# Patient Record
Sex: FEMALE | Race: WHITE | Employment: OTHER | URBAN - METROPOLITAN AREA
[De-identification: names, ages, dates, MRNs, and addresses within clinical notes are randomized per-mention and may not be internally consistent; named-entity substitution may affect disease eponyms.]

---

## 2017-01-13 ENCOUNTER — TELEPHONE (OUTPATIENT)
Dept: INTERNAL MEDICINE CLINIC | Facility: CLINIC | Age: 67
End: 2017-01-13

## 2017-01-13 DIAGNOSIS — M48.07 LUMBOSACRAL STENOSIS: Primary | ICD-10-CM

## 2017-01-13 NOTE — TELEPHONE ENCOUNTER
FW: REFERRAL  Received:  Today       Genevieve Ring MD  [facility] Clinical Staff       Phone Number: [phone]                     ok            Previous Messages       ----- Message -----   Sudha Smith From: Farshad Werner LPN      Sent: 7/31/2124   5:06 PM

## 2017-01-16 ENCOUNTER — OFFICE VISIT (OUTPATIENT)
Dept: PHYSICAL THERAPY | Age: 67
End: 2017-01-16
Attending: ORTHOPAEDIC SURGERY
Payer: MEDICARE

## 2017-01-16 DIAGNOSIS — Z98.1 S/P LUMBAR FUSION: Primary | ICD-10-CM

## 2017-01-16 DIAGNOSIS — M48.07 SPINAL STENOSIS OF LUMBOSACRAL REGION: ICD-10-CM

## 2017-01-16 PROCEDURE — 97162 PT EVAL MOD COMPLEX 30 MIN: CPT

## 2017-01-16 PROCEDURE — 97110 THERAPEUTIC EXERCISES: CPT

## 2017-01-16 NOTE — PROGRESS NOTES
SPINE EVALUATION:   Referring Physician: Dr. Jagdeep Jaeger  Diagnosis: Spinal stenosis of lumbosacral region (M48.07)     Date of Service: 1/16/2017     PATIENT SUMMARY   Fran Law is a 77year old y/o female who presents to therapy today with comp (taking meds), thyroidectomy 2008, surgery for shatterd patella mid-1990s. ASSESSMENT  Pt presents to therapy with c/o dull LBP with shooting L leg pain, intermittent throughout day. Pain started following fall over dog leash.  She is moderately irritab 4+/5   Knee extension: R 5/5; L 4+/5   PF: R 5/5; L 5/5  DF: R 5/5; L 5/5     Flexibility: Slightly limited hip flexors (bilateral)    Special tests:   Repeated Motions Testing: Standing left side glides reduced LBP and L leg pain  Lumbar Quadrant Testing: care.    Thank you for your referral. Please co-sign.  If you have any questions, please contact me at Dept: [phone]    Sincerely,  Electronically signed by therapist: Carlie Samuel PT, DPT    Physician's certification required: Yes    Certification Fr

## 2017-01-19 ENCOUNTER — OFFICE VISIT (OUTPATIENT)
Dept: PHYSICAL THERAPY | Age: 67
End: 2017-01-19
Attending: ORTHOPAEDIC SURGERY
Payer: MEDICARE

## 2017-01-19 PROCEDURE — 97112 NEUROMUSCULAR REEDUCATION: CPT

## 2017-01-19 PROCEDURE — 97140 MANUAL THERAPY 1/> REGIONS: CPT

## 2017-01-19 PROCEDURE — 97110 THERAPEUTIC EXERCISES: CPT

## 2017-01-19 NOTE — PROGRESS NOTES
Dx: Spinal stenosis of lumbosacral region (M48.07)   Authorized # of Visits: 8 Next MD Visit: After PT   Fall Risk: Slight Precautions: Fusion L4-5, MS     Subjective: Pt states she is feeling good today.  She still has occasional sharp shooting pain into L bars, EO         Single leg stance, EO, UE support x2, 0k92ark each         Prone thoracic transverse mobilization, grade II, x5 min         STM lumbar paraspinals x5min           HEP:  · Prone Press Up on Elbows; hold 1-2 minutes, twice daily  · Left Benetta Sina

## 2017-01-23 ENCOUNTER — OFFICE VISIT (OUTPATIENT)
Dept: PHYSICAL THERAPY | Age: 67
End: 2017-01-23
Attending: ORTHOPAEDIC SURGERY
Payer: MEDICARE

## 2017-01-23 PROCEDURE — 97110 THERAPEUTIC EXERCISES: CPT

## 2017-01-23 PROCEDURE — 97140 MANUAL THERAPY 1/> REGIONS: CPT

## 2017-01-23 PROCEDURE — 97112 NEUROMUSCULAR REEDUCATION: CPT

## 2017-01-23 NOTE — PROGRESS NOTES
Dx: Spinal stenosis of lumbosacral region (M48.07)   Authorized # of Visits: 8 Next MD Visit: After PT   Fall Risk: Slight Precautions: Fusion L4-5, MS     Subjective: \"Not good today. I feel tight in my left buttock and it connects to my left leg. \" Pain Tx#: 5 Date: Tx#: 6 Date: Tx#: 7 Date:    Tx#: 8   Upright bike, L1, x6min Walking in hallway x5min        Standing left side glides 2x10 ----------------        Prone press up 2x10 Standing lumbar extension 3x2x10        Flexion in standing with R fo

## 2017-01-25 RX ORDER — CELECOXIB 200 MG/1
CAPSULE ORAL
Qty: 90 CAPSULE | Refills: 1 | Status: ON HOLD | OUTPATIENT
Start: 2017-01-25 | End: 2017-04-18

## 2017-01-25 RX ORDER — TRIAMTERENE AND HYDROCHLOROTHIAZIDE 37.5; 25 MG/1; MG/1
1 TABLET ORAL
Qty: 90 TABLET | Refills: 0 | Status: ON HOLD | OUTPATIENT
Start: 2017-01-25 | End: 2017-04-24

## 2017-01-26 ENCOUNTER — OFFICE VISIT (OUTPATIENT)
Dept: PHYSICAL THERAPY | Age: 67
End: 2017-01-26
Attending: ORTHOPAEDIC SURGERY
Payer: MEDICARE

## 2017-01-26 PROCEDURE — 97110 THERAPEUTIC EXERCISES: CPT

## 2017-01-26 PROCEDURE — 97140 MANUAL THERAPY 1/> REGIONS: CPT

## 2017-01-26 NOTE — PROGRESS NOTES
Dx: Spinal stenosis of lumbosacral region (M48.07)   Authorized # of Visits: 8 Next MD Visit: After PT   Fall Risk: Slight Precautions: Fusion L4-5, MS     Subjective: \"The mornings are really bad. Lasts for a few hours after waking.  About 3/10 currently, 1/26/2017  Tx#: 4 Date: Tx#: 5 Date: Tx#: 6 Date: Tx#: 7 Date:    Tx#: 8   Upright bike, L1, x6min Walking in hallway x5min ---------------       Standing left side glides 2x10 ---------------- ----------------       Prone press up 2x10 Standing lumba progression, importance of increasing dosing of HEP throughout day. TherEx to improve mobility. NMR to improve motor control and balance. Manual therapy to decrease pain and improve mobility. Charges:  TherEx x3 (40 min), Manual x1 (x15 min)       Total

## 2017-01-30 ENCOUNTER — APPOINTMENT (OUTPATIENT)
Dept: PHYSICAL THERAPY | Age: 67
End: 2017-01-30
Attending: ORTHOPAEDIC SURGERY
Payer: MEDICARE

## 2017-01-31 ENCOUNTER — APPOINTMENT (OUTPATIENT)
Dept: PHYSICAL THERAPY | Age: 67
End: 2017-01-31
Attending: ORTHOPAEDIC SURGERY
Payer: MEDICARE

## 2017-02-02 ENCOUNTER — APPOINTMENT (OUTPATIENT)
Dept: PHYSICAL THERAPY | Age: 67
End: 2017-02-02
Attending: ORTHOPAEDIC SURGERY
Payer: MEDICARE

## 2017-02-03 ENCOUNTER — APPOINTMENT (OUTPATIENT)
Dept: PHYSICAL THERAPY | Age: 67
End: 2017-02-03
Attending: ORTHOPAEDIC SURGERY
Payer: MEDICARE

## 2017-02-06 ENCOUNTER — APPOINTMENT (OUTPATIENT)
Dept: PHYSICAL THERAPY | Age: 67
End: 2017-02-06
Attending: ORTHOPAEDIC SURGERY
Payer: MEDICARE

## 2017-02-08 ENCOUNTER — LAB ENCOUNTER (OUTPATIENT)
Dept: LAB | Age: 67
End: 2017-02-08
Attending: ORTHOPAEDIC SURGERY
Payer: MEDICARE

## 2017-02-08 DIAGNOSIS — M96.1 POST-LAMINECTOMY SYNDROME: ICD-10-CM

## 2017-02-08 LAB
ALBUMIN SERPL-MCNC: 4 G/DL (ref 3.5–4.8)
ALP LIVER SERPL-CCNC: 57 U/L (ref 55–142)
ALT SERPL-CCNC: 31 U/L (ref 14–54)
AST SERPL-CCNC: 20 U/L (ref 15–41)
BASOPHILS # BLD AUTO: 0.05 X10(3) UL (ref 0–0.1)
BASOPHILS NFR BLD AUTO: 0.8 %
BILIRUB SERPL-MCNC: 0.5 MG/DL (ref 0.1–2)
BILIRUB UR QL STRIP.AUTO: NEGATIVE
BUN BLD-MCNC: 23 MG/DL (ref 8–20)
CALCIUM BLD-MCNC: 9.5 MG/DL (ref 8.3–10.3)
CHLORIDE: 103 MMOL/L (ref 101–111)
CO2: 29 MMOL/L (ref 22–32)
COLOR UR AUTO: YELLOW
CREAT BLD-MCNC: 0.78 MG/DL (ref 0.55–1.02)
EOSINOPHIL # BLD AUTO: 0.32 X10(3) UL (ref 0–0.3)
EOSINOPHIL NFR BLD AUTO: 4.9 %
ERYTHROCYTE [DISTWIDTH] IN BLOOD BY AUTOMATED COUNT: 13.2 % (ref 11.5–16)
GLUCOSE BLD-MCNC: 92 MG/DL (ref 70–99)
GLUCOSE UR STRIP.AUTO-MCNC: NEGATIVE MG/DL
HCT VFR BLD AUTO: 41.9 % (ref 34–50)
HGB BLD-MCNC: 13.9 G/DL (ref 12–16)
IMMATURE GRANULOCYTE COUNT: 0.05 X10(3) UL (ref 0–1)
IMMATURE GRANULOCYTE RATIO %: 0.8 %
INR BLD: 0.93 (ref 0.89–1.12)
KETONES UR STRIP.AUTO-MCNC: NEGATIVE MG/DL
LYMPHOCYTES # BLD AUTO: 1.67 X10(3) UL (ref 0.9–4)
LYMPHOCYTES NFR BLD AUTO: 25.7 %
M PROTEIN MFR SERPL ELPH: 7.6 G/DL (ref 6.1–8.3)
MCH RBC QN AUTO: 31.1 PG (ref 27–33.2)
MCHC RBC AUTO-ENTMCNC: 33.2 G/DL (ref 31–37)
MCV RBC AUTO: 93.7 FL (ref 81–100)
MONOCYTES # BLD AUTO: 0.59 X10(3) UL (ref 0.1–0.6)
MONOCYTES NFR BLD AUTO: 9.1 %
NEUTROPHIL ABS PRELIM: 3.82 X10 (3) UL (ref 1.3–6.7)
NEUTROPHILS # BLD AUTO: 3.82 X10(3) UL (ref 1.3–6.7)
NEUTROPHILS NFR BLD AUTO: 58.7 %
NITRITE UR QL STRIP.AUTO: NEGATIVE
PH UR STRIP.AUTO: 7 [PH] (ref 4.5–8)
PLATELET # BLD AUTO: 265 10(3)UL (ref 150–450)
POTASSIUM SERPL-SCNC: 3.8 MMOL/L (ref 3.6–5.1)
PROT UR STRIP.AUTO-MCNC: NEGATIVE MG/DL
PSA SERPL DL<=0.01 NG/ML-MCNC: 12.7 SECONDS (ref 12.3–14.8)
RBC # BLD AUTO: 4.47 X10(6)UL (ref 3.8–5.1)
RBC UR QL AUTO: NEGATIVE
RED CELL DISTRIBUTION WIDTH-SD: 45.1 FL (ref 35.1–46.3)
SODIUM SERPL-SCNC: 140 MMOL/L (ref 136–144)
SP GR UR STRIP.AUTO: 1.01 (ref 1–1.03)
UROBILINOGEN UR STRIP.AUTO-MCNC: <2 MG/DL
WBC # BLD AUTO: 6.5 X10(3) UL (ref 4–13)

## 2017-02-08 PROCEDURE — 85025 COMPLETE CBC W/AUTO DIFF WBC: CPT

## 2017-02-08 PROCEDURE — 85610 PROTHROMBIN TIME: CPT

## 2017-02-08 PROCEDURE — 36415 COLL VENOUS BLD VENIPUNCTURE: CPT

## 2017-02-08 PROCEDURE — 80053 COMPREHEN METABOLIC PANEL: CPT

## 2017-02-08 PROCEDURE — 80061 LIPID PANEL: CPT

## 2017-02-08 PROCEDURE — 81001 URINALYSIS AUTO W/SCOPE: CPT

## 2017-02-08 PROCEDURE — 87081 CULTURE SCREEN ONLY: CPT

## 2017-02-09 ENCOUNTER — APPOINTMENT (OUTPATIENT)
Dept: PHYSICAL THERAPY | Age: 67
End: 2017-02-09
Attending: ORTHOPAEDIC SURGERY
Payer: MEDICARE

## 2017-02-09 ENCOUNTER — OFFICE VISIT (OUTPATIENT)
Dept: INTERNAL MEDICINE CLINIC | Facility: CLINIC | Age: 67
End: 2017-02-09

## 2017-02-09 VITALS
RESPIRATION RATE: 16 BRPM | DIASTOLIC BLOOD PRESSURE: 78 MMHG | BODY MASS INDEX: 31.9 KG/M2 | HEIGHT: 65 IN | HEART RATE: 71 BPM | TEMPERATURE: 99 F | OXYGEN SATURATION: 98 % | WEIGHT: 191.5 LBS | SYSTOLIC BLOOD PRESSURE: 120 MMHG

## 2017-02-09 DIAGNOSIS — Z01.818 PRE-OP EVALUATION: Primary | ICD-10-CM

## 2017-02-09 DIAGNOSIS — Z23 NEED FOR PNEUMOCOCCAL VACCINATION: ICD-10-CM

## 2017-02-09 DIAGNOSIS — L65.9 HAIR LOSS: ICD-10-CM

## 2017-02-09 PROCEDURE — G0009 ADMIN PNEUMOCOCCAL VACCINE: HCPCS | Performed by: FAMILY MEDICINE

## 2017-02-09 PROCEDURE — 90670 PCV13 VACCINE IM: CPT | Performed by: FAMILY MEDICINE

## 2017-02-09 PROCEDURE — 93000 ELECTROCARDIOGRAM COMPLETE: CPT | Performed by: FAMILY MEDICINE

## 2017-02-09 PROCEDURE — 99214 OFFICE O/P EST MOD 30 MIN: CPT | Performed by: FAMILY MEDICINE

## 2017-02-09 NOTE — PROGRESS NOTES
Shlomo Meneses is a 77year old female who presents for a pre-operative physical exam. Patient is to have surgery to assess L4-L5 fusion and to fuse L3-L4, to be done by Dr. Yuli Rust at [facility] on 3/7/17.       HPI:   Pt complains of low back p total) by mouth daily. Disp: 180 tablet Rfl: 1   Multiple Vitamin (DAILY VALUE MULTIVITAMIN) Oral Tab Take 1 tablet by mouth daily. Disp:  Rfl:    Cholecalciferol (VITAMIN D) 2000 UNITS Oral Cap Take 1 capsule by mouth daily.  Disp:  Rfl:    omeprazole 20 M Comment: glass of wine a few times per week. 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 Exercise: three times per week.   Diet: watches fats closely, watches sugar closely and watches calories closely     REVIEW OF SYSTEMS:   GENE 98[birth year]  -COMP METABOLIC PANEL (14)   Collection Time: 02/08/17  7:24 AM   Result Value Ref Range   Glucose 92 70-99 mg/dL   BUN 23 (H) 8-20 mg/dL   Creatinine 0.78 0.55-1.02 mg/dL   GFR 79 >=60   Calcium, Total 9.5 8.3-10.3 mg/dL   Alkaline Phosphatase 5 uL   Neutrophil Absolute 3.82 1.30-6.70 x10(3) uL   Lymphocyte Absolute 1.67 0.90-4.00 x10(3) uL   Monocyte Absolute 0.59 0.10-0.60 x10(3) uL   Eosinophil Absolute 0.32 (H) 0.00-0.30 x10(3) uL   Basophil Absolute 0.05 0.00-0.10 x10(3) uL   Immature Granulo

## 2017-02-10 ENCOUNTER — APPOINTMENT (OUTPATIENT)
Dept: PHYSICAL THERAPY | Age: 67
End: 2017-02-10
Attending: ORTHOPAEDIC SURGERY
Payer: MEDICARE

## 2017-02-16 RX ORDER — BACLOFEN 20 MG/1
TABLET ORAL
Qty: 90 TABLET | Refills: 0 | Status: SHIPPED | OUTPATIENT
Start: 2017-02-16 | End: 2017-03-17

## 2017-02-20 ENCOUNTER — APPOINTMENT (OUTPATIENT)
Dept: LAB | Age: 67
End: 2017-02-20
Attending: DERMATOLOGY
Payer: MEDICARE

## 2017-02-20 DIAGNOSIS — D48.5 NEOPLASM OF UNCERTAIN BEHAVIOR OF SKIN: ICD-10-CM

## 2017-02-20 PROCEDURE — 88305 TISSUE EXAM BY PATHOLOGIST: CPT

## 2017-02-24 ENCOUNTER — TELEPHONE (OUTPATIENT)
Dept: INTERNAL MEDICINE CLINIC | Facility: CLINIC | Age: 67
End: 2017-02-24

## 2017-02-24 RX ORDER — ESCITALOPRAM OXALATE 20 MG/1
TABLET ORAL
Qty: 90 TABLET | Refills: 0 | Status: ON HOLD | OUTPATIENT
Start: 2017-02-24 | End: 2017-04-18

## 2017-02-24 RX ORDER — CEPHALEXIN 500 MG/1
500 CAPSULE ORAL 2 TIMES DAILY
Qty: 6 CAPSULE | Refills: 0 | Status: SHIPPED | OUTPATIENT
Start: 2017-02-24 | End: 2017-03-15 | Stop reason: ALTCHOICE

## 2017-03-02 ENCOUNTER — TELEPHONE (OUTPATIENT)
Dept: INTERNAL MEDICINE CLINIC | Facility: CLINIC | Age: 67
End: 2017-03-02

## 2017-03-02 DIAGNOSIS — D22.9 ATYPICAL MOLE: Primary | ICD-10-CM

## 2017-03-06 ENCOUNTER — APPOINTMENT (OUTPATIENT)
Dept: LAB | Age: 67
End: 2017-03-06
Attending: DERMATOLOGY
Payer: MEDICARE

## 2017-03-06 DIAGNOSIS — D48.5 NEOPLASM OF UNCERTAIN BEHAVIOR OF SKIN: ICD-10-CM

## 2017-03-06 PROCEDURE — 88305 TISSUE EXAM BY PATHOLOGIST: CPT

## 2017-03-09 ENCOUNTER — LAB ENCOUNTER (OUTPATIENT)
Dept: LAB | Age: 67
End: 2017-03-09
Attending: Other
Payer: MEDICARE

## 2017-03-09 DIAGNOSIS — L65.9 HAIR LOSS: ICD-10-CM

## 2017-03-09 DIAGNOSIS — G35 MULTIPLE SCLEROSIS (HCC): ICD-10-CM

## 2017-03-09 LAB
ALBUMIN SERPL-MCNC: 3.9 G/DL (ref 3.5–4.8)
ALP LIVER SERPL-CCNC: 59 U/L (ref 55–142)
ALT SERPL-CCNC: 27 U/L (ref 14–54)
AST SERPL-CCNC: 18 U/L (ref 15–41)
BASOPHILS # BLD AUTO: 0.05 X10(3) UL (ref 0–0.1)
BASOPHILS NFR BLD AUTO: 0.7 %
BILIRUB SERPL-MCNC: 0.4 MG/DL (ref 0.1–2)
BUN BLD-MCNC: 19 MG/DL (ref 8–20)
CALCIUM BLD-MCNC: 9.7 MG/DL (ref 8.3–10.3)
CHLORIDE: 102 MMOL/L (ref 101–111)
CO2: 29 MMOL/L (ref 22–32)
CREAT BLD-MCNC: 0.7 MG/DL (ref 0.55–1.02)
EOSINOPHIL # BLD AUTO: 0.21 X10(3) UL (ref 0–0.3)
EOSINOPHIL NFR BLD AUTO: 2.9 %
ERYTHROCYTE [DISTWIDTH] IN BLOOD BY AUTOMATED COUNT: 13.2 % (ref 11.5–16)
FREE T4: 0.8 NG/DL (ref 0.9–1.8)
GLUCOSE BLD-MCNC: 102 MG/DL (ref 70–99)
HCT VFR BLD AUTO: 41.9 % (ref 34–50)
HGB BLD-MCNC: 14.1 G/DL (ref 12–16)
IMMATURE GRANULOCYTE COUNT: 0.02 X10(3) UL (ref 0–1)
IMMATURE GRANULOCYTE RATIO %: 0.3 %
LYMPHOCYTES # BLD AUTO: 1.63 X10(3) UL (ref 0.9–4)
LYMPHOCYTES NFR BLD AUTO: 22.7 %
M PROTEIN MFR SERPL ELPH: 7.5 G/DL (ref 6.1–8.3)
MCH RBC QN AUTO: 30.9 PG (ref 27–33.2)
MCHC RBC AUTO-ENTMCNC: 33.7 G/DL (ref 31–37)
MCV RBC AUTO: 91.7 FL (ref 81–100)
MONOCYTES # BLD AUTO: 0.77 X10(3) UL (ref 0.1–0.6)
MONOCYTES NFR BLD AUTO: 10.7 %
NEUTROPHIL ABS PRELIM: 4.5 X10 (3) UL (ref 1.3–6.7)
NEUTROPHILS # BLD AUTO: 4.5 X10(3) UL (ref 1.3–6.7)
NEUTROPHILS NFR BLD AUTO: 62.7 %
PLATELET # BLD AUTO: 273 10(3)UL (ref 150–450)
POTASSIUM SERPL-SCNC: 4.1 MMOL/L (ref 3.6–5.1)
RBC # BLD AUTO: 4.57 X10(6)UL (ref 3.8–5.1)
RED CELL DISTRIBUTION WIDTH-SD: 44.6 FL (ref 35.1–46.3)
SODIUM SERPL-SCNC: 139 MMOL/L (ref 136–144)
TSI SER-ACNC: 1.77 MIU/ML (ref 0.35–5.5)
WBC # BLD AUTO: 7.2 X10(3) UL (ref 4–13)

## 2017-03-09 PROCEDURE — 80053 COMPREHEN METABOLIC PANEL: CPT

## 2017-03-09 PROCEDURE — 84443 ASSAY THYROID STIM HORMONE: CPT

## 2017-03-09 PROCEDURE — 36415 COLL VENOUS BLD VENIPUNCTURE: CPT

## 2017-03-09 PROCEDURE — 85025 COMPLETE CBC W/AUTO DIFF WBC: CPT

## 2017-03-09 PROCEDURE — 84439 ASSAY OF FREE THYROXINE: CPT

## 2017-03-13 RX ORDER — AMLODIPINE BESYLATE 5 MG/1
10 TABLET ORAL DAILY
Qty: 180 TABLET | Refills: 0 | Status: SHIPPED | OUTPATIENT
Start: 2017-03-13 | End: 2017-06-05

## 2017-03-16 ENCOUNTER — TELEPHONE (OUTPATIENT)
Dept: NEUROLOGY | Facility: CLINIC | Age: 67
End: 2017-03-16

## 2017-03-16 DIAGNOSIS — G35 MULTIPLE SCLEROSIS (HCC): Primary | ICD-10-CM

## 2017-03-16 RX ORDER — DIMETHYL FUMARATE 240 MG/1
240 CAPSULE ORAL 2 TIMES DAILY
Qty: 180 CAPSULE | Refills: 3 | Status: SHIPPED | OUTPATIENT
Start: 2017-03-16 | End: 2018-04-09

## 2017-03-16 NOTE — TELEPHONE ENCOUNTER
Contacted patient to clarify request.  This appears to be a new specialty pharmacy. She verified that she can no longer fill her Tecfidera at [facility], and needs new Rx sent to [id number]. Last Rx written on 12/9/16 with #180/3.   She confirms that she

## 2017-03-16 NOTE — TELEPHONE ENCOUNTER
Spoke with patient and relayed that Tecfidera was e-scribed, and there were no further recommendations for blood work. She verbalized understanding.

## 2017-03-17 RX ORDER — BACLOFEN 20 MG/1
TABLET ORAL
Qty: 90 TABLET | Refills: 0 | Status: SHIPPED | OUTPATIENT
Start: 2017-03-17 | End: 2017-06-01

## 2017-04-06 ENCOUNTER — ANESTHESIA EVENT (OUTPATIENT)
Dept: SURGERY | Facility: HOSPITAL | Age: 67
End: 2017-04-06

## 2017-04-10 RX ORDER — BACLOFEN 20 MG/1
TABLET ORAL
Qty: 90 TABLET | Refills: 0 | Status: ON HOLD | OUTPATIENT
Start: 2017-04-10 | End: 2017-04-18

## 2017-04-11 ENCOUNTER — HOSPITAL ENCOUNTER (OUTPATIENT)
Dept: PHYSICAL THERAPY | Facility: HOSPITAL | Age: 67
Discharge: HOME | End: 2017-04-11
Attending: ORTHOPAEDIC SURGERY
Payer: MEDICARE

## 2017-04-11 ENCOUNTER — LABORATORY ENCOUNTER (OUTPATIENT)
Dept: LAB | Facility: HOSPITAL | Age: 67
End: 2017-04-11
Attending: ORTHOPAEDIC SURGERY
Payer: MEDICARE

## 2017-04-11 DIAGNOSIS — M48.061 LUMBAR SPINAL STENOSIS: ICD-10-CM

## 2017-04-11 DIAGNOSIS — I10 ESSENTIAL HYPERTENSION: ICD-10-CM

## 2017-04-11 PROCEDURE — 85025 COMPLETE CBC W/AUTO DIFF WBC: CPT

## 2017-04-11 PROCEDURE — 36415 COLL VENOUS BLD VENIPUNCTURE: CPT

## 2017-04-11 PROCEDURE — 80053 COMPREHEN METABOLIC PANEL: CPT

## 2017-04-11 PROCEDURE — 85610 PROTHROMBIN TIME: CPT

## 2017-04-11 PROCEDURE — 81001 URINALYSIS AUTO W/SCOPE: CPT

## 2017-04-11 PROCEDURE — 87081 CULTURE SCREEN ONLY: CPT

## 2017-04-18 ENCOUNTER — SURGERY (OUTPATIENT)
Age: 67
End: 2017-04-18

## 2017-04-18 ENCOUNTER — ANESTHESIA (OUTPATIENT)
Dept: SURGERY | Facility: HOSPITAL | Age: 67
End: 2017-04-18

## 2017-04-18 ENCOUNTER — APPOINTMENT (OUTPATIENT)
Dept: GENERAL RADIOLOGY | Facility: HOSPITAL | Age: 67
DRG: 460 | End: 2017-04-18
Attending: ORTHOPAEDIC SURGERY
Payer: MEDICARE

## 2017-04-18 ENCOUNTER — HOSPITAL ENCOUNTER (INPATIENT)
Facility: HOSPITAL | Age: 67
LOS: 6 days | Discharge: NURSING, REHAB OR LONG-TERM CARE | DRG: 460 | End: 2017-04-24
Attending: ORTHOPAEDIC SURGERY | Admitting: ORTHOPAEDIC SURGERY
Payer: MEDICARE

## 2017-04-18 DIAGNOSIS — I10 ESSENTIAL HYPERTENSION: Primary | ICD-10-CM

## 2017-04-18 DIAGNOSIS — M48.061 LUMBAR SPINAL STENOSIS: ICD-10-CM

## 2017-04-18 PROCEDURE — 0SG00A1 FUSION OF LUMBAR VERTEBRAL JOINT WITH INTERBODY FUSION DEVICE, POSTERIOR APPROACH, POSTERIOR COLUMN, OPEN APPROACH: ICD-10-PCS | Performed by: ORTHOPAEDIC SURGERY

## 2017-04-18 PROCEDURE — 01NB0ZZ RELEASE LUMBAR NERVE, OPEN APPROACH: ICD-10-PCS | Performed by: ORTHOPAEDIC SURGERY

## 2017-04-18 PROCEDURE — 99223 1ST HOSP IP/OBS HIGH 75: CPT | Performed by: INTERNAL MEDICINE

## 2017-04-18 PROCEDURE — 0QP004Z REMOVAL OF INTERNAL FIXATION DEVICE FROM LUMBAR VERTEBRA, OPEN APPROACH: ICD-10-PCS | Performed by: ORTHOPAEDIC SURGERY

## 2017-04-18 PROCEDURE — 76001 XR FLUOROSCOPE EXAM >1 HR EXTENSIVE (CPT=76001): CPT

## 2017-04-18 PROCEDURE — 0QJY0ZZ INSPECTION OF LOWER BONE, OPEN APPROACH: ICD-10-PCS | Performed by: ORTHOPAEDIC SURGERY

## 2017-04-18 PROCEDURE — 0SB20ZZ EXCISION OF LUMBAR VERTEBRAL DISC, OPEN APPROACH: ICD-10-PCS | Performed by: ORTHOPAEDIC SURGERY

## 2017-04-18 PROCEDURE — 4A11X4G MONITORING OF PERIPHERAL NERVOUS ELECTRICAL ACTIVITY, INTRAOPERATIVE, EXTERNAL APPROACH: ICD-10-PCS | Performed by: ORTHOPAEDIC SURGERY

## 2017-04-18 PROCEDURE — 0SG0071 FUSION OF LUMBAR VERTEBRAL JOINT WITH AUTOLOGOUS TISSUE SUBSTITUTE, POSTERIOR APPROACH, POSTERIOR COLUMN, OPEN APPROACH: ICD-10-PCS | Performed by: ORTHOPAEDIC SURGERY

## 2017-04-18 DEVICE — DURAGENXS MATRIX DURAL REGENERATION MATRIX IS AN ABSORBABLE IMPLANT FOR REPAIR OF DURAL DEFECTS. DURAGENXS MATRIX IS AN EASY TO HANDLE, SOFT, WHITE, PLIABLE, NONFRIABLE, POROUS COLLAGEN MATRIX. DURAGENXS MATRIX IS SUPPLIED STERILE, NONPYROGENIC, FOR SINGLE USE IN DOUBLE PEEL PACKAGES IN A VARIETY OF SIZES.
Type: IMPLANTABLE DEVICE | Site: BACK | Status: FUNCTIONAL
Brand: DURAGEN XS™ DURAL REGENERATION MATRIX

## 2017-04-18 DEVICE — IMPLANTABLE DEVICE: Type: IMPLANTABLE DEVICE | Site: BACK | Status: FUNCTIONAL

## 2017-04-18 DEVICE — SCREW SET SPNE VRST GRY: Type: IMPLANTABLE DEVICE | Site: BACK | Status: FUNCTIONAL

## 2017-04-18 DEVICE — GRAFT BN DMNR FBR 30ML: Type: IMPLANTABLE DEVICE | Site: BACK | Status: FUNCTIONAL

## 2017-04-18 DEVICE — BONE GRAFT KIT 7510200 INFUSE SMALL
Type: IMPLANTABLE DEVICE | Site: BACK | Status: FUNCTIONAL
Brand: INFUSE® BONE GRAFT

## 2017-04-18 DEVICE — FLOSEAL SEALENT STERILE 10ML: Type: IMPLANTABLE DEVICE | Site: BACK | Status: FUNCTIONAL

## 2017-04-18 RX ORDER — DIPHENHYDRAMINE HCL 25 MG
25 CAPSULE ORAL EVERY 4 HOURS PRN
Status: DISCONTINUED | OUTPATIENT
Start: 2017-04-18 | End: 2017-04-24

## 2017-04-18 RX ORDER — BACLOFEN 20 MG/1
20 TABLET ORAL 3 TIMES DAILY
Status: ON HOLD | COMMUNITY
End: 2017-04-20

## 2017-04-18 RX ORDER — BACLOFEN 10 MG/1
20 TABLET ORAL 2 TIMES DAILY
Status: DISCONTINUED | OUTPATIENT
Start: 2017-04-18 | End: 2017-04-24

## 2017-04-18 RX ORDER — CELECOXIB 200 MG/1
200 CAPSULE ORAL DAILY
COMMUNITY
End: 2017-07-25

## 2017-04-18 RX ORDER — HYDROCODONE BITARTRATE AND ACETAMINOPHEN 10; 325 MG/1; MG/1
1 TABLET ORAL EVERY 4 HOURS PRN
Qty: 90 TABLET | Refills: 0 | Status: SHIPPED | OUTPATIENT
Start: 2017-04-18 | End: 2017-04-24

## 2017-04-18 RX ORDER — SODIUM CHLORIDE, SODIUM LACTATE, POTASSIUM CHLORIDE, CALCIUM CHLORIDE 600; 310; 30; 20 MG/100ML; MG/100ML; MG/100ML; MG/100ML
INJECTION, SOLUTION INTRAVENOUS CONTINUOUS
Status: DISCONTINUED | OUTPATIENT
Start: 2017-04-18 | End: 2017-04-24

## 2017-04-18 RX ORDER — ESCITALOPRAM OXALATE 20 MG/1
20 TABLET ORAL DAILY
COMMUNITY
End: 2017-08-25

## 2017-04-18 RX ORDER — HYDROCODONE BITARTRATE AND ACETAMINOPHEN 10; 325 MG/1; MG/1
1 TABLET ORAL EVERY 4 HOURS PRN
Status: DISCONTINUED | OUTPATIENT
Start: 2017-04-18 | End: 2017-04-19

## 2017-04-18 RX ORDER — HYDROMORPHONE HYDROCHLORIDE 1 MG/ML
0.4 INJECTION, SOLUTION INTRAMUSCULAR; INTRAVENOUS; SUBCUTANEOUS EVERY 5 MIN PRN
Status: DISCONTINUED | OUTPATIENT
Start: 2017-04-18 | End: 2017-04-18 | Stop reason: HOSPADM

## 2017-04-18 RX ORDER — NALOXONE HYDROCHLORIDE 0.4 MG/ML
80 INJECTION, SOLUTION INTRAMUSCULAR; INTRAVENOUS; SUBCUTANEOUS AS NEEDED
Status: DISCONTINUED | OUTPATIENT
Start: 2017-04-18 | End: 2017-04-18 | Stop reason: HOSPADM

## 2017-04-18 RX ORDER — AMLODIPINE BESYLATE 5 MG/1
10 TABLET ORAL DAILY
Status: DISCONTINUED | OUTPATIENT
Start: 2017-04-19 | End: 2017-04-24

## 2017-04-18 RX ORDER — BISACODYL 10 MG
10 SUPPOSITORY, RECTAL RECTAL
Status: DISCONTINUED | OUTPATIENT
Start: 2017-04-18 | End: 2017-04-24

## 2017-04-18 RX ORDER — MEPERIDINE HYDROCHLORIDE 25 MG/ML
12.5 INJECTION INTRAMUSCULAR; INTRAVENOUS; SUBCUTANEOUS AS NEEDED
Status: DISCONTINUED | OUTPATIENT
Start: 2017-04-18 | End: 2017-04-18 | Stop reason: HOSPADM

## 2017-04-18 RX ORDER — ESCITALOPRAM OXALATE 20 MG/1
20 TABLET ORAL DAILY
Status: DISCONTINUED | OUTPATIENT
Start: 2017-04-18 | End: 2017-04-18

## 2017-04-18 RX ORDER — DOCUSATE SODIUM 100 MG/1
100 CAPSULE, LIQUID FILLED ORAL 2 TIMES DAILY
Status: DISCONTINUED | OUTPATIENT
Start: 2017-04-18 | End: 2017-04-24

## 2017-04-18 RX ORDER — HYDROCODONE BITARTRATE AND ACETAMINOPHEN 10; 325 MG/1; MG/1
2 TABLET ORAL EVERY 4 HOURS PRN
Status: DISCONTINUED | OUTPATIENT
Start: 2017-04-18 | End: 2017-04-19

## 2017-04-18 RX ORDER — ONDANSETRON 2 MG/ML
4 INJECTION INTRAMUSCULAR; INTRAVENOUS EVERY 4 HOURS PRN
Status: DISPENSED | OUTPATIENT
Start: 2017-04-18 | End: 2017-04-19

## 2017-04-18 RX ORDER — CYCLOBENZAPRINE HCL 10 MG
10 TABLET ORAL 3 TIMES DAILY
Qty: 90 TABLET | Refills: 0 | Status: SHIPPED | OUTPATIENT
Start: 2017-04-18 | End: 2017-05-26

## 2017-04-18 RX ORDER — SODIUM CHLORIDE, SODIUM LACTATE, POTASSIUM CHLORIDE, CALCIUM CHLORIDE 600; 310; 30; 20 MG/100ML; MG/100ML; MG/100ML; MG/100ML
INJECTION, SOLUTION INTRAVENOUS CONTINUOUS
Status: DISCONTINUED | OUTPATIENT
Start: 2017-04-18 | End: 2017-04-19

## 2017-04-18 RX ORDER — NALBUPHINE HCL 10 MG/ML
2.5 AMPUL (ML) INJECTION EVERY 4 HOURS PRN
Status: DISCONTINUED | OUTPATIENT
Start: 2017-04-18 | End: 2017-04-24

## 2017-04-18 RX ORDER — DIPHENHYDRAMINE HYDROCHLORIDE 50 MG/ML
25 INJECTION INTRAMUSCULAR; INTRAVENOUS EVERY 4 HOURS PRN
Status: DISCONTINUED | OUTPATIENT
Start: 2017-04-18 | End: 2017-04-24

## 2017-04-18 RX ORDER — ATORVASTATIN CALCIUM 20 MG/1
20 TABLET, FILM COATED ORAL NIGHTLY
Status: DISCONTINUED | OUTPATIENT
Start: 2017-04-18 | End: 2017-04-24

## 2017-04-18 RX ORDER — MIDAZOLAM HYDROCHLORIDE 1 MG/ML
1 INJECTION INTRAMUSCULAR; INTRAVENOUS EVERY 5 MIN PRN
Status: DISCONTINUED | OUTPATIENT
Start: 2017-04-18 | End: 2017-04-18 | Stop reason: HOSPADM

## 2017-04-18 RX ORDER — ONDANSETRON 2 MG/ML
4 INJECTION INTRAMUSCULAR; INTRAVENOUS EVERY 6 HOURS PRN
Status: DISCONTINUED | OUTPATIENT
Start: 2017-04-18 | End: 2017-04-24

## 2017-04-18 RX ORDER — ESTRADIOL 0.1 MG/G
CREAM VAGINAL
COMMUNITY
End: 2017-08-04

## 2017-04-18 RX ORDER — ESCITALOPRAM OXALATE 20 MG/1
20 TABLET ORAL EVERY EVENING
Status: DISCONTINUED | OUTPATIENT
Start: 2017-04-18 | End: 2017-04-18

## 2017-04-18 RX ORDER — DIPHENHYDRAMINE HYDROCHLORIDE 50 MG/ML
12.5 INJECTION INTRAMUSCULAR; INTRAVENOUS AS NEEDED
Status: DISCONTINUED | OUTPATIENT
Start: 2017-04-18 | End: 2017-04-18 | Stop reason: HOSPADM

## 2017-04-18 RX ORDER — BACLOFEN 10 MG/1
20 TABLET ORAL 3 TIMES DAILY
Status: DISCONTINUED | OUTPATIENT
Start: 2017-04-18 | End: 2017-04-18

## 2017-04-18 RX ORDER — ESCITALOPRAM OXALATE 20 MG/1
20 TABLET ORAL NIGHTLY
Status: DISCONTINUED | OUTPATIENT
Start: 2017-04-18 | End: 2017-04-24

## 2017-04-18 RX ORDER — SULFAMETHOXAZOLE AND TRIMETHOPRIM 800; 160 MG/1; MG/1
1 TABLET ORAL 2 TIMES DAILY
Status: DISCONTINUED | OUTPATIENT
Start: 2017-04-18 | End: 2017-04-20

## 2017-04-18 RX ORDER — ONDANSETRON 2 MG/ML
4 INJECTION INTRAMUSCULAR; INTRAVENOUS AS NEEDED
Status: DISCONTINUED | OUTPATIENT
Start: 2017-04-18 | End: 2017-04-18 | Stop reason: HOSPADM

## 2017-04-18 RX ORDER — DIPHENHYDRAMINE HYDROCHLORIDE 50 MG/ML
12.5 INJECTION INTRAMUSCULAR; INTRAVENOUS EVERY 4 HOURS PRN
Status: DISCONTINUED | OUTPATIENT
Start: 2017-04-18 | End: 2017-04-24

## 2017-04-18 RX ORDER — POLYETHYLENE GLYCOL 3350 17 G/17G
17 POWDER, FOR SOLUTION ORAL DAILY PRN
Status: DISCONTINUED | OUTPATIENT
Start: 2017-04-18 | End: 2017-04-24

## 2017-04-18 RX ORDER — HYDROMORPHONE HYDROCHLORIDE 1 MG/ML
0.4 INJECTION, SOLUTION INTRAMUSCULAR; INTRAVENOUS; SUBCUTANEOUS EVERY 30 MIN PRN
Status: DISCONTINUED | OUTPATIENT
Start: 2017-04-18 | End: 2017-04-24

## 2017-04-18 RX ORDER — HYDROMORPHONE HYDROCHLORIDE 1 MG/ML
INJECTION, SOLUTION INTRAMUSCULAR; INTRAVENOUS; SUBCUTANEOUS
Status: COMPLETED
Start: 2017-04-18 | End: 2017-04-18

## 2017-04-18 RX ORDER — LEVOTHYROXINE SODIUM 0.03 MG/1
25 TABLET ORAL
Status: DISCONTINUED | OUTPATIENT
Start: 2017-04-19 | End: 2017-04-24

## 2017-04-18 RX ORDER — CYCLOBENZAPRINE HCL 10 MG
10 TABLET ORAL EVERY 8 HOURS PRN
Status: DISCONTINUED | OUTPATIENT
Start: 2017-04-18 | End: 2017-04-24

## 2017-04-18 RX ORDER — GENTAMICIN SULFATE 40 MG/ML
INJECTION, SOLUTION INTRAMUSCULAR; INTRAVENOUS AS NEEDED
Status: DISCONTINUED | OUTPATIENT
Start: 2017-04-18 | End: 2017-04-18 | Stop reason: HOSPADM

## 2017-04-18 RX ORDER — SENNOSIDES 8.6 MG
17.2 TABLET ORAL NIGHTLY
Status: DISCONTINUED | OUTPATIENT
Start: 2017-04-18 | End: 2017-04-24

## 2017-04-18 RX ORDER — PANTOPRAZOLE SODIUM 20 MG/1
20 TABLET, DELAYED RELEASE ORAL
Status: DISCONTINUED | OUTPATIENT
Start: 2017-04-19 | End: 2017-04-24

## 2017-04-18 RX ORDER — DIMETHYL FUMARATE 240 MG/1
240 CAPSULE ORAL 2 TIMES DAILY
Status: DISCONTINUED | OUTPATIENT
Start: 2017-04-18 | End: 2017-04-24

## 2017-04-18 RX ORDER — METOCLOPRAMIDE HYDROCHLORIDE 5 MG/ML
10 INJECTION INTRAMUSCULAR; INTRAVENOUS EVERY 6 HOURS PRN
Status: DISCONTINUED | OUTPATIENT
Start: 2017-04-18 | End: 2017-04-24

## 2017-04-18 RX ORDER — SODIUM PHOSPHATE, DIBASIC AND SODIUM PHOSPHATE, MONOBASIC 7; 19 G/133ML; G/133ML
1 ENEMA RECTAL ONCE AS NEEDED
Status: ACTIVE | OUTPATIENT
Start: 2017-04-18 | End: 2017-04-18

## 2017-04-18 RX ORDER — LEVOTHYROXINE SODIUM 0.03 MG/1
25 TABLET ORAL
COMMUNITY
End: 2017-08-15

## 2017-04-18 RX ORDER — NALOXONE HYDROCHLORIDE 0.4 MG/ML
0.08 INJECTION, SOLUTION INTRAMUSCULAR; INTRAVENOUS; SUBCUTANEOUS
Status: DISCONTINUED | OUTPATIENT
Start: 2017-04-18 | End: 2017-04-24

## 2017-04-18 NOTE — ANESTHESIA PREPROCEDURE EVALUATION
PRE-OP EVALUATION    Patient Name: Arielle Michael    Pre-op Diagnosis: POSTLAMINECTOMY SYNDROME      Procedure(s):  INSPECTION  OF FUSION LUMBAR 4-5 AND LAMINECTOMY WITH FUSION AND EXTENSION OF LUMBAR 3-4     Surgeon(s) and Role:     * Dalton Zuleta (two) times daily. Disp: 180 capsule Rfl: 3   Multiple Vitamin (DAILY VALUE MULTIVITAMIN) Oral Tab Take 1 tablet by mouth daily. Disp:  Rfl:    Cholecalciferol (VITAMIN D) 2000 UNITS Oral Cap Take 1 capsule by mouth daily.  Disp:  Rfl:    omeprazole 20 MG O 04/11/2017   .0 04/11/2017       Lab Results  Component Value Date    04/11/2017   K 3.4* 04/11/2017   CL 98* 04/11/2017   CO2 30.0 04/11/2017   BUN 28* 04/11/2017   CREATSERUM 0.68 04/11/2017   GLU 86 04/11/2017   CA 10.0 04/11/2017       Lab

## 2017-04-18 NOTE — PROGRESS NOTES
NURSING ADMISSION NOTE      Patient admitted via bed  Oriented to room. Safety precautions initiated. Bed in low position. Call light in reach.

## 2017-04-18 NOTE — ANESTHESIA POSTPROCEDURE EVALUATION
[facility] Patient Status:  Surgery Admit   Age/Gender 79year old female MRN [id number]   [facility] SURGERY Attending Sandy Barrios MD   Hosp Day # 0 PCP Kaitlin Jean MD       Anesthesia Post-op Note    Proced

## 2017-04-18 NOTE — OPERATIVE REPORT
[facility]  Operative Report    Izaiah Alicea Patient Status:  Surgery Admit    3/21/1950 MRN [id number]   [facility] SURGERY Attending Yessica Leger MD   Hosp Day # 0 PCP Alexandrea Evans MD         PREOPERATIVE DIAGNOSIS:   Spo procedure, DVT prophylaxis, antibiotic prophylaxis, consent, availability, sterility of implant, x-rays. All operating personnel concurred and were introduced to each other. Then she was prepped and draped in the usual fashion.  We obtained AP and lateral f exiting nerve root. We exposed the disc as well. We moved the lamina and decompressed the spinal canal. Then we incised the disc, used a series of chen up to the appropriate size. Confirmed its position with the AP and lateral fluoroscopy.  We used an an

## 2017-04-18 NOTE — PROGRESS NOTES
Staff attempted to get the patient up however very unsteady and complaints of dizziness.  Changed of dressing done 70% saturated

## 2017-04-18 NOTE — H&P
[facility]    Spine Surgery H&P  Dr. Wei Mansfield Patient Status:  Surgery Admit    3/21/1950 MRN [id number]   Location [address] [place] [address] Attending Yessica Leger MD   Hosp Day # 0 PCP Alexandrea Evans accommodate. Pupils are approximately 3mm and react to 2mm with reaction to light. Oropharynx is clear. Lungs: Clear to auscultation bilaterally. Cardiac: Regular rate and rhythm. No murmur.   Abdomen:  Soft, non-distended, non-tender, with no rebound o

## 2017-04-18 NOTE — PROGRESS NOTES
[facility] Pharmacy Note:  Pain Consult    Erika Moss is a 79year old female started on Dilaudid PCA. Pharmacy was consulted to review medication profile and to discontinue previously ordered narcotics and sedatives.     Medication profile was reviewed

## 2017-04-19 PROCEDURE — 99232 SBSQ HOSP IP/OBS MODERATE 35: CPT | Performed by: STUDENT IN AN ORGANIZED HEALTH CARE EDUCATION/TRAINING PROGRAM

## 2017-04-19 RX ORDER — POTASSIUM CHLORIDE 20 MEQ/1
40 TABLET, EXTENDED RELEASE ORAL EVERY 4 HOURS
Status: COMPLETED | OUTPATIENT
Start: 2017-04-19 | End: 2017-04-20

## 2017-04-19 RX ORDER — POTASSIUM CHLORIDE 20 MEQ/1
40 TABLET, EXTENDED RELEASE ORAL EVERY 4 HOURS
Status: DISCONTINUED | OUTPATIENT
Start: 2017-04-19 | End: 2017-04-19

## 2017-04-19 RX ORDER — OXYCODONE HYDROCHLORIDE AND ACETAMINOPHEN 5; 325 MG/1; MG/1
1 TABLET ORAL EVERY 4 HOURS PRN
Status: DISCONTINUED | OUTPATIENT
Start: 2017-04-19 | End: 2017-04-24

## 2017-04-19 RX ORDER — OXYCODONE HYDROCHLORIDE AND ACETAMINOPHEN 5; 325 MG/1; MG/1
2 TABLET ORAL EVERY 4 HOURS PRN
Status: DISCONTINUED | OUTPATIENT
Start: 2017-04-19 | End: 2017-04-24

## 2017-04-19 NOTE — OCCUPATIONAL THERAPY NOTE
OCCUPATIONAL THERAPY EVALUATION - INPATIENT     Room Number: 380/380-A  Evaluation Date: 4/19/2017  Type of Evaluation: Initial  Presenting Problem: Lumbar surgery- refer to Medical History section    Physician Order: IP Consult to Occupational Therapy  Re Comment w/ removal of both ovaries d/t DBT and fibroid    THYROIDECTOMY      Comment partial thyroidectomy Lt    LAMINECTOMY,LUMBAR  4/1/2010    Comment L4 L5 fusion    COLONOSCOPY  1/2003    REMOVAL OF OVARIAN CYST(S)      Comment x2    FOOT/TOES SURGE does the patient currently need…  -   Putting on and taking off regular lower body clothing?: A Lot  -   Bathing (including washing, rinsing, drying)?: A Lot  -   Toileting, which includes using toilet, bedpan or urinal? : A Lot  -   Putting on and taking Pt has history of MS.  In this OT evaluation patient presents with the following impairments: Drowsiness,  Decreased B LE strength, decreased trunk mobility, decreased standing balance, decreased standing endurance, and impaired knowledge about proper body 3 out of 3 spine precautions independently.

## 2017-04-19 NOTE — PROGRESS NOTES
[facility]  Progress Note     Maurisio Foil Patient Status:  Inpatient    3/21/1950 MRN [id number]   [facility] 3SW-A Attending Mariel Munson MD   Hosp Day # 1 PCP Sonja Jenkins MD     Chief Complaint: Medical Mgmt     S: P 20 mg Oral BID   • escitalopram  20 mg Oral Nightly       ASSESSMENT / PLAN:     1. Spondylolisthesis s/p L4/L5 lumbar fusion   2. Hypertension  1. Continue amlodipine  2. Continue to hold HCTZ reassess BP in AM  3. Depression- continue lexapro  4.  GERD-

## 2017-04-19 NOTE — PROGRESS NOTES
[facility]  Progress Note    Elijah Fay Patient Status:  Inpatient    3/21/1950 MRN [id number]   [facility] 3SW-A Attending Jolene Granger MD   Hosp Day # 1 PCP Leyla Lozada MD     Subjective:  Elijah Fay is a(n) 79 clear.      Neck: No tenderness to palpitation. Full range of motion to flexion and extension, lateral rotation and lateral flexion of cervical spine. No JVD. Supple. Lungs: Clear to auscultation bilaterally. Cardiac: Regular rate and rhythm.  No murmu

## 2017-04-19 NOTE — PLAN OF CARE
PT REFUSED HOLLOWAY OUT ,PT STATES\" IM NOT STRONG ENOUGH TO GET OOB  TODAY. PT REFUSED  LS SPINE XRY UNTIL RENETTA. PT MADE AWARE CONSEQUENCES OF INCREASE UTI. SEE NEW ORDER

## 2017-04-20 PROCEDURE — 99232 SBSQ HOSP IP/OBS MODERATE 35: CPT | Performed by: STUDENT IN AN ORGANIZED HEALTH CARE EDUCATION/TRAINING PROGRAM

## 2017-04-20 RX ORDER — OXYCODONE HYDROCHLORIDE AND ACETAMINOPHEN 5; 325 MG/1; MG/1
1 TABLET ORAL EVERY 4 HOURS PRN
Qty: 90 TABLET | Refills: 1 | Status: SHIPPED | OUTPATIENT
Start: 2017-04-20 | End: 2017-04-30

## 2017-04-20 NOTE — CM/SW NOTE
04/20/17 1000   CM/SW Referral Data   Referral Source Other  (PT/OT)   Reason for Referral Discharge planning   Informant Patient;Edward Staff   Pertinent Medical Hx   Primary Care Physician Name MD Garo   Patient Info   Patient's Mental Status Aleidalia

## 2017-04-20 NOTE — PROGRESS NOTES
Reminded patient and  about discharge/education class today. Reviewed prevention of constipation side effect  from narcotics. Teachback done again on ankle pumps and incentive spriometry use. Solicited and answered any questions pt had about care.  Pt

## 2017-04-20 NOTE — PHYSICAL THERAPY NOTE
PHYSICAL THERAPY TREATMENT NOTE - INPATIENT    Room Number: 380/380-A     Session: 1   Number of Visits to Meet Established Goals: 4    Presenting Problem: s/p lumbar fusion    Problem List  Principal Problem:    Lumbar spinal stenosis  Active Problems: back  Management Techniques: Activity promotion; Body mechanics;Breathing techniques;Relaxation;Repositioning    BALANCE                                                                                                                     Static Sitting: Poor Patient End of Session: Up in chair;Needs met;Call light within reach;RN aware of session/findings; All patient questions and concerns addressed;SCDs in place; Ice applied         ASSESSMENT      Patient is a 79year old female admitted 4/18/2017 for

## 2017-04-20 NOTE — OCCUPATIONAL THERAPY NOTE
OCCUPATIONAL THERAPY TREATMENT NOTE - INPATIENT     Room Number: 380/380-A  Session: 1   Number of Visits to Meet Established Goals: 5    Presenting Problem: Lumbar surgery- refer to Medical History section    History related to current admission: Pt was a LAMINECTOMY,LUMBAR  4/1/2010    Comment L4 L5 fusion    COLONOSCOPY  1/2003    REMOVAL OF OVARIAN CYST(S)      Comment x2    FOOT/TOES SURGERY PROC UNLISTED  1/2005    Comment hemmertoes operation Rt toe    HYSTERECTOMY         SUBJECTIVE  \"I'm doing bett back to bedside commode via RW, mod assist plus close SBA x1, increased time, and safety cueing > transfer to commode via mod assist to control descent and safety cues for hand placement > toileting including pericare and clothing management in standing vi Potential : Good  Frequency (Obs): Daily    ADL Goals  Patient will perform lower body dressing:  with min assist, with adaptive equipment PRN and while maintaining back safety precautions --> in progress  Patient will perform toileting: with min assist --

## 2017-04-20 NOTE — PLAN OF CARE
DR BROOKS PA INFORMED PT NOT VOID YET. BLADDER SCAN 300CC. PT KEEP REFUSING FOR STRAIGHT CATH. PT STATES\" GIVE ME SOMETIMES TO CONCENTRATE\" PA ADVICE IF UNABLE TO VOID BY 8 PM TO CONSULT [facility] UROLOGIST. WILL INFORMED PT.

## 2017-04-20 NOTE — CM/SW NOTE
Informed by PT that ANTOINE still recommended. CRISELDA spoke with pt. She would like to know what her insurance coverage would be for ANTOINE- she is interested in [address] and agree with referral to check benefits. Referral sent via [facility] [address].

## 2017-04-20 NOTE — PROGRESS NOTES
[facility]  Progress Note    Ned Corado Patient Status:  Inpatient    3/21/1950 MRN [id number]   [facility] 3SW-A Attending Gurinder Vital MD   Hosp Day # 2 PCP Cookie Aguilar MD     Subjective:  Ned Corado is a(n) 79 Pupils are approximately 3mm and react to 2mm with reaction to light. Oropharynx is clear. Neck: No tenderness to palpitation. Full range of motion to flexion and extension, lateral rotation and lateral flexion of cervical spine. No JVD. Supple.

## 2017-04-20 NOTE — CM/SW NOTE
Informed by Lee Kuhn with [facility] [place] [place] [phone] that pt has no copay for days 1-10. She did inform pt of this. ANTOINE will obtain insurance auth. CRISELDA spoke with pt and she confirms above. Insurance auth pending  SW following.

## 2017-04-20 NOTE — PROGRESS NOTES
[facility]  Progress Note     Trudy Jamaal Patient Status:  Inpatient    3/21/1950 MRN [id number]   [facility] 3SW-A Attending Annamarie May MD   Hosp Day # 2 PCP Lizz Loyd MD     Chief Complaint: Medical Mgmt     S: P breakfast   • Sulfamethoxazole-TMP DS  1 tablet Oral BID   • baclofen  20 mg Oral BID   • escitalopram  20 mg Oral Nightly       ASSESSMENT / PLAN:     1. Spondylolisthesis s/p L4/L5 lumbar fusion   2. Hypertension  1. Continue amlodipine  2.  Continue to h

## 2017-04-20 NOTE — PLAN OF CARE
UNABLE TO DO LS XRY D/T PT UNABLE TO STAND ALONE. NEED 2 ASSIST TO STAND UP. DR BROOKS PA AWARE. PT REQ TO TALK TO SW RE: Paz Ahumada. SW INFORMED

## 2017-04-21 ENCOUNTER — APPOINTMENT (OUTPATIENT)
Dept: GENERAL RADIOLOGY | Facility: HOSPITAL | Age: 67
DRG: 460 | End: 2017-04-21
Attending: PHYSICIAN ASSISTANT
Payer: MEDICARE

## 2017-04-21 ENCOUNTER — APPOINTMENT (OUTPATIENT)
Dept: ULTRASOUND IMAGING | Facility: HOSPITAL | Age: 67
DRG: 460 | End: 2017-04-21
Attending: STUDENT IN AN ORGANIZED HEALTH CARE EDUCATION/TRAINING PROGRAM
Payer: MEDICARE

## 2017-04-21 PROCEDURE — 72100 X-RAY EXAM L-S SPINE 2/3 VWS: CPT

## 2017-04-21 PROCEDURE — 93970 EXTREMITY STUDY: CPT

## 2017-04-21 PROCEDURE — 99233 SBSQ HOSP IP/OBS HIGH 50: CPT | Performed by: STUDENT IN AN ORGANIZED HEALTH CARE EDUCATION/TRAINING PROGRAM

## 2017-04-21 NOTE — CM/SW NOTE
Informed by Ninfa [place] with [facility] that PMR consult completed- indicating acute rehab. Message from [facility] cm that acute rehab has been approved. [facility] is the in network option for acute rehab. RN updated- no d/c today but likely tomorrow.     Pt/ mona

## 2017-04-21 NOTE — PROGRESS NOTES
Reminded patient about discharge/education class today. Patient declines to attend and states her  does not want to attend as they are familiar with the anticipated discharged instructions.  Reviewed prevention of constipation side effect  from narco

## 2017-04-21 NOTE — CM/SW NOTE
Informed by RN that Yesica Vo ordering a PMR consult for rehab at [facility]. She notes that he does not feel that pt will receive needed rehab at [facility]. \Hospital [facility] a [facility] and Outlook.\"". Referral sent via [facility]. Jonas Escobar, nurse liaison with [facility], aware of order.   Updated Lesly Linares with [facility] re: P

## 2017-04-21 NOTE — PLAN OF CARE
Assumed care for this patient at 0730: patient alert and oriented. Nauseated this am. Prn zofran given. Pod#3 fusion lumbar 4-5 and laminectomy. Pain controlled with po pain meds. Patient with bilateral leg weakness and pain.  Venous ultrasound negative for

## 2017-04-21 NOTE — PHYSICAL THERAPY NOTE
PHYSICAL THERAPY TREATMENT NOTE - INPATIENT    Room Number: 380/380-A     Session: 2  Number of Visits to Meet Established Goals: 4    Presenting Problem: s/p lumbar fusion    Problem List  Principal Problem:    Lumbar spinal stenosis  Active Problems: ASSESSMENT   Ratin  Location: low back  Management Techniques: Activity promotion; Body mechanics;Breathing techniques;Relaxation;Repositioning    BALANCE knee instability. Pt returned to bedside chair with mod A. Updated pt on role of PT, POC, DC planning/recs, positioning.,activity. Patient End of Session: In bed;Needs met;Call light within reach;RN aware of session/findings; All patient questions a

## 2017-04-21 NOTE — OCCUPATIONAL THERAPY NOTE
OCCUPATIONAL THERAPY TREATMENT NOTE - INPATIENT     Room Number: 380/380-A  Session: 2   Number of Visits to Meet Established Goals: 5    Presenting Problem: Lumbar surgery- refer to Medical History section    History related to current admission: Pt was a Comment partial thyroidectomy Lt    LAMINECTOMY,LUMBAR  4/1/2010    Comment L4 L5 fusion    COLONOSCOPY  1/2003    REMOVAL OF OVARIAN CYST(S)      Comment x2    FOOT/TOES SURGERY PROC UNLISTED  1/2005    Comment hemmertoes operation Rt toe    HYSTERECTOMY Needs met;Call light within reach; In bed;RN aware of session/findings; All patient questions and concerns addressed;SCDs in place    ASSESSMENT   Pt is progressing towards OT goals.  Pt presents with deficits in endurance, LE strength, balance, safety, activ

## 2017-04-21 NOTE — PROGRESS NOTES
[facility]  Progress Note     Fran Generous Patient Status:  Inpatient    3/21/1950 MRN [id number]   [facility] 3SW-A Attending Los Sneed MD   Hosp Day # 3 PCP Michael Daniel MD     Chief Complaint: Medical Mgmt     S: P breakfast   • baclofen  20 mg Oral BID   • escitalopram  20 mg Oral Nightly       ASSESSMENT / PLAN:     1. Spondylolisthesis s/p L4/L5 lumbar fusion   2. Hypertension  1. Continue amlodipine  2. Continue to hold HCTZ , may resume on DC  3.  Depression- con

## 2017-04-21 NOTE — CONSULTS
PHYSICAL MEDICINE AND REHABILITATION CONSULTATION     CC: Impaired mobility and ADL dysfunction secondary to lumbar spondylolisthesis s/p L4-5 fusion    HPI: This is a 79year old female with history of MS with stress incontinence, LE spasticity, GERD, HTN 40 mEq 40 mEq Oral Q4H   [DISCONTINUED] lactated ringers infusion  Intravenous Continuous   [COMPLETED] CeFAZolin Sodium (ANCEF/KEFZOL) IVPB premix 2 g 2 g Intravenous Once   [DISCONTINUED] lactated ringers infusion  Intravenous Continuous   [DISCONTINUED] (ANCEF/KEFZOL) IVPB premix 2 g 2 g Intravenous Q8H   ondansetron HCl (ZOFRAN) injection 4 mg 4 mg Intravenous Q6H PRN   Naloxone HCl (NARCAN) 0.4 MG/ML injection 0.08 mg 0.08 mg Intravenous Q5 Min PRN   DiphenhydrAMINE HCl (BENADRYL) injection 12.5 mg 12. 5 sclerosis ([facility] [address] 2000     chronic side effects include fatique and \"brain fog\" and decreased vision of left eye, left sided weakness due to MS   • PONV (postoperative nausea and vomiting)      severe - projectile vomiting for the entire night    • Back pr for bed mobility, mod (A) for functional transfers, setup for upper body ADL, and max (A) for lower body ADL.      Precautions: fall, spine  FULL CODE    OBJECTIVE:    /64 mmHg  Pulse 74  Temp(Src) 98.4 °F (36.9 °C) (Oral)  Resp 18  Ht 5' 5\" (1.651 m Depression  9.  GERD    IMPAIRMENTS: ADLs, functional mobility, balance, spasticity, strength, endurance, self care, transfers, hygiene    RECOMMENDATIONS: ACUTE INPATIENT REHAB    Based on patient's current functional status with medical needs including pa

## 2017-04-21 NOTE — PROGRESS NOTES
[facility]  Progress Note    Ramirezbrianna Kylee Patient Status:  Inpatient    3/21/1950 MRN [id number]   [facility] 3SW-A Attending Janice Arreola MD   Hosp Day # 3 PCP Mick Guzman MD     Subjective:  Daniel Pichardo is a(n) 79 Supple. Lungs: Clear to auscultation bilaterally. Cardiac: Regular rate and rhythm. No murmur. Abdomen:  Soft, non-distended, non-tender, with no rebound or guarding. No peritoneal signs. No ascites. Liver is within normal limits.   Spleen is not palp

## 2017-04-22 ENCOUNTER — APPOINTMENT (OUTPATIENT)
Dept: GENERAL RADIOLOGY | Facility: HOSPITAL | Age: 67
DRG: 460 | End: 2017-04-22
Attending: Other
Payer: MEDICARE

## 2017-04-22 PROCEDURE — 71010 XR CHEST AP PORTABLE  (CPT=71010): CPT

## 2017-04-22 PROCEDURE — 99233 SBSQ HOSP IP/OBS HIGH 50: CPT | Performed by: STUDENT IN AN ORGANIZED HEALTH CARE EDUCATION/TRAINING PROGRAM

## 2017-04-22 PROCEDURE — 99223 1ST HOSP IP/OBS HIGH 75: CPT | Performed by: OTHER

## 2017-04-22 NOTE — PHYSICAL THERAPY NOTE
PHYSICAL THERAPY TREATMENT NOTE - INPATIENT    Room Number: 380/380-A     Session: 3  Number of Visits to Meet Established Goals: 4    Presenting Problem: s/p lumbar fusion    Problem List  Principal Problem:    Lumbar spinal stenosis  Active Problems: PAIN ASSESSMENT   Ratin  Location: L LE  Management Techniques: Activity promotion; Body mechanics;Breathing techniques;Relaxation;Repositioning    BALANCE to fear of ambulating without 2-person assist.  Pt req'd extra time for ambulation due to slow marilyn. Pt c/o LE weakness and demonstrated mild LE buckling again during last 10' of ambulation.   Pt demonstrated improved sequencing and use of RW during gai level: moderate assistance       Goal #2  Patient is able to demonstrate transfers Sit to/from Stand at assistance level: moderate assistance       Goal #3  Patient is able to ambulate 50 feet with assist device: walker - rolling at assistance level: minim

## 2017-04-22 NOTE — OCCUPATIONAL THERAPY NOTE
OCCUPATIONAL THERAPY TREATMENT NOTE - INPATIENT     Room Number: 380/380-A  Session: 3  Number of Visits to Meet Established Goals: 5    Presenting Problem: Lumbar surgery- refer to Medical History section    History related to current admission: Pt was ad Comment partial thyroidectomy Lt    LAMINECTOMY,LUMBAR  4/1/2010    Comment L4 L5 fusion    COLONOSCOPY  1/2003    REMOVAL OF OVARIAN CYST(S)      Comment x2    FOOT/TOES SURGERY PROC UNLISTED  1/2005    Comment hemmertoes operation Rt toe    HYSTERECTOMY rw use in home environment and decreasing fall risk in home environment.   Patient End of Session: Up in chair;Needs met;Call light within reach;RN aware of session/findings; All patient questions and concerns addressed    ASSESSMENT   Patient is a 79 year precautions independently.  --> MET 4/20; continued reinforcement required to incorporate into ADLs

## 2017-04-22 NOTE — PROGRESS NOTES
[facility]  Progress Note     Parvin Lorenzo Patient Status:  Inpatient    3/21/1950 MRN [id number]   [facility] [place] 3SW-A Attending Consuelo Selby MD   Hosp Day # 4 PCP Ron Jett MD     Chief Complaint: Medical Mgmt     S: P Oral BID   • escitalopram  20 mg Oral Nightly       ASSESSMENT / PLAN:     1. Spondylolisthesis s/p L4/L5 lumbar fusion   2. Hypertension  1. Continue amlodipine  2. Continue to hold HCTZ , may resume on DC  3. Depression- continue lexapro  4.  GERD- contin

## 2017-04-22 NOTE — PLAN OF CARE
Pt has not voided since ford D/Matthew at 0630, bladder scan shows 126mL after sitting on BSC for 10 minutes without ability to void. Will continue to monitor. Pt tried to get up and void again, only drops, no significant void.     Neurology paged, has not

## 2017-04-22 NOTE — PLAN OF CARE
DISCHARGE PLANNING    • Discharge to home or other facility with appropriate resources Adequate for Discharge        GENITOURINARY - ADULT    • Absence of urinary retention Adequate for Discharge        Impaired Activities of Daily Living    • Achieve high

## 2017-04-23 ENCOUNTER — APPOINTMENT (OUTPATIENT)
Dept: MRI IMAGING | Facility: HOSPITAL | Age: 67
DRG: 460 | End: 2017-04-23
Attending: Other
Payer: MEDICARE

## 2017-04-23 PROCEDURE — 72156 MRI NECK SPINE W/O & W/DYE: CPT

## 2017-04-23 PROCEDURE — 99233 SBSQ HOSP IP/OBS HIGH 50: CPT | Performed by: STUDENT IN AN ORGANIZED HEALTH CARE EDUCATION/TRAINING PROGRAM

## 2017-04-23 PROCEDURE — 72157 MRI CHEST SPINE W/O & W/DYE: CPT

## 2017-04-23 PROCEDURE — 99233 SBSQ HOSP IP/OBS HIGH 50: CPT | Performed by: OTHER

## 2017-04-23 NOTE — PLAN OF CARE
DISCHARGE PLANNING    • Discharge to home or other facility with appropriate resources Progressing        GENITOURINARY - ADULT    • Absence of urinary retention Progressing        Impaired Activities of Daily Living    • Achieve highest/safest level of in

## 2017-04-23 NOTE — PHYSICAL THERAPY NOTE
PHYSICAL THERAPY RE-EVALUATION - INPATIENT    Room Number: 380/380-A     Session: 4  Number of Visits to Meet Established Goals: 4    Presenting Problem: s/p lumbar fusion    Problem List  Principal Problem:    Lumbar spinal stenosis  Active Problems: hump.\"    Patient’s self-stated goal is return home. OBJECTIVE  Precautions: Spine; Other (Comment) (pt has co-morbidity of MS)    WEIGHT BEARING RESTRICTION  Weight Bearing Restriction: None                PAIN ASSESSMENT   Rating: 3  Location: low [facility] care and manage clothing independently. Pt sit->stand from toilet to RW with SBA. Ambulated 3ft to sink using RW and washed hands with SBA. Pt then ambulated 200' in hallway using RW with CGA.   Pt demonstrated proper UE placement with sit<->stand t with assist device: walker - rolling at assistance level: minimum assistance    MET 04/23/2017; New Goal 200ft with SBA   Goal #4  Patient to be able to independently verbalize 3/3 spinal precautions MET 04/23/2017   Goal #5  Patient to be I with HEP MET 0

## 2017-04-23 NOTE — OCCUPATIONAL THERAPY NOTE
OCCUPATIONAL THERAPY TREATMENT NOTE - INPATIENT     Room Number: 380/380-A  Session: 4  Number of Visits to Meet Established Goals: 5    Presenting Problem: Lumbar surgery- refer to Medical History section    History related to current admission: Pt was ad ABDOM HYSTERECTOMY      Comment w/ removal of both ovaries d/t DBT and fibroid    THYROIDECTOMY      Comment partial thyroidectomy Lt    LAMINECTOMY,LUMBAR  4/1/2010    Comment L4 L5 fusion    COLONOSCOPY  1/2003    REMOVAL OF OVARIAN CYST(S)      Comment End of Session: Up in chair;Needs met;Call light within reach;RN aware of session/findings; All patient questions and concerns addressed; Family present    ASSESSMENT   Patient is a 79year old male admitted 4/18/17 for spinal surgery.  In this OT treatment s

## 2017-04-23 NOTE — CONSULTS
[facility]    Report of Consultation    Hakan Lan Patient Status:  Inpatient    3/21/1950 MRN [id number]   [facility] 3SW-A Attending Obie Dubin, MD   [facility] Day # 4 PCP Keyanna Grigsby MD     Date of Admission:  2017 Past Surgical History    BACK SURGERY      Comment cervical surgery/ACDF x 3 with Dr. Diana Miranda          Comment x2    TOTAL ABDOM HYSTERECTOMY      Comment w/ removal of both ovaries d/t DBT and fibroid    THYROIDECTOMY      Comment partial th cap/tab 25 mg, 25 mg, Oral, Q4H PRN **OR** DiphenhydrAMINE HCl (BENADRYL) injection 25 mg, 25 mg, Intravenous, Q4H PRN  •  lactated ringers infusion, , Intravenous, Continuous  •  ondansetron HCl (ZOFRAN) injection 4 mg, 4 mg, Intravenous, Q6H PRN  •  Nalo Sensation to light touch is intact bilaterally. Motor: RUE 5/5, LUE distally 4/5, LE's- HF very limited effort as she is not able/allowed to due to recent lumbar fusion, KE/KF 5-/5, DF/PF 5/5. Finger-to-nose coordination is intact.   Gait difficulty stand allowing me to participate in the evaluation of your patient,    Esteban Monzon, DO  Neurology and Neuromuscular medicine  [facility]   pager [phone]

## 2017-04-23 NOTE — PHYSICAL THERAPY NOTE
Attempted to see patient for PT treatment this AM, however, patient was not in room (at MRI per RN report). Will make another attempt later today as schedule permits.

## 2017-04-23 NOTE — PROGRESS NOTES
[facility]  Progress Note     Maurisio Brown Patient Status:  Inpatient    3/21/1950 MRN [id number]   [facility] 3SW-A Attending Mariel Munson MD   Hosp Day # 5 PCP Sonja Jenkins MD     Chief Complaint: Medical Mgmt     S: P 20 mg Oral BID   • escitalopram  20 mg Oral Nightly       ASSESSMENT / PLAN:     1. Spondylolisthesis s/p L4/L5 lumbar fusion   2. Hypertension  1. Continue amlodipine  2. Continue to hold HCTZ , may resume on DC  3. Depression- continue lexapro  4.  GERD-

## 2017-04-23 NOTE — PROGRESS NOTES
[facility]    Progress Note    Murali Segovia Patient Status:  Inpatient    3/21/1950 MRN [id number]   [facility] 3SW-A Attending Srikanth Cueva MD   Hosp Day # 5 PCP Alonzo Tovar MD     Subjective:  Murali Segovia is a(n) IVPB 1g/100ml in 0.9% NaCl minibag/add-van 1 g Intravenous Q8H   MethylPREDNISolone Sodium Succ (Solu-MEDROL) 500 mg in sodium chloride 0.9 % 100 mL IVPB 500 mg Intravenous Q12H   oxyCODONE-acetaminophen (PERCOCET) 5-325 MG per tab 1 tablet 1 tablet Oral Q C2 suggestive of plaque  MRI brain ~2013- periventricular T2 flair lesions    Here yesterday : MRI c and t spine reviewed independently the images- no compression arthritic lesions, and old plaques seen, no enhancement      Assessment:  Patient Active Prob

## 2017-04-23 NOTE — PROGRESS NOTES
[facility]  Progress Note    Megan Buchanan Patient Status:  Inpatient    3/21/1950 MRN [id number]   [facility] 3SW-A Attending Javier Chong MD   Hosp Day # 4 PCP Cam Lyon MD     Subjective:  Megan Buchanan is a(n) 79 flexion and extension, lateral rotation and lateral flexion of cervical spine. No JVD. Supple. Lungs: Clear to auscultation bilaterally. Cardiac: Regular rate and rhythm. No murmur.   Abdomen:  Soft, non-distended, non-tender, with no rebound or guardin

## 2017-04-24 ENCOUNTER — TELEPHONE (OUTPATIENT)
Dept: INTERNAL MEDICINE CLINIC | Facility: CLINIC | Age: 67
End: 2017-04-24

## 2017-04-24 VITALS
WEIGHT: 194 LBS | HEART RATE: 81 BPM | OXYGEN SATURATION: 95 % | DIASTOLIC BLOOD PRESSURE: 70 MMHG | SYSTOLIC BLOOD PRESSURE: 131 MMHG | BODY MASS INDEX: 32.32 KG/M2 | RESPIRATION RATE: 18 BRPM | HEIGHT: 65 IN | TEMPERATURE: 98 F

## 2017-04-24 PROCEDURE — 99232 SBSQ HOSP IP/OBS MODERATE 35: CPT | Performed by: STUDENT IN AN ORGANIZED HEALTH CARE EDUCATION/TRAINING PROGRAM

## 2017-04-24 RX ORDER — TRIAMTERENE AND HYDROCHLOROTHIAZIDE 37.5; 25 MG/1; MG/1
1 TABLET ORAL
Qty: 90 TABLET | Refills: 0 | Status: SHIPPED | OUTPATIENT
Start: 2017-04-24 | End: 2017-06-05

## 2017-04-24 RX ORDER — OXYCODONE HYDROCHLORIDE AND ACETAMINOPHEN 5; 325 MG/1; MG/1
2 TABLET ORAL EVERY 4 HOURS PRN
Qty: 10 TABLET | Refills: 0 | Status: SHIPPED | OUTPATIENT
Start: 2017-04-24 | End: 2017-04-24

## 2017-04-24 RX ORDER — OXYCODONE HYDROCHLORIDE AND ACETAMINOPHEN 5; 325 MG/1; MG/1
1 TABLET ORAL EVERY 4 HOURS PRN
Qty: 10 TABLET | Refills: 0 | Status: SHIPPED | OUTPATIENT
Start: 2017-04-24 | End: 2017-04-24

## 2017-04-24 NOTE — CM/SW NOTE
Discussed pt during daily rounds. Per RN Gen pt is cleared by MDs for discharge today. Spoke with Anthony Vanegas from [facility] and she will get a bed assisgnment for pt some time today. Met with pt. She agrees with plan and would like [address] [place] [address] transport.    She

## 2017-04-24 NOTE — PROGRESS NOTES
Chart reviewed.     80 yo s/p David approach bilateral, removal of cami pedicle screw construct L4/5, inspection of fusion L4/5, revision decompression L3/4, interbody fusion with 2 cages L3/4, posterior segmental fixation from L3-L4-L5, posterolatera

## 2017-04-24 NOTE — CM/SW NOTE
04/24/17 1600   Discharge disposition   Discharged to: [address] [place] [address]   Name of Facillity/Home Care/Hospice Shana Ates   Discharge transportation [facility]

## 2017-04-24 NOTE — CM/SW NOTE
Call from [employer] with [facility]. They are running 20-30 minutes behind. Informed RN Gen who will inform pt.

## 2017-04-24 NOTE — PLAN OF CARE
DISCHARGE PLANNING    • Discharge to home or other facility with appropriate resources Progressing        PAIN - ADULT    • Verbalizes/displays adequate comfort level or patient's stated pain goal Progressing        Verbalized has adequate pain control to

## 2017-04-24 NOTE — CM/SW NOTE
Per Carrillo Lonodn with [facility], pt will go to room 3314 at facility. Requests 3pm discharge time. Contacted Jazmin Hernandez with [facility] and they can provide 3pm medivan. Updated pt and RN Gen. RN to call report to [phone].

## 2017-04-24 NOTE — PHYSICAL THERAPY NOTE
PHYSICAL THERAPY TREATMENT NOTE - INPATIENT    Room Number: 380/380-A     Session: 5   Number of Visits to Meet Established Goals: 4    Presenting Problem: s/p lumbar fusion    Problem List  Principal Problem:    Lumbar spinal stenosis  Active Problems: for transport to rehab. Patient’s self-stated goal is to return home after rehab. OBJECTIVE  Precautions: Spine; Other (Comment) (pt has co-morbidity of MS)    WEIGHT BEARING RESTRICTION  Weight Bearing Restriction: None                PAIN ASSESSMEN supine to sit with supervision, while maintaining spinal precautions with log roll technique. Patient transferred sit to stand with supervision. Patient ambulated 150 ft to rehab gym with rolling walker and CGA.  Patient ascended and descended 8 stairs with Goal #2   Patient is able to demonstrate transfers Sit to/from Stand at assistance level: moderate assistance     MET 04/23/2017; New Goal: SBA-Goal met 4/24/17   Goal #3   Patient is able to ambulate 50 feet with assist device: walker - rolling at [employer]

## 2017-04-24 NOTE — PROGRESS NOTES
[facility]  Progress Note     Trudy Hinton Patient Status:  Inpatient    3/21/1950 MRN [id number]   [facility] 3SW-A Attending Annamarie May MD   Hosp Day # 6 PCP Lizz Loyd MD     Chief Complaint: Medical Mgmt     S: P 1. Spondylolisthesis s/p L4/L5 lumbar fusion   2. Hypertension  1. Continue amlodipine, HCTZ on d/c  3. Depression- continue lexapro  4. GERD- continue PPI  5. HLD- continue statin  6. Hypothyroid - continue levothyroxine   7. MS  8.  Anterior distal le

## 2017-05-02 NOTE — DISCHARGE SUMMARY
Discharge Summary  Patient ID:  Lyudmila Leon  [id number]  79year old  3/21/1950    Admit date: 4/18/2017    Discharge date and time: 4/24/17  Attending Physician: No att. providers found     Reason for admission: post op/ MS  Discharge Diagnoses: POST (two) times daily. , Normal, Disp-180 capsule, R-3, FAISAL    AmLODIPine Besylate 5 MG Oral Tab  Take 2 tablets (10 mg total) by mouth daily. , Normal, Disp-180 tablet, R-0    Atorvastatin Calcium 20 MG Oral Tab  Take 1 tablet (20 mg total) by mouth nightly.  Pt

## 2017-05-04 ENCOUNTER — TELEPHONE (OUTPATIENT)
Dept: INTERNAL MEDICINE CLINIC | Facility: CLINIC | Age: 67
End: 2017-05-04

## 2017-05-04 DIAGNOSIS — M23.52 CHRONIC INSTABILITY OF LEFT KNEE: Primary | ICD-10-CM

## 2017-05-04 DIAGNOSIS — G35 MULTIPLE SCLEROSIS (HCC): ICD-10-CM

## 2017-05-04 NOTE — TELEPHONE ENCOUNTER
referral for DME  Received:  Today       Dawit [facility]       Cc: [facility] Referral Pool                     Received call this morning from Anais. Feliberto Mcbride faxed over a request to your office.   I am going to give you

## 2017-05-05 ENCOUNTER — TELEPHONE (OUTPATIENT)
Dept: NEUROLOGY | Facility: CLINIC | Age: 67
End: 2017-05-05

## 2017-05-05 NOTE — TELEPHONE ENCOUNTER
S: Patient experiencing left leg weakness, with no numbness, s/p lumbar fusion L4/5. B: Last seen by Dr. Gayle Tapia for MS on 12/09/16; stable on Tecfidera. Had lumbar fusion on 04/18/17.      A: According to progress notes on 04/18/17 at 1430, \"Dr. Ernesto Bhat

## 2017-05-05 NOTE — TELEPHONE ENCOUNTER
Left detailed message (acceptable per HIPPA) regarding doctor recommendations. Informed the patient to call the office back to schedule an appointment and ask to speak to a nurse for further clarification.

## 2017-05-08 ENCOUNTER — TELEPHONE (OUTPATIENT)
Dept: INTERNAL MEDICINE CLINIC | Facility: CLINIC | Age: 67
End: 2017-05-08

## 2017-05-10 RX ORDER — BACLOFEN 20 MG/1
TABLET ORAL
Qty: 90 TABLET | Refills: 0 | Status: SHIPPED | OUTPATIENT
Start: 2017-05-10 | End: 2018-02-27

## 2017-05-17 ENCOUNTER — OFFICE VISIT (OUTPATIENT)
Dept: INTERNAL MEDICINE CLINIC | Facility: CLINIC | Age: 67
End: 2017-05-17

## 2017-05-17 VITALS
RESPIRATION RATE: 16 BRPM | HEIGHT: 65 IN | SYSTOLIC BLOOD PRESSURE: 118 MMHG | HEART RATE: 74 BPM | DIASTOLIC BLOOD PRESSURE: 84 MMHG | TEMPERATURE: 98 F

## 2017-05-17 DIAGNOSIS — M48.061 LUMBAR SPINAL STENOSIS: Primary | ICD-10-CM

## 2017-05-17 DIAGNOSIS — Z12.31 VISIT FOR SCREENING MAMMOGRAM: ICD-10-CM

## 2017-05-17 DIAGNOSIS — E03.8 OTHER SPECIFIED HYPOTHYROIDISM: ICD-10-CM

## 2017-05-17 DIAGNOSIS — M25.362 KNEE INSTABILITY, LEFT: ICD-10-CM

## 2017-05-17 DIAGNOSIS — I10 ESSENTIAL HYPERTENSION: ICD-10-CM

## 2017-05-17 PROCEDURE — 99214 OFFICE O/P EST MOD 30 MIN: CPT | Performed by: FAMILY MEDICINE

## 2017-05-17 NOTE — PROGRESS NOTES
HPI:    Patient ID: Jaspal Cruz is a 79year old female. HPI  Jaspal Cruz is a 79year old female.   HPI:   Here for f/u from lumbar sx a month ago  Did have relapse of MS after the sx   Seeing Dr Sree Velazquez now for it   Had 3 steroid drips afterw MULTIVITAMIN) Oral Tab Take 1 tablet by mouth daily. Disp:  Rfl:    Cholecalciferol (VITAMIN D) 2000 UNITS Oral Cap Take 1 capsule by mouth daily.  Disp:  Rfl:    omeprazole 20 MG Oral Capsule Delayed Release Take 1 capsule (20 mg total) by mouth every morn (M48.06) Lumbar spinal stenosis  (primary encounter diagnosis)  Plan: s/p sx  Seeing Dr Odalys Selby    ([address]) Essential hypertension  Plan: stable       (E03.8) Other specified hypothyroidism  Plan: recent labs ok       (Z12.31) Visit for screening mammogr Vitamin (DAILY VALUE MULTIVITAMIN) Oral Tab Take 1 tablet by mouth daily. Disp:  Rfl:    Cholecalciferol (VITAMIN D) 2000 UNITS Oral Cap Take 1 capsule by mouth daily.  Disp:  Rfl:    omeprazole 20 MG Oral Capsule Delayed Release Take 1 capsule (20 mg total

## 2017-05-30 ENCOUNTER — OFFICE VISIT (OUTPATIENT)
Dept: PHYSICAL THERAPY | Age: 67
End: 2017-05-30
Attending: ORTHOPAEDIC SURGERY
Payer: MEDICARE

## 2017-05-30 DIAGNOSIS — Z98.1 S/P LUMBAR FUSION: ICD-10-CM

## 2017-05-30 DIAGNOSIS — R52 PAIN: Primary | ICD-10-CM

## 2017-05-30 PROCEDURE — 97110 THERAPEUTIC EXERCISES: CPT

## 2017-05-30 PROCEDURE — 97163 PT EVAL HIGH COMPLEX 45 MIN: CPT

## 2017-05-30 NOTE — PROGRESS NOTES
SPINE EVALUATION:   Referring Physician: Dr. Vince Flores  Diagnosis: Difficulty walking (R 26.2)  S/P LUMBAR LAMINECTOMY POST LAT INTERBODY FUS 2 LEVEL on 4/18/2017 Date of Service: 5/30/2017     PATIENT SUMMARY   Gema Guevara is a 79year old y/o fem unstable with L leg due to weakness. Gus Lan describes prior level of function able to do all activities prior to surgery despite pain, no leg weakness, able to ambulate with a cane.  Pt goals include would like to do anything prior, be able to walk withou given.  Charges: PT Eval High Complexity based on FOTO, comorbidities and time it took to complete evaluation Thera Ex 1      Total Timed Treatment: 45 min     Total Treatment Time: 45 min   PLAN OF CARE:    Goals:    Patient will be independent with HEP i

## 2017-05-31 ENCOUNTER — HOSPITAL ENCOUNTER (OUTPATIENT)
Dept: MAMMOGRAPHY | Age: 67
Discharge: HOME | End: 2017-05-31
Attending: FAMILY MEDICINE
Payer: MEDICARE

## 2017-05-31 DIAGNOSIS — Z12.31 VISIT FOR SCREENING MAMMOGRAM: ICD-10-CM

## 2017-05-31 PROCEDURE — 77067 SCR MAMMO BI INCL CAD: CPT | Performed by: FAMILY MEDICINE

## 2017-06-01 ENCOUNTER — OFFICE VISIT (OUTPATIENT)
Dept: PHYSICAL THERAPY | Age: 67
End: 2017-06-01
Attending: ORTHOPAEDIC SURGERY
Payer: MEDICARE

## 2017-06-01 PROCEDURE — 97110 THERAPEUTIC EXERCISES: CPT

## 2017-06-01 PROCEDURE — 97112 NEUROMUSCULAR REEDUCATION: CPT

## 2017-06-01 RX ORDER — TRIAMTERENE AND HYDROCHLOROTHIAZIDE 37.5; 25 MG/1; MG/1
1 TABLET ORAL
Qty: 90 TABLET | Refills: 1 | OUTPATIENT
Start: 2017-06-01

## 2017-06-01 RX ORDER — ATORVASTATIN CALCIUM 20 MG/1
20 TABLET, FILM COATED ORAL NIGHTLY
Qty: 30 TABLET | Refills: 5 | Status: SHIPPED | OUTPATIENT
Start: 2017-06-01 | End: 2017-10-26

## 2017-06-01 RX ORDER — BACLOFEN 20 MG/1
TABLET ORAL
Qty: 90 TABLET | Refills: 0 | Status: SHIPPED | OUTPATIENT
Start: 2017-06-01 | End: 2017-08-04

## 2017-06-01 NOTE — PROGRESS NOTES
Dx: Difficulty walking (R 26.2)  S/P LUMBAR LAMINECTOMY POST LAT INTERBODY FUS 2 LEVEL on 4/18/2017       Authorized # of Visits:   Will see for 12 visits         Next MD visit: none scheduled  Fall Risk: standard         Precautions: n/a             [place] 2 (25 min) Neuro re-ed 1 (15 min)       Total Timed Treatment: 40 min  Total Treatment Time: 50 min

## 2017-06-02 ENCOUNTER — HOSPITAL ENCOUNTER (OUTPATIENT)
Dept: MRI IMAGING | Age: 67
Discharge: HOME | End: 2017-06-02
Attending: ORTHOPAEDIC SURGERY
Payer: MEDICARE

## 2017-06-02 DIAGNOSIS — Z98.1 S/P LUMBAR FUSION: ICD-10-CM

## 2017-06-02 DIAGNOSIS — R52 PAIN: ICD-10-CM

## 2017-06-02 PROCEDURE — 72158 MRI LUMBAR SPINE W/O & W/DYE: CPT | Performed by: ORTHOPAEDIC SURGERY

## 2017-06-02 PROCEDURE — A9575 INJ GADOTERATE MEGLUMI 0.1ML: HCPCS | Performed by: ORTHOPAEDIC SURGERY

## 2017-06-05 ENCOUNTER — OFFICE VISIT (OUTPATIENT)
Dept: PHYSICAL THERAPY | Age: 67
End: 2017-06-05
Attending: ORTHOPAEDIC SURGERY
Payer: MEDICARE

## 2017-06-05 PROCEDURE — 97112 NEUROMUSCULAR REEDUCATION: CPT

## 2017-06-05 PROCEDURE — 97110 THERAPEUTIC EXERCISES: CPT

## 2017-06-05 NOTE — PROGRESS NOTES
Dx: Difficulty walking (R 26.2)  S/P LUMBAR LAMINECTOMY POST LAT INTERBODY FUS 2 LEVEL on 4/18/2017       Authorized # of Visits:   Will see for 12 visits         Next MD visit: none scheduled  Fall Risk: standard         Precautions: n/a             [employer] in parallel bars 2X DLS on airex 10 sec X 10 reps        Backward walk parallel bars 2X DLS on airex with head turn R and L 5X        Standing hip flex, abd, extension 10X each R and L purple tband Standing hip flex, abd, extension 10X 2 each R and L black

## 2017-06-06 RX ORDER — AMLODIPINE BESYLATE 5 MG/1
10 TABLET ORAL DAILY
Qty: 180 TABLET | Refills: 0 | Status: SHIPPED | OUTPATIENT
Start: 2017-06-06 | End: 2017-08-29

## 2017-06-06 RX ORDER — TRIAMTERENE AND HYDROCHLOROTHIAZIDE 37.5; 25 MG/1; MG/1
1 TABLET ORAL
Qty: 90 TABLET | Refills: 0 | Status: SHIPPED | OUTPATIENT
Start: 2017-06-06 | End: 2018-01-31

## 2017-06-07 ENCOUNTER — APPOINTMENT (OUTPATIENT)
Dept: PHYSICAL THERAPY | Age: 67
End: 2017-06-07
Attending: ORTHOPAEDIC SURGERY
Payer: MEDICARE

## 2017-06-09 ENCOUNTER — HOSPITAL ENCOUNTER (OUTPATIENT)
Dept: CT IMAGING | Facility: HOSPITAL | Age: 67
Discharge: HOME | End: 2017-06-09
Attending: ORTHOPAEDIC SURGERY
Payer: MEDICARE

## 2017-06-09 DIAGNOSIS — Z98.1 S/P LUMBAR FUSION: ICD-10-CM

## 2017-06-09 PROCEDURE — 72131 CT LUMBAR SPINE W/O DYE: CPT | Performed by: ORTHOPAEDIC SURGERY

## 2017-06-15 ENCOUNTER — OFFICE VISIT (OUTPATIENT)
Dept: PHYSICAL THERAPY | Age: 67
End: 2017-06-15
Attending: ORTHOPAEDIC SURGERY
Payer: MEDICARE

## 2017-06-15 PROCEDURE — 97112 NEUROMUSCULAR REEDUCATION: CPT

## 2017-06-15 PROCEDURE — 97110 THERAPEUTIC EXERCISES: CPT

## 2017-06-15 NOTE — PROGRESS NOTES
Dx: Difficulty walking (R 26.2)  S/P LUMBAR LAMINECTOMY POST LAT INTERBODY FUS 2 LEVEL on 4/18/2017       Authorized # of Visits:   Will see for 12 visits         Next MD visit: none scheduled  Fall Risk: standard         Precautions: n/a             [place] no support       B heel raises 20X Sit to stand 10X 2 Hip stand flex, abd, ext black tband 20X       Lateral walk in parallel bars 2X DLS on airex 10 sec X 10 reps March airex 30X       Backward walk parallel bars 2X DLS on airex with head turn R and L 5X

## 2017-06-16 ENCOUNTER — OFFICE VISIT (OUTPATIENT)
Dept: PHYSICAL THERAPY | Age: 67
End: 2017-06-16
Attending: ORTHOPAEDIC SURGERY
Payer: MEDICARE

## 2017-06-16 PROCEDURE — 97116 GAIT TRAINING THERAPY: CPT

## 2017-06-16 PROCEDURE — 97112 NEUROMUSCULAR REEDUCATION: CPT

## 2017-06-16 NOTE — PROGRESS NOTES
Dx: Difficulty walking (R 26.2)  S/P LUMBAR LAMINECTOMY POST LAT INTERBODY FUS 2 LEVEL on 4/18/2017       Authorized # of Visits:   Will see for 12 visits         Next MD visit: none scheduled  Fall Risk: standard         Precautions: n/a             [place] DLS on airex 10 sec X 10 reps March airex 30X March airex 30X      Backward walk parallel bars 2X DLS on airex with head turn R and L 5X Step up and over on airex 20X LAQ 20X 1#  Sitting hip flexion no weight 10X      Standing hip flex, abd, extension 10X

## 2017-06-20 ENCOUNTER — OFFICE VISIT (OUTPATIENT)
Dept: PHYSICAL THERAPY | Age: 67
End: 2017-06-20
Attending: ORTHOPAEDIC SURGERY
Payer: MEDICARE

## 2017-06-20 PROCEDURE — 97112 NEUROMUSCULAR REEDUCATION: CPT

## 2017-06-20 PROCEDURE — 97116 GAIT TRAINING THERAPY: CPT

## 2017-06-20 PROCEDURE — 97110 THERAPEUTIC EXERCISES: CPT

## 2017-06-20 NOTE — PROGRESS NOTES
Dx: Difficulty walking (R 26.2)  S/P LUMBAR LAMINECTOMY POST LAT INTERBODY FUS 2 LEVEL on 4/18/2017       Authorized # of Visits:   Will see for 12 visits         Next MD visit: none scheduled  Fall Risk: standard         Precautions: n/a             Mary Fong training with a quad cane 120 X 2 with CGA  25 min Step ups 4\" 20x L     Lateral walk in parallel bars 2X DLS on airex 10 sec X 10 reps March airex 30X March airex 30X Lateral steps 5X parallel bars     Backward walk parallel bars 2X DLS on airex with hea

## 2017-06-22 ENCOUNTER — OFFICE VISIT (OUTPATIENT)
Dept: PHYSICAL THERAPY | Age: 67
End: 2017-06-22
Attending: ORTHOPAEDIC SURGERY
Payer: MEDICARE

## 2017-06-22 PROCEDURE — 97110 THERAPEUTIC EXERCISES: CPT

## 2017-06-22 PROCEDURE — 97112 NEUROMUSCULAR REEDUCATION: CPT

## 2017-06-22 PROCEDURE — 97116 GAIT TRAINING THERAPY: CPT

## 2017-06-22 NOTE — PROGRESS NOTES
Dx: Difficulty walking (R 26.2)  S/P LUMBAR LAMINECTOMY POST LAT INTERBODY FUS 2 LEVEL on 4/18/2017       Authorized # of Visits:   Will see for 12 visits         Next MD visit: none scheduled  Fall Risk: standard         Precautions: n/a             Kenzie Platt precor knee flex 20X 15#    March in place 20X Lateral walk 5X Stand 2 min no support Stand 3 min no support Step ups 6\" 20X R Shuttle DL squats L 5 30 reps  SL R L4  SL L L3    B heel raises 20X Sit to stand 10X 2 Hip stand flex, abd, ext black tband 20X

## 2017-06-26 RX ORDER — BACLOFEN 20 MG/1
TABLET ORAL
Qty: 90 TABLET | Refills: 0 | Status: SHIPPED | OUTPATIENT
Start: 2017-06-26 | End: 2017-08-04

## 2017-06-27 ENCOUNTER — OFFICE VISIT (OUTPATIENT)
Dept: PHYSICAL THERAPY | Age: 67
End: 2017-06-27
Attending: ORTHOPAEDIC SURGERY
Payer: MEDICARE

## 2017-06-27 PROCEDURE — 97110 THERAPEUTIC EXERCISES: CPT

## 2017-06-27 PROCEDURE — 97112 NEUROMUSCULAR REEDUCATION: CPT

## 2017-06-27 PROCEDURE — 97116 GAIT TRAINING THERAPY: CPT

## 2017-06-27 NOTE — PROGRESS NOTES
Dx: Difficulty walking (R 26.2)  S/P LUMBAR LAMINECTOMY POST LAT INTERBODY FUS 2 LEVEL on 4/18/2017       Authorized # of Visits:   Will see for 12 visits         Next MD visit: none scheduled  Fall Risk: standard         Precautions: n/a             Elnor Specking 5 30 reps  SL R L4  SL L L3 Shuttle DL squats L 5 30 reps  SL R L4  SL L L3   B heel raises 20X Sit to stand 10X 2 Hip stand flex, abd, ext black tband 20X Gait training with a quad cane 120 X 2 with CGA  25 min Step ups 4\" 20x L March airex 30X Standing

## 2017-06-29 ENCOUNTER — APPOINTMENT (OUTPATIENT)
Dept: PHYSICAL THERAPY | Age: 67
End: 2017-06-29
Attending: ORTHOPAEDIC SURGERY
Payer: MEDICARE

## 2017-07-06 ENCOUNTER — OFFICE VISIT (OUTPATIENT)
Dept: PHYSICAL THERAPY | Age: 67
End: 2017-07-06
Attending: ORTHOPAEDIC SURGERY
Payer: MEDICARE

## 2017-07-06 PROCEDURE — 97110 THERAPEUTIC EXERCISES: CPT

## 2017-07-06 PROCEDURE — 97112 NEUROMUSCULAR REEDUCATION: CPT

## 2017-07-06 PROCEDURE — 97116 GAIT TRAINING THERAPY: CPT

## 2017-07-06 NOTE — PROGRESS NOTES
Dx: Difficulty walking (R 26.2)  S/P LUMBAR LAMINECTOMY POST LAT INTERBODY FUS 2 LEVEL on 4/18/2017       Authorized # of Visits:   Will see for 12 visits         Next MD visit: none scheduled  Fall Risk: standard         Precautions: n/a             [place] knee flex 20X 20#   March in place 20X Lateral walk 5X Stand 2 min no support Stand 3 min no support Step ups 6\" 20X R Shuttle DL squats L 5 30 reps  SL R L4  SL L L3 Shuttle DL squats L 5 30 reps  SL R L4  SL L L3 Standing hip flex/ext/  abd 30X black tb Treatment: 40 min  Total Treatment Time: 50 min

## 2017-07-11 ENCOUNTER — OFFICE VISIT (OUTPATIENT)
Dept: PHYSICAL THERAPY | Age: 67
End: 2017-07-11
Attending: ORTHOPAEDIC SURGERY
Payer: MEDICARE

## 2017-07-11 PROCEDURE — 97112 NEUROMUSCULAR REEDUCATION: CPT

## 2017-07-11 PROCEDURE — 97116 GAIT TRAINING THERAPY: CPT

## 2017-07-11 PROCEDURE — 97110 THERAPEUTIC EXERCISES: CPT

## 2017-07-11 NOTE — PROGRESS NOTES
Dx: Difficulty walking (R 26.2)  S/P LUMBAR LAMINECTOMY POST LAT INTERBODY FUS 2 LEVEL on 4/18/2017       Authorized # of Visits:   Will see for 12 visits         Next MD visit: none scheduled  Fall Risk: standard         Precautions: n/a             [place] knee flex 20X 20#   March in place 20X Lateral walk 5X Stand 2 min no support Stand 3 min no support Step ups 6\" 20X R Shuttle DL squats L 5 30 reps  SL R L4  SL L L3 Shuttle DL squats L 5 30 reps  SL R L4  SL L L3 Standing hip flex/ext/  abd 30X black tb occasional UE support   Skilled Services: balance exercises to further improve ambulation without AD and distance , strengthening exercises to improve gait and transfers, continue gait training no AD     Charges: Gait training 1 (15 min) Neuro re-ed 1 (15

## 2017-07-13 ENCOUNTER — APPOINTMENT (OUTPATIENT)
Dept: PHYSICAL THERAPY | Age: 67
End: 2017-07-13
Attending: ORTHOPAEDIC SURGERY
Payer: MEDICARE

## 2017-07-17 DIAGNOSIS — G35 MULTIPLE SCLEROSIS (HCC): Primary | ICD-10-CM

## 2017-07-17 RX ORDER — OMEPRAZOLE 20 MG/1
20 CAPSULE, DELAYED RELEASE ORAL EVERY MORNING
Qty: 90 CAPSULE | Refills: 1 | Status: SHIPPED | OUTPATIENT
Start: 2017-07-17 | End: 2018-02-19

## 2017-07-18 ENCOUNTER — TELEPHONE (OUTPATIENT)
Dept: NEUROLOGY | Facility: CLINIC | Age: 67
End: 2017-07-18

## 2017-07-18 ENCOUNTER — OFFICE VISIT (OUTPATIENT)
Dept: PHYSICAL THERAPY | Age: 67
End: 2017-07-18
Attending: ORTHOPAEDIC SURGERY
Payer: MEDICARE

## 2017-07-18 DIAGNOSIS — G35 MULTIPLE SCLEROSIS (HCC): Primary | ICD-10-CM

## 2017-07-18 PROCEDURE — 97116 GAIT TRAINING THERAPY: CPT

## 2017-07-18 PROCEDURE — 97110 THERAPEUTIC EXERCISES: CPT

## 2017-07-18 PROCEDURE — 97112 NEUROMUSCULAR REEDUCATION: CPT

## 2017-07-18 NOTE — TELEPHONE ENCOUNTER
Pt last seen on 12/9/17.   Per LOV notes:    Impression/Plan:  (G35) Multiple sclerosis ([facility] [address]  (primary encounter diagnosis)  Plan: CBC WITH DIFFERENTIAL WITH PLATELET, COMP         METABOLIC PANEL (14), TECFIDERA 240 MG Oral         Capsule Delayed Release

## 2017-07-18 NOTE — PROGRESS NOTES
Dx: Difficulty walking (R 26.2)  S/P LUMBAR LAMINECTOMY POST LAT INTERBODY FUS 2 LEVEL on 4/18/2017       Authorized # of Visits:   Will see for 12 visits         Next MD visit: none scheduled  Fall Risk: standard         Precautions: n/a             Storm Grady Standing hip flex/ext/  abd 30X black tband  Lateral walks black tband 5X parallel bars  Shuttle DL squats L 5 30 reps  SL R L4 20X  SL L L3 20X Rocking forward/back rocker board 10X one UE to no support    DLS on airex 1 min X 2 March airex 30X DLS on air

## 2017-07-24 RX ORDER — ESTRADIOL 0.1 MG/G
CREAM VAGINAL
Qty: 42.5 G | Refills: 0 | Status: SHIPPED | OUTPATIENT
Start: 2017-07-24 | End: 2017-10-17

## 2017-07-25 ENCOUNTER — OFFICE VISIT (OUTPATIENT)
Dept: PHYSICAL THERAPY | Age: 67
End: 2017-07-25
Attending: ORTHOPAEDIC SURGERY
Payer: MEDICARE

## 2017-07-25 PROCEDURE — 97110 THERAPEUTIC EXERCISES: CPT

## 2017-07-25 PROCEDURE — 97112 NEUROMUSCULAR REEDUCATION: CPT

## 2017-07-25 RX ORDER — CELECOXIB 200 MG/1
200 CAPSULE ORAL DAILY
Qty: 90 CAPSULE | Refills: 0 | Status: SHIPPED | OUTPATIENT
Start: 2017-07-25 | End: 2017-11-16

## 2017-07-25 NOTE — PROGRESS NOTES
Dx: Difficulty walking (R 26.2)  S/P LUMBAR LAMINECTOMY POST LAT INTERBODY FUS 2 LEVEL on 4/18/2017       Authorized # of Visits:   Will see for 12 visits         Next MD visit: none scheduled  Fall Risk: standard         Precautions: n/a           Physical 10/12 Date: 7/18/2017  Tx#: 11/12 Date: 7/25/2017  Tx#: 12/12   Nu step 10' L2 Nu step 10' L2 Nu step 10' L2 Nu step 10' L2 Nu step 10' L2 Nu step 10' L2   precor knee flex 20X 15# precor knee flex 20X 15# precor knee flex 20X 20# precor knee flex 20X 20# Total Timed Treatment: 40 min  Total Treatment Time: 50 min

## 2017-07-26 ENCOUNTER — LAB ENCOUNTER (OUTPATIENT)
Dept: LAB | Age: 67
End: 2017-07-26
Attending: Other
Payer: MEDICARE

## 2017-07-26 DIAGNOSIS — G35 MULTIPLE SCLEROSIS (HCC): ICD-10-CM

## 2017-07-26 LAB
ALBUMIN SERPL-MCNC: 3.9 G/DL (ref 3.5–4.8)
ALP LIVER SERPL-CCNC: 61 U/L (ref 55–142)
ALT SERPL-CCNC: 26 U/L (ref 14–54)
AST SERPL-CCNC: 14 U/L (ref 15–41)
BASOPHILS # BLD AUTO: 0.05 X10(3) UL (ref 0–0.1)
BASOPHILS NFR BLD AUTO: 0.9 %
BILIRUB SERPL-MCNC: 0.5 MG/DL (ref 0.1–2)
BUN BLD-MCNC: 16 MG/DL (ref 8–20)
CALCIUM BLD-MCNC: 9 MG/DL (ref 8.3–10.3)
CHLORIDE: 105 MMOL/L (ref 101–111)
CO2: 31 MMOL/L (ref 22–32)
CREAT BLD-MCNC: 0.69 MG/DL (ref 0.55–1.02)
EOSINOPHIL # BLD AUTO: 0.24 X10(3) UL (ref 0–0.3)
EOSINOPHIL NFR BLD AUTO: 4.1 %
ERYTHROCYTE [DISTWIDTH] IN BLOOD BY AUTOMATED COUNT: 13.3 % (ref 11.5–16)
GLUCOSE BLD-MCNC: 94 MG/DL (ref 70–99)
HCT VFR BLD AUTO: 40.6 % (ref 34–50)
HGB BLD-MCNC: 13.3 G/DL (ref 12–16)
IMMATURE GRANULOCYTE COUNT: 0.01 X10(3) UL (ref 0–1)
IMMATURE GRANULOCYTE RATIO %: 0.2 %
LYMPHOCYTES # BLD AUTO: 1.51 X10(3) UL (ref 0.9–4)
LYMPHOCYTES NFR BLD AUTO: 25.8 %
M PROTEIN MFR SERPL ELPH: 7.3 G/DL (ref 6.1–8.3)
MCH RBC QN AUTO: 29.4 PG (ref 27–33.2)
MCHC RBC AUTO-ENTMCNC: 32.8 G/DL (ref 31–37)
MCV RBC AUTO: 89.6 FL (ref 81–100)
MONOCYTES # BLD AUTO: 0.62 X10(3) UL (ref 0.1–0.6)
MONOCYTES NFR BLD AUTO: 10.6 %
NEUTROPHIL ABS PRELIM: 3.42 X10 (3) UL (ref 1.3–6.7)
NEUTROPHILS # BLD AUTO: 3.42 X10(3) UL (ref 1.3–6.7)
NEUTROPHILS NFR BLD AUTO: 58.4 %
PLATELET # BLD AUTO: 258 10(3)UL (ref 150–450)
POTASSIUM SERPL-SCNC: 4 MMOL/L (ref 3.6–5.1)
RBC # BLD AUTO: 4.53 X10(6)UL (ref 3.8–5.1)
RED CELL DISTRIBUTION WIDTH-SD: 43.9 FL (ref 35.1–46.3)
SODIUM SERPL-SCNC: 142 MMOL/L (ref 136–144)
WBC # BLD AUTO: 5.9 X10(3) UL (ref 4–13)

## 2017-07-26 PROCEDURE — 85025 COMPLETE CBC W/AUTO DIFF WBC: CPT

## 2017-07-26 PROCEDURE — 36415 COLL VENOUS BLD VENIPUNCTURE: CPT

## 2017-07-26 PROCEDURE — 80053 COMPREHEN METABOLIC PANEL: CPT

## 2017-08-04 ENCOUNTER — OFFICE VISIT (OUTPATIENT)
Dept: NEUROLOGY | Facility: CLINIC | Age: 67
End: 2017-08-04

## 2017-08-04 VITALS
HEART RATE: 60 BPM | DIASTOLIC BLOOD PRESSURE: 84 MMHG | BODY MASS INDEX: 30 KG/M2 | WEIGHT: 181 LBS | RESPIRATION RATE: 16 BRPM | SYSTOLIC BLOOD PRESSURE: 138 MMHG

## 2017-08-04 DIAGNOSIS — F41.9 ANXIETY: ICD-10-CM

## 2017-08-04 DIAGNOSIS — G35 MULTIPLE SCLEROSIS EXACERBATION (HCC): ICD-10-CM

## 2017-08-04 DIAGNOSIS — M48.061 LUMBAR SPINAL STENOSIS: ICD-10-CM

## 2017-08-04 DIAGNOSIS — G35 MULTIPLE SCLEROSIS OF CORD (HCC): Primary | ICD-10-CM

## 2017-08-04 PROCEDURE — 99214 OFFICE O/P EST MOD 30 MIN: CPT | Performed by: OTHER

## 2017-08-04 RX ORDER — ALPRAZOLAM 0.25 MG/1
0.25 TABLET ORAL 2 TIMES DAILY PRN
Qty: 30 TABLET | Refills: 0 | Status: SHIPPED | OUTPATIENT
Start: 2017-08-04 | End: 2017-11-16

## 2017-08-04 NOTE — PROGRESS NOTES
Pt here for MS follow up. Pt had back surgery recently, and had an MS relapse right after the surgery. Was given IV steroids in hospital.  Has been good since then. Pt likes her Tecfidera. Blood tests done prior to visit are WNL.

## 2017-08-04 NOTE — PATIENT INSTRUCTIONS
Refill policies:    • Allow 2-3 business days for refills; controlled substances may take longer.   • Contact your pharmacy at least 5 days prior to running out of medication and have them send an electronic request or submit request through the [facility] have a procedure or additional testing performed. [facility]) will contact your insurance carrier to obtain pre-certification or prior authorization.     Unfortunately, [employer] has seen an increase in denial of payment even though the p

## 2017-08-04 NOTE — PROGRESS NOTES
[facility] [address]   Neurology; follow up  CLINIC VISIT  2017    Jono Lopez Patient Status:  No patient class for patient encounter    3/21/1950 MRN [id number]   Location [address] [place] [address] Clay Ibarra MD     [facility] morning   • Back problem    • Basal cell carcinoma of ear 4/2008    excision of BCC lesion Left ear   • Depression    • Disorder of thyroid    • High blood pressure    • High cholesterol    • Hyperlipidemia    • Multiple sclerosis ([facility] [address] 2000    chronic dean Vaginal Cream PLACE 2 GRAMS VAGINALLY DAILY Disp: 42.5 g Rfl: 0   omeprazole 20 MG Oral Capsule Delayed Release Take 1 capsule (20 mg total) by mouth every morning.  Disp: 90 capsule Rfl: 1   Triamterene-HCTZ 37.5-25 MG Oral Tab Take 1 tablet by mouth once EXAMINATION:  VITAL SIGNS: /84 (BP Location: Right arm, Patient Position: Sitting, Cuff Size: adult)   Pulse 60   Resp 16   Wt 181 lb   BMI 30.12 kg/m²    Body mass index is 30.12 kg/m².   General:  Patient is a 79year old female in no acute distress This Visit     Lumbar spinal stenosis    Multiple sclerosis of cord (HCC) - Primary    Multiple sclerosis exacerbation (HCC)    Relevant Orders    CBC WITH DIFFERENTIAL WITH PLATELET    COMP METABOLIC PANEL (14)    Anxiety      Other Visit Diagnoses    Non

## 2017-08-07 NOTE — TELEPHONE ENCOUNTER
[employer] Referral Order placed.        Per Dr. Paul Dalton at [address] [place] [address] on 08/04/17:  New anxiety, situation induced, xanax #30 was given, PRN

## 2017-08-15 RX ORDER — LEVOTHYROXINE SODIUM 0.03 MG/1
25 TABLET ORAL
Qty: 30 TABLET | Refills: 4 | Status: SHIPPED | OUTPATIENT
Start: 2017-08-15 | End: 2018-01-22

## 2017-08-15 NOTE — TELEPHONE ENCOUNTER
Received denial for Alprazolam 0.25 tabs from [address] [place] [address], Medicare Part D.      States covered drugs include: Clonazepam, Clorazepate, Lorazepam     Theses drugs require PA and may be covered for patients who have previously tried and [facility]

## 2017-08-21 ENCOUNTER — LAB ENCOUNTER (OUTPATIENT)
Dept: LAB | Age: 67
End: 2017-08-21
Attending: DERMATOLOGY
Payer: MEDICARE

## 2017-08-21 ENCOUNTER — TELEPHONE (OUTPATIENT)
Dept: INTERNAL MEDICINE CLINIC | Facility: CLINIC | Age: 67
End: 2017-08-21

## 2017-08-21 DIAGNOSIS — D48.5 NEOPLASM OF UNCERTAIN BEHAVIOR OF SKIN: ICD-10-CM

## 2017-08-21 DIAGNOSIS — D22.5 PIGMENTED HAIRY EPIDERMAL NEVUS: Primary | ICD-10-CM

## 2017-08-21 PROCEDURE — 88305 TISSUE EXAM BY PATHOLOGIST: CPT

## 2017-08-21 NOTE — TELEPHONE ENCOUNTER
Referral   Received:  Today   Message Contents   Noreen Christensen CNA  [facility]   Cc: [facility] Referral Pool   Phone Number: [phone]             .Reason for the order/referral:Referral   PCP: Dr. Anitha Laura   Refer to Provider Dr. Santos Mueller

## 2017-08-27 RX ORDER — ESCITALOPRAM OXALATE 20 MG/1
20 TABLET ORAL DAILY
Qty: 30 TABLET | Refills: 0 | Status: SHIPPED | OUTPATIENT
Start: 2017-08-27 | End: 2017-09-21

## 2017-08-29 RX ORDER — AMLODIPINE BESYLATE 5 MG/1
TABLET ORAL
Qty: 180 TABLET | Refills: 0 | Status: SHIPPED | OUTPATIENT
Start: 2017-08-29 | End: 2017-11-25

## 2017-09-25 RX ORDER — ESCITALOPRAM OXALATE 20 MG/1
TABLET ORAL
Qty: 30 TABLET | Refills: 2 | Status: SHIPPED | OUTPATIENT
Start: 2017-09-25 | End: 2017-12-09

## 2017-10-18 RX ORDER — ESTRADIOL 0.1 MG/G
CREAM VAGINAL
Qty: 42.5 G | Refills: 0 | Status: SHIPPED | OUTPATIENT
Start: 2017-10-18 | End: 2017-10-19

## 2017-10-19 ENCOUNTER — TELEPHONE (OUTPATIENT)
Dept: INTERNAL MEDICINE CLINIC | Facility: CLINIC | Age: 67
End: 2017-10-19

## 2017-10-19 RX ORDER — ESTRADIOL 0.1 MG/G
1 CREAM VAGINAL EVERY OTHER DAY
Qty: 42.5 G | Refills: 0 | COMMUNITY
Start: 2017-10-19 | End: 2017-11-16

## 2017-10-19 NOTE — TELEPHONE ENCOUNTER
[facility] pharmacy called regarding a new rx that was send for ESTRACE 0.1 MG/GM Vaginal Cream  Patient is able to get 1 tube for 30 days, pharmacy stated that patient will be short of what was prescribed to her. Is this okay?     Kameron Pandey [id number]

## 2017-10-27 RX ORDER — ATORVASTATIN CALCIUM 20 MG/1
TABLET, FILM COATED ORAL
Qty: 30 TABLET | Refills: 0 | Status: SHIPPED | OUTPATIENT
Start: 2017-10-27 | End: 2017-11-21

## 2017-10-30 ENCOUNTER — TELEPHONE (OUTPATIENT)
Dept: NEUROLOGY | Facility: CLINIC | Age: 67
End: 2017-10-30

## 2017-10-30 NOTE — TELEPHONE ENCOUNTER
Informed patient lab orders placed on 08/04/17. She confirmed she gets her labs done at [facility] and informed her she can get the labs drawn at any [place] [address]. Patient verbalized understanding and has no further questions or concerns at this time.

## 2017-11-02 ENCOUNTER — LAB ENCOUNTER (OUTPATIENT)
Dept: LAB | Age: 67
End: 2017-11-02
Attending: Other
Payer: MEDICARE

## 2017-11-02 DIAGNOSIS — G35 MULTIPLE SCLEROSIS EXACERBATION (HCC): ICD-10-CM

## 2017-11-02 PROCEDURE — 85025 COMPLETE CBC W/AUTO DIFF WBC: CPT

## 2017-11-02 PROCEDURE — 80053 COMPREHEN METABOLIC PANEL: CPT

## 2017-11-02 PROCEDURE — 36415 COLL VENOUS BLD VENIPUNCTURE: CPT

## 2017-11-15 ENCOUNTER — TELEPHONE (OUTPATIENT)
Dept: INTERNAL MEDICINE CLINIC | Facility: CLINIC | Age: 67
End: 2017-11-15

## 2017-11-15 PROBLEM — Z98.1 HISTORY OF LUMBAR FUSION: Status: ACTIVE | Noted: 2017-11-15

## 2017-11-15 PROBLEM — L92.0 GRANULOMA ANNULARE: Status: ACTIVE | Noted: 2017-11-15

## 2017-11-15 NOTE — TELEPHONE ENCOUNTER
[facility] calling in requesting refill for Estradiol (ESTRACE) 0.1 MG/GM Vaginal Cream    To be sent to:  [facility] in [place] (on file)

## 2017-11-16 ENCOUNTER — OFFICE VISIT (OUTPATIENT)
Dept: INTERNAL MEDICINE CLINIC | Facility: CLINIC | Age: 67
End: 2017-11-16

## 2017-11-16 VITALS
BODY MASS INDEX: 30.77 KG/M2 | WEIGHT: 180.25 LBS | OXYGEN SATURATION: 99 % | TEMPERATURE: 99 F | DIASTOLIC BLOOD PRESSURE: 74 MMHG | RESPIRATION RATE: 12 BRPM | HEART RATE: 62 BPM | SYSTOLIC BLOOD PRESSURE: 134 MMHG | HEIGHT: 64 IN

## 2017-11-16 DIAGNOSIS — K21.9 GASTROESOPHAGEAL REFLUX DISEASE WITHOUT ESOPHAGITIS: ICD-10-CM

## 2017-11-16 DIAGNOSIS — Z13.820 SCREENING FOR OSTEOPOROSIS: ICD-10-CM

## 2017-11-16 DIAGNOSIS — Z13.21 SCREENING FOR ENDOCRINE, NUTRITIONAL, METABOLIC AND IMMUNITY DISORDER: ICD-10-CM

## 2017-11-16 DIAGNOSIS — M48.061 SPINAL STENOSIS OF LUMBAR REGION, UNSPECIFIED WHETHER NEUROGENIC CLAUDICATION PRESENT: ICD-10-CM

## 2017-11-16 DIAGNOSIS — R73.01 IMPAIRED FASTING GLUCOSE: ICD-10-CM

## 2017-11-16 DIAGNOSIS — I10 ESSENTIAL HYPERTENSION: ICD-10-CM

## 2017-11-16 DIAGNOSIS — Z13.29 SCREENING FOR ENDOCRINE, NUTRITIONAL, METABOLIC AND IMMUNITY DISORDER: ICD-10-CM

## 2017-11-16 DIAGNOSIS — Z00.00 ENCOUNTER FOR ANNUAL HEALTH EXAMINATION: ICD-10-CM

## 2017-11-16 DIAGNOSIS — E03.9 HYPOTHYROIDISM, UNSPECIFIED TYPE: ICD-10-CM

## 2017-11-16 DIAGNOSIS — IMO0002 OSTEOARTHROSIS INVOLVING LOWER LEG: ICD-10-CM

## 2017-11-16 DIAGNOSIS — Z13.228 SCREENING FOR ENDOCRINE, NUTRITIONAL, METABOLIC AND IMMUNITY DISORDER: ICD-10-CM

## 2017-11-16 DIAGNOSIS — Z98.1 HISTORY OF LUMBAR FUSION: ICD-10-CM

## 2017-11-16 DIAGNOSIS — S46.811A TRAPEZIUS MUSCLE STRAIN, RIGHT, INITIAL ENCOUNTER: ICD-10-CM

## 2017-11-16 DIAGNOSIS — Z13.0 SCREENING FOR ENDOCRINE, NUTRITIONAL, METABOLIC AND IMMUNITY DISORDER: ICD-10-CM

## 2017-11-16 DIAGNOSIS — F32.A DEPRESSION, UNSPECIFIED DEPRESSION TYPE: ICD-10-CM

## 2017-11-16 DIAGNOSIS — N95.1 MENOPAUSAL VAGINAL DRYNESS: ICD-10-CM

## 2017-11-16 DIAGNOSIS — E78.5 HYPERLIPIDEMIA, UNSPECIFIED HYPERLIPIDEMIA TYPE: ICD-10-CM

## 2017-11-16 DIAGNOSIS — G35 MULTIPLE SCLEROSIS (HCC): ICD-10-CM

## 2017-11-16 DIAGNOSIS — F41.9 ANXIETY: ICD-10-CM

## 2017-11-16 DIAGNOSIS — R53.1 INCREASED WEAKNESS WHEN AMBULATING: ICD-10-CM

## 2017-11-16 DIAGNOSIS — L92.0 GRANULOMA ANNULARE: Primary | ICD-10-CM

## 2017-11-16 PROCEDURE — 96160 PT-FOCUSED HLTH RISK ASSMT: CPT | Performed by: FAMILY MEDICINE

## 2017-11-16 PROCEDURE — G0438 PPPS, INITIAL VISIT: HCPCS | Performed by: FAMILY MEDICINE

## 2017-11-16 RX ORDER — ESTRADIOL 0.1 MG/G
1 CREAM VAGINAL EVERY OTHER DAY
Qty: 42.5 G | Refills: 0 | Status: SHIPPED | OUTPATIENT
Start: 2017-11-16 | End: 2019-01-24

## 2017-11-16 RX ORDER — CYCLOBENZAPRINE HCL 10 MG
10 TABLET ORAL 3 TIMES DAILY PRN
Qty: 30 TABLET | Refills: 0 | Status: SHIPPED | OUTPATIENT
Start: 2017-11-16 | End: 2017-11-27

## 2017-11-16 RX ORDER — CELECOXIB 200 MG/1
200 CAPSULE ORAL DAILY
Qty: 90 CAPSULE | Refills: 0 | Status: SHIPPED | OUTPATIENT
Start: 2017-11-16 | End: 2018-02-19

## 2017-11-16 NOTE — PROGRESS NOTES
HPI:   Jono Lopez is a 79year old female who presents for a MA (Medicare Advantage) [facility] (Once per calendar year). Pt also here for a medication check: 1. Patient presents for recheck of her hypertension/hyperlipidemia.  Pt has been taking worsened by certain movements like raising her arm up. Pain is lessened by rest, heat. Pt also needs a refill on her Celebrex. Pt states she has had this happen before and muscle relaxant- flexeril helped. .  7. Pt also needs a refill on her Estrace cream f mg total) by mouth daily. Estradiol (ESTRACE) 0.1 MG/GM Vaginal Cream Place 1 g vaginally every other day. Cyclobenzaprine HCl 10 MG Oral Tab Take 1 tablet (10 mg total) by mouth 3 (three) times daily as needed for Muscle spasms.    ATORVASTATIN 20 MG O smokeless tobacco. She reports that she drinks about 2.4 oz of alcohol per week . She reports that she does not use drugs.      REVIEW OF SYSTEMS:   GENERAL: feels well otherwise  SKIN: denies any unusual skin lesions,+ ganuloma annulare  EYES: denies blurr tenderness/mass/nodules; no carotid bruit or JVD   Back:   Symmetric, no curvature, ROM normal, no CVA tenderness. Right trapezius- area tense, tight and tender. No swelling, no discoloration or deformity. FROM with pain.  equal bilaterally.    Lungs: esophagitis    Depression, unspecified depression type    Spinal stenosis of lumbar region, unspecified whether neurogenic claudication present    Screening for endocrine, nutritional, metabolic and immunity disorder  -     VITAMIN D, 25-HYDROXY; Future  - ASSAY, THYROID STIM HORMONE; Future    5. Increased weakness when ambulating  Managed by Neuro- Dr. Natalie Orr    6. Hyperlipidemia, unspecified hyperlipidemia type  Stable, continue current medication. Follow a low fat diet. - LIPID PANEL; Future    7.  Impair Place 1 g vaginally every other day. Dispense: 42.5 g; Refill: 0    The patient indicates understanding of these issues and agrees to the plan. Pt to f/u in 6 months for a medication check or sooner if needed.     Rosio Little NP, 11/16/2017     General Yes    Do you have a living will?: Yes     Please go to \"Cognitive Assessment\" under Medicare Assessment section in Charting, test patient and document. Then, refresh your progress note to see your input here.   Cognitive Assessment     What day of the care reminders to display for this patient. Update Health Maintenance if applicable    Chlamydia  Annually if high risk No results found for: CHLAMYDIA No flowsheet data found.     Screening Mammogram      Mammogram Annually to 76, then as discussed Mammogr (mg/dL)   Date Value   09/10/2015 22    No flowsheet data found. Drug Serum Conc  Annually No results found for: DIGOXIN, DIG, VALP No flowsheet data found.     Diabetes      HgbA1C  Annually HEMOGLOBIN A1c (% of total Hgb)   Date Value   09/10/2015 6.5

## 2017-11-16 NOTE — PATIENT INSTRUCTIONS
Kylee Reyes's SCREENING SCHEDULE   Tests on this list are recommended by your physician but may not be covered, or covered at this frequency, by your insurer. Please check with your insurance carrier before scheduling to verify coverage.    Massimo Pollack 65-75) IPPE only No results found for this or any previous visit.  Limited to patients who meet one of the following criteria:   • Men who are 73-68 years old and have smoked more than 100 cigarettes in their lifetime   • Anyone with a family history    Col Rohit Banerjee due on 05/31/2018 Please get this Mammogram regularly   Immunizations      Influenza  Covered Annually   Orders placed or performed in visit on 12/04/14  -INFLUENZA VIRUS VACCINE, >=1YEARS OF AGE    Please get every year    Pneumococcal 13 computer and printer. (the forms are also available in [address])  [email or web address]  This link also has information from the [facility] regarding Advance Directives.

## 2017-11-20 ENCOUNTER — APPOINTMENT (OUTPATIENT)
Dept: LAB | Age: 67
End: 2017-11-20
Attending: FAMILY MEDICINE
Payer: MEDICARE

## 2017-11-20 DIAGNOSIS — E78.5 HYPERLIPIDEMIA, UNSPECIFIED HYPERLIPIDEMIA TYPE: ICD-10-CM

## 2017-11-20 DIAGNOSIS — Z13.21 SCREENING FOR ENDOCRINE, NUTRITIONAL, METABOLIC AND IMMUNITY DISORDER: ICD-10-CM

## 2017-11-20 DIAGNOSIS — Z13.29 SCREENING FOR ENDOCRINE, NUTRITIONAL, METABOLIC AND IMMUNITY DISORDER: ICD-10-CM

## 2017-11-20 DIAGNOSIS — R73.01 IMPAIRED FASTING GLUCOSE: ICD-10-CM

## 2017-11-20 DIAGNOSIS — Z13.228 SCREENING FOR ENDOCRINE, NUTRITIONAL, METABOLIC AND IMMUNITY DISORDER: ICD-10-CM

## 2017-11-20 DIAGNOSIS — E03.9 HYPOTHYROIDISM, UNSPECIFIED TYPE: ICD-10-CM

## 2017-11-20 DIAGNOSIS — Z13.0 SCREENING FOR ENDOCRINE, NUTRITIONAL, METABOLIC AND IMMUNITY DISORDER: ICD-10-CM

## 2017-11-20 PROCEDURE — 82306 VITAMIN D 25 HYDROXY: CPT

## 2017-11-20 PROCEDURE — 80061 LIPID PANEL: CPT

## 2017-11-20 PROCEDURE — 82607 VITAMIN B-12: CPT

## 2017-11-20 PROCEDURE — 36415 COLL VENOUS BLD VENIPUNCTURE: CPT

## 2017-11-20 PROCEDURE — 83036 HEMOGLOBIN GLYCOSYLATED A1C: CPT

## 2017-11-20 PROCEDURE — 84443 ASSAY THYROID STIM HORMONE: CPT

## 2017-11-24 RX ORDER — ATORVASTATIN CALCIUM 20 MG/1
TABLET, FILM COATED ORAL
Qty: 30 TABLET | Refills: 0 | Status: SHIPPED | OUTPATIENT
Start: 2017-11-24 | End: 2017-12-19

## 2017-11-27 RX ORDER — AMLODIPINE BESYLATE 5 MG/1
TABLET ORAL
Qty: 180 TABLET | Refills: 1 | Status: SHIPPED | OUTPATIENT
Start: 2017-11-27 | End: 2018-05-14

## 2017-12-07 NOTE — TELEPHONE ENCOUNTER
Pt is calling states that he will be out of his pain pills by tomorrow morning 8 am.  If thought you would be changing what he is taking.  Please call to advise.   i have not seen her so unable to sign

## 2017-12-12 RX ORDER — ESCITALOPRAM OXALATE 20 MG/1
TABLET ORAL
Qty: 30 TABLET | Refills: 0 | Status: SHIPPED | OUTPATIENT
Start: 2017-12-12 | End: 2018-01-05

## 2017-12-21 RX ORDER — ATORVASTATIN CALCIUM 20 MG/1
TABLET, FILM COATED ORAL
Qty: 30 TABLET | Refills: 2 | Status: SHIPPED | OUTPATIENT
Start: 2017-12-21 | End: 2018-03-20

## 2018-01-05 RX ORDER — ESCITALOPRAM OXALATE 20 MG/1
TABLET ORAL
Qty: 30 TABLET | Refills: 0 | Status: SHIPPED | OUTPATIENT
Start: 2018-01-05 | End: 2018-01-31

## 2018-01-16 ENCOUNTER — TELEPHONE (OUTPATIENT)
Dept: INTERNAL MEDICINE CLINIC | Facility: CLINIC | Age: 68
End: 2018-01-16

## 2018-01-16 DIAGNOSIS — D22.9 LINEAR SEBACEOUS NEVUS SEQUENCE: Primary | ICD-10-CM

## 2018-01-16 DIAGNOSIS — G35 MS (MULTIPLE SCLEROSIS) (HCC): ICD-10-CM

## 2018-01-16 NOTE — TELEPHONE ENCOUNTER
Referral   Received:  Today   Message Contents   SUSHILA [facility] Clinical Staff   Cc: [facility] Referral Pool   Phone Number: [phone]             .Reason for the order/referral:Referral   PCP: Dr. Cheryl Garcia   Refer to Provider Dr. Javier Martinez

## 2018-01-19 ENCOUNTER — TELEPHONE (OUTPATIENT)
Dept: NEUROLOGY | Facility: CLINIC | Age: 68
End: 2018-01-19

## 2018-01-19 DIAGNOSIS — G35 MULTIPLE SCLEROSIS (HCC): Primary | ICD-10-CM

## 2018-01-19 NOTE — TELEPHONE ENCOUNTER
Received fax request to initiate PA for Tecfidera. Also received a fax from [facility] that patient has been approved for a sammie through the [employer] - medicare access fund for $6000 from 12/5/2017 - 12/4/18.      Lab orders placed for C

## 2018-01-22 ENCOUNTER — TELEPHONE (OUTPATIENT)
Dept: INTERNAL MEDICINE CLINIC | Facility: CLINIC | Age: 68
End: 2018-01-22

## 2018-01-22 ENCOUNTER — LAB ENCOUNTER (OUTPATIENT)
Dept: LAB | Age: 68
End: 2018-01-22
Attending: Other
Payer: MEDICARE

## 2018-01-22 DIAGNOSIS — G35 MULTIPLE SCLEROSIS (HCC): ICD-10-CM

## 2018-01-22 LAB
ALBUMIN SERPL-MCNC: 4.2 G/DL (ref 3.5–4.8)
ALP LIVER SERPL-CCNC: 62 U/L (ref 55–142)
ALT SERPL-CCNC: 28 U/L (ref 14–54)
AST SERPL-CCNC: 17 U/L (ref 15–41)
BASOPHILS # BLD AUTO: 0.04 X10(3) UL (ref 0–0.1)
BASOPHILS NFR BLD AUTO: 0.6 %
BILIRUB SERPL-MCNC: 0.5 MG/DL (ref 0.1–2)
BUN BLD-MCNC: 21 MG/DL (ref 8–20)
CALCIUM BLD-MCNC: 9.7 MG/DL (ref 8.3–10.3)
CHLORIDE: 102 MMOL/L (ref 101–111)
CO2: 32 MMOL/L (ref 22–32)
CREAT BLD-MCNC: 0.81 MG/DL (ref 0.55–1.02)
EOSINOPHIL # BLD AUTO: 0.29 X10(3) UL (ref 0–0.3)
EOSINOPHIL NFR BLD AUTO: 4.6 %
ERYTHROCYTE [DISTWIDTH] IN BLOOD BY AUTOMATED COUNT: 13.5 % (ref 11.5–16)
GLUCOSE BLD-MCNC: 92 MG/DL (ref 70–99)
HCT VFR BLD AUTO: 43.9 % (ref 34–50)
HGB BLD-MCNC: 14.4 G/DL (ref 12–16)
IMMATURE GRANULOCYTE COUNT: 0.01 X10(3) UL (ref 0–1)
IMMATURE GRANULOCYTE RATIO %: 0.2 %
LYMPHOCYTES # BLD AUTO: 1.53 X10(3) UL (ref 0.9–4)
LYMPHOCYTES NFR BLD AUTO: 24.2 %
M PROTEIN MFR SERPL ELPH: 7.9 G/DL (ref 6.1–8.3)
MCH RBC QN AUTO: 30.3 PG (ref 27–33.2)
MCHC RBC AUTO-ENTMCNC: 32.8 G/DL (ref 31–37)
MCV RBC AUTO: 92.2 FL (ref 81–100)
MONOCYTES # BLD AUTO: 0.76 X10(3) UL (ref 0.1–0.6)
MONOCYTES NFR BLD AUTO: 12 %
NEUTROPHIL ABS PRELIM: 3.69 X10 (3) UL (ref 1.3–6.7)
NEUTROPHILS # BLD AUTO: 3.69 X10(3) UL (ref 1.3–6.7)
NEUTROPHILS NFR BLD AUTO: 58.4 %
PLATELET # BLD AUTO: 277 10(3)UL (ref 150–450)
POTASSIUM SERPL-SCNC: 3.7 MMOL/L (ref 3.6–5.1)
RBC # BLD AUTO: 4.76 X10(6)UL (ref 3.8–5.1)
RED CELL DISTRIBUTION WIDTH-SD: 46.3 FL (ref 35.1–46.3)
SODIUM SERPL-SCNC: 140 MMOL/L (ref 136–144)
WBC # BLD AUTO: 6.3 X10(3) UL (ref 4–13)

## 2018-01-22 PROCEDURE — 80053 COMPREHEN METABOLIC PANEL: CPT

## 2018-01-22 PROCEDURE — 36415 COLL VENOUS BLD VENIPUNCTURE: CPT

## 2018-01-22 PROCEDURE — 85025 COMPLETE CBC W/AUTO DIFF WBC: CPT

## 2018-01-22 RX ORDER — LEVOTHYROXINE SODIUM 0.03 MG/1
25 TABLET ORAL
Qty: 30 TABLET | Refills: 5 | Status: SHIPPED | OUTPATIENT
Start: 2018-01-22 | End: 2018-05-30

## 2018-01-22 NOTE — TELEPHONE ENCOUNTER
Rec'd fax from [address] [place] [address] relaying APPROVAL of Tecfidera 240mg effective from 01/01/2018 through 01/19/2019. Certification Number: [id number]. Last Rx written on 3/16/17. Do not need new Rx at this time.

## 2018-01-22 NOTE — TELEPHONE ENCOUNTER
Patient called because pharmacy said our office \"refused\" to fill her thyroid medication.   Please refill Levothyroxine Sodium 25 MCG Oral Tab for her as she is out of medication    Joel/Enrique in chart

## 2018-01-31 RX ORDER — TRIAMTERENE AND HYDROCHLOROTHIAZIDE 37.5; 25 MG/1; MG/1
TABLET ORAL
Qty: 90 TABLET | Refills: 0 | Status: SHIPPED | OUTPATIENT
Start: 2018-01-31 | End: 2018-04-30

## 2018-02-01 RX ORDER — ESCITALOPRAM OXALATE 20 MG/1
TABLET ORAL
Qty: 30 TABLET | Refills: 5 | Status: SHIPPED | OUTPATIENT
Start: 2018-02-01 | End: 2018-07-21

## 2018-02-09 ENCOUNTER — TELEPHONE (OUTPATIENT)
Dept: NEUROLOGY | Facility: CLINIC | Age: 68
End: 2018-02-09

## 2018-02-09 NOTE — TELEPHONE ENCOUNTER
Form signed. . However, patient did not sign form so unable to forward form on to [employer].  Patient informed and form mailed back to patient per her request.

## 2018-02-19 ENCOUNTER — TELEPHONE (OUTPATIENT)
Dept: INTERNAL MEDICINE CLINIC | Facility: CLINIC | Age: 68
End: 2018-02-19

## 2018-02-19 DIAGNOSIS — IMO0002 OSTEOARTHROSIS INVOLVING LOWER LEG: ICD-10-CM

## 2018-02-19 RX ORDER — CELECOXIB 200 MG/1
200 CAPSULE ORAL DAILY
Qty: 90 CAPSULE | Refills: 0 | Status: SHIPPED | OUTPATIENT
Start: 2018-02-19 | End: 2018-02-19 | Stop reason: CLARIF

## 2018-02-19 RX ORDER — CELECOXIB 200 MG/1
200 CAPSULE ORAL DAILY
Qty: 90 CAPSULE | Refills: 0 | Status: SHIPPED | OUTPATIENT
Start: 2018-02-19 | End: 2018-05-15

## 2018-02-19 RX ORDER — OMEPRAZOLE 20 MG/1
20 CAPSULE, DELAYED RELEASE ORAL EVERY MORNING
Qty: 90 CAPSULE | Refills: 0 | Status: SHIPPED | OUTPATIENT
Start: 2018-02-19 | End: 2018-02-19 | Stop reason: CLARIF

## 2018-02-19 RX ORDER — OMEPRAZOLE 20 MG/1
20 CAPSULE, DELAYED RELEASE ORAL EVERY MORNING
Qty: 90 CAPSULE | Refills: 0 | Status: SHIPPED | OUTPATIENT
Start: 2018-02-19 | End: 2018-02-20

## 2018-02-19 NOTE — TELEPHONE ENCOUNTER
[facility] pharmacy called requesting refill for:  omeprazole 20 MG Oral Capsule Delayed Release  celecoxib 200 MG Oral Cap    To be sent to:  [place] [id number] Cameron Banerjee, [address] [address] AT [address] [facility], [phone], [phone]

## 2018-02-19 NOTE — TELEPHONE ENCOUNTER
Rx sent to incorrect pharmacy.    Per patient, rx cancelled at [facility]  Please send to [address] [facility] [address] the Celecoxib  Omeprazole to be filled by specialist Dr. Neto Lentz

## 2018-02-23 ENCOUNTER — APPOINTMENT (OUTPATIENT)
Dept: LAB | Age: 68
End: 2018-02-23
Attending: DERMATOLOGY
Payer: MEDICARE

## 2018-02-23 PROCEDURE — 88305 TISSUE EXAM BY PATHOLOGIST: CPT

## 2018-02-27 ENCOUNTER — OFFICE VISIT (OUTPATIENT)
Dept: NEUROLOGY | Facility: CLINIC | Age: 68
End: 2018-02-27

## 2018-02-27 VITALS
RESPIRATION RATE: 16 BRPM | DIASTOLIC BLOOD PRESSURE: 78 MMHG | HEART RATE: 80 BPM | WEIGHT: 190 LBS | BODY MASS INDEX: 32 KG/M2 | SYSTOLIC BLOOD PRESSURE: 112 MMHG

## 2018-02-27 DIAGNOSIS — M48.061 SPINAL STENOSIS OF LUMBAR REGION WITHOUT NEUROGENIC CLAUDICATION: ICD-10-CM

## 2018-02-27 DIAGNOSIS — M62.838 MUSCLE SPASMS OF BOTH LOWER EXTREMITIES: ICD-10-CM

## 2018-02-27 DIAGNOSIS — F41.9 ANXIETY: ICD-10-CM

## 2018-02-27 DIAGNOSIS — F32.0 CURRENT MILD EPISODE OF MAJOR DEPRESSIVE DISORDER WITHOUT PRIOR EPISODE (HCC): ICD-10-CM

## 2018-02-27 DIAGNOSIS — G35 MULTIPLE SCLEROSIS (HCC): Primary | ICD-10-CM

## 2018-02-27 DIAGNOSIS — G25.81 RLS (RESTLESS LEGS SYNDROME): ICD-10-CM

## 2018-02-27 PROCEDURE — 99214 OFFICE O/P EST MOD 30 MIN: CPT | Performed by: OTHER

## 2018-02-27 RX ORDER — BACLOFEN 20 MG/1
TABLET ORAL
Qty: 90 TABLET | Refills: 0 | Status: SHIPPED | OUTPATIENT
Start: 2018-02-27 | End: 2018-02-27

## 2018-02-27 RX ORDER — BACLOFEN 20 MG/1
TABLET ORAL
Qty: 120 TABLET | Refills: 11 | Status: SHIPPED | OUTPATIENT
Start: 2018-02-27 | End: 2018-08-23

## 2018-02-27 NOTE — PATIENT INSTRUCTIONS
Refill policies:    • Allow 2-3 business days for refills; controlled substances may take longer.   • Contact your pharmacy at least 5 days prior to running out of medication and have them send an electronic request or submit request through the [facility] recommended that you have a procedure or additional testing performed. [facility]) will contact your insurance carrier to obtain pre-certification or prior authorization.     Unfortunately, [facility] has seen an increase in denial of paym

## 2018-02-27 NOTE — PROGRESS NOTES
[facility]   Neurology; follow up  CLINIC VISIT  2018    Rodrick Craig Patient Status:  No patient class for patient encounter    3/21/1950 MRN [id number]   Location [address] [place] [address] Ochoa Hyman MD     REASON decreased vision of left eye, left sided weakness due to MS   • Muscle weakness     has MS   • OSTEOARTHRITIS    • Osteoarthritis    • OTHER DISEASES     MS dx 1981 Dr. Cloud Speaker   • PONV (postoperative nausea and vomiting)     severe - projectile vomiting 25 MCG Oral Tab Take 1 tablet (25 mcg total) by mouth before breakfast. Disp: 30 tablet Rfl: 5   ATORVASTATIN 20 MG Oral Tab TAKE 1 TABLET(20 MG) BY MOUTH EVERY NIGHT Disp: 30 tablet Rfl: 2   AMLODIPINE BESYLATE 5 MG Oral Tab TAKE 2 TABLETS(10 MG) BY MOUTH acute stress;   HEENT:  Normal conjunctiva, no abnormal secretion,   Neck supple,  No carotid bruit,  thyroid normal  Lungs are clear to auscultation  Heart: normal SR, no murmur  Extremities:  No edema or cyanosis, pulse is normal. Discoloration in her ank ([facility]  (primary encounter diagnosis)    (F32.0) Current mild episode of major depressive disorder without prior episode ([facility]    (F41.9) Anxiety    (M48.061) Spinal stenosis of lumbar region without neurogenic claudication    (G25.81) RLS (restless legs sy

## 2018-03-20 RX ORDER — ATORVASTATIN CALCIUM 20 MG/1
TABLET, FILM COATED ORAL
Qty: 30 TABLET | Refills: 3 | Status: SHIPPED | OUTPATIENT
Start: 2018-03-20 | End: 2018-08-05

## 2018-04-05 ENCOUNTER — OFFICE VISIT (OUTPATIENT)
Dept: INTERNAL MEDICINE CLINIC | Facility: CLINIC | Age: 68
End: 2018-04-05

## 2018-04-05 VITALS
BODY MASS INDEX: 32 KG/M2 | HEART RATE: 61 BPM | RESPIRATION RATE: 16 BRPM | DIASTOLIC BLOOD PRESSURE: 76 MMHG | OXYGEN SATURATION: 98 % | WEIGHT: 191.75 LBS | TEMPERATURE: 98 F | SYSTOLIC BLOOD PRESSURE: 130 MMHG

## 2018-04-05 DIAGNOSIS — H66.92 LEFT ACUTE OTITIS MEDIA: Primary | ICD-10-CM

## 2018-04-05 PROCEDURE — 99213 OFFICE O/P EST LOW 20 MIN: CPT | Performed by: FAMILY MEDICINE

## 2018-04-05 RX ORDER — AMOXICILLIN 875 MG/1
875 TABLET, COATED ORAL 2 TIMES DAILY
Qty: 20 TABLET | Refills: 0 | Status: SHIPPED | OUTPATIENT
Start: 2018-04-05 | End: 2018-08-14 | Stop reason: ALTCHOICE

## 2018-04-05 NOTE — PROGRESS NOTES
CHIEF COMPLAINT:   Patient presents with:  Ear Pain: Left ear pain along with left cheek and left neck pain x 4 days. No fever.        HPI:   Jono Lopez is a non-toxic, well appearing 76year old female with complaints of left ear pain that extends Rfl: 3   Multiple Vitamin (DAILY VALUE MULTIVITAMIN) Oral Tab Take 1 tablet by mouth daily. Disp:  Rfl:    Cholecalciferol (VITAMIN D) 2000 UNITS Oral Cap Take 1 capsule by mouth daily.  Disp:  Rfl:       Past Medical History:   Diagnosis Date   • BACK PAIN bilaterally. External auditory canals healthy. Right TM: pearly, no bulging, no retraction,no effusion; bony landmarks present. Left TM: redden and dull, no bulging, no retraction,+ effusion; bony landmarks not present.   NOSE: nostrils patent, minimal fuad

## 2018-04-09 DIAGNOSIS — G35 MULTIPLE SCLEROSIS (HCC): ICD-10-CM

## 2018-04-10 RX ORDER — DIMETHYL FUMARATE 240 MG/1
CAPSULE ORAL
Qty: 180 CAPSULE | Refills: 1 | Status: SHIPPED | OUTPATIENT
Start: 2018-04-10 | End: 2018-08-23

## 2018-04-10 NOTE — TELEPHONE ENCOUNTER
Medication: Tecfidera 240mg     Date of last refill: 3/16/17  [place]     Last office visit: 2/27/18  Due back to clinic per last office note:  6 months  Date next office visit scheduled:  No appointment     Last OV note

## 2018-05-02 ENCOUNTER — LAB ENCOUNTER (OUTPATIENT)
Dept: LAB | Age: 68
End: 2018-05-02
Attending: DERMATOLOGY
Payer: MEDICARE

## 2018-05-02 DIAGNOSIS — C44.310 BCC (BASAL CELL CARCINOMA), FACE: ICD-10-CM

## 2018-05-02 PROCEDURE — 88305 TISSUE EXAM BY PATHOLOGIST: CPT

## 2018-05-02 RX ORDER — TRIAMTERENE AND HYDROCHLOROTHIAZIDE 37.5; 25 MG/1; MG/1
TABLET ORAL
Qty: 90 TABLET | Refills: 0 | Status: SHIPPED | OUTPATIENT
Start: 2018-05-02 | End: 2018-07-31

## 2018-05-14 RX ORDER — AMLODIPINE BESYLATE 5 MG/1
TABLET ORAL
Qty: 180 TABLET | Refills: 0 | Status: SHIPPED | OUTPATIENT
Start: 2018-05-14 | End: 2018-08-06

## 2018-05-15 DIAGNOSIS — IMO0002 OSTEOARTHROSIS INVOLVING LOWER LEG: ICD-10-CM

## 2018-05-15 RX ORDER — CELECOXIB 200 MG/1
200 CAPSULE ORAL DAILY
Qty: 90 CAPSULE | Refills: 0 | Status: SHIPPED | OUTPATIENT
Start: 2018-05-15 | End: 2018-08-15

## 2018-05-30 RX ORDER — LEVOTHYROXINE SODIUM 0.03 MG/1
TABLET ORAL
Qty: 90 TABLET | Refills: 0 | Status: SHIPPED | OUTPATIENT
Start: 2018-05-30 | End: 2018-09-24

## 2018-06-06 DIAGNOSIS — G35 MULTIPLE SCLEROSIS (HCC): Primary | ICD-10-CM

## 2018-06-07 RX ORDER — BACLOFEN 20 MG/1
TABLET ORAL
Qty: 180 TABLET | Refills: 0 | OUTPATIENT
Start: 2018-06-07

## 2018-06-07 NOTE — TELEPHONE ENCOUNTER
Pt returned call and states that she is taking the Baclofen TID. She just picked up a refill last week, however, and does not need one right now. Will refuse this request.    Pt reminded of need for labs, and she verbalized understanding.   She is current

## 2018-06-07 NOTE — TELEPHONE ENCOUNTER
Refill is for Baclofen TID. Patient increased at last OV to QID. Patient also to have labs done every 3 months. Last CBC and CMP 1/22/18.  LMTCB to clarify how often she is taking the Baclofen, remind her to get labs done and schedule follow up with Dr Serenity Higuera

## 2018-06-25 ENCOUNTER — TELEPHONE (OUTPATIENT)
Dept: INTERNAL MEDICINE CLINIC | Facility: CLINIC | Age: 68
End: 2018-06-25

## 2018-06-25 DIAGNOSIS — Z85.828 HISTORY OF BASAL CELL CARCINOMA: Primary | ICD-10-CM

## 2018-06-25 DIAGNOSIS — Z12.31 ENCOUNTER FOR SCREENING MAMMOGRAM FOR MALIGNANT NEOPLASM OF BREAST: Primary | ICD-10-CM

## 2018-06-25 NOTE — TELEPHONE ENCOUNTER
Referral Request   Received:  Today   Message Contents   Jacqui Faviobelinda [facility] Clinical Staff   Cc: [facility] Referral Pool   Phone Number: [phone]             .Reason for the order/referral: Office Visit   PCP:  Dr Letty Sanches   Refer to Provider

## 2018-06-25 NOTE — TELEPHONE ENCOUNTER
Patient due for yearly routine mammo - last mammo May 2017.  Rcv'd letter that she was due for mammo in May

## 2018-07-05 ENCOUNTER — HOSPITAL ENCOUNTER (OUTPATIENT)
Dept: MAMMOGRAPHY | Age: 68
Discharge: HOME | End: 2018-07-05
Attending: FAMILY MEDICINE
Payer: MEDICARE

## 2018-07-05 DIAGNOSIS — Z12.31 ENCOUNTER FOR SCREENING MAMMOGRAM FOR MALIGNANT NEOPLASM OF BREAST: ICD-10-CM

## 2018-07-05 PROCEDURE — 77067 SCR MAMMO BI INCL CAD: CPT | Performed by: FAMILY MEDICINE

## 2018-07-05 PROCEDURE — 77063 BREAST TOMOSYNTHESIS BI: CPT | Performed by: FAMILY MEDICINE

## 2018-07-21 RX ORDER — ESCITALOPRAM OXALATE 20 MG/1
TABLET ORAL
Qty: 30 TABLET | Refills: 2 | Status: SHIPPED | OUTPATIENT
Start: 2018-07-21 | End: 2018-09-12

## 2018-07-31 NOTE — TELEPHONE ENCOUNTER
Refill needed    TRIAMTERENE-HCTZ 37.5-25 MG Oral Tab    [facility] #[id number]

## 2018-08-01 RX ORDER — TRIAMTERENE AND HYDROCHLOROTHIAZIDE 37.5; 25 MG/1; MG/1
1 TABLET ORAL
Qty: 90 TABLET | Refills: 0 | Status: SHIPPED | OUTPATIENT
Start: 2018-08-01 | End: 2018-10-29

## 2018-08-01 NOTE — TELEPHONE ENCOUNTER
Refill requested: Triamterene HCTZ 37.5-25    Passed protocol      Last refill: 5/2/18 #90 NR    Relevant Labs: CMP 1/22/18   BP Readings from Last 3 Encounters:  04/05/18 : 130/76  02/27/18 : 112/78  11/16/17 : 134/74      Last OV / RTC advised: 4/5/18 SM

## 2018-08-06 RX ORDER — ATORVASTATIN CALCIUM 20 MG/1
TABLET, FILM COATED ORAL
Qty: 90 TABLET | Refills: 0 | Status: SHIPPED | OUTPATIENT
Start: 2018-08-06 | End: 2018-11-02

## 2018-08-07 RX ORDER — AMLODIPINE BESYLATE 5 MG/1
10 TABLET ORAL DAILY
Qty: 180 TABLET | Refills: 0 | Status: SHIPPED | OUTPATIENT
Start: 2018-08-07 | End: 2018-11-06

## 2018-08-07 NOTE — TELEPHONE ENCOUNTER
Refill requested:  Amlodipine 5 mg     Passed protocol      Last refill: 5/14/18 Amlodipine 5 mg  #180 NR     Relevant Labs:  CMP 1/22/18   BP Readings from Last 3 Encounters:  04/05/18 : 130/76  02/27/18 : 112/78  11/16/17 : 134/74      Last OV / RTC advi

## 2018-08-09 ENCOUNTER — LAB ENCOUNTER (OUTPATIENT)
Dept: LAB | Age: 68
End: 2018-08-09
Attending: Other
Payer: MEDICARE

## 2018-08-09 ENCOUNTER — TELEPHONE (OUTPATIENT)
Dept: NEUROLOGY | Facility: CLINIC | Age: 68
End: 2018-08-09

## 2018-08-09 DIAGNOSIS — G35 MULTIPLE SCLEROSIS (HCC): ICD-10-CM

## 2018-08-09 LAB
ALBUMIN SERPL-MCNC: 4 G/DL (ref 3.5–4.8)
ALBUMIN/GLOB SERPL: 1.1 {RATIO} (ref 1–2)
ALP LIVER SERPL-CCNC: 59 U/L (ref 55–142)
ALT SERPL-CCNC: 25 U/L (ref 14–54)
ANION GAP SERPL CALC-SCNC: 7 MMOL/L (ref 0–18)
AST SERPL-CCNC: 15 U/L (ref 15–41)
BASOPHILS # BLD AUTO: 0.05 X10(3) UL (ref 0–0.1)
BASOPHILS NFR BLD AUTO: 0.9 %
BILIRUB SERPL-MCNC: 0.5 MG/DL (ref 0.1–2)
BUN BLD-MCNC: 20 MG/DL (ref 8–20)
BUN/CREAT SERPL: 27 (ref 10–20)
CALCIUM BLD-MCNC: 9 MG/DL (ref 8.3–10.3)
CHLORIDE SERPL-SCNC: 105 MMOL/L (ref 101–111)
CO2 SERPL-SCNC: 28 MMOL/L (ref 22–32)
CREAT BLD-MCNC: 0.74 MG/DL (ref 0.55–1.02)
EOSINOPHIL # BLD AUTO: 0.29 X10(3) UL (ref 0–0.3)
EOSINOPHIL NFR BLD AUTO: 5 %
ERYTHROCYTE [DISTWIDTH] IN BLOOD BY AUTOMATED COUNT: 13.8 % (ref 11.5–16)
GLOBULIN PLAS-MCNC: 3.6 G/DL (ref 2.5–3.7)
GLUCOSE BLD-MCNC: 99 MG/DL (ref 70–99)
HCT VFR BLD AUTO: 42.1 % (ref 34–50)
HGB BLD-MCNC: 13.7 G/DL (ref 12–16)
IMMATURE GRANULOCYTE COUNT: 0.01 X10(3) UL (ref 0–1)
IMMATURE GRANULOCYTE RATIO %: 0.2 %
LYMPHOCYTES # BLD AUTO: 1.45 X10(3) UL (ref 0.9–4)
LYMPHOCYTES NFR BLD AUTO: 25 %
M PROTEIN MFR SERPL ELPH: 7.6 G/DL (ref 6.1–8.3)
MCH RBC QN AUTO: 30.4 PG (ref 27–33.2)
MCHC RBC AUTO-ENTMCNC: 32.5 G/DL (ref 31–37)
MCV RBC AUTO: 93.3 FL (ref 81–100)
MONOCYTES # BLD AUTO: 0.62 X10(3) UL (ref 0.1–1)
MONOCYTES NFR BLD AUTO: 10.7 %
NEUTROPHIL ABS PRELIM: 3.37 X10 (3) UL (ref 1.3–6.7)
NEUTROPHILS # BLD AUTO: 3.37 X10(3) UL (ref 1.3–6.7)
NEUTROPHILS NFR BLD AUTO: 58.2 %
OSMOLALITY SERPL CALC.SUM OF ELEC: 293 MOSM/KG (ref 275–295)
PLATELET # BLD AUTO: 249 10(3)UL (ref 150–450)
POTASSIUM SERPL-SCNC: 4.1 MMOL/L (ref 3.6–5.1)
RBC # BLD AUTO: 4.51 X10(6)UL (ref 3.8–5.1)
RED CELL DISTRIBUTION WIDTH-SD: 47.3 FL (ref 35.1–46.3)
SODIUM SERPL-SCNC: 140 MMOL/L (ref 136–144)
WBC # BLD AUTO: 5.8 X10(3) UL (ref 4–13)

## 2018-08-09 PROCEDURE — 80053 COMPREHEN METABOLIC PANEL: CPT

## 2018-08-09 PROCEDURE — 36415 COLL VENOUS BLD VENIPUNCTURE: CPT

## 2018-08-09 PROCEDURE — 85025 COMPLETE CBC W/AUTO DIFF WBC: CPT

## 2018-08-09 NOTE — TELEPHONE ENCOUNTER
[employer] message sent to patient with the below results.     ----- Message from Aliza Selby MD sent at 8/9/2018  3:23 PM CDT -----  Labs fine

## 2018-08-14 ENCOUNTER — OFFICE VISIT (OUTPATIENT)
Dept: INTERNAL MEDICINE CLINIC | Facility: CLINIC | Age: 68
End: 2018-08-14

## 2018-08-14 VITALS
HEART RATE: 64 BPM | BODY MASS INDEX: 33 KG/M2 | WEIGHT: 196 LBS | DIASTOLIC BLOOD PRESSURE: 78 MMHG | OXYGEN SATURATION: 98 % | TEMPERATURE: 98 F | SYSTOLIC BLOOD PRESSURE: 128 MMHG | RESPIRATION RATE: 12 BRPM

## 2018-08-14 DIAGNOSIS — Z23 NEED FOR STREPTOCOCCUS PNEUMONIAE VACCINATION: ICD-10-CM

## 2018-08-14 DIAGNOSIS — R89.9 ABNORMAL LABORATORY TEST RESULT: Primary | ICD-10-CM

## 2018-08-14 DIAGNOSIS — Z13.820 OSTEOPOROSIS SCREENING: ICD-10-CM

## 2018-08-14 PROCEDURE — 99213 OFFICE O/P EST LOW 20 MIN: CPT | Performed by: NURSE PRACTITIONER

## 2018-08-14 NOTE — PROGRESS NOTES
CHIEF COMPLAINT:     Patient presents with:  Lab Results: question about BUN/Cr      HPI:   Fawn Oneill is a 76year old female here with main concern that she had a recent elevated BUN/CREAT ratio on CMP from 8/9/18.  Pt states Dr. Jae Calvin ordered the 4/2008    excision of BCC lesion Left ear   • Depression    • Disorder of thyroid    • High blood pressure    • High cholesterol    • Hyperlipidemia    • Multiple sclerosis (HCC) 2000    chronic side effects include fatique and \"brain fog\" and decreased indicates understanding of these issues and agrees to the plan. Follow up for routine care and disease management.

## 2018-08-15 DIAGNOSIS — IMO0002 OSTEOARTHROSIS INVOLVING LOWER LEG: ICD-10-CM

## 2018-08-15 RX ORDER — ESCITALOPRAM OXALATE 20 MG/1
TABLET ORAL
Qty: 90 TABLET | Refills: 2 | OUTPATIENT
Start: 2018-08-15

## 2018-08-15 NOTE — TELEPHONE ENCOUNTER
Refill requested: Escitalopram 20 mg       Last refill: 7/21/18 #30 2R - Due around 9/21/18  Declined to pharmacy.

## 2018-08-16 RX ORDER — CELECOXIB 200 MG/1
CAPSULE ORAL
Qty: 90 CAPSULE | Refills: 0 | Status: SHIPPED | OUTPATIENT
Start: 2018-08-16 | End: 2018-11-15

## 2018-08-23 ENCOUNTER — OFFICE VISIT (OUTPATIENT)
Dept: NEUROLOGY | Facility: CLINIC | Age: 68
End: 2018-08-23

## 2018-08-23 VITALS
HEART RATE: 78 BPM | SYSTOLIC BLOOD PRESSURE: 136 MMHG | BODY MASS INDEX: 33.29 KG/M2 | HEIGHT: 64 IN | RESPIRATION RATE: 16 BRPM | WEIGHT: 195 LBS | DIASTOLIC BLOOD PRESSURE: 72 MMHG

## 2018-08-23 DIAGNOSIS — G25.81 RLS (RESTLESS LEGS SYNDROME): Primary | ICD-10-CM

## 2018-08-23 DIAGNOSIS — R53.82 CHRONIC FATIGUE: ICD-10-CM

## 2018-08-23 DIAGNOSIS — G35 MULTIPLE SCLEROSIS (HCC): ICD-10-CM

## 2018-08-23 PROCEDURE — 99214 OFFICE O/P EST MOD 30 MIN: CPT | Performed by: OTHER

## 2018-08-23 RX ORDER — DIMETHYL FUMARATE 240 MG/1
1 CAPSULE ORAL 2 TIMES DAILY
Qty: 180 CAPSULE | Refills: 3 | Status: SHIPPED | OUTPATIENT
Start: 2018-08-23 | End: 2019-02-05

## 2018-08-23 RX ORDER — BACLOFEN 20 MG/1
TABLET ORAL
Qty: 270 TABLET | Refills: 3 | Status: SHIPPED | OUTPATIENT
Start: 2018-08-23 | End: 2019-08-25

## 2018-08-23 NOTE — PROGRESS NOTES
[facility]   Neurology; follow up  CLINIC VISIT  2018    Roland Miller Patient Status:  No patient class for patient encounter    3/21/1950 MRN [id number]   Location [address] [place] [address] Cherylyn Sandhoff, MD     REASON OSTEOARTHRITIS    • Osteoarthritis    • OTHER DISEASES     MS dx 1981 Dr. Uma Khan   • PONV (postoperative nausea and vomiting)     severe - projectile vomiting for the entire night    • Unspecified essential hypertension    • Visual impairment     decreas TAKE 1 TABLET(20 MG) BY MOUTH DAILY Disp: 30 tablet Rfl: 2   LEVOTHYROXINE SODIUM 25 MCG Oral Tab TAKE 1 TABLET(25 MCG) BY MOUTH BEFORE BREAKFAST Disp: 90 tablet Rfl: 0   omeprazole 20 MG Oral Capsule Delayed Release Take one capsule (20 mg total) by mouth Neck supple,  No carotid bruit,  thyroid normal  Lungs are clear to auscultation  Heart: normal SR, no murmur  Extremities:  No edema or cyanosis, pulse is normal. Discoloration in her ankles,  Skin:  No unusual lesions    Neurologic Examination:  Mental Impression/Plan:  (G25.81) RLS (restless legs syndrome)  (primary encounter diagnosis)    (G35) Multiple sclerosis (HCC)  Plan: Dimethyl Fumarate (TECFIDERA) 240 MG Oral         Capsule Delayed Release, CBC WITH DIFFERENTIAL         WITH PLATELET, CO

## 2018-08-23 NOTE — PATIENT INSTRUCTIONS
Refill policies:    • Allow 2-3 business days for refills; controlled substances may take longer.   • Contact your pharmacy at least 5 days prior to running out of medication and have them send an electronic request or submit request through the “request re entire amount billed. Precertification and Prior Authorizations: If your physician has recommended that you have a procedure or additional testing performed.   [facility]) will contact your insurance carrier to obtain pre-certi

## 2018-09-12 RX ORDER — ESCITALOPRAM OXALATE 20 MG/1
TABLET ORAL
Qty: 90 TABLET | Refills: 0 | Status: SHIPPED | OUTPATIENT
Start: 2018-09-12 | End: 2018-12-12

## 2018-09-14 ENCOUNTER — HOSPITAL ENCOUNTER (OUTPATIENT)
Dept: BONE DENSITY | Age: 68
Discharge: HOME | End: 2018-09-14
Attending: NURSE PRACTITIONER
Payer: MEDICARE

## 2018-09-14 DIAGNOSIS — Z13.820 OSTEOPOROSIS SCREENING: ICD-10-CM

## 2018-09-14 PROCEDURE — 77080 DXA BONE DENSITY AXIAL: CPT | Performed by: NURSE PRACTITIONER

## 2018-09-24 RX ORDER — LEVOTHYROXINE SODIUM 0.03 MG/1
25 TABLET ORAL
Qty: 90 TABLET | Refills: 0 | Status: SHIPPED | OUTPATIENT
Start: 2018-09-24 | End: 2018-11-18

## 2018-10-05 ENCOUNTER — TELEPHONE (OUTPATIENT)
Dept: INTERNAL MEDICINE CLINIC | Facility: CLINIC | Age: 68
End: 2018-10-05

## 2018-10-05 NOTE — TELEPHONE ENCOUNTER
[place] 3 [place] cant fly gets sick would like to know if she can get a 10 day of antibiotic

## 2018-10-05 NOTE — TELEPHONE ENCOUNTER
Pt asking if SM can rx an ABX. Leaving for [place] [facility] next Saturday and will be on a plane for 5 hours . ( daughter has breast cancer and this is 3rd time flying there.  Last 2 flights got sick ( URi sx's) Hx of MS Argueta like rx sent to [facility] in chart [employer]

## 2018-10-08 RX ORDER — AZITHROMYCIN 250 MG/1
250 TABLET, FILM COATED ORAL DAILY
Qty: 6 TABLET | Refills: 0 | Status: SHIPPED | OUTPATIENT
Start: 2018-10-08 | End: 2019-01-17 | Stop reason: ALTCHOICE

## 2018-10-31 RX ORDER — TRIAMTERENE AND HYDROCHLOROTHIAZIDE 37.5; 25 MG/1; MG/1
TABLET ORAL
Qty: 90 TABLET | Refills: 0 | Status: SHIPPED | OUTPATIENT
Start: 2018-10-31 | End: 2019-01-24

## 2018-10-31 NOTE — TELEPHONE ENCOUNTER
Medication(s) to Refill:   Requested Prescriptions     Pending Prescriptions Disp Refills   • TRIAMTERENE-HCTZ 37.5-25 MG Oral Tab [Pharmacy Med Name: TRIAMTERENE 37.5MG/ HCTZ 25MG TABS] 90 tablet 0     Sig: TAKE 1 TABLET BY MOUTH EVERY DAY       Last Time

## 2018-11-05 RX ORDER — ATORVASTATIN CALCIUM 20 MG/1
TABLET, FILM COATED ORAL
Qty: 90 TABLET | Refills: 0 | Status: SHIPPED | OUTPATIENT
Start: 2018-11-05 | End: 2019-01-24

## 2018-11-06 RX ORDER — AMLODIPINE BESYLATE 5 MG/1
TABLET ORAL
Qty: 180 TABLET | Refills: 0 | Status: SHIPPED | OUTPATIENT
Start: 2018-11-06 | End: 2019-01-24

## 2018-11-06 NOTE — TELEPHONE ENCOUNTER
Refill requested:   Requested Prescriptions     Pending Prescriptions Disp Refills   • AMLODIPINE BESYLATE 5 MG Oral Tab [Pharmacy Med Name: AMLODIPINE BESYLATE 5MG TABLETS] 180 tablet 0     Sig: TAKE 2 TABLETS(10 MG) BY MOUTH DAILY         Passed protocol

## 2018-11-15 DIAGNOSIS — IMO0002 OSTEOARTHROSIS INVOLVING LOWER LEG: ICD-10-CM

## 2018-11-15 RX ORDER — CELECOXIB 200 MG/1
CAPSULE ORAL
Qty: 90 CAPSULE | Refills: 0 | Status: SHIPPED | OUTPATIENT
Start: 2018-11-15 | End: 2019-01-24

## 2018-11-15 NOTE — TELEPHONE ENCOUNTER
Celecoxib 200 MG- Qty: 90  No refill  LOV: 08/14/18  Last refill: 08/16/18  No upcoming appointments

## 2018-11-19 RX ORDER — LEVOTHYROXINE SODIUM 0.03 MG/1
TABLET ORAL
Qty: 90 TABLET | Refills: 0 | Status: SHIPPED | OUTPATIENT
Start: 2018-11-19 | End: 2019-02-11

## 2018-12-12 RX ORDER — ESCITALOPRAM OXALATE 20 MG/1
TABLET ORAL
Qty: 90 TABLET | Refills: 0 | Status: SHIPPED | OUTPATIENT
Start: 2018-12-12 | End: 2019-01-24

## 2018-12-22 RX ORDER — OMEPRAZOLE 20 MG/1
CAPSULE, DELAYED RELEASE ORAL
Qty: 90 CAPSULE | Refills: 0 | OUTPATIENT
Start: 2018-12-22

## 2018-12-26 ENCOUNTER — TELEPHONE (OUTPATIENT)
Dept: NEUROLOGY | Facility: CLINIC | Age: 68
End: 2018-12-26

## 2018-12-28 ENCOUNTER — LAB ENCOUNTER (OUTPATIENT)
Dept: LAB | Age: 68
End: 2018-12-28
Attending: Other
Payer: MEDICARE

## 2018-12-28 DIAGNOSIS — G35 MULTIPLE SCLEROSIS (HCC): ICD-10-CM

## 2018-12-28 PROCEDURE — 36415 COLL VENOUS BLD VENIPUNCTURE: CPT

## 2018-12-28 PROCEDURE — 85025 COMPLETE CBC W/AUTO DIFF WBC: CPT

## 2018-12-28 PROCEDURE — 80053 COMPREHEN METABOLIC PANEL: CPT

## 2019-01-03 ENCOUNTER — TELEPHONE (OUTPATIENT)
Dept: NEUROLOGY | Facility: CLINIC | Age: 69
End: 2019-01-03

## 2019-01-03 NOTE — TELEPHONE ENCOUNTER
Patient notified via [address]    ----- Message from Peggy Alejandre MD sent at 1/2/2019  4:27 PM CST -----  Labs normal

## 2019-01-23 PROBLEM — M47.812 FACET ARTHRITIS OF CERVICAL REGION: Status: ACTIVE | Noted: 2019-01-23

## 2019-01-23 PROBLEM — N95.1 MENOPAUSAL VAGINAL DRYNESS: Status: ACTIVE | Noted: 2019-01-23

## 2019-01-23 PROBLEM — M47.816 FACET ARTHRITIS OF LUMBAR REGION: Status: ACTIVE | Noted: 2019-01-23

## 2019-01-24 ENCOUNTER — OFFICE VISIT (OUTPATIENT)
Dept: INTERNAL MEDICINE CLINIC | Facility: CLINIC | Age: 69
End: 2019-01-24
Payer: MEDICARE

## 2019-01-24 VITALS
WEIGHT: 195 LBS | DIASTOLIC BLOOD PRESSURE: 76 MMHG | HEIGHT: 64.5 IN | HEART RATE: 78 BPM | SYSTOLIC BLOOD PRESSURE: 132 MMHG | OXYGEN SATURATION: 97 % | BODY MASS INDEX: 32.89 KG/M2 | RESPIRATION RATE: 16 BRPM | TEMPERATURE: 98 F

## 2019-01-24 DIAGNOSIS — F41.9 ANXIETY: ICD-10-CM

## 2019-01-24 DIAGNOSIS — I10 ESSENTIAL HYPERTENSION: Primary | ICD-10-CM

## 2019-01-24 DIAGNOSIS — R73.03 PREDIABETES: Chronic | ICD-10-CM

## 2019-01-24 DIAGNOSIS — R53.1 INCREASED WEAKNESS WHEN AMBULATING: ICD-10-CM

## 2019-01-24 DIAGNOSIS — M48.061 SPINAL STENOSIS OF LUMBAR REGION, UNSPECIFIED WHETHER NEUROGENIC CLAUDICATION PRESENT: ICD-10-CM

## 2019-01-24 DIAGNOSIS — Z11.59 NEED FOR HEPATITIS C SCREENING TEST: ICD-10-CM

## 2019-01-24 DIAGNOSIS — N95.1 MENOPAUSAL VAGINAL DRYNESS: ICD-10-CM

## 2019-01-24 DIAGNOSIS — Z12.31 ENCOUNTER FOR SCREENING MAMMOGRAM FOR MALIGNANT NEOPLASM OF BREAST: ICD-10-CM

## 2019-01-24 DIAGNOSIS — M62.838 MUSCLE SPASMS OF BOTH LOWER EXTREMITIES: ICD-10-CM

## 2019-01-24 DIAGNOSIS — L65.9 HAIR LOSS: ICD-10-CM

## 2019-01-24 DIAGNOSIS — R68.89 COLD FEELING: ICD-10-CM

## 2019-01-24 DIAGNOSIS — M47.812 FACET ARTHRITIS OF CERVICAL REGION: ICD-10-CM

## 2019-01-24 DIAGNOSIS — M47.816 FACET ARTHRITIS OF LUMBAR REGION: ICD-10-CM

## 2019-01-24 DIAGNOSIS — E03.9 HYPOTHYROIDISM, UNSPECIFIED TYPE: ICD-10-CM

## 2019-01-24 DIAGNOSIS — G25.81 RLS (RESTLESS LEGS SYNDROME): ICD-10-CM

## 2019-01-24 DIAGNOSIS — L92.0 GRANULOMA ANNULARE: ICD-10-CM

## 2019-01-24 DIAGNOSIS — E78.5 HYPERLIPIDEMIA, UNSPECIFIED HYPERLIPIDEMIA TYPE: ICD-10-CM

## 2019-01-24 DIAGNOSIS — F33.0 MILD RECURRENT MAJOR DEPRESSION (HCC): Chronic | ICD-10-CM

## 2019-01-24 DIAGNOSIS — Z00.00 ENCOUNTER FOR ANNUAL HEALTH EXAMINATION: ICD-10-CM

## 2019-01-24 DIAGNOSIS — R53.82 CHRONIC FATIGUE: ICD-10-CM

## 2019-01-24 DIAGNOSIS — G35 MULTIPLE SCLEROSIS (HCC): ICD-10-CM

## 2019-01-24 DIAGNOSIS — IMO0002 OSTEOARTHROSIS INVOLVING LOWER LEG: ICD-10-CM

## 2019-01-24 DIAGNOSIS — Z98.1 HISTORY OF LUMBAR FUSION: ICD-10-CM

## 2019-01-24 DIAGNOSIS — K21.9 GASTROESOPHAGEAL REFLUX DISEASE WITHOUT ESOPHAGITIS: ICD-10-CM

## 2019-01-24 PROBLEM — F32.0 CURRENT MILD EPISODE OF MAJOR DEPRESSIVE DISORDER WITHOUT PRIOR EPISODE: Status: ACTIVE | Noted: 2019-01-24

## 2019-01-24 PROBLEM — F32.0 CURRENT MILD EPISODE OF MAJOR DEPRESSIVE DISORDER WITHOUT PRIOR EPISODE (HCC): Status: ACTIVE | Noted: 2019-01-24

## 2019-01-24 PROCEDURE — 99397 PER PM REEVAL EST PAT 65+ YR: CPT | Performed by: FAMILY MEDICINE

## 2019-01-24 PROCEDURE — G0439 PPPS, SUBSEQ VISIT: HCPCS | Performed by: FAMILY MEDICINE

## 2019-01-24 PROCEDURE — 96160 PT-FOCUSED HLTH RISK ASSMT: CPT | Performed by: FAMILY MEDICINE

## 2019-01-24 RX ORDER — CELECOXIB 200 MG/1
CAPSULE ORAL
Qty: 90 CAPSULE | Refills: 0 | Status: SHIPPED | OUTPATIENT
Start: 2019-01-24 | End: 2019-04-02

## 2019-01-24 RX ORDER — OMEPRAZOLE 20 MG/1
CAPSULE, DELAYED RELEASE ORAL
Qty: 90 CAPSULE | Refills: 0 | Status: SHIPPED | OUTPATIENT
Start: 2019-01-24 | End: 2020-02-12

## 2019-01-24 RX ORDER — TRIAMTERENE AND HYDROCHLOROTHIAZIDE 37.5; 25 MG/1; MG/1
1 TABLET ORAL
Qty: 90 TABLET | Refills: 0 | Status: SHIPPED | OUTPATIENT
Start: 2019-01-24 | End: 2019-04-02

## 2019-01-24 RX ORDER — ATORVASTATIN CALCIUM 20 MG/1
TABLET, FILM COATED ORAL
Qty: 90 TABLET | Refills: 0 | Status: SHIPPED | OUTPATIENT
Start: 2019-01-24 | End: 2019-04-02

## 2019-01-24 RX ORDER — ESCITALOPRAM OXALATE 20 MG/1
TABLET ORAL
Qty: 90 TABLET | Refills: 0 | Status: SHIPPED | OUTPATIENT
Start: 2019-01-24 | End: 2019-07-24

## 2019-01-24 RX ORDER — AMLODIPINE BESYLATE 5 MG/1
TABLET ORAL
Qty: 180 TABLET | Refills: 0 | Status: SHIPPED | OUTPATIENT
Start: 2019-01-24 | End: 2019-04-02

## 2019-01-24 RX ORDER — ESTRADIOL 0.1 MG/G
1 CREAM VAGINAL EVERY OTHER DAY
Qty: 42.5 G | Refills: 0 | Status: SHIPPED | OUTPATIENT
Start: 2019-01-24 | End: 2019-06-13 | Stop reason: ALTCHOICE

## 2019-01-24 NOTE — PATIENT INSTRUCTIONS
Lillian Reyes's SCREENING SCHEDULE   Tests on this list are recommended by your physician but may not be covered, or covered at this frequency, by your insurer. Please check with your insurance carrier before scheduling to verify coverage.    Jeremi Parker results found for this or any previous visit.  Limited to patients who meet one of the following criteria:   • Men who are 73-68 years old and have smoked more than 100 cigarettes in their lifetime   • Anyone with a family history    Colorectal Cancer Scree get this Mammogram regularly   Immunizations      Influenza  Covered Annually Orders placed or performed in visit on 12/04/14   • INFLUENZA VIRUS VACCINE, >=1YEARS OF AGE    Please get every year    Pneumococcal 13 (Prevnar)  Covered Once after 65 Orders and printer. (the forms are also available in [address])  [email or web address]  This link also has information from the [facility] regarding Advance Directives.

## 2019-01-24 NOTE — PROGRESS NOTES
HPI:   Hakan Lan is a 76year old female who presents for a MA (Medicare Advantage) [facility] (Once per calendar year). Pt had a history of hypothyroidism and here to recheck. Has been tolerating the medication well. Due for TSH repeat.  Denies hypertension     Esophageal reflux     Osteoarthrosis involving lower leg     Hyperlipidemia     Increased weakness when ambulating     Hypothyroidism     Anxiety     Granuloma annulare     History of lumbar fusion     RLS (restless legs syndrome)     Musc Oral Tab Take 1 tablet by mouth once daily. Estradiol (ESTRACE) 0.1 MG/GM Vaginal Cream Place 1 g vaginally every other day.    LEVOTHYROXINE SODIUM 25 MCG Oral Tab TAKE 1 TABLET(25 MCG) BY MOUTH BEFORE BREAKFAST   baclofen 20 MG Oral Tab TAKE 1 TABLET(20 glasses  HEENT: denies nasal congestion, sinus pain or ST  LUNGS: denies shortness of breath with exertion  CARDIOVASCULAR: denies chest pain on exertion,+HTN  GI: denies abdominal pain,+GERD  : denies dysuria, vaginal discharge or itching, no complaint cyanosis or edema   Pulses: 2+ and symmetric   Skin: Skin color, texture, turgor normal, no rashes or lesions. No bald patches noted some thinning hair noted towards the front of the scalp.    Lymph nodes: Cervical, supraclavicular, and axillary nodes lucy Oral Cap; TAKE 1 CAPSULE(200 MG) BY MOUTH DAILY    Increased weakness when ambulating    Muscle spasms of both lower extremities    Facet arthritis of cervical region (HCC)    Menopausal vaginal dryness  -     Estradiol (ESTRACE) 0.1 MG/GM Vaginal Cream; P type  Pt to continue their medication, increase exercise,  choose better fats,  increase fiber and avoid processed foods.    - LIPID PANEL; Future  - atorvastatin 20 MG Oral Tab; TAKE 1 TABLET(20 MG) BY MOUTH EVERY NIGHT  Dispense: 90 tablet;  Refill: 0 calcium supplement daily. 18. Chronic fatigue  Check labs  - ASSAY, THYROID STIM HORMONE; Future  - VITAMIN B12; Future  - MAGNESIUM; Future    19.  Prediabetes  - stable, Pt to continue to eat a low carb diet, exercise and loose weight  - HEMOGLOBIN A1C (calc))   Date Value   09/10/2015 116     LDL Cholesterol (mg/dL)   Date Value   11/20/2017 82        EKG - w/ Initial Preventative Physical Exam only, or if medically necessary Electrocardiogram date02/09/2017       Colorectal Cancer Screening      Colono Illicit injectable drug abusers     Tetanus Toxoid  Only covered with a cut with metal- TD and TDaP Not covered by Medicare Part B No vaccine history found This may be covered with your prescription benefits, but Medicare does not cover unless Medically

## 2019-01-26 RX ORDER — TRIAMTERENE AND HYDROCHLOROTHIAZIDE 37.5; 25 MG/1; MG/1
TABLET ORAL
Qty: 90 TABLET | Refills: 0 | OUTPATIENT
Start: 2019-01-26

## 2019-01-27 DIAGNOSIS — G35 MULTIPLE SCLEROSIS (HCC): ICD-10-CM

## 2019-01-28 NOTE — TELEPHONE ENCOUNTER
Called the pharmacy and spoke with Riaz Sloan who states that the system did not verify the patient had refills remaining on the prescription. Okay to disreagrd.      Medication: TECFIDERA 240 MG Oral Capsule Delayed Release    Date of last refill: 08/23/18 (#18

## 2019-02-01 RX ORDER — DIMETHYL FUMARATE 240 MG/1
CAPSULE ORAL
Qty: 60 CAPSULE | OUTPATIENT
Start: 2019-02-01

## 2019-02-02 ENCOUNTER — LAB ENCOUNTER (OUTPATIENT)
Dept: LAB | Age: 69
End: 2019-02-02
Attending: FAMILY MEDICINE
Payer: MEDICARE

## 2019-02-02 DIAGNOSIS — I10 ESSENTIAL HYPERTENSION: ICD-10-CM

## 2019-02-02 DIAGNOSIS — L65.9 HAIR LOSS: Primary | ICD-10-CM

## 2019-02-02 DIAGNOSIS — R53.82 CHRONIC FATIGUE: ICD-10-CM

## 2019-02-02 DIAGNOSIS — Z11.59 NEED FOR HEPATITIS C SCREENING TEST: ICD-10-CM

## 2019-02-02 DIAGNOSIS — R68.89 COLD FEELING: ICD-10-CM

## 2019-02-02 DIAGNOSIS — E78.5 HYPERLIPIDEMIA, UNSPECIFIED HYPERLIPIDEMIA TYPE: ICD-10-CM

## 2019-02-02 DIAGNOSIS — R73.03 PREDIABETES: Chronic | ICD-10-CM

## 2019-02-02 LAB
CHOLEST SMN-MCNC: 198 MG/DL (ref ?–200)
EST. AVERAGE GLUCOSE BLD GHB EST-MCNC: 126 MG/DL (ref 68–126)
HAV AB SERPL IA-ACNC: 535 PG/ML (ref 193–986)
HAV IGM SER QL: 2.1 MG/DL (ref 1.8–2.5)
HBA1C MFR BLD HPLC: 6 % (ref ?–5.7)
HCV AB SERPL QL IA: NONREACTIVE
HDLC SERPL-MCNC: 86 MG/DL (ref 40–59)
IRON SATURATION: 20 % (ref 15–50)
IRON: 74 UG/DL (ref 28–170)
LDLC SERPL CALC-MCNC: 98 MG/DL (ref ?–100)
NONHDLC SERPL-MCNC: 112 MG/DL (ref ?–130)
PROLACTIN: 5.9 NG/ML
SED RATE-ML: 12 MM/HR (ref 0–25)
T4 FREE SERPL-MCNC: 0.8 NG/DL (ref 0.9–1.8)
TOTAL IRON BINDING CAPACITY: 373 UG/DL (ref 240–450)
TRANSFERRIN SERPL-MCNC: 250 MG/DL (ref 200–360)
TRIGL SERPL-MCNC: 72 MG/DL (ref 30–149)
TSI SER-ACNC: 1.98 MIU/ML (ref 0.35–5.5)
VIT D+METAB SERPL-MCNC: 34.8 NG/ML (ref 30–100)
VLDLC SERPL CALC-MCNC: 14 MG/DL (ref 0–30)

## 2019-02-02 PROCEDURE — 80061 LIPID PANEL: CPT

## 2019-02-02 PROCEDURE — 84402 ASSAY OF FREE TESTOSTERONE: CPT

## 2019-02-02 PROCEDURE — 84146 ASSAY OF PROLACTIN: CPT

## 2019-02-02 PROCEDURE — 83550 IRON BINDING TEST: CPT

## 2019-02-02 PROCEDURE — 83735 ASSAY OF MAGNESIUM: CPT

## 2019-02-02 PROCEDURE — 86803 HEPATITIS C AB TEST: CPT

## 2019-02-02 PROCEDURE — 84630 ASSAY OF ZINC: CPT

## 2019-02-02 PROCEDURE — 83540 ASSAY OF IRON: CPT

## 2019-02-02 PROCEDURE — 84439 ASSAY OF FREE THYROXINE: CPT

## 2019-02-02 PROCEDURE — 36415 COLL VENOUS BLD VENIPUNCTURE: CPT

## 2019-02-02 PROCEDURE — 82607 VITAMIN B-12: CPT

## 2019-02-02 PROCEDURE — 82306 VITAMIN D 25 HYDROXY: CPT

## 2019-02-02 PROCEDURE — 83036 HEMOGLOBIN GLYCOSYLATED A1C: CPT

## 2019-02-02 PROCEDURE — 84443 ASSAY THYROID STIM HORMONE: CPT

## 2019-02-02 PROCEDURE — 84403 ASSAY OF TOTAL TESTOSTERONE: CPT

## 2019-02-02 PROCEDURE — 85652 RBC SED RATE AUTOMATED: CPT

## 2019-02-02 PROCEDURE — 84207 ASSAY OF VITAMIN B-6: CPT

## 2019-02-04 RX ORDER — AMLODIPINE BESYLATE 5 MG/1
TABLET ORAL
Qty: 180 TABLET | Refills: 0 | OUTPATIENT
Start: 2019-02-04

## 2019-02-05 ENCOUNTER — TELEPHONE (OUTPATIENT)
Dept: NEUROLOGY | Facility: CLINIC | Age: 69
End: 2019-02-05

## 2019-02-05 DIAGNOSIS — G35 MULTIPLE SCLEROSIS (HCC): ICD-10-CM

## 2019-02-05 RX ORDER — DIMETHYL FUMARATE 240 MG/1
1 CAPSULE ORAL 2 TIMES DAILY
Qty: 180 CAPSULE | Refills: 2 | Status: SHIPPED | OUTPATIENT
Start: 2019-02-05 | End: 2019-09-17

## 2019-02-05 RX ORDER — ATORVASTATIN CALCIUM 20 MG/1
TABLET, FILM COATED ORAL
Qty: 90 TABLET | Refills: 0 | OUTPATIENT
Start: 2019-02-05

## 2019-02-05 NOTE — TELEPHONE ENCOUNTER
Received fax from [facility] clinical review that patient has been approved for Tecfidera  mg through 1/31/2019. Per CMM review. No key.

## 2019-02-05 NOTE — TELEPHONE ENCOUNTER
Called the pharmacy and spoke with Stephen Anglin who states that the patient prescription has  and the pharmacy is needing a new one.        Medication: Tecfidera 240 MG Capsule    Date of last refill: 18 (#180/3)  [place]

## 2019-02-06 LAB
SEX HORMONE BINDING GLOBULIN: 52 NMOL/L
TESTOSTERONE -MS, BIOAVAILAB: 7.1 NG/DL
TESTOSTERONE, -MS/MS: 20 NG/DL
TESTOSTERONE, FREE -MS/MS: 2.5 PG/ML
VITAMIN B6: 44.9 NMOL/L

## 2019-02-07 LAB — ZINC: 76 UG/DL

## 2019-02-11 ENCOUNTER — TELEPHONE (OUTPATIENT)
Dept: INTERNAL MEDICINE CLINIC | Facility: CLINIC | Age: 69
End: 2019-02-11

## 2019-02-11 DIAGNOSIS — R79.89 ABNORMAL THYROID BLOOD TEST: Primary | ICD-10-CM

## 2019-02-11 RX ORDER — LEVOTHYROXINE SODIUM 0.03 MG/1
TABLET ORAL
Qty: 90 TABLET | Refills: 1 | Status: SHIPPED | OUTPATIENT
Start: 2019-02-11 | End: 2019-07-08

## 2019-02-11 NOTE — TELEPHONE ENCOUNTER
Hgba1c up a little to 6. Pt to watch her diet, and continue to exercise. TSH wnl and T4 remains low, Pt to f/u with Endo- Dr. Sudha Frey.  Other labs pending

## 2019-02-13 DIAGNOSIS — IMO0002 OSTEOARTHROSIS INVOLVING LOWER LEG: ICD-10-CM

## 2019-02-13 RX ORDER — CELECOXIB 200 MG/1
CAPSULE ORAL
Qty: 90 CAPSULE | Refills: 0 | OUTPATIENT
Start: 2019-02-13

## 2019-02-13 NOTE — TELEPHONE ENCOUNTER
Pt med.  Was refilled on 1-24-19 qty: 90     Celecoxib  Last OV relevant to medication: 1-24-19  Last refill date: 1-24-19  #/refills: 0  When pt was asked to return for OV: 6 months  Upcoming appt/reason: none  Recent labs: 2-2-19: HCV/ Sed Rate/ [employer]

## 2019-03-03 ENCOUNTER — APPOINTMENT (OUTPATIENT)
Dept: GENERAL RADIOLOGY | Age: 69
End: 2019-03-03
Attending: FAMILY MEDICINE
Payer: MEDICARE

## 2019-03-03 ENCOUNTER — HOSPITAL ENCOUNTER (OUTPATIENT)
Age: 69
Discharge: HOME | End: 2019-03-03
Attending: FAMILY MEDICINE
Payer: MEDICARE

## 2019-03-03 VITALS
DIASTOLIC BLOOD PRESSURE: 79 MMHG | RESPIRATION RATE: 16 BRPM | WEIGHT: 195 LBS | TEMPERATURE: 98 F | HEART RATE: 81 BPM | OXYGEN SATURATION: 98 % | BODY MASS INDEX: 33 KG/M2 | SYSTOLIC BLOOD PRESSURE: 149 MMHG

## 2019-03-03 DIAGNOSIS — L03.011 PARONYCHIA OF RIGHT THUMB: Primary | ICD-10-CM

## 2019-03-03 DIAGNOSIS — S60.111A SUBUNGUAL HEMATOMA OF RIGHT THUMB, INITIAL ENCOUNTER: ICD-10-CM

## 2019-03-03 DIAGNOSIS — S62.522A CLOSED DISPLACED FRACTURE OF DISTAL PHALANX OF LEFT THUMB, INITIAL ENCOUNTER: ICD-10-CM

## 2019-03-03 PROCEDURE — 11740 EVACUATION SUBUNGUAL HMTMA: CPT

## 2019-03-03 PROCEDURE — 73140 X-RAY EXAM OF FINGER(S): CPT | Performed by: FAMILY MEDICINE

## 2019-03-03 PROCEDURE — 26750 TREAT FINGER FRACTURE EACH: CPT

## 2019-03-03 PROCEDURE — 99213 OFFICE O/P EST LOW 20 MIN: CPT

## 2019-03-03 PROCEDURE — 99204 OFFICE O/P NEW MOD 45 MIN: CPT

## 2019-03-03 RX ORDER — CEPHALEXIN 500 MG/1
500 CAPSULE ORAL 3 TIMES DAILY
Qty: 21 CAPSULE | Refills: 0 | Status: SHIPPED | OUTPATIENT
Start: 2019-03-03 | End: 2019-03-10

## 2019-03-03 NOTE — ED PROVIDER NOTES
Patient Seen in: THE [facility] In [place]    History   Patient presents with:  Upper Extremity Injury (musculoskeletal): right thumb     Stated Complaint: RIGHT THUMB SMASHED IN CAR DOOR X 3DAYS    17-year-old female comes in to get checked Tobacco Use      Smoking status: Never Smoker      Smokeless tobacco: Never Used    Alcohol use:  Yes      Alcohol/week: 3.0 oz      Types: 5 Glasses of wine per week      Binge frequency: Weekly      Comment: 5x week red wine    Drug use: No      Review There is a nondisplaced fracture of the distal tuft of the distal phalanx of the thumb.  No additional osseous injuries/fractures.  There are mild to moderate osteoarthritic changes of the MCP joint of the thumb.  There also mild   osteoarthritic changes a Current Discharge Medication List    START taking these medications    cephALEXin 500 MG Oral Cap  Take 1 capsule (500 mg total) by mouth 3 (three) times daily for 7 days.   Qty: 21 capsule Refills: 0

## 2019-03-03 NOTE — ED INITIAL ASSESSMENT (HPI)
Patient states that last Wednesday she closed her right thumb in a car door. Patient states that she noticed that the cuticle region was \" lifted\" with a yellow coloring to the base of the nail bed. Patient denies fever. Complains of soreness.  CMS [place]

## 2019-03-11 RX ORDER — ESCITALOPRAM OXALATE 20 MG/1
TABLET ORAL
Qty: 90 TABLET | Refills: 0 | Status: SHIPPED | OUTPATIENT
Start: 2019-03-11 | End: 2019-04-02

## 2019-03-13 ENCOUNTER — HOSPITAL ENCOUNTER (OUTPATIENT)
Age: 69
Discharge: HOME | End: 2019-03-13
Payer: MEDICARE

## 2019-03-13 ENCOUNTER — LAB ENCOUNTER (OUTPATIENT)
Dept: LAB | Age: 69
End: 2019-03-13
Attending: INTERNAL MEDICINE
Payer: MEDICARE

## 2019-03-13 VITALS
TEMPERATURE: 98 F | DIASTOLIC BLOOD PRESSURE: 76 MMHG | RESPIRATION RATE: 16 BRPM | SYSTOLIC BLOOD PRESSURE: 127 MMHG | WEIGHT: 197 LBS | HEIGHT: 65 IN | OXYGEN SATURATION: 96 % | BODY MASS INDEX: 32.82 KG/M2 | HEART RATE: 70 BPM

## 2019-03-13 DIAGNOSIS — S69.91XD: Primary | ICD-10-CM

## 2019-03-13 DIAGNOSIS — E89.0 POSTSURGICAL HYPOTHYROIDISM: ICD-10-CM

## 2019-03-13 LAB — TSI SER-ACNC: 4.17 MIU/ML (ref 0.36–3.74)

## 2019-03-13 PROCEDURE — 84443 ASSAY THYROID STIM HORMONE: CPT

## 2019-03-13 PROCEDURE — 99213 OFFICE O/P EST LOW 20 MIN: CPT

## 2019-03-13 PROCEDURE — 99214 OFFICE O/P EST MOD 30 MIN: CPT

## 2019-03-13 PROCEDURE — 36415 COLL VENOUS BLD VENIPUNCTURE: CPT

## 2019-03-13 PROCEDURE — 84439 ASSAY OF FREE THYROXINE: CPT

## 2019-03-13 RX ORDER — CEFADROXIL 500 MG/1
500 CAPSULE ORAL 2 TIMES DAILY
Qty: 14 CAPSULE | Refills: 0 | Status: SHIPPED | OUTPATIENT
Start: 2019-03-13 | End: 2019-03-20

## 2019-03-13 NOTE — ED INITIAL ASSESSMENT (HPI)
Patient presents with cc of wound check to right thumbnail which she smashed in the car on the 26th. States that she was here to get drained on 3/3/19 but last two days noted yellowish color under nail bed despite finishing antibiotic course. No drainage not

## 2019-03-13 NOTE — ED PROVIDER NOTES
Patient Seen in: [facility]    History   Patient presents with:  Laceration Abrasion (integumentary)    Stated Complaint: wound check (seems to be infected)    PATTY    Raji Spicer is a 69-year-old female who presents today for evaluat UNLISTED  1/2005    hemmertoes operation Rt toe   • HYSTERECTOMY     • LAMINECTOMY,LUMBAR  4/1/2010    L4 L5 fusion   • LUMBAR LAMINECTOMY POST LAT INTERBODY FUS 2 LEVEL N/A 4/18/2017    Performed by Lita Villavicencio MD at [address] [place] [address] over the hematoma, which the patient is afraid is pus formation under the nail. The cuticle is healthy in appearance without paronychia. Neurological: She is alert and oriented to person, place, and time. Skin: Skin is warm and dry.    Nursing note an

## 2019-03-18 LAB — FREE T4 BY EQUIL DIALYSIS-TMS: 1.5 NG/DL

## 2019-03-18 RX ORDER — LEVOTHYROXINE SODIUM 0.03 MG/1
TABLET ORAL
Qty: 90 TABLET | Refills: 0 | OUTPATIENT
Start: 2019-03-18

## 2019-03-18 NOTE — PROGRESS NOTES
Thyroid level slightly decreased. However as discussed at office visit, pt had been taking levothyroxine with food and vitamins. Pt should get labs done 6 weeks after taking medication appropriately - already ordered.   Pt should also schedule follow-up a

## 2019-03-18 NOTE — PROGRESS NOTES
S/w patient regarding lab result , patient verbalized understanding, appt made to see Dr Fermin Acevedo on 7/18/19.

## 2019-03-18 NOTE — CONSULTS
[facility]  Report of Consultation    Londonhollie Maura Patient Status:  Inpatient    3/21/1950 MRN [id number]   [facility] 3SW-A Attending Consuelo Selby MD   Hosp Day # 0 PCP Ron Jett MD     Reason for Consultation:  Medical Bmp ordered. Pt notified and non fasting.  She wanted to know about the HIV test. She said she  was told by another dr to come back in 3 months to have it rechecked even though the one in dec was non reactive. Please advise.    HYSTERECTOMY      Comment w/ removal of both ovaries d/t DBT and fibroid    THYROIDECTOMY      Comment partial thyroidectomy Lt    LAMINECTOMY,LUMBAR  4/1/2010    Comment L4 L5 fusion    COLONOSCOPY  1/2003    REMOVAL OF OVARIAN CYST(S)      Comment x2 ondansetron HCl (ZOFRAN) injection 4 mg, 4 mg, Intravenous, Q6H PRN  •  Naloxone HCl (NARCAN) 0.4 MG/ML injection 0.08 mg, 0.08 mg, Intravenous, Q5 Min PRN  •  DiphenhydrAMINE HCl (BENADRYL) injection 12.5 mg, 12.5 mg, Intravenous, Q4H PRN  •  Nalbuphine surgeon    1. Essential hypertension–continue home medication amlodipine which patient already took today's dose at home before surgery, will restart tomorrow, discussed this with RN. We will hold triamterene hydrochlorothiazide for now with hypokalemia.

## 2019-03-18 NOTE — TELEPHONE ENCOUNTER
Pt called back stated that she no longer uses [facility] in [facility], uses [employer] order. I told her they requested a refill ([facility]) she stated she is good, does not need no refills.      Asked pt if it was okay to take [facility] off chart, she said ye

## 2019-03-18 NOTE — TELEPHONE ENCOUNTER
Lm for pt to return phone call. Pharmacy ([facility] in [place]) requested refill on Levothyroxine. We just filled Rx on 2-2019 through [facility]. To early for refill.     Calling for clarification

## 2019-03-20 NOTE — TELEPHONE ENCOUNTER
Patient called office. Pt stated she does not need the refill as yet, it was an error from the Pharmacy. Patient would like to keep [facility] on file.

## 2019-04-01 ENCOUNTER — APPOINTMENT (OUTPATIENT)
Dept: GENERAL RADIOLOGY | Age: 69
End: 2019-04-01
Attending: PHYSICIAN ASSISTANT
Payer: MEDICARE

## 2019-04-01 ENCOUNTER — HOSPITAL ENCOUNTER (OUTPATIENT)
Age: 69
Discharge: HOME | End: 2019-04-01
Payer: MEDICARE

## 2019-04-01 VITALS
TEMPERATURE: 98 F | DIASTOLIC BLOOD PRESSURE: 68 MMHG | HEART RATE: 60 BPM | RESPIRATION RATE: 18 BRPM | SYSTOLIC BLOOD PRESSURE: 133 MMHG | OXYGEN SATURATION: 99 %
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
## 2019-04-01 DIAGNOSIS — S20.211A RIB CONTUSION, RIGHT, INITIAL ENCOUNTER: ICD-10-CM

## 2019-04-01 DIAGNOSIS — S66.911A MUSCLE STRAIN OF RIGHT WRIST, INITIAL ENCOUNTER: ICD-10-CM

## 2019-04-01 PROCEDURE — 73110 X-RAY EXAM OF WRIST: CPT | Performed by: PHYSICIAN ASSISTANT

## 2019-04-01 PROCEDURE — 71101 X-RAY EXAM UNILAT RIBS/CHEST: CPT | Performed by: PHYSICIAN ASSISTANT

## 2019-04-01 PROCEDURE — 99214 OFFICE O/P EST MOD 30 MIN: CPT

## 2019-04-01 PROCEDURE — 73140 X-RAY EXAM OF FINGER(S): CPT | Performed by: PHYSICIAN ASSISTANT

## 2019-04-01 RX ORDER — ACETAMINOPHEN 500 MG
1000 TABLET ORAL ONCE
Status: COMPLETED | OUTPATIENT
Start: 2019-04-01 | End: 2019-04-01

## 2019-04-01 RX ORDER — ACETAMINOPHEN AND CODEINE PHOSPHATE 300; 30 MG/1; MG/1
1 TABLET ORAL EVERY 6 HOURS PRN
Qty: 10 TABLET | Refills: 0 | Status: SHIPPED | OUTPATIENT
Start: 2019-04-01 | End: 2019-04-08

## 2019-04-01 NOTE — ED PROVIDER NOTES
Patient Seen in: [facility] [address] [facility] [place]    History   Patient presents with:  Fall (musculoskeletal, neurologic)  Trauma (cardiovascular, musculoskeletal)    Stated Complaint: Rib pain right side,gash above right eye,fell yesterday    HPI x2   • COLONOSCOPY  1/2003   • FOOT/TOES SURGERY PROC UNLISTED  1/2005    hemmertoes operation Rt toe   • HYSTERECTOMY     • LAMINECTOMY,LUMBAR  4/1/2010    L4 L5 fusion   • LUMBAR LAMINECTOMY POST LAT INTERBODY FUS 2 LEVEL N/A 4/18/2017    Performed by Koko Reynoso Pulmonary/Chest: Effort normal and breath sounds normal. No tachypnea. No respiratory distress. She has no decreased breath sounds. She exhibits tenderness (Right chest wall). She exhibits no crepitus and no edema.        Genitourinary: No breast tenderness CONCLUSION:    Redemonstration of fracture involving the tuft of the distal phalanx of the thumb. The fracture was present on prior x-ray exam from about 4 weeks ago.   Fracture lines remain demonstrated, there is greater lucency, which could be secondary Result Date: 4/1/2019  PROCEDURE:  XR WRIST COMPLETE (MIN 3 VIEWS), RIGHT (CPT=73110)  TECHNIQUE:  Three views were obtained. COMPARISON:  None. INDICATIONS:  Status post fall onto outstretched right hand/wrist.  Right wrist pain.   Right-sided chest and CONCLUSION:  Unremarkable exam.    Dictated by: Jonathan Oquendo DO on 4/01/2019 at 12:39     Approved by:  Jonathan Oquendo DO                MDM   Clinical impression: Fall from tripping, rib contusion, right wrist strain  Plan: Patient is informed of her x-ray

## 2019-04-01 NOTE — ED INITIAL ASSESSMENT (HPI)
Patient presents with cc of fall yesterday after Sikh when her left leg gave out-she has MS but has not needed cane for months. She landed on her right side/chest/ribcage,right wrist and right eyelid got bruised from her eyeglass frame. No head injury or L

## 2019-04-02 DIAGNOSIS — IMO0002 OSTEOARTHROSIS INVOLVING LOWER LEG: ICD-10-CM

## 2019-04-02 DIAGNOSIS — I10 ESSENTIAL HYPERTENSION: ICD-10-CM

## 2019-04-02 DIAGNOSIS — E78.5 HYPERLIPIDEMIA, UNSPECIFIED HYPERLIPIDEMIA TYPE: ICD-10-CM

## 2019-04-02 RX ORDER — CELECOXIB 200 MG/1
CAPSULE ORAL
Qty: 90 CAPSULE | Refills: 0 | Status: SHIPPED | OUTPATIENT
Start: 2019-04-02 | End: 2019-12-05

## 2019-04-02 RX ORDER — TRIAMTERENE AND HYDROCHLOROTHIAZIDE 37.5; 25 MG/1; MG/1
TABLET ORAL
Qty: 90 TABLET | Refills: 0 | Status: SHIPPED | OUTPATIENT
Start: 2019-04-02 | End: 2019-06-05

## 2019-04-02 RX ORDER — AMLODIPINE BESYLATE 5 MG/1
TABLET ORAL
Qty: 180 TABLET | Refills: 0 | Status: SHIPPED | OUTPATIENT
Start: 2019-04-02 | End: 2019-06-05

## 2019-04-02 RX ORDER — ESCITALOPRAM OXALATE 20 MG/1
TABLET ORAL
Qty: 90 TABLET | Refills: 0 | Status: SHIPPED | OUTPATIENT
Start: 2019-04-02 | End: 2019-09-17

## 2019-04-02 RX ORDER — ATORVASTATIN CALCIUM 20 MG/1
TABLET, FILM COATED ORAL
Qty: 90 TABLET | Refills: 0 | Status: SHIPPED | OUTPATIENT
Start: 2019-04-02 | End: 2019-06-05

## 2019-04-02 NOTE — TELEPHONE ENCOUNTER
Amlodipine approved  Last OV relevant to medication: 1-24-19  Last refill date: 1-24-19  #/refills: 0  When pt was asked to return for OV: 6 month  Upcoming appt/reason: none  Recent labs: 12-28-19: CMP    Atorvastatin approved  Last OV relevant to [employer]

## 2019-05-03 ENCOUNTER — TELEPHONE (OUTPATIENT)
Dept: NEUROLOGY | Facility: CLINIC | Age: 69
End: 2019-05-03

## 2019-05-03 ENCOUNTER — LAB ENCOUNTER (OUTPATIENT)
Dept: LAB | Age: 69
End: 2019-05-03
Attending: INTERNAL MEDICINE
Payer: MEDICARE

## 2019-05-03 DIAGNOSIS — G35 MULTIPLE SCLEROSIS (HCC): Primary | ICD-10-CM

## 2019-05-03 DIAGNOSIS — G35 MULTIPLE SCLEROSIS (HCC): ICD-10-CM

## 2019-05-03 DIAGNOSIS — E89.0 POSTSURGICAL HYPOTHYROIDISM: ICD-10-CM

## 2019-05-03 PROCEDURE — 80053 COMPREHEN METABOLIC PANEL: CPT

## 2019-05-03 PROCEDURE — 36415 COLL VENOUS BLD VENIPUNCTURE: CPT

## 2019-05-03 PROCEDURE — 85025 COMPLETE CBC W/AUTO DIFF WBC: CPT

## 2019-05-03 PROCEDURE — 84443 ASSAY THYROID STIM HORMONE: CPT

## 2019-05-03 NOTE — TELEPHONE ENCOUNTER
Patient called at Lab trying to get Tecfidera labs. Patient would like orders placed now and for three months from now. Orders placed. LOV notes 8/23/2019;     She is stable in MS on tecfidera, for last 4 years, she had one flare up in April 2017 afte

## 2019-05-06 ENCOUNTER — TELEPHONE (OUTPATIENT)
Dept: NEUROLOGY | Facility: CLINIC | Age: 69
End: 2019-05-06

## 2019-05-06 NOTE — TELEPHONE ENCOUNTER
Left message left with lab results as stated below.  (ok per HIPAA consent)      ----- Message from Henrry Frye MD sent at 5/6/2019  9:32 AM CDT -----  Lab normal, slightly increased BUN, needs more fluid intake,

## 2019-06-05 DIAGNOSIS — E78.5 HYPERLIPIDEMIA, UNSPECIFIED HYPERLIPIDEMIA TYPE: ICD-10-CM

## 2019-06-05 DIAGNOSIS — I10 ESSENTIAL HYPERTENSION: ICD-10-CM

## 2019-06-05 DIAGNOSIS — IMO0002 OSTEOARTHROSIS INVOLVING LOWER LEG: ICD-10-CM

## 2019-06-06 ENCOUNTER — TELEPHONE (OUTPATIENT)
Dept: INTERNAL MEDICINE CLINIC | Facility: CLINIC | Age: 69
End: 2019-06-06

## 2019-06-06 DIAGNOSIS — E78.5 HYPERLIPIDEMIA, UNSPECIFIED HYPERLIPIDEMIA TYPE: ICD-10-CM

## 2019-06-06 DIAGNOSIS — I10 ESSENTIAL HYPERTENSION: ICD-10-CM

## 2019-06-06 RX ORDER — CELECOXIB 200 MG/1
CAPSULE ORAL
Qty: 90 CAPSULE | Refills: 0 | OUTPATIENT
Start: 2019-06-06

## 2019-06-06 RX ORDER — AMLODIPINE BESYLATE 5 MG/1
TABLET ORAL
Qty: 20 TABLET | Refills: 0 | Status: SHIPPED | OUTPATIENT
Start: 2019-06-06 | End: 2019-07-24

## 2019-06-06 RX ORDER — ATORVASTATIN CALCIUM 20 MG/1
TABLET, FILM COATED ORAL
Qty: 90 TABLET | Refills: 0 | Status: SHIPPED | OUTPATIENT
Start: 2019-06-06 | End: 2019-06-06

## 2019-06-06 RX ORDER — ESCITALOPRAM OXALATE 20 MG/1
TABLET ORAL
Qty: 90 TABLET | Refills: 0 | OUTPATIENT
Start: 2019-06-06

## 2019-06-06 RX ORDER — TRIAMTERENE AND HYDROCHLOROTHIAZIDE 37.5; 25 MG/1; MG/1
TABLET ORAL
Qty: 90 TABLET | Refills: 0 | Status: SHIPPED | OUTPATIENT
Start: 2019-06-06 | End: 2019-06-06

## 2019-06-06 RX ORDER — TRIAMTERENE AND HYDROCHLOROTHIAZIDE 37.5; 25 MG/1; MG/1
1 TABLET ORAL
Qty: 10 TABLET | Refills: 0 | Status: SHIPPED | OUTPATIENT
Start: 2019-06-06 | End: 2019-07-24

## 2019-06-06 RX ORDER — AMLODIPINE BESYLATE 5 MG/1
TABLET ORAL
Qty: 180 TABLET | Refills: 0 | Status: SHIPPED | OUTPATIENT
Start: 2019-06-06 | End: 2019-06-06

## 2019-06-06 RX ORDER — ATORVASTATIN CALCIUM 20 MG/1
TABLET, FILM COATED ORAL
Qty: 10 TABLET | Refills: 0 | Status: SHIPPED | OUTPATIENT
Start: 2019-06-06 | End: 2019-07-24

## 2019-06-06 NOTE — TELEPHONE ENCOUNTER
Per refill request 90 day sent to mail order but Pt called back and needs 10 days to local pharmacy.  This is ok to do

## 2019-06-06 NOTE — TELEPHONE ENCOUNTER
Patient calling back her voicemail was \"cutoff. \" Informed patient she is due for OV; she is leaving for [place] this weekend, and is asking for 10 day supply until she gets back to schedule for July?   Please callback patient and let her know

## 2019-06-06 NOTE — TELEPHONE ENCOUNTER
Celecoxib last filled 4/2/19 #90     Escitalopram last filled 4/2/19 #90    LOV 1/24/19 - MA Supervisit    RTC - 6 months for med check    No upcoming appt    Labs completed 2/2/19

## 2019-06-13 ENCOUNTER — OFFICE VISIT (OUTPATIENT)
Dept: FAMILY MEDICINE CLINIC | Facility: CLINIC | Age: 69
End: 2019-06-13
Payer: MEDICARE

## 2019-06-13 VITALS
OXYGEN SATURATION: 97 % | SYSTOLIC BLOOD PRESSURE: 128 MMHG | BODY MASS INDEX: 33.05 KG/M2 | RESPIRATION RATE: 16 BRPM | HEIGHT: 64.5 IN | WEIGHT: 196 LBS | TEMPERATURE: 98 F | DIASTOLIC BLOOD PRESSURE: 78 MMHG | HEART RATE: 68 BPM

## 2019-06-13 DIAGNOSIS — J01.00 ACUTE NON-RECURRENT MAXILLARY SINUSITIS: Primary | ICD-10-CM

## 2019-06-13 PROCEDURE — 99213 OFFICE O/P EST LOW 20 MIN: CPT | Performed by: NURSE PRACTITIONER

## 2019-06-13 RX ORDER — AMOXICILLIN AND CLAVULANATE POTASSIUM 875; 125 MG/1; MG/1
1 TABLET, FILM COATED ORAL 2 TIMES DAILY
Qty: 14 TABLET | Refills: 0 | Status: SHIPPED | OUTPATIENT
Start: 2019-06-13 | End: 2019-06-20

## 2019-06-13 NOTE — PROGRESS NOTES
CHIEF COMPLAINT:   Patient presents with:  Cough: cold sxs x 4 days, coughing up green mucus, travel in 2 days      HPI:   Parvin Lorenzo is a 71year old female who presents for upper respiratory symptoms for  4 days.  Patient reports sinus congestion, ESCITALOPRAM 20 MG Oral Tab TAKE 1 TABLET EVERY DAY Disp: 90 tablet Rfl: 0   Cholecalciferol (VITAMIN D) 2000 UNITS Oral Cap Take 1 capsule by mouth daily.  Disp:  Rfl:       Past Medical History:   Diagnosis Date   • Arthritis    • BACK PAIN     in morning LUNGS: denies shortness of breath or wheezing, See HPI  CARDIOVASCULAR: denies chest pain or palpitations   GI: denies N/V/C or abdominal pain  NEURO: Denies headaches    EXAM:   /78   Pulse 68   Temp 98.4 °F (36.9 °C) (Oral)   Resp 16   Ht 64.5\" Risks, benefits, and side effects of medication explained and discussed. Patient Instructions       Sinusitis (Antibiotic Treatment)    The sinuses are air-filled spaces within the bones of the face.  They connect to the inside of the nose. Sinusitis is · Over-the-counter antihistamines may help if allergies contributed to your sinusitis.    · Do not use nasal rinses or irrigation during an acute sinus infection, unless your healthcare provider tells you to.  Rinsing may spread the infection to other areas Your healthcare provider has told you that you have acute bronchitis. Bronchitis is infection or inflammation of the bronchial tubes (airways in the lungs). Normally, air moves easily in and out of the airways.  Bronchitis narrows the airways, making it emmie · Drink plenty of fluids, such as water, juice, or warm soup. Fluids loosen mucus so that you can cough it up. This helps you breathe more easily. Fluids also prevent dehydration. · Make sure you get plenty of rest.  · Do not smoke.  Do not allow anyone el

## 2019-06-13 NOTE — PATIENT INSTRUCTIONS
Sinusitis (Antibiotic Treatment)    The sinuses are air-filled spaces within the bones of the face. They connect to the inside of the nose. Sinusitis is an inflammation of the tissue that lines the sinuses. Sinusitis can occur during a cold.  It can also · Do not use nasal rinses or irrigation during an acute sinus infection, unless your healthcare provider tells you to. Rinsing may spread the infection to other areas in your sinuses.   · Use acetaminophen or ibuprofen to control pain, unless another pain m Your healthcare provider has told you that you have acute bronchitis. Bronchitis is infection or inflammation of the bronchial tubes (airways in the lungs). Normally, air moves easily in and out of the airways.  Bronchitis narrows the airways, making it emmie · Drink plenty of fluids, such as water, juice, or warm soup. Fluids loosen mucus so that you can cough it up. This helps you breathe more easily. Fluids also prevent dehydration. · Make sure you get plenty of rest.  · Do not smoke.  Do not allow anyone el

## 2019-07-09 RX ORDER — LEVOTHYROXINE SODIUM 0.03 MG/1
TABLET ORAL
Qty: 90 TABLET | Refills: 1 | Status: SHIPPED | OUTPATIENT
Start: 2019-07-09 | End: 2019-12-05

## 2019-07-24 ENCOUNTER — OFFICE VISIT (OUTPATIENT)
Dept: INTERNAL MEDICINE CLINIC | Facility: CLINIC | Age: 69
End: 2019-07-24
Payer: MEDICARE

## 2019-07-24 VITALS
HEART RATE: 65 BPM | WEIGHT: 195 LBS | BODY MASS INDEX: 32.89 KG/M2 | RESPIRATION RATE: 12 BRPM | SYSTOLIC BLOOD PRESSURE: 136 MMHG | DIASTOLIC BLOOD PRESSURE: 64 MMHG | OXYGEN SATURATION: 98 % | TEMPERATURE: 98 F | HEIGHT: 64.5 IN

## 2019-07-24 DIAGNOSIS — I10 ESSENTIAL HYPERTENSION: ICD-10-CM

## 2019-07-24 DIAGNOSIS — F33.0 MILD RECURRENT MAJOR DEPRESSION (HCC): Chronic | ICD-10-CM

## 2019-07-24 DIAGNOSIS — E78.5 HYPERLIPIDEMIA, UNSPECIFIED HYPERLIPIDEMIA TYPE: Primary | ICD-10-CM

## 2019-07-24 DIAGNOSIS — F41.9 ANXIETY: ICD-10-CM

## 2019-07-24 PROCEDURE — 99214 OFFICE O/P EST MOD 30 MIN: CPT | Performed by: FAMILY MEDICINE

## 2019-07-24 RX ORDER — ESCITALOPRAM OXALATE 20 MG/1
TABLET ORAL
Qty: 90 TABLET | Refills: 0 | Status: SHIPPED | OUTPATIENT
Start: 2019-07-24 | End: 2019-10-01

## 2019-07-24 RX ORDER — TRIAMTERENE AND HYDROCHLOROTHIAZIDE 37.5; 25 MG/1; MG/1
1 TABLET ORAL
Qty: 90 TABLET | Refills: 1 | Status: SHIPPED | OUTPATIENT
Start: 2019-07-24 | End: 2020-01-13

## 2019-07-24 RX ORDER — ATORVASTATIN CALCIUM 20 MG/1
TABLET, FILM COATED ORAL
Qty: 90 TABLET | Refills: 0 | Status: SHIPPED | OUTPATIENT
Start: 2019-07-24 | End: 2019-10-11

## 2019-07-24 RX ORDER — AMLODIPINE BESYLATE 5 MG/1
TABLET ORAL
Qty: 180 TABLET | Refills: 1 | Status: SHIPPED | OUTPATIENT
Start: 2019-07-24 | End: 2020-01-13

## 2019-07-24 NOTE — PROGRESS NOTES
CHIEF COMPLAINT:     Patient presents with:  Medication Follow-Up      HPI:   Rodrick Craig is a 71year old female   Patient presents for recheck of  Hypertension and hyperlipdemia.  Pt has been taking medications as instructed, no medication side effe tablet Rfl: 1   celecoxib 200 MG Oral Cap TAKE 1 CAPSULE EVERY DAY Disp: 90 capsule Rfl: 0   ESCITALOPRAM 20 MG Oral Tab TAKE 1 TABLET EVERY DAY Disp: 90 tablet Rfl: 0   Dimethyl Fumarate (TECFIDERA) 240 MG Oral Capsule Delayed Release Take 1 tablet by cheyenne frequency: Weekly      Comment: 5x week red wine    Drug use: No       REVIEW OF SYSTEMS:   GENERAL: Denies fever, chills,weight change, decreased appetite  SKIN: Denies rashes, skin wounds or ulcers.   EYES: Denies blurred vision or double vision  HENT: Edgard Thompson hyperlipidemia type  - Pt to continue their medication, increase exercise,  choose better fats,  increase fiber and avoid processed foods. LIPID PANEL; Future  - atorvastatin 20 MG Oral Tab; TAKE 1 TABLET EVERY NIGHT  Dispense: 90 tablet; Refill: 0    3.

## 2019-08-01 ENCOUNTER — HOSPITAL ENCOUNTER (OUTPATIENT)
Dept: MAMMOGRAPHY | Age: 69
Discharge: HOME | End: 2019-08-01
Attending: FAMILY MEDICINE
Payer: MEDICARE

## 2019-08-01 DIAGNOSIS — Z12.31 ENCOUNTER FOR SCREENING MAMMOGRAM FOR MALIGNANT NEOPLASM OF BREAST: ICD-10-CM

## 2019-08-01 PROCEDURE — 77063 BREAST TOMOSYNTHESIS BI: CPT | Performed by: FAMILY MEDICINE

## 2019-08-01 PROCEDURE — 77067 SCR MAMMO BI INCL CAD: CPT | Performed by: FAMILY MEDICINE

## 2019-08-05 ENCOUNTER — TELEPHONE (OUTPATIENT)
Dept: INTERNAL MEDICINE CLINIC | Facility: CLINIC | Age: 69
End: 2019-08-05

## 2019-08-05 DIAGNOSIS — Z85.828 PERSONAL HISTORY OF OTHER MALIGNANT NEOPLASM OF SKIN: Primary | ICD-10-CM

## 2019-08-05 DIAGNOSIS — G35 MULTIPLE SCLEROSIS (HCC): ICD-10-CM

## 2019-08-05 NOTE — TELEPHONE ENCOUNTER
Referral request 2 of 2   Received:  Today   Message Contents   Magdi Beck [facility] Clinical Staff   Cc: [facility]   Phone Number: [phone]             Patient Name: Shlomo Meneses   : 1950   Reason for the order/r

## 2019-08-26 RX ORDER — BACLOFEN 20 MG/1
TABLET ORAL
Qty: 270 TABLET | Refills: 0 | Status: SHIPPED | OUTPATIENT
Start: 2019-08-26 | End: 2019-09-17

## 2019-08-26 NOTE — TELEPHONE ENCOUNTER
Medication: Baclofen 20 mg     Date of last refill: 08/23/18 (#270/3)  [place]     Last office visit: 8/23/2018  Due back to clinic per last office note:  Around 08/23/19  Date next office visit scheduled:  09/17/

## 2019-09-11 ENCOUNTER — LAB ENCOUNTER (OUTPATIENT)
Dept: LAB | Age: 69
End: 2019-09-11
Attending: INTERNAL MEDICINE
Payer: MEDICARE

## 2019-09-11 ENCOUNTER — TELEPHONE (OUTPATIENT)
Dept: NEUROLOGY | Facility: CLINIC | Age: 69
End: 2019-09-11

## 2019-09-11 DIAGNOSIS — G35 MULTIPLE SCLEROSIS (HCC): ICD-10-CM

## 2019-09-11 DIAGNOSIS — E78.5 HYPERLIPIDEMIA, UNSPECIFIED HYPERLIPIDEMIA TYPE: ICD-10-CM

## 2019-09-11 DIAGNOSIS — E89.0 POSTSURGICAL HYPOTHYROIDISM: ICD-10-CM

## 2019-09-11 LAB
ALBUMIN SERPL-MCNC: 3.9 G/DL (ref 3.4–5)
ALBUMIN/GLOB SERPL: 1.2 {RATIO} (ref 1–2)
ALP LIVER SERPL-CCNC: 57 U/L (ref 55–142)
ALT SERPL-CCNC: 24 U/L (ref 13–56)
ANION GAP SERPL CALC-SCNC: 11 MMOL/L (ref 0–18)
AST SERPL-CCNC: 16 U/L (ref 15–37)
BASOPHILS # BLD AUTO: 0.04 X10(3) UL (ref 0–0.2)
BASOPHILS NFR BLD AUTO: 0.6 %
BILIRUB SERPL-MCNC: 0.6 MG/DL (ref 0.1–2)
BUN BLD-MCNC: 22 MG/DL (ref 7–18)
BUN/CREAT SERPL: 31.9 (ref 10–20)
CALCIUM BLD-MCNC: 9.3 MG/DL (ref 8.5–10.1)
CHLORIDE SERPL-SCNC: 105 MMOL/L (ref 98–112)
CHOLEST SMN-MCNC: 211 MG/DL (ref ?–200)
CO2 SERPL-SCNC: 27 MMOL/L (ref 21–32)
CREAT BLD-MCNC: 0.69 MG/DL (ref 0.55–1.02)
DEPRECATED RDW RBC AUTO: 46.2 FL (ref 35.1–46.3)
EOSINOPHIL # BLD AUTO: 0.28 X10(3) UL (ref 0–0.7)
EOSINOPHIL NFR BLD AUTO: 4.3 %
ERYTHROCYTE [DISTWIDTH] IN BLOOD BY AUTOMATED COUNT: 13.9 % (ref 11–15)
GLOBULIN PLAS-MCNC: 3.3 G/DL (ref 2.8–4.4)
GLUCOSE BLD-MCNC: 101 MG/DL (ref 70–99)
HCT VFR BLD AUTO: 42.2 % (ref 35–48)
HDLC SERPL-MCNC: 96 MG/DL (ref 40–59)
HGB BLD-MCNC: 13.8 G/DL (ref 12–16)
IMM GRANULOCYTES # BLD AUTO: 0.01 X10(3) UL (ref 0–1)
IMM GRANULOCYTES NFR BLD: 0.2 %
LDLC SERPL CALC-MCNC: 100 MG/DL (ref ?–100)
LYMPHOCYTES # BLD AUTO: 1.35 X10(3) UL (ref 1–4)
LYMPHOCYTES NFR BLD AUTO: 20.8 %
M PROTEIN MFR SERPL ELPH: 7.2 G/DL (ref 6.4–8.2)
MCH RBC QN AUTO: 29.7 PG (ref 26–34)
MCHC RBC AUTO-ENTMCNC: 32.7 G/DL (ref 31–37)
MCV RBC AUTO: 90.9 FL (ref 80–100)
MONOCYTES # BLD AUTO: 0.65 X10(3) UL (ref 0.1–1)
MONOCYTES NFR BLD AUTO: 10 %
NEUTROPHILS # BLD AUTO: 4.16 X10 (3) UL (ref 1.5–7.7)
NEUTROPHILS # BLD AUTO: 4.16 X10(3) UL (ref 1.5–7.7)
NEUTROPHILS NFR BLD AUTO: 64.1 %
NONHDLC SERPL-MCNC: 115 MG/DL (ref ?–130)
OSMOLALITY SERPL CALC.SUM OF ELEC: 299 MOSM/KG (ref 275–295)
PLATELET # BLD AUTO: 247 10(3)UL (ref 150–450)
POTASSIUM SERPL-SCNC: 3.8 MMOL/L (ref 3.5–5.1)
RBC # BLD AUTO: 4.64 X10(6)UL (ref 3.8–5.3)
SODIUM SERPL-SCNC: 143 MMOL/L (ref 136–145)
TRIGL SERPL-MCNC: 73 MG/DL (ref 30–149)
TSI SER-ACNC: 3.04 MIU/ML (ref 0.36–3.74)
VLDLC SERPL CALC-MCNC: 15 MG/DL (ref 0–30)
WBC # BLD AUTO: 6.5 X10(3) UL (ref 4–11)

## 2019-09-11 PROCEDURE — 80053 COMPREHEN METABOLIC PANEL: CPT

## 2019-09-11 PROCEDURE — 36415 COLL VENOUS BLD VENIPUNCTURE: CPT

## 2019-09-11 PROCEDURE — 80061 LIPID PANEL: CPT

## 2019-09-11 PROCEDURE — 85025 COMPLETE CBC W/AUTO DIFF WBC: CPT

## 2019-09-11 PROCEDURE — 84443 ASSAY THYROID STIM HORMONE: CPT

## 2019-09-11 NOTE — TELEPHONE ENCOUNTER
[place] message sent to patient with the below results.       ----- Message from Dea Sagastume MD sent at 9/11/2019  3:34 PM CDT -----  Lab normal

## 2019-09-17 ENCOUNTER — OFFICE VISIT (OUTPATIENT)
Dept: NEUROLOGY | Facility: CLINIC | Age: 69
End: 2019-09-17
Payer: MEDICARE

## 2019-09-17 VITALS
BODY MASS INDEX: 33 KG/M2 | RESPIRATION RATE: 18 BRPM | WEIGHT: 197 LBS | HEART RATE: 72 BPM | SYSTOLIC BLOOD PRESSURE: 132 MMHG | DIASTOLIC BLOOD PRESSURE: 70 MMHG

## 2019-09-17 DIAGNOSIS — G35 MULTIPLE SCLEROSIS (HCC): ICD-10-CM

## 2019-09-17 DIAGNOSIS — G25.81 RLS (RESTLESS LEGS SYNDROME): Primary | ICD-10-CM

## 2019-09-17 DIAGNOSIS — M62.838 MUSCLE SPASMS OF BOTH LOWER EXTREMITIES: ICD-10-CM

## 2019-09-17 PROCEDURE — 99214 OFFICE O/P EST MOD 30 MIN: CPT | Performed by: OTHER

## 2019-09-17 RX ORDER — BACLOFEN 20 MG/1
20 TABLET ORAL 2 TIMES DAILY
Qty: 180 TABLET | Refills: 3 | Status: SHIPPED | OUTPATIENT
Start: 2019-09-17 | End: 2020-09-17

## 2019-09-17 RX ORDER — DIMETHYL FUMARATE 240 MG/1
1 CAPSULE ORAL 2 TIMES DAILY
Qty: 180 CAPSULE | Refills: 3 | Status: SHIPPED | OUTPATIENT
Start: 2019-09-17 | End: 2020-01-31

## 2019-09-17 NOTE — PROGRESS NOTES
[facility]   Neurology; follow up  CLINIC VISIT  2019    Opal Wright Patient Status:  No patient class for patient encounter    3/21/1950 MRN [id number]   Location ED [facility] PCP Lieutenant Derick MD     REASON side effects include fatique and \"brain fog\" and decreased vision of left eye, left sided weakness due to MS   • Muscle weakness     has MS   • OSTEOARTHRITIS    • Osteoarthritis    • OTHER DISEASES     MS dx 1981 Dr. Jeannie Ramachandran   • DARYA (postoperative stefano tablet Rfl: 0   escitalopram 20 MG Oral Tab TAKE 1 TABLET(20 MG) BY MOUTH DAILY Disp: 90 tablet Rfl: 0   Levothyroxine Sodium 25 MCG Oral Tab TAKE 1 TABLET BEFORE BREAKFAST Disp: 90 tablet Rfl: 1   celecoxib 200 MG Oral Cap TAKE 1 CAPSULE EVERY DAY Disp: 9 Normal developed and well nourished , in no acute stress;   HEENT:  Normal conjunctiva, no abnormal secretion,   Neck supple,  No carotid bruit,  thyroid normal  Lungs are clear to auscultation  Heart: normal SR, no murmur  Extremities:  No edema or cyanosi syndrome) - Primary          Impression/Plan:  (G25.81) RLS (restless legs syndrome)  (primary encounter diagnosis)    (H26.389) Muscle spasms of both lower extremities    (G35) Multiple sclerosis (HCC)  Plan: Dimethyl Fumarate (TECFIDERA) 240 MG Oral

## 2019-09-19 ENCOUNTER — TELEPHONE (OUTPATIENT)
Dept: NEUROLOGY | Facility: CLINIC | Age: 69
End: 2019-09-19

## 2019-10-01 DIAGNOSIS — F41.9 ANXIETY: ICD-10-CM

## 2019-10-01 DIAGNOSIS — F33.0 MILD RECURRENT MAJOR DEPRESSION (HCC): Chronic | ICD-10-CM

## 2019-10-02 RX ORDER — ESCITALOPRAM OXALATE 20 MG/1
TABLET ORAL
Qty: 90 TABLET | Refills: 0 | Status: SHIPPED | OUTPATIENT
Start: 2019-10-02 | End: 2019-12-05

## 2019-10-02 NOTE — TELEPHONE ENCOUNTER
ESCITALOPRAM 20 MG Oral Tab    Last OV relevant to medication: 7/24/2019  Last refill date: 7/24/2019     #/refills: #90 w/ 0 refills   When pt was asked to return for OV: 6 months   Upcoming appt/reason: No future appointments

## 2019-10-11 DIAGNOSIS — E78.5 HYPERLIPIDEMIA, UNSPECIFIED HYPERLIPIDEMIA TYPE: ICD-10-CM

## 2019-10-12 RX ORDER — ATORVASTATIN CALCIUM 20 MG/1
TABLET, FILM COATED ORAL
Qty: 90 TABLET | Refills: 2 | Status: SHIPPED | OUTPATIENT
Start: 2019-10-12 | End: 2020-05-08

## 2019-10-14 ENCOUNTER — TELEPHONE (OUTPATIENT)
Dept: INTERNAL MEDICINE CLINIC | Facility: CLINIC | Age: 69
End: 2019-10-14

## 2019-10-14 DIAGNOSIS — Z12.11 COLON CANCER SCREENING: Primary | ICD-10-CM

## 2019-10-14 NOTE — TELEPHONE ENCOUNTER
Referral   Received:  Today   Message Contents   Deidre Luu [facility] Clinical Staff   Cc: [facility] Referral Pool             .Reason for the order/referral:  Route Colonoscopy   PCP:  Dr. Carrie Hernandez   Refer to Provider :  Dr. Ramirez Walker

## 2019-11-05 PROBLEM — K31.7 POLYP OF STOMACH AND DUODENUM: Status: ACTIVE | Noted: 2019-11-05

## 2019-11-05 PROBLEM — R12 HEARTBURN: Status: ACTIVE | Noted: 2019-11-05

## 2019-11-05 PROBLEM — K44.9 DIAPHRAGMATIC HERNIA WITHOUT OBSTRUCTION OR GANGRENE: Status: ACTIVE | Noted: 2019-11-05

## 2019-11-05 PROBLEM — K21.9 GASTROESOPHAGEAL REFLUX DISEASE WITHOUT ESOPHAGITIS: Status: ACTIVE | Noted: 2019-11-05

## 2019-11-05 PROBLEM — Z86.010 PERSONAL HISTORY OF COLONIC POLYPS: Status: ACTIVE | Noted: 2019-11-05

## 2019-11-05 PROBLEM — D12.0 BENIGN NEOPLASM OF CECUM: Status: ACTIVE | Noted: 2019-11-05

## 2019-11-05 PROBLEM — D12.2 BENIGN NEOPLASM OF ASCENDING COLON: Status: ACTIVE | Noted: 2019-11-05

## 2019-11-05 PROBLEM — K22.70 BARRETT'S ESOPHAGUS WITHOUT DYSPLASIA: Status: ACTIVE | Noted: 2019-11-05

## 2019-11-05 PROBLEM — Z86.0100 PERSONAL HISTORY OF COLONIC POLYPS: Status: ACTIVE | Noted: 2019-11-05

## 2019-11-05 PROBLEM — K29.30 CHRONIC SUPERFICIAL GASTRITIS WITHOUT BLEEDING: Status: ACTIVE | Noted: 2019-11-05

## 2019-11-05 PROCEDURE — 88305 TISSUE EXAM BY PATHOLOGIST: CPT | Performed by: INTERNAL MEDICINE

## 2019-12-05 DIAGNOSIS — IMO0002 OSTEOARTHROSIS INVOLVING LOWER LEG: ICD-10-CM

## 2019-12-05 DIAGNOSIS — F33.0 MILD RECURRENT MAJOR DEPRESSION (HCC): Chronic | ICD-10-CM

## 2019-12-05 DIAGNOSIS — F41.9 ANXIETY: ICD-10-CM

## 2019-12-05 RX ORDER — ESCITALOPRAM OXALATE 20 MG/1
TABLET ORAL
Qty: 90 TABLET | Refills: 0 | Status: SHIPPED | OUTPATIENT
Start: 2019-12-05 | End: 2020-06-25

## 2019-12-05 RX ORDER — LEVOTHYROXINE SODIUM 0.03 MG/1
TABLET ORAL
Qty: 90 TABLET | Refills: 0 | Status: SHIPPED | OUTPATIENT
Start: 2019-12-05 | End: 2020-02-12

## 2019-12-05 RX ORDER — CELECOXIB 200 MG/1
CAPSULE ORAL
Qty: 90 CAPSULE | Refills: 0 | Status: SHIPPED | OUTPATIENT
Start: 2019-12-05 | End: 2020-03-09

## 2019-12-05 NOTE — TELEPHONE ENCOUNTER
Levothyroxine 25 MCG  Last OV relevant to medication: 7-24-19  Last refill date: 7-9-19 #/refills: 1  When pt was asked to return for OV: 6 mo.   Upcoming appt/reason: none  Recent labs: 9-11-19: TSH    Escitalopram 20 Mg  Last OV relevant to medication: 7-

## 2019-12-07 DIAGNOSIS — G35 MULTIPLE SCLEROSIS (HCC): Primary | ICD-10-CM

## 2019-12-07 DIAGNOSIS — M62.838 MUSCLE SPASMS OF BOTH LOWER EXTREMITIES: ICD-10-CM

## 2019-12-09 RX ORDER — BACLOFEN 20 MG/1
TABLET ORAL
Qty: 270 TABLET | Refills: 0 | OUTPATIENT
Start: 2019-12-09

## 2019-12-09 NOTE — TELEPHONE ENCOUNTER
Refill request denied. Patient received Rx Baclofen on 9/17/19- 1 year refills. With [facility] mail order.

## 2019-12-29 ENCOUNTER — APPOINTMENT (OUTPATIENT)
Dept: GENERAL RADIOLOGY | Age: 69
End: 2019-12-29
Attending: PHYSICIAN ASSISTANT
Payer: MEDICARE

## 2019-12-29 ENCOUNTER — HOSPITAL ENCOUNTER (OUTPATIENT)
Age: 69
Discharge: HOME | End: 2019-12-29
Payer: MEDICARE

## 2019-12-29 VITALS
HEART RATE: 85 BPM | DIASTOLIC BLOOD PRESSURE: 70 MMHG | SYSTOLIC BLOOD PRESSURE: 150 MMHG | RESPIRATION RATE: 18 BRPM | OXYGEN SATURATION: 97 % | TEMPERATURE: 98 F

## 2019-12-29 DIAGNOSIS — S82.832A OTHER CLOSED FRACTURE OF DISTAL END OF LEFT FIBULA, INITIAL ENCOUNTER: Primary | ICD-10-CM

## 2019-12-29 PROCEDURE — 99214 OFFICE O/P EST MOD 30 MIN: CPT

## 2019-12-29 PROCEDURE — 73560 X-RAY EXAM OF KNEE 1 OR 2: CPT | Performed by: PHYSICIAN ASSISTANT

## 2019-12-29 PROCEDURE — 73610 X-RAY EXAM OF ANKLE: CPT | Performed by: PHYSICIAN ASSISTANT

## 2019-12-29 PROCEDURE — 73630 X-RAY EXAM OF FOOT: CPT | Performed by: PHYSICIAN ASSISTANT

## 2019-12-29 NOTE — ED INITIAL ASSESSMENT (HPI)
Patient reports slipped fell 12 steps going the stairs on 12/24/2019. Pain to left lower leg from knee down to foot area. With bruising and swelling.

## 2019-12-29 NOTE — ED PROVIDER NOTES
Patient Seen in: [facility] [address] [place] [address],[address]      History   Patient presents with:  Lower Extremity Injury    Stated Complaint: LEFT FOOT SPRAIN X 5 DAYS    HPI    Patient is a very pleasant 51-year-old female.   5 days prior to arrival, patient L4 L5 fusion   • LUMBAR LAMINECTOMY POST LAT INTERBODY FUS 2 LEVEL N/A 4/18/2017    Performed by Sujata Mcintyre MD at [facility] MAIN OR   • REMOVAL OF OVARIAN CYST(S)      x2   • THYROIDECTOMY      partial thyroidectomy Lt   • TONSILLECTOMY  1950's   • TOTAL 12/29/2019  PROCEDURE:  XR ANKLE (MIN 3 VIEWS), LEFT (CPT=73610)  TECHNIQUE:  Three views were obtained. COMPARISON:  None. INDICATIONS:  LEFT FOOT SPRAIN X 5 DAYS  PATIENT STATED HISTORY: (As transcribed by Technologist)  Smitha Alas on 12/24.  Left knee pain a INDICATIONS:  LEFT FOOT SPRAIN X 5 DAYS  PATIENT STATED HISTORY: (As transcribed by Technologist)  Ann [place] on 12/24. Left knee pain anteriorly and left ankle pain since. Left foot has bruising plantar aspect.  History of prior left knee surgery about 25yrs ago

## 2019-12-30 ENCOUNTER — TELEPHONE (OUTPATIENT)
Dept: INTERNAL MEDICINE CLINIC | Facility: CLINIC | Age: 69
End: 2019-12-30

## 2019-12-30 DIAGNOSIS — S82.402A CLOSED FRACTURE OF SHAFT OF LEFT FIBULA, UNSPECIFIED FRACTURE MORPHOLOGY, INITIAL ENCOUNTER: Primary | ICD-10-CM

## 2019-12-30 NOTE — TELEPHONE ENCOUNTER
REFERRAL   Received:  Today   Message Contents   [facility] Clinical Staff   Cc: [facility] Referral Pool   Phone Number: [phone]             .Reason for the order/referral:(L) FOOT & ANKLE   PCP: Rex Mario   Refer to Provider (R

## 2020-01-02 ENCOUNTER — TELEPHONE (OUTPATIENT)
Dept: ORTHOPEDICS CLINIC | Facility: CLINIC | Age: 70
End: 2020-01-02

## 2020-01-02 DIAGNOSIS — M79.662 PAIN OF LEFT LOWER LEG: Primary | ICD-10-CM

## 2020-01-02 NOTE — TELEPHONE ENCOUNTER
CONCLUSION:    Oblique fracture distal shaft of the fibula just above the lateral malleolus with mild displacement, no angulation, no widening of the ankle mortise.   Soft tissue swelling consistent with hematoma lateral and to a lesser extent anterior   an

## 2020-01-02 NOTE — TELEPHONE ENCOUNTER
Patient notified that she needs to come to office to have splint taken off and then will go to x-rays

## 2020-01-02 NOTE — TELEPHONE ENCOUNTER
Please place orders for fracture of left distal shaft of fib.     Future Appointments   Date Time Provider [place]   1/3/2020  4:00 PM Gilmar Alacron MD [facility]   9/9/2020 10:00 AM Inna Yap MD [facility]

## 2020-01-03 ENCOUNTER — OFFICE VISIT (OUTPATIENT)
Dept: ORTHOPEDICS CLINIC | Facility: CLINIC | Age: 70
End: 2020-01-03
Payer: MEDICARE

## 2020-01-03 ENCOUNTER — HOSPITAL ENCOUNTER (OUTPATIENT)
Dept: GENERAL RADIOLOGY | Facility: HOSPITAL | Age: 70
Discharge: HOME | End: 2020-01-03
Attending: ORTHOPAEDIC SURGERY
Payer: MEDICARE

## 2020-01-03 VITALS — HEIGHT: 65 IN | BODY MASS INDEX: 31.99 KG/M2 | OXYGEN SATURATION: 98 % | WEIGHT: 192 LBS | HEART RATE: 72 BPM

## 2020-01-03 DIAGNOSIS — S82.65XA CLOSED NONDISPLACED FRACTURE OF LATERAL MALLEOLUS OF LEFT FIBULA, INITIAL ENCOUNTER: ICD-10-CM

## 2020-01-03 DIAGNOSIS — S82.65XA CLOSED NONDISPLACED FRACTURE OF LATERAL MALLEOLUS OF LEFT FIBULA, INITIAL ENCOUNTER: Primary | ICD-10-CM

## 2020-01-03 PROCEDURE — L4387 NON-PNEUM WALK BOOT PRE OTS: HCPCS | Performed by: ORTHOPAEDIC SURGERY

## 2020-01-03 PROCEDURE — 73610 X-RAY EXAM OF ANKLE: CPT | Performed by: ORTHOPAEDIC SURGERY

## 2020-01-03 PROCEDURE — 99203 OFFICE O/P NEW LOW 30 MIN: CPT | Performed by: ORTHOPAEDIC SURGERY

## 2020-01-03 NOTE — H&P
[facility] New Patient Note    CC: Patient presents with:  Consult: right lower leg injury- slipped on carpet 12/24/19. icing.  seen at urgent care 12/29/19. hx right shattered knee 1997.         HPI: This 71year old female presents today with comp x2   • COLONOSCOPY  1/2003   • COLONOSCOPY     • FOOT/TOES SURGERY PROC UNLISTED  1/2005    hemmertoes operation Rt toe   • HYSTERECTOMY     • LAMINECTOMY,LUMBAR  4/1/2010    L4 L5 fusion   • LUMBAR LAMINECTOMY POST LAT INTERBODY FUS 2 LEVEL N/A 4/18/2017 Brother    • Hypertension Sister    • Hypertension Sister    • Hypertension Brother      Social History    Occupational History      Not on file    Tobacco Use      Smoking status: Never Smoker      Smokeless tobacco: Never Used    Substance and Sexual Act well as treatment recommendations. For minimally displaced stable ankle fracture, can treat nonsurgically with weightbearing as tolerated in a boot. We did provide a tall walking boot today.   I did discuss physical therapy for decreasing ankle swelling a

## 2020-01-08 NOTE — PROGRESS NOTES
LOWER EXTREMITY EVALUATION:   Referring Physician: Dr. Natalia Duenas  Diagnosis: Closed nondisplaced fracture of lateral malleolus of left fibula     Date of Service: 1/8/2020     PATIENT SUMMARY   Josh Garcia is a 71year old female who presents to  last year. She notes she can exercise without any symptoms coming on. 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 Her stress tends to exacerbate her MS symptoms.  She notes undergoing a lumbar fusio necessary to address the above impairments and reach functional goals. Therapy educated the patient to not engage in any LLE WB without her boot until she meets with Dr. Helene Alba on 1/31/20.  The patient was instructed to no longer WB on her LLE when gettin mod      Strength/MMT:   Hip Knee Foot/Ankle   Flexion: R 4/5; L 4/5  Extension: R 4-/5; L 4/5  Abduction: R 4-/5; L 4-/5  ER: R 4/5; L 4/5  IR: R 4+/5; L 4/5 Flexion: R 4+/5; L 4+/5  Extension: R 5/5; L 5/5    DF: R 5/5; L; DNT  PF: R; 5/5  L; DNT  INV: R uneven surfaces such as gravel and grass   · Pt will be independent and compliant with comprehensive HEP to maintain progress achieved in PT       Frequency / Duration: Patient will be seen for 2 x/week or a total of 8 visits over a 90 day period.  Treatmen

## 2020-01-09 ENCOUNTER — OFFICE VISIT (OUTPATIENT)
Dept: PHYSICAL THERAPY | Age: 70
End: 2020-01-09
Attending: ORTHOPAEDIC SURGERY
Payer: MEDICARE

## 2020-01-09 DIAGNOSIS — S82.65XA CLOSED NONDISPLACED FRACTURE OF LATERAL MALLEOLUS OF LEFT FIBULA, INITIAL ENCOUNTER: ICD-10-CM

## 2020-01-09 PROCEDURE — 97110 THERAPEUTIC EXERCISES: CPT

## 2020-01-09 PROCEDURE — 97161 PT EVAL LOW COMPLEX 20 MIN: CPT

## 2020-01-13 ENCOUNTER — APPOINTMENT (OUTPATIENT)
Dept: PHYSICAL THERAPY | Age: 70
End: 2020-01-13
Attending: ORTHOPAEDIC SURGERY
Payer: MEDICARE

## 2020-01-13 DIAGNOSIS — I10 ESSENTIAL HYPERTENSION: ICD-10-CM

## 2020-01-13 RX ORDER — TRIAMTERENE AND HYDROCHLOROTHIAZIDE 37.5; 25 MG/1; MG/1
TABLET ORAL
Qty: 90 TABLET | Refills: 0 | Status: SHIPPED | OUTPATIENT
Start: 2020-01-13 | End: 2020-06-25

## 2020-01-13 RX ORDER — AMLODIPINE BESYLATE 5 MG/1
TABLET ORAL
Qty: 180 TABLET | Refills: 0 | Status: SHIPPED | OUTPATIENT
Start: 2020-01-13 | End: 2020-04-01

## 2020-01-13 NOTE — TELEPHONE ENCOUNTER
Amlodipine 5 mg  Last OV relevant to medication: 7-24-19  Last refill date: 7-24-19 #/refills: 1  When pt was asked to return for OV: 6 mo.   Upcoming appt/reason: none  Recent labs: 9-11-19: CMP    Triamterene-hctz 37.5-25 MG  Last OV relevant to [employer]

## 2020-01-15 ENCOUNTER — APPOINTMENT (OUTPATIENT)
Dept: PHYSICAL THERAPY | Age: 70
End: 2020-01-15
Attending: ORTHOPAEDIC SURGERY
Payer: MEDICARE

## 2020-01-16 ENCOUNTER — TELEPHONE (OUTPATIENT)
Dept: INTERNAL MEDICINE CLINIC | Facility: CLINIC | Age: 70
End: 2020-01-16

## 2020-01-16 DIAGNOSIS — S82.65XD CLOSED NONDISPLACED FRACTURE OF LATERAL MALLEOLUS OF LEFT FIBULA WITH ROUTINE HEALING, SUBSEQUENT ENCOUNTER: Primary | ICD-10-CM

## 2020-01-16 NOTE — TELEPHONE ENCOUNTER
referral request   Received:  Today   Message Contents   Michelle Livan [facility] Clinical Staff   Cc: [facility] Referral Pool   Phone Number: [phone]             .Reason for the order/referral: post surgical physical therapy   PCP: [email or web address] Dr. Thao Tolliver

## 2020-01-20 ENCOUNTER — APPOINTMENT (OUTPATIENT)
Dept: PHYSICAL THERAPY | Age: 70
End: 2020-01-20
Attending: ORTHOPAEDIC SURGERY
Payer: MEDICARE

## 2020-01-22 ENCOUNTER — OFFICE VISIT (OUTPATIENT)
Dept: PHYSICAL THERAPY | Age: 70
End: 2020-01-22
Attending: ORTHOPAEDIC SURGERY
Payer: MEDICARE

## 2020-01-22 PROCEDURE — 97140 MANUAL THERAPY 1/> REGIONS: CPT

## 2020-01-22 PROCEDURE — 97110 THERAPEUTIC EXERCISES: CPT

## 2020-01-22 NOTE — PROGRESS NOTES
Dx:  Closed nondisplaced fracture of lateral malleolus of left fibula with routine healing        Insurance (Authorized # of Visits):  6          Authorizing Physician: Dr. Payton Kay  Next MD visit: none scheduled  Fall Risk: standard         Precautions: n surfaces such as gravel and grass   · Pt will be independent and compliant with comprehensive HEP to maintain progress achieved in PT      Plan: POC will emphasize left ankle AROM exercises, STM/IASTM, and strengthening/flexibility in NWB.   Date: 1/22/2020

## 2020-01-24 ENCOUNTER — APPOINTMENT (OUTPATIENT)
Dept: PHYSICAL THERAPY | Age: 70
End: 2020-01-24
Attending: ORTHOPAEDIC SURGERY
Payer: MEDICARE

## 2020-01-28 ENCOUNTER — OFFICE VISIT (OUTPATIENT)
Dept: PHYSICAL THERAPY | Age: 70
End: 2020-01-28
Attending: ORTHOPAEDIC SURGERY
Payer: MEDICARE

## 2020-01-28 PROCEDURE — 97110 THERAPEUTIC EXERCISES: CPT

## 2020-01-28 PROCEDURE — 97140 MANUAL THERAPY 1/> REGIONS: CPT

## 2020-01-28 NOTE — PROGRESS NOTES
Dx:  Closed nondisplaced fracture of lateral malleolus of left fibula with routine healing        Insurance (Authorized # of Visits):  6          Authorizing Physician: Dr. Cara Kwong  Next MD visit: none scheduled  Fall Risk: standard         Precautions: n comprehensive HEP to maintain progress achieved in PT    Plan: POC will emphasize left ankle AROM exercises, STM/IASTM, and strengthening/flexibility in NWB.   Date: 1/22/2020  TX#: 2/8 Date: 1/28                TX#: 3/8 Date:                 TX#: 4/ Date:

## 2020-01-30 ENCOUNTER — APPOINTMENT (OUTPATIENT)
Dept: PHYSICAL THERAPY | Age: 70
End: 2020-01-30
Attending: ORTHOPAEDIC SURGERY
Payer: MEDICARE

## 2020-01-31 ENCOUNTER — OFFICE VISIT (OUTPATIENT)
Dept: ORTHOPEDICS CLINIC | Facility: CLINIC | Age: 70
End: 2020-01-31
Payer: MEDICARE

## 2020-01-31 ENCOUNTER — HOSPITAL ENCOUNTER (OUTPATIENT)
Dept: GENERAL RADIOLOGY | Facility: HOSPITAL | Age: 70
Discharge: HOME | End: 2020-01-31
Attending: ORTHOPAEDIC SURGERY
Payer: MEDICARE

## 2020-01-31 VITALS
HEART RATE: 68 BPM | RESPIRATION RATE: 16 BRPM | OXYGEN SATURATION: 97 % | BODY MASS INDEX: 31.99 KG/M2 | WEIGHT: 192 LBS | HEIGHT: 65 IN

## 2020-01-31 DIAGNOSIS — S82.65XA CLOSED NONDISPLACED FRACTURE OF LATERAL MALLEOLUS OF LEFT FIBULA, INITIAL ENCOUNTER: ICD-10-CM

## 2020-01-31 DIAGNOSIS — S82.65XD CLOSED NONDISPLACED FRACTURE OF LATERAL MALLEOLUS OF LEFT FIBULA WITH ROUTINE HEALING, SUBSEQUENT ENCOUNTER: Primary | ICD-10-CM

## 2020-01-31 PROCEDURE — 99213 OFFICE O/P EST LOW 20 MIN: CPT | Performed by: ORTHOPAEDIC SURGERY

## 2020-01-31 PROCEDURE — 73610 X-RAY EXAM OF ANKLE: CPT | Performed by: ORTHOPAEDIC SURGERY

## 2020-01-31 NOTE — PROGRESS NOTES
[facility] Progress Note    Subjective: Patient returns approximately 6 weeks following stable left ankle fibula fracture.   She has been full weightbearing in her boot, removing the boot for working on motion with therapy, and reports that there is n

## 2020-02-03 ENCOUNTER — OFFICE VISIT (OUTPATIENT)
Dept: PHYSICAL THERAPY | Age: 70
End: 2020-02-03
Attending: ORTHOPAEDIC SURGERY
Payer: MEDICARE

## 2020-02-03 PROCEDURE — 97110 THERAPEUTIC EXERCISES: CPT

## 2020-02-03 NOTE — PROGRESS NOTES
Dx:  Closed nondisplaced fracture of lateral malleolus of left fibula with routine healing        Insurance (Authorized # of Visits):  6          Authorizing Physician: Dr. Tosha Prater MD visit: N/A  Fall Risk: standard         Precautions: n/a to > or = to 35 degrees for improved efficiency with pivoting during gait  · Pt will demonstrate improved left ankle eversion AROM to > or = to 18 degrees for improved efficiency with pivoting during gait  · Pt will have increased ankle left strength to 4+ isometrics - 4 ways, 5 sec, 5 reps each      Towel crunches, x 4 mins --      Standing marching and side stepping inside parallel bars, x 20 reps each, 1 set w/ UE support w/ boot on --       Prone hip extension reps, x 10, 2 sets       Three way hip kicks

## 2020-02-05 ENCOUNTER — OFFICE VISIT (OUTPATIENT)
Dept: PHYSICAL THERAPY | Age: 70
End: 2020-02-05
Attending: ORTHOPAEDIC SURGERY
Payer: MEDICARE

## 2020-02-05 PROCEDURE — 97110 THERAPEUTIC EXERCISES: CPT

## 2020-02-05 NOTE — PROGRESS NOTES
Dx:  Closed nondisplaced fracture of lateral malleolus of left fibula with routine healing        Insurance (Authorized # of Visits):  6          Authorizing Physician: Dr. Jolyn Osler Next MD visit: N/A  Fall Risk: standard         Precautions: n/a surfaces such as gravel and grass   · Pt will be independent and compliant with comprehensive HEP to maintain progress achieved in PT      Plan: POC will continue left ankle and LLE strengthening NWB or WB while wearing boot - Will begin to wean out of seals sec, 5 reps each     Towel crunches, x 4 mins -- --     Standing marching and side stepping inside parallel bars, x 20 reps each, 1 set w/ UE support w/ boot on -- --      Prone hip extension reps, x 10, 2 sets Prone hip extension reps, x 10, 2 sets w/ 2 l

## 2020-02-06 ENCOUNTER — TELEPHONE (OUTPATIENT)
Dept: INTERNAL MEDICINE CLINIC | Facility: CLINIC | Age: 70
End: 2020-02-06

## 2020-02-06 NOTE — TELEPHONE ENCOUNTER
Patient recevied a letter from her insurance stating amlodopine will not be covered anymore and so she would like to know if we can send another one for her and would like to know which one it would be

## 2020-02-11 ENCOUNTER — OFFICE VISIT (OUTPATIENT)
Dept: PHYSICAL THERAPY | Age: 70
End: 2020-02-11
Attending: ORTHOPAEDIC SURGERY
Payer: MEDICARE

## 2020-02-11 DIAGNOSIS — F41.9 ANXIETY: ICD-10-CM

## 2020-02-11 DIAGNOSIS — IMO0002 OSTEOARTHROSIS INVOLVING LOWER LEG: ICD-10-CM

## 2020-02-11 DIAGNOSIS — F33.0 MILD RECURRENT MAJOR DEPRESSION (HCC): Chronic | ICD-10-CM

## 2020-02-11 PROCEDURE — 97110 THERAPEUTIC EXERCISES: CPT

## 2020-02-11 NOTE — PROGRESS NOTES
Dx:  Closed nondisplaced fracture of lateral malleolus of left fibula with routine healing        Insurance (Authorized # of Visits):  6          Authorizing Physician: Dr. Kervin Mcdonald  Next MD visit: N/A  Fall Risk: standard         Precautions: n/a strength to 4+/5 throughout for improved ankle control with ADLs such as prolonged gait and stair negotiation   · Pt will have improved SLS to > or = to 30s for increased ankle stability with ambulation on uneven surfaces such as gravel and grass   · Pt wi lying abduction SLR, x 10 2 sets, w/ 2 lb weights Supine SLR, x 10, 2 sets, w/ and w/o 4 lb weight    Side lying abduction SLR, x 10 2 sets, w/ 2 lb weights     6\" lateral step ups while wearing boot, x 10, 2 sets 6\" lateral/forward step ups while wearin

## 2020-02-11 NOTE — PHYSICAL THERAPY NOTE
PHYSICAL THERAPY EVALUATION - INPATIENT     Room Number: 380/380-A  Evaluation Date: 4/19/2017  Type of Evaluation: Initial  Physician Order: PT Eval and Treat    Presenting Problem: s/p lumbar fusion  Reason for Therapy: Mobility Dysfunction and Disch removal of both ovaries d/t DBT and fibroid    THYROIDECTOMY      Comment partial thyroidectomy Lt    LAMINECTOMY,LUMBAR  4/1/2010    Comment L4 L5 fusion    COLONOSCOPY  1/2003    REMOVAL OF OVARIAN CYST(S)      Comment x2    FOOT/TOES SURGERY PROC UNLIST Lot   -   Moving from lying on back to sitting on the side of the bed?: A Lot   How much help from another person does the patient currently need. ..   -   Moving to and from a bed to a chair (including a wheelchair)?: Total   -   Need to walk in hospital r decreased endurance, gait dysfunction. These impairments manifest themselves as functional limitations in mobility and functional independence. Pt's AM-PAC score demonstrating 81.38% functional impairment. The patient is below her baseline and would benefit No

## 2020-02-12 ENCOUNTER — OFFICE VISIT (OUTPATIENT)
Dept: PHYSICAL THERAPY | Age: 70
End: 2020-02-12
Attending: ORTHOPAEDIC SURGERY
Payer: MEDICARE

## 2020-02-12 PROCEDURE — 97110 THERAPEUTIC EXERCISES: CPT

## 2020-02-12 PROCEDURE — 97112 NEUROMUSCULAR REEDUCATION: CPT

## 2020-02-12 RX ORDER — LEVOTHYROXINE SODIUM 0.03 MG/1
TABLET ORAL
Qty: 90 TABLET | Refills: 0 | Status: SHIPPED | OUTPATIENT
Start: 2020-02-12 | End: 2020-04-21

## 2020-02-12 NOTE — PROGRESS NOTES
Dx:  Closed nondisplaced fracture of lateral malleolus of left fibula with routine healing        Insurance (Authorized # of Visits):  8          Authorizing Physician: Dr. Naren Recio  Next MD visit: Early April 2020  Fall Risk: standard         Precautions: prolonged gait and stair negotiation   · Pt will have improved SLS to > or = to 30s for increased ankle stability with ambulation on uneven surfaces such as gravel and grass   · Pt will be independent and compliant with comprehensive HEP to maintain progre each Left ankle isometrics - 4 ways, 5 sec, 5 reps each Left ankle isometrics - 4 ways, 5 sec, 5 reps each   -- -- Wobble board forward/back, x 3 mins Wobble board forward/back and side/side, x 6 mins   -- -- Leg Press BLE - 6 bands, x 20 reps    Leg Press

## 2020-02-12 NOTE — TELEPHONE ENCOUNTER
Sent [employer] message stating that she is due for AWV. Celecoxib 200 mg  Last OV relevant to medication: 7-24-19  Last refill date: 12-5-19 #/refills: 0  When pt was asked to return for OV: 6 mo.   Upcoming appt/reason: none  Recent labs: none    Escitalo

## 2020-02-18 RX ORDER — CELECOXIB 200 MG/1
CAPSULE ORAL
Qty: 90 CAPSULE | Refills: 0 | OUTPATIENT
Start: 2020-02-18

## 2020-02-18 RX ORDER — ESCITALOPRAM OXALATE 20 MG/1
TABLET ORAL
Qty: 90 TABLET | Refills: 0 | OUTPATIENT
Start: 2020-02-18

## 2020-02-24 ENCOUNTER — OFFICE VISIT (OUTPATIENT)
Dept: PHYSICAL THERAPY | Age: 70
End: 2020-02-24
Attending: ORTHOPAEDIC SURGERY
Payer: MEDICARE

## 2020-02-24 PROCEDURE — 97110 THERAPEUTIC EXERCISES: CPT

## 2020-02-24 NOTE — PROGRESS NOTES
Discharge Summary    Pt has attended 8 visits in Physical Therapy. Subjective: The patient comes into therapy with no pain. Hugh Torres did not attend therapy last week due to being on vacation with her family.  She is now 9 weeks out from fracturing her l wearing her boot. Clifton Sanchez was able to meet 4/6 functional goals this session and partially met another goal. She is able to negotiate stairs reciprocally with RUE support and has made significant gains with her functional ability.  Clifton Sanchez still shows some de and measures and HEP review - 25 mins   Left ankle alphabet, x 2 reps Left ankle alphabet, x 2 reps Left ankle alphabet, x 2 reps -- Sports Art Bike - 8 mins   Wind shield wiper AROM, x 25 reps, 2 sets Wind shield wiper AROM, x 25 reps, 2 sets Wind shield extension reps, x 10, 2 sets Prone hip extension reps, x 10, 2 sets w/ 2 lb weights Prone hip extension reps, x 10, 2 sets w/ 2 lb weights Prone hip extension reps, x 10, 2 sets w/ 2 lb weights --   Three way hip kicks, x 10, 2 sets Three way hip kicks, x

## 2020-02-25 ENCOUNTER — TELEPHONE (OUTPATIENT)
Dept: NEUROLOGY | Facility: CLINIC | Age: 70
End: 2020-02-25

## 2020-02-25 NOTE — TELEPHONE ENCOUNTER
Left message with Nemesio Moody from [address] to discuss. Patient states \"I might just stop medication, I feel fine\". Requested patient make f/u appointment with provider before stopping medication. Patient reports she has enough medication for the next month.

## 2020-02-27 ENCOUNTER — APPOINTMENT (OUTPATIENT)
Dept: PHYSICAL THERAPY | Age: 70
End: 2020-02-27
Attending: ORTHOPAEDIC SURGERY
Payer: MEDICARE

## 2020-02-27 NOTE — TELEPHONE ENCOUNTER
Spoke with Yissel Richards with [employer] and he states that generally patient's with government insurance do not receive co-pay assistance directly from [employer].  He states that they are usually put in touch with a charitable organization to help with co-pay and sh

## 2020-02-28 NOTE — TELEPHONE ENCOUNTER
Called patient and relayed the information in the note below regarding calling [employer] to be evaluated for free drug programs    Patient states she just got off the phone with [address] and they again have informed her that she does not qualify for their minor

## 2020-02-28 NOTE — TELEPHONE ENCOUNTER
Spoke with Antolin Schafer, Rep for [employer], and he states that [address] attempted call to patient. She needs to call patient assistance to be evaluated for free drug. Rn called above [employer] at Multiple sclerosis support [phone] and spoke to Donovan.   Per KATHRYN

## 2020-03-02 ENCOUNTER — APPOINTMENT (OUTPATIENT)
Dept: PHYSICAL THERAPY | Age: 70
End: 2020-03-02
Attending: ORTHOPAEDIC SURGERY
Payer: MEDICARE

## 2020-03-05 ENCOUNTER — APPOINTMENT (OUTPATIENT)
Dept: PHYSICAL THERAPY | Age: 70
End: 2020-03-05
Attending: ORTHOPAEDIC SURGERY
Payer: MEDICARE

## 2020-03-05 NOTE — TELEPHONE ENCOUNTER
Patient calling on status of Celecoxib 200 mg and Levothyroxine she has made an appointment at the end of month per [facility] request

## 2020-03-09 RX ORDER — CELECOXIB 200 MG/1
CAPSULE ORAL
Qty: 90 CAPSULE | Refills: 0 | Status: SHIPPED | OUTPATIENT
Start: 2020-03-09 | End: 2020-04-30

## 2020-03-30 ENCOUNTER — TELEPHONE (OUTPATIENT)
Dept: ORTHOPEDICS CLINIC | Facility: CLINIC | Age: 70
End: 2020-03-30

## 2020-03-30 NOTE — TELEPHONE ENCOUNTER
Called patient to discuss her ankle - reports it is feeling great. She has been discharged from PT and is walking, exercising, functioning normally without any issue. She is happy with her recovery.  Advised patient to continue activity as tolerated and caroline

## 2020-04-01 DIAGNOSIS — I10 ESSENTIAL HYPERTENSION: ICD-10-CM

## 2020-04-01 RX ORDER — AMLODIPINE BESYLATE 5 MG/1
TABLET ORAL
Qty: 180 TABLET | Refills: 0 | Status: SHIPPED | OUTPATIENT
Start: 2020-04-01 | End: 2020-06-25

## 2020-04-01 NOTE — TELEPHONE ENCOUNTER
CAL[facility]    Per notes below, patient had 2 months of Tecfidera left and was waiting to determine if any funding would become available to help with co-pay assist.    Patient could be scheduled for Telephone visit with Dr. Preethi Ernandez if she needs to change MS medic

## 2020-04-01 NOTE — TELEPHONE ENCOUNTER
Amlodipine 5 mg  Last OV relevant to medication: 7-24-19  Last refill date: 1-13-20 #/refills: 0  When pt was asked to return for OV: 6 mo.   Upcoming appt/reason: 6-1-20  Recent labs: 9-11-19: CMP

## 2020-04-08 ENCOUNTER — TELEPHONE (OUTPATIENT)
Dept: NEUROLOGY | Facility: CLINIC | Age: 70
End: 2020-04-08

## 2020-04-08 NOTE — TELEPHONE ENCOUNTER
I called her back, she is doing well on tecfidera, the reason she wants to stop the pill is she has to pay $ 8000/month fir the medication, she could not afford it.  Are we able to talk to company or insurance to give her drug financial support or to see an

## 2020-04-08 NOTE — TELEPHONE ENCOUNTER
Pt does not qualify for [employer] free drug program. Pt states she was not successful in getting assistance through [employer] one to one. Gave pt PAN foundation number to call and apply for their assistance.  Pt to call back with update,

## 2020-04-08 NOTE — TELEPHONE ENCOUNTER
Pt states she has only been taking Tecfidera one time a day instead of twice a day so that she wouldn't run out of medication. Pt states she is doing great and would like to know if she can just stop taking medication all together.      Will route to provid

## 2020-04-09 NOTE — TELEPHONE ENCOUNTER
Pt would need to be put on waitlist on for PAN so she called [employer] and they dug deeper and said there was a mistake somewhere that she was indeed still being covered by [employer] assistance program and have a script on file still.  No further needs from [employer]

## 2020-04-21 RX ORDER — LEVOTHYROXINE SODIUM 0.03 MG/1
TABLET ORAL
Qty: 90 TABLET | Refills: 0 | Status: SHIPPED | OUTPATIENT
Start: 2020-04-21 | End: 2020-06-18

## 2020-04-29 DIAGNOSIS — IMO0002 OSTEOARTHROSIS INVOLVING LOWER LEG: ICD-10-CM

## 2020-04-30 RX ORDER — CELECOXIB 200 MG/1
CAPSULE ORAL
Qty: 90 CAPSULE | Refills: 0 | Status: SHIPPED | OUTPATIENT
Start: 2020-04-30 | End: 2020-07-09

## 2020-04-30 NOTE — TELEPHONE ENCOUNTER
Celecoxib 200 mg  Last OV relevant to medication: 7-24-19  Last refill date: 3-9-20 #/refills: 0  When pt was asked to return for OV: 6 mo.   Upcoming appt/reason: 6-1-20  Recent labs: none

## 2020-05-08 DIAGNOSIS — E78.5 HYPERLIPIDEMIA, UNSPECIFIED HYPERLIPIDEMIA TYPE: ICD-10-CM

## 2020-05-08 RX ORDER — ATORVASTATIN CALCIUM 20 MG/1
TABLET, FILM COATED ORAL
Qty: 90 TABLET | Refills: 1 | Status: SHIPPED | OUTPATIENT
Start: 2020-05-08 | End: 2020-10-08

## 2020-05-19 ENCOUNTER — TELEPHONE (OUTPATIENT)
Dept: NEUROLOGY | Facility: CLINIC | Age: 70
End: 2020-05-19

## 2020-05-27 DIAGNOSIS — I10 ESSENTIAL HYPERTENSION: ICD-10-CM

## 2020-05-28 RX ORDER — AMLODIPINE BESYLATE 5 MG/1
TABLET ORAL
Qty: 180 TABLET | Refills: 0 | OUTPATIENT
Start: 2020-05-28

## 2020-06-01 NOTE — TELEPHONE ENCOUNTER
Form signed by Dr. Powell Agent. Per letter sent with paperwork from pt, requested it be mailed back to her. Sent original back in envelope provided by pt. Copy sent for scanning.

## 2020-06-18 RX ORDER — LEVOTHYROXINE SODIUM 0.03 MG/1
TABLET ORAL
Qty: 90 TABLET | Refills: 0 | Status: SHIPPED | OUTPATIENT
Start: 2020-06-18 | End: 2020-08-27

## 2020-06-18 NOTE — TELEPHONE ENCOUNTER
Levothyroxine 25 mcg  Last OV relevant to medication:  Last refill date: 4-21-20 #/refills: 0  When pt was asked to return for OV:   Upcoming appt/reason: 6-25-20  Recent labs: 9-11-19: TSH W REFLEX

## 2020-06-25 ENCOUNTER — OFFICE VISIT (OUTPATIENT)
Dept: INTERNAL MEDICINE CLINIC | Facility: CLINIC | Age: 70
End: 2020-06-25
Payer: MEDICARE

## 2020-06-25 VITALS
BODY MASS INDEX: 33.15 KG/M2 | SYSTOLIC BLOOD PRESSURE: 138 MMHG | RESPIRATION RATE: 16 BRPM | HEIGHT: 65 IN | TEMPERATURE: 97 F | DIASTOLIC BLOOD PRESSURE: 70 MMHG | WEIGHT: 199 LBS

## 2020-06-25 DIAGNOSIS — I10 ESSENTIAL HYPERTENSION: ICD-10-CM

## 2020-06-25 DIAGNOSIS — K21.9 GASTROESOPHAGEAL REFLUX DISEASE WITHOUT ESOPHAGITIS: ICD-10-CM

## 2020-06-25 DIAGNOSIS — G25.81 RLS (RESTLESS LEGS SYNDROME): ICD-10-CM

## 2020-06-25 DIAGNOSIS — E78.5 HYPERLIPIDEMIA, UNSPECIFIED HYPERLIPIDEMIA TYPE: ICD-10-CM

## 2020-06-25 DIAGNOSIS — S80.212A ABRASION OF LEFT KNEE, INITIAL ENCOUNTER: ICD-10-CM

## 2020-06-25 DIAGNOSIS — Z23 NEED FOR PNEUMOCOCCAL VACCINATION: ICD-10-CM

## 2020-06-25 DIAGNOSIS — L92.0 GRANULOMA ANNULARE: ICD-10-CM

## 2020-06-25 DIAGNOSIS — F33.0 MILD RECURRENT MAJOR DEPRESSION (HCC): ICD-10-CM

## 2020-06-25 DIAGNOSIS — M62.838 MUSCLE SPASMS OF BOTH LOWER EXTREMITIES: ICD-10-CM

## 2020-06-25 DIAGNOSIS — N95.1 MENOPAUSAL VAGINAL DRYNESS: ICD-10-CM

## 2020-06-25 DIAGNOSIS — M47.816 FACET ARTHRITIS OF LUMBAR REGION: ICD-10-CM

## 2020-06-25 DIAGNOSIS — E03.9 HYPOTHYROIDISM, UNSPECIFIED TYPE: ICD-10-CM

## 2020-06-25 DIAGNOSIS — Z86.010 PERSONAL HISTORY OF COLONIC POLYPS: ICD-10-CM

## 2020-06-25 DIAGNOSIS — Z00.00 LABORATORY EXAMINATION ORDERED AS PART OF A ROUTINE GENERAL MEDICAL EXAMINATION: ICD-10-CM

## 2020-06-25 DIAGNOSIS — IMO0002 OSTEOARTHROSIS INVOLVING LOWER LEG: ICD-10-CM

## 2020-06-25 DIAGNOSIS — F41.9 ANXIETY: ICD-10-CM

## 2020-06-25 DIAGNOSIS — R73.03 PREDIABETES: ICD-10-CM

## 2020-06-25 DIAGNOSIS — M47.812 FACET ARTHRITIS OF CERVICAL REGION: ICD-10-CM

## 2020-06-25 DIAGNOSIS — K22.70 BARRETT'S ESOPHAGUS WITHOUT DYSPLASIA: ICD-10-CM

## 2020-06-25 DIAGNOSIS — Z00.00 ENCOUNTER FOR ANNUAL HEALTH EXAMINATION: ICD-10-CM

## 2020-06-25 DIAGNOSIS — Z98.1 HISTORY OF LUMBAR FUSION: ICD-10-CM

## 2020-06-25 DIAGNOSIS — R53.82 CHRONIC FATIGUE: ICD-10-CM

## 2020-06-25 DIAGNOSIS — R53.1 INCREASED WEAKNESS WHEN AMBULATING: ICD-10-CM

## 2020-06-25 DIAGNOSIS — Z12.31 ENCOUNTER FOR SCREENING MAMMOGRAM FOR BREAST CANCER: Primary | ICD-10-CM

## 2020-06-25 DIAGNOSIS — M48.061 SPINAL STENOSIS OF LUMBAR REGION, UNSPECIFIED WHETHER NEUROGENIC CLAUDICATION PRESENT: ICD-10-CM

## 2020-06-25 DIAGNOSIS — S93.401A SPRAIN OF RIGHT ANKLE, UNSPECIFIED LIGAMENT, INITIAL ENCOUNTER: ICD-10-CM

## 2020-06-25 DIAGNOSIS — G35 MULTIPLE SCLEROSIS (HCC): ICD-10-CM

## 2020-06-25 PROCEDURE — G0439 PPPS, SUBSEQ VISIT: HCPCS | Performed by: FAMILY MEDICINE

## 2020-06-25 PROCEDURE — 99397 PER PM REEVAL EST PAT 65+ YR: CPT | Performed by: FAMILY MEDICINE

## 2020-06-25 PROCEDURE — 90732 PPSV23 VACC 2 YRS+ SUBQ/IM: CPT | Performed by: FAMILY MEDICINE

## 2020-06-25 PROCEDURE — G0009 ADMIN PNEUMOCOCCAL VACCINE: HCPCS | Performed by: FAMILY MEDICINE

## 2020-06-25 PROCEDURE — 96160 PT-FOCUSED HLTH RISK ASSMT: CPT | Performed by: FAMILY MEDICINE

## 2020-06-25 RX ORDER — AMLODIPINE BESYLATE 5 MG/1
10 TABLET ORAL DAILY
Qty: 180 TABLET | Refills: 0 | Status: SHIPPED | OUTPATIENT
Start: 2020-06-25 | End: 2020-08-17

## 2020-06-25 RX ORDER — ESCITALOPRAM OXALATE 20 MG/1
20 TABLET ORAL DAILY
Qty: 90 TABLET | Refills: 0 | Status: SHIPPED | OUTPATIENT
Start: 2020-06-25 | End: 2020-08-17

## 2020-06-25 RX ORDER — CEFADROXIL 500 MG/1
500 CAPSULE ORAL 2 TIMES DAILY
Qty: 20 CAPSULE | Refills: 0 | Status: SHIPPED | OUTPATIENT
Start: 2020-06-25 | End: 2021-01-07 | Stop reason: ALTCHOICE

## 2020-06-25 RX ORDER — TRIAMTERENE AND HYDROCHLOROTHIAZIDE 37.5; 25 MG/1; MG/1
1 TABLET ORAL DAILY
Qty: 90 TABLET | Refills: 0 | Status: SHIPPED | OUTPATIENT
Start: 2020-06-25 | End: 2020-08-17

## 2020-06-25 NOTE — PATIENT INSTRUCTIONS
Mirza Reyes's SCREENING SCHEDULE   Tests on this list are recommended by your physician but may not be covered, or covered at this frequency, by your insurer. Please check with your insurance carrier before scheduling to verify coverage.    Tucker Matos found for this or any previous visit.  Limited to patients who meet one of the following criteria:   • Men who are 73-68 years old and have smoked more than 100 cigarettes in their lifetime   • Anyone with a family history    Colorectal Cancer Screening  Co Mammogram regularly   Immunizations      Influenza  Covered Annually Orders placed or performed in visit on 12/04/14   • INFLUENZA VIRUS VACCINE, >=1YEARS OF AGE    Please get every year    Pneumococcal 13 (Prevnar)  Covered Once after 65 Orders placed or on it's website for anyone to review and print using their home computer and printer. (the forms are also available in [address])  [email or web address]  This link also has information from the [facility] regarding Advance Directives.

## 2020-06-25 NOTE — PROGRESS NOTES
HPI:   Izaiah Alicea is a 79year old female who presents for a MA (Medicare Advantage) [facility] (Once per calendar year). Patient presents for recheck of their hypertension/hyperlipidemia.  Pt has been taking medications as instructed, no medicati the USPSTF and we discussed options, see handouts.     Do you have 3 or more medical conditions?: 1-Yes  Have you fallen in the last 12 months?: 1-Yes  Do you accidently lose urine?: 0-No  Do you have difficulty seeing?: 1-Yes  Do you have any difficulty wa Physical Therapist (Physical Therapy)  Almaz Sherwood PT as Physical Therapist (Physical Therapy)    Patient Active Problem List:     Lumbar spinal stenosis     Multiple sclerosis (HCC)     Essential hypertension     Esophageal reflux     Osteoarthrosis daily.  Triamterene-HCTZ 37.5-25 MG Oral Tab, Take 1 tablet by mouth daily. escitalopram 20 MG Oral Tab, Take 1 tablet (20 mg total) by mouth daily.   LEVOTHYROXINE SODIUM 25 MCG Oral Tab, TAKE 1 TABLET BEFORE BREAKFAST  OMEPRAZOLE 20 MG Oral Capsule Delay HISTORY:   She  reports that she has never smoked. She has never used smokeless tobacco. She reports current alcohol use of about 5.0 standard drinks of alcohol per week. She reports that she does not use drugs.      REVIEW OF SYSTEMS:   GENERAL: feels well symmetrical, trachea midline, no adenopathy;  thyroid: not enlarged, symmetric, no tenderness/mass/nodules; no carotid bruit or JVD   Back:   Symmetric, no curvature, ROM normal, no CVA tenderness   Lungs:   Clear to auscultation bilaterally, respirations 25-HYDROXY; Future  -     ASSAY, THYROID STIM HORMONE; Future    Need for pneumococcal vaccination  -     PNEUMOCOCCAL IMM (PNEUMOVAX)    Encounter for annual health examination    Gastroesophageal reflux disease without esophagitis    Personal history of pneumococcal vaccination    - PNEUMOCOCCAL IMM (PNEUMOVAX)    4. Encounter for annual health examination  - Pt to follow up in one year for her next PX.    5. Gastroesophageal reflux disease without esophagitis  - Managed by Dr. Deja Polk    6.  Personal history tablet; Refill: 0    20. Alas's esophagus without dysplasia  - Managed by Dr. Sheridan Caldera    21. Facet arthritis of lumbar region  - stable, uses Celebrex as needed    22. Granuloma annulare  - Managed by Dr. Taya Daley    23.  Hypothyroidism, unspecified type Cardiovascular Disease Screening     LDL Annually LDL Cholesterol (mg/dL)   Date Value   09/11/2019 100 (H)     LDL-CHOLESTEROL (mg/dL (calc))   Date Value   09/10/2015 116        EKG - w/ Initial Preventative Physical Exam only, or if medically necessar live in the same house as a HepB virus carrier   Homosexual men   Illicit injectable drug abusers     Tetanus Toxoid  Only covered with a cut with metal- TD and TDaP Not covered by Medicare Part B No vaccine history found This may be covered with your pres

## 2020-07-08 DIAGNOSIS — IMO0002 OSTEOARTHROSIS INVOLVING LOWER LEG: ICD-10-CM

## 2020-07-09 RX ORDER — CELECOXIB 200 MG/1
CAPSULE ORAL
Qty: 90 CAPSULE | Refills: 0 | Status: SHIPPED | OUTPATIENT
Start: 2020-07-09 | End: 2020-09-22

## 2020-08-03 ENCOUNTER — HOSPITAL ENCOUNTER (OUTPATIENT)
Dept: MAMMOGRAPHY | Age: 70
Discharge: HOME | End: 2020-08-03
Attending: FAMILY MEDICINE
Payer: MEDICARE

## 2020-08-03 DIAGNOSIS — Z12.31 ENCOUNTER FOR SCREENING MAMMOGRAM FOR BREAST CANCER: ICD-10-CM

## 2020-08-03 PROCEDURE — 77067 SCR MAMMO BI INCL CAD: CPT | Performed by: FAMILY MEDICINE

## 2020-08-03 PROCEDURE — 77063 BREAST TOMOSYNTHESIS BI: CPT | Performed by: FAMILY MEDICINE

## 2020-08-04 ENCOUNTER — TELEPHONE (OUTPATIENT)
Dept: INTERNAL MEDICINE CLINIC | Facility: CLINIC | Age: 70
End: 2020-08-04

## 2020-08-04 NOTE — TELEPHONE ENCOUNTER
Pt stopped by the clinic today with concerns of her BP being up at home. Her reading were: 139/71,132/72,137/70 and 149/76. The only reading that was up was the last one. Lets have the Pt continue to monitor her BP for the next week.  Make sure Pt is taking

## 2020-08-17 DIAGNOSIS — I10 ESSENTIAL HYPERTENSION: ICD-10-CM

## 2020-08-17 DIAGNOSIS — F33.0 MILD RECURRENT MAJOR DEPRESSION (HCC): ICD-10-CM

## 2020-08-17 DIAGNOSIS — F41.9 ANXIETY: ICD-10-CM

## 2020-08-17 RX ORDER — ESCITALOPRAM OXALATE 20 MG/1
TABLET ORAL
Qty: 90 TABLET | Refills: 0 | Status: SHIPPED | OUTPATIENT
Start: 2020-08-17 | End: 2020-11-17

## 2020-08-17 RX ORDER — TRIAMTERENE AND HYDROCHLOROTHIAZIDE 37.5; 25 MG/1; MG/1
TABLET ORAL
Qty: 90 TABLET | Refills: 0 | Status: SHIPPED | OUTPATIENT
Start: 2020-08-17 | End: 2020-11-17

## 2020-08-17 RX ORDER — AMLODIPINE BESYLATE 5 MG/1
TABLET ORAL
Qty: 180 TABLET | Refills: 0 | Status: SHIPPED | OUTPATIENT
Start: 2020-08-17 | End: 2020-11-17

## 2020-08-17 NOTE — TELEPHONE ENCOUNTER
Sent pt my chart message stating pt needs to get lab work done. Provided central scheduling. Amlodipine 5 mg  Last OV relevant to medication: 6-25-20  Last refill date: 6-25-20 #/refills: 0  When pt was asked to return for OV: 6 mo.   Upcoming appt/reas

## 2020-08-20 ENCOUNTER — LAB ENCOUNTER (OUTPATIENT)
Dept: LAB | Age: 70
End: 2020-08-20
Attending: FAMILY MEDICINE
Payer: MEDICARE

## 2020-08-20 DIAGNOSIS — Z00.00 LABORATORY EXAMINATION ORDERED AS PART OF A ROUTINE GENERAL MEDICAL EXAMINATION: ICD-10-CM

## 2020-08-20 LAB
ALBUMIN SERPL-MCNC: 3.9 G/DL (ref 3.4–5)
ALBUMIN/GLOB SERPL: 1.1 {RATIO} (ref 1–2)
ALP LIVER SERPL-CCNC: 59 U/L (ref 55–142)
ALT SERPL-CCNC: 23 U/L (ref 13–56)
ANION GAP SERPL CALC-SCNC: 3 MMOL/L (ref 0–18)
AST SERPL-CCNC: 13 U/L (ref 15–37)
BASOPHILS # BLD AUTO: 0.05 X10(3) UL (ref 0–0.2)
BASOPHILS NFR BLD AUTO: 0.8 %
BILIRUB SERPL-MCNC: 0.5 MG/DL (ref 0.1–2)
BUN BLD-MCNC: 22 MG/DL (ref 7–18)
BUN/CREAT SERPL: 31 (ref 10–20)
CALCIUM BLD-MCNC: 9.4 MG/DL (ref 8.5–10.1)
CHLORIDE SERPL-SCNC: 104 MMOL/L (ref 98–112)
CHOLEST SMN-MCNC: 187 MG/DL (ref ?–200)
CO2 SERPL-SCNC: 32 MMOL/L (ref 21–32)
CREAT BLD-MCNC: 0.71 MG/DL (ref 0.55–1.02)
DEPRECATED RDW RBC AUTO: 48.3 FL (ref 35.1–46.3)
EOSINOPHIL # BLD AUTO: 0.29 X10(3) UL (ref 0–0.7)
EOSINOPHIL NFR BLD AUTO: 4.5 %
ERYTHROCYTE [DISTWIDTH] IN BLOOD BY AUTOMATED COUNT: 13.8 % (ref 11–15)
EST. AVERAGE GLUCOSE BLD GHB EST-MCNC: 120 MG/DL (ref 68–126)
GLOBULIN PLAS-MCNC: 3.6 G/DL (ref 2.8–4.4)
GLUCOSE BLD-MCNC: 113 MG/DL (ref 70–99)
HBA1C MFR BLD HPLC: 5.8 % (ref ?–5.7)
HCT VFR BLD AUTO: 43.6 % (ref 35–48)
HDLC SERPL-MCNC: 90 MG/DL (ref 40–59)
HGB BLD-MCNC: 13.6 G/DL (ref 12–16)
IMM GRANULOCYTES # BLD AUTO: 0.02 X10(3) UL (ref 0–1)
IMM GRANULOCYTES NFR BLD: 0.3 %
LDLC SERPL CALC-MCNC: 87 MG/DL (ref ?–100)
LYMPHOCYTES # BLD AUTO: 1.5 X10(3) UL (ref 1–4)
LYMPHOCYTES NFR BLD AUTO: 23.3 %
M PROTEIN MFR SERPL ELPH: 7.5 G/DL (ref 6.4–8.2)
MCH RBC QN AUTO: 29.4 PG (ref 26–34)
MCHC RBC AUTO-ENTMCNC: 31.2 G/DL (ref 31–37)
MCV RBC AUTO: 94.4 FL (ref 80–100)
MONOCYTES # BLD AUTO: 0.76 X10(3) UL (ref 0.1–1)
MONOCYTES NFR BLD AUTO: 11.8 %
NEUTROPHILS # BLD AUTO: 3.83 X10 (3) UL (ref 1.5–7.7)
NEUTROPHILS # BLD AUTO: 3.83 X10(3) UL (ref 1.5–7.7)
NEUTROPHILS NFR BLD AUTO: 59.3 %
NONHDLC SERPL-MCNC: 97 MG/DL (ref ?–130)
OSMOLALITY SERPL CALC.SUM OF ELEC: 292 MOSM/KG (ref 275–295)
PATIENT FASTING Y/N/NP: YES
PATIENT FASTING Y/N/NP: YES
PLATELET # BLD AUTO: 270 10(3)UL (ref 150–450)
POTASSIUM SERPL-SCNC: 4 MMOL/L (ref 3.5–5.1)
RBC # BLD AUTO: 4.62 X10(6)UL (ref 3.8–5.3)
SODIUM SERPL-SCNC: 139 MMOL/L (ref 136–145)
TRIGL SERPL-MCNC: 49 MG/DL (ref 30–149)
TSI SER-ACNC: 2.79 MIU/ML (ref 0.36–3.74)
VIT D+METAB SERPL-MCNC: 55 NG/ML (ref 30–100)
VLDLC SERPL CALC-MCNC: 10 MG/DL (ref 0–30)
WBC # BLD AUTO: 6.5 X10(3) UL (ref 4–11)

## 2020-08-20 PROCEDURE — 80061 LIPID PANEL: CPT

## 2020-08-20 PROCEDURE — 36415 COLL VENOUS BLD VENIPUNCTURE: CPT

## 2020-08-20 PROCEDURE — 85025 COMPLETE CBC W/AUTO DIFF WBC: CPT

## 2020-08-20 PROCEDURE — 84443 ASSAY THYROID STIM HORMONE: CPT

## 2020-08-20 PROCEDURE — 80053 COMPREHEN METABOLIC PANEL: CPT

## 2020-08-20 PROCEDURE — 82306 VITAMIN D 25 HYDROXY: CPT

## 2020-08-20 PROCEDURE — 83036 HEMOGLOBIN GLYCOSYLATED A1C: CPT

## 2020-08-24 ENCOUNTER — TELEPHONE (OUTPATIENT)
Dept: ORTHOPEDICS CLINIC | Facility: CLINIC | Age: 70
End: 2020-08-24

## 2020-08-24 DIAGNOSIS — M25.571 RIGHT ANKLE PAIN, UNSPECIFIED CHRONICITY: Primary | ICD-10-CM

## 2020-08-24 NOTE — TELEPHONE ENCOUNTER
Patient has an appointment scheduled Wednesday 8/26 at 11:45 am for rolled R ankle. Patient did have imaging done, can you please order?

## 2020-08-24 NOTE — TELEPHONE ENCOUNTER
Contacted patient, let her know that imaging has been ordered and to contact central scheduling to schedule appointment.

## 2020-08-25 ENCOUNTER — HOSPITAL ENCOUNTER (OUTPATIENT)
Dept: GENERAL RADIOLOGY | Age: 70
Discharge: HOME | End: 2020-08-25
Attending: NURSE PRACTITIONER
Payer: MEDICARE

## 2020-08-25 DIAGNOSIS — M25.571 RIGHT ANKLE PAIN, UNSPECIFIED CHRONICITY: ICD-10-CM

## 2020-08-25 PROCEDURE — 73610 X-RAY EXAM OF ANKLE: CPT | Performed by: NURSE PRACTITIONER

## 2020-08-26 ENCOUNTER — OFFICE VISIT (OUTPATIENT)
Dept: ORTHOPEDICS CLINIC | Facility: CLINIC | Age: 70
End: 2020-08-26
Payer: MEDICARE

## 2020-08-26 ENCOUNTER — TELEPHONE (OUTPATIENT)
Dept: PHYSICAL THERAPY | Age: 70
End: 2020-08-26

## 2020-08-26 VITALS — OXYGEN SATURATION: 99 % | BODY MASS INDEX: 32 KG/M2 | HEIGHT: 65 IN | RESPIRATION RATE: 16 BRPM | HEART RATE: 88 BPM

## 2020-08-26 DIAGNOSIS — S93.491A SPRAIN OF ANTERIOR TALOFIBULAR LIGAMENT OF RIGHT ANKLE, INITIAL ENCOUNTER: Primary | ICD-10-CM

## 2020-08-26 DIAGNOSIS — M25.571 RIGHT ANKLE PAIN, UNSPECIFIED CHRONICITY: ICD-10-CM

## 2020-08-26 PROBLEM — K63.5 POLYP OF ASCENDING COLON: Status: ACTIVE | Noted: 2020-02-06

## 2020-08-26 PROCEDURE — 99213 OFFICE O/P EST LOW 20 MIN: CPT | Performed by: NURSE PRACTITIONER

## 2020-08-27 RX ORDER — LEVOTHYROXINE SODIUM 0.03 MG/1
TABLET ORAL
Qty: 90 TABLET | Refills: 0 | Status: SHIPPED | OUTPATIENT
Start: 2020-08-27 | End: 2020-11-17

## 2020-08-27 NOTE — PROGRESS NOTES
LOWER EXTREMITY EVALUATION:   Referring Physician: Dr. Venkat Funes  Diagnosis:  Sprain of anterior talofibular ligament of right ankle, initial encounter     Date of Service: 8/28/2020      PATIENT SUMMARY   Maurisio Brown is a 71year old female who prese and swelling that she states came on during the first week of July 2020 after sustaining a fall at her house. The results of the objective tests and measures show the patient feels no pain with palpation to any area of her right ankle or RLE.  She ambulates degrees L; 37 degrees  EV: R; 15 degrees L; 19 degrees            Accessory motion: The patient presents with mild hypomobility with right metatarsal mobilizations        Flexibility:  Hamstrings: R; WNL L; WNL  Piriformis: R; mild L; mild  Quads: R; signi degrees for improved efficiency with pivoting during gait  · Pt will demonstrate improved right ankle eversion AROM to > or = to 18 degrees for improved efficiency with pivoting during gait  · Pt will have increased ankle right strength to 4+/5 throughout

## 2020-08-27 NOTE — TELEPHONE ENCOUNTER
Levothyroxine 25 mcg   Last OV relevant to medication: 6-25-20  Last refill date: 6-18-20 #/refills: 0  When pt was asked to return for OV: 6 mo.   Upcoming appt/reason: none  Recent labs: 8-20-20: thyroid

## 2020-08-28 ENCOUNTER — OFFICE VISIT (OUTPATIENT)
Dept: PHYSICAL THERAPY | Age: 70
End: 2020-08-28
Attending: NURSE PRACTITIONER
Payer: MEDICARE

## 2020-08-28 DIAGNOSIS — S93.491A SPRAIN OF ANTERIOR TALOFIBULAR LIGAMENT OF RIGHT ANKLE, INITIAL ENCOUNTER: ICD-10-CM

## 2020-08-28 DIAGNOSIS — M25.571 RIGHT ANKLE PAIN, UNSPECIFIED CHRONICITY: ICD-10-CM

## 2020-08-28 PROCEDURE — 97161 PT EVAL LOW COMPLEX 20 MIN: CPT

## 2020-08-28 PROCEDURE — 97110 THERAPEUTIC EXERCISES: CPT

## 2020-09-02 ENCOUNTER — OFFICE VISIT (OUTPATIENT)
Dept: PHYSICAL THERAPY | Age: 70
End: 2020-09-02
Attending: NURSE PRACTITIONER
Payer: MEDICARE

## 2020-09-02 DIAGNOSIS — S93.491A SPRAIN OF ANTERIOR TALOFIBULAR LIGAMENT OF RIGHT ANKLE, INITIAL ENCOUNTER: ICD-10-CM

## 2020-09-02 DIAGNOSIS — M25.571 RIGHT ANKLE PAIN, UNSPECIFIED CHRONICITY: ICD-10-CM

## 2020-09-02 PROCEDURE — 97110 THERAPEUTIC EXERCISES: CPT

## 2020-09-02 PROCEDURE — 97140 MANUAL THERAPY 1/> REGIONS: CPT

## 2020-09-02 PROCEDURE — 97112 NEUROMUSCULAR REEDUCATION: CPT

## 2020-09-02 NOTE — PROGRESS NOTES
Dx: Sprain of anterior talofibular ligament of right ankle, initial encounter          Insurance (Authorized # of Visits):  6           Authorizing Physician: Dr. Tony Barr  Next MD visit: none scheduled  Fall Risk: standard         Precautions: n/a will have improved tandem stance balance to > or = to 30s for increased ankle stability with ambulation on uneven surfaces such as gravel and grass   · Pt will be independent and compliant with comprehensive HEP to maintain progress achieved in PT    Plan:

## 2020-09-04 ENCOUNTER — OFFICE VISIT (OUTPATIENT)
Dept: PHYSICAL THERAPY | Age: 70
End: 2020-09-04
Attending: NURSE PRACTITIONER
Payer: MEDICARE

## 2020-09-04 DIAGNOSIS — S93.491A SPRAIN OF ANTERIOR TALOFIBULAR LIGAMENT OF RIGHT ANKLE, INITIAL ENCOUNTER: ICD-10-CM

## 2020-09-04 DIAGNOSIS — M25.571 RIGHT ANKLE PAIN, UNSPECIFIED CHRONICITY: ICD-10-CM

## 2020-09-04 PROCEDURE — 97140 MANUAL THERAPY 1/> REGIONS: CPT

## 2020-09-04 PROCEDURE — 97112 NEUROMUSCULAR REEDUCATION: CPT

## 2020-09-04 PROCEDURE — 97110 THERAPEUTIC EXERCISES: CPT

## 2020-09-04 NOTE — PROGRESS NOTES
Dx: Sprain of anterior talofibular ligament of right ankle, initial encounter          Insurance (Authorized # of Visits):  6           Authorizing Physician: Dr. Wendy Velazquez  Next MD visit: none scheduled  Fall Risk: standard         Precautions: n/a to 30s for increased ankle stability with ambulation on uneven surfaces such as gravel and grass   · Pt will be independent and compliant with comprehensive HEP to maintain progress achieved in PT    Plan: POC will emphasize STM, global LE strengthening, c

## 2020-09-10 ENCOUNTER — OFFICE VISIT (OUTPATIENT)
Dept: PHYSICAL THERAPY | Age: 70
End: 2020-09-10
Attending: NURSE PRACTITIONER
Payer: MEDICARE

## 2020-09-10 DIAGNOSIS — S93.491A SPRAIN OF ANTERIOR TALOFIBULAR LIGAMENT OF RIGHT ANKLE, INITIAL ENCOUNTER: ICD-10-CM

## 2020-09-10 DIAGNOSIS — M25.571 RIGHT ANKLE PAIN, UNSPECIFIED CHRONICITY: ICD-10-CM

## 2020-09-10 PROCEDURE — 97112 NEUROMUSCULAR REEDUCATION: CPT

## 2020-09-10 PROCEDURE — 97140 MANUAL THERAPY 1/> REGIONS: CPT

## 2020-09-10 PROCEDURE — 97110 THERAPEUTIC EXERCISES: CPT

## 2020-09-10 NOTE — PROGRESS NOTES
Dx: Sprain of anterior talofibular ligament of right ankle, initial encounter          Insurance (Authorized # of Visits):  6           Authorizing Physician: Dr. Isa Frazier  Next MD visit: none scheduled  Fall Risk: standard         Precautions: n/a stance balance to > or = to 30s for increased ankle stability with ambulation on uneven surfaces such as gravel and grass   · Pt will be independent and compliant with comprehensive HEP to maintain progress achieved in PT    Plan: POC will emphasize AVERY g

## 2020-09-15 ENCOUNTER — OFFICE VISIT (OUTPATIENT)
Dept: PHYSICAL THERAPY | Age: 70
End: 2020-09-15
Attending: NURSE PRACTITIONER
Payer: MEDICARE

## 2020-09-15 DIAGNOSIS — S93.491A SPRAIN OF ANTERIOR TALOFIBULAR LIGAMENT OF RIGHT ANKLE, INITIAL ENCOUNTER: ICD-10-CM

## 2020-09-15 DIAGNOSIS — M25.571 RIGHT ANKLE PAIN, UNSPECIFIED CHRONICITY: ICD-10-CM

## 2020-09-15 PROCEDURE — 97110 THERAPEUTIC EXERCISES: CPT

## 2020-09-15 PROCEDURE — 97112 NEUROMUSCULAR REEDUCATION: CPT

## 2020-09-15 PROCEDURE — 97140 MANUAL THERAPY 1/> REGIONS: CPT

## 2020-09-17 PROBLEM — R29.898 WEAKNESS OF RIGHT LEG: Status: ACTIVE | Noted: 2020-09-17

## 2020-09-17 NOTE — PROGRESS NOTES
[facility] [address]   Neurology; follow up  CLINIC VISIT  2020    Rafi Nataliya Patient Status:  No patient class for patient encounter    3/21/1950 MRN [id number]   Location [address] [place] [address] Juan Marcelo MD     REASON Constipation    • Depression    • Disorder of thyroid    • Easy bruising    • Fatigue    • Hemorrhoids After having a baby   • High blood pressure    • High cholesterol    • Hyperlipidemia    • Multiple sclerosis ([facility] [address] 2000    chronic side effects include Dispense Refill   • Dimethyl Fumarate (TECFIDERA) 240 MG Oral Capsule Delayed Release Take 240 mg by mouth 2 (two) times a day. 60 capsule 11   • baclofen 20 MG Oral Tab Take 1 tablet (20 mg total) by mouth 2 (two) times daily.  180 tablet 3   • OMEPRAZOLE bleeding  ENDOCRINE: denies excessive thirst or urination; denies unexpected wt gain or wt loss  ALLERGY/IMM.: denies food or seasonal allergies      PHYSICAL EXAMINATION:  VITAL SIGNS: /72 (BP Location: Right arm, Patient Position: Sitting, Cuff Siz negative;   Coordination: Normal FTN and HTS;   Gait: spastic based, slow, no assistance is needed,   Romberg sign is negative, Tandem walk is abnormal    MRI brain last 4/2017 MS was same, unchanged, she does not want to repeat yearly,       Problem List I

## 2020-09-18 ENCOUNTER — OFFICE VISIT (OUTPATIENT)
Dept: PHYSICAL THERAPY | Age: 70
End: 2020-09-18
Attending: NURSE PRACTITIONER
Payer: MEDICARE

## 2020-09-18 DIAGNOSIS — M25.571 RIGHT ANKLE PAIN, UNSPECIFIED CHRONICITY: ICD-10-CM

## 2020-09-18 DIAGNOSIS — S93.491A SPRAIN OF ANTERIOR TALOFIBULAR LIGAMENT OF RIGHT ANKLE, INITIAL ENCOUNTER: ICD-10-CM

## 2020-09-18 PROCEDURE — 97112 NEUROMUSCULAR REEDUCATION: CPT

## 2020-09-18 PROCEDURE — 97140 MANUAL THERAPY 1/> REGIONS: CPT

## 2020-09-18 PROCEDURE — 97110 THERAPEUTIC EXERCISES: CPT

## 2020-09-18 NOTE — PROGRESS NOTES
Dx: Sprain of anterior talofibular ligament of right ankle, initial encounter          Insurance (Authorized # of Visits):  6           Authorizing Physician: Dr. Tosin Milton  Next MD visit: none scheduled  Fall Risk: standard         Precautions: n/a · Pt will have improved tandem stance balance to > or = to 30s for increased ankle stability with ambulation on uneven surfaces such as gravel and grass   · Pt will be independent and compliant with comprehensive HEP to maintain progress achieved in PT each side    Side lying abduction SLR, x 10 reps, 2 sets    Prone Hip extension reps, x 10 reps, 2 sets     Wobble Board forward/back and side/side - 5 mins Wobble Board forward/back and side/side - 5 mins Wobble Board forward/back and side/side - 5 mins

## 2020-09-19 DIAGNOSIS — IMO0002 OSTEOARTHROSIS INVOLVING LOWER LEG: ICD-10-CM

## 2020-09-22 ENCOUNTER — OFFICE VISIT (OUTPATIENT)
Dept: PHYSICAL THERAPY | Age: 70
End: 2020-09-22
Attending: NURSE PRACTITIONER
Payer: MEDICARE

## 2020-09-22 DIAGNOSIS — S93.491A SPRAIN OF ANTERIOR TALOFIBULAR LIGAMENT OF RIGHT ANKLE, INITIAL ENCOUNTER: ICD-10-CM

## 2020-09-22 DIAGNOSIS — M25.571 RIGHT ANKLE PAIN, UNSPECIFIED CHRONICITY: ICD-10-CM

## 2020-09-22 PROCEDURE — 97110 THERAPEUTIC EXERCISES: CPT

## 2020-09-22 PROCEDURE — 97140 MANUAL THERAPY 1/> REGIONS: CPT

## 2020-09-22 PROCEDURE — 97112 NEUROMUSCULAR REEDUCATION: CPT

## 2020-09-22 RX ORDER — CELECOXIB 200 MG/1
CAPSULE ORAL
Qty: 90 CAPSULE | Refills: 0 | Status: SHIPPED | OUTPATIENT
Start: 2020-09-22 | End: 2020-12-13

## 2020-09-22 NOTE — PROGRESS NOTES
Dx: Sprain of anterior talofibular ligament of right ankle, initial encounter          Insurance (Authorized # of Visits):  6           Authorizing Physician: Dr. Angy Constantino  Next MD visit: none scheduled  Fall Risk: standard         Precautions: n/a will have improved tandem stance balance to > or = to 30s for increased ankle stability with ambulation on uneven surfaces such as gravel and grass   · Pt will be independent and compliant with comprehensive HEP to maintain progress achieved in PT    Plan: Board forward/back and side/side - 5 mins      Wall squats, x 10 reps, 2 sets   STS from chair, x 10 reps, 2 sets, w/ RB STS from chair, x 10 reps, 2 sets, w/ RB STS from chair, x 10 reps, 2 sets, w/ RB STS from chair, x 10 reps, 2 sets, w/ RB   LBW/MW w/

## 2020-09-22 NOTE — TELEPHONE ENCOUNTER
Failed protocol     Last refill:  7/9/2020 Celebrex 200 mg #90 NR    LOV:   6/25/2020 Jacquie Herrera RTC 6 months  24.  Osteoarthrosis involving lower leg  - stable, uses Celebrex as needed  No FOV scheduled

## 2020-09-25 ENCOUNTER — OFFICE VISIT (OUTPATIENT)
Dept: PHYSICAL THERAPY | Age: 70
End: 2020-09-25
Attending: NURSE PRACTITIONER
Payer: MEDICARE

## 2020-09-25 DIAGNOSIS — S93.491A SPRAIN OF ANTERIOR TALOFIBULAR LIGAMENT OF RIGHT ANKLE, INITIAL ENCOUNTER: ICD-10-CM

## 2020-09-25 DIAGNOSIS — M25.571 RIGHT ANKLE PAIN, UNSPECIFIED CHRONICITY: ICD-10-CM

## 2020-09-25 PROCEDURE — 97140 MANUAL THERAPY 1/> REGIONS: CPT

## 2020-09-25 PROCEDURE — 97110 THERAPEUTIC EXERCISES: CPT

## 2020-09-25 NOTE — PROGRESS NOTES
Progress Summary    Pt has attended 8 visits in Physical Therapy. Subjective: The patient comes into therapy feeling good and states her right ankle swelling is 80% reduced since starting physical therapy.  Marisol Kincaid also feels her strength and balance h AROM, knee/ankle strength, and static/dynamic balance since beginning therapy. She shows more control during gait and less unsteady. However, Sinan Salinas still presents with hip weakness impairments that therapy can still help her progress.  The patient can also Semi-Tandem and Tandem balance in parallel bars - 6 mins --   Tandem walk on FP inside parallel bars w/ RUE finger support - 6 laps - w/ and w/o hurdles Tandem walk on FP inside parallel bars w/ RUE finger support - 6 laps - w/ and w/o hurdles Tandem walk Dept: [phone].     Sincerely,  Electronically signed by therapist: Stoney Woodruff PT

## 2020-10-07 ENCOUNTER — APPOINTMENT (OUTPATIENT)
Dept: PHYSICAL THERAPY | Age: 70
End: 2020-10-07
Attending: NURSE PRACTITIONER
Payer: MEDICARE

## 2020-10-07 DIAGNOSIS — E78.5 HYPERLIPIDEMIA, UNSPECIFIED HYPERLIPIDEMIA TYPE: ICD-10-CM

## 2020-10-08 ENCOUNTER — OFFICE VISIT (OUTPATIENT)
Dept: FAMILY MEDICINE CLINIC | Facility: CLINIC | Age: 70
End: 2020-10-08
Payer: MEDICARE

## 2020-10-08 VITALS
HEART RATE: 69 BPM | TEMPERATURE: 98 F | DIASTOLIC BLOOD PRESSURE: 74 MMHG | OXYGEN SATURATION: 96 % | RESPIRATION RATE: 16 BRPM | HEIGHT: 65 IN | BODY MASS INDEX: 32.65 KG/M2 | SYSTOLIC BLOOD PRESSURE: 139 MMHG | WEIGHT: 196 LBS

## 2020-10-08 DIAGNOSIS — L02.91 ABSCESS: Primary | ICD-10-CM

## 2020-10-08 PROCEDURE — 87205 SMEAR GRAM STAIN: CPT | Performed by: NURSE PRACTITIONER

## 2020-10-08 PROCEDURE — 3008F BODY MASS INDEX DOCD: CPT | Performed by: NURSE PRACTITIONER

## 2020-10-08 PROCEDURE — 87070 CULTURE OTHR SPECIMN AEROBIC: CPT | Performed by: NURSE PRACTITIONER

## 2020-10-08 PROCEDURE — 99214 OFFICE O/P EST MOD 30 MIN: CPT | Performed by: NURSE PRACTITIONER

## 2020-10-08 PROCEDURE — 3075F SYST BP GE 130 - 139MM HG: CPT | Performed by: NURSE PRACTITIONER

## 2020-10-08 PROCEDURE — 3078F DIAST BP <80 MM HG: CPT | Performed by: NURSE PRACTITIONER

## 2020-10-08 RX ORDER — ATORVASTATIN CALCIUM 20 MG/1
TABLET, FILM COATED ORAL
Qty: 90 TABLET | Refills: 1 | Status: SHIPPED | OUTPATIENT
Start: 2020-10-08 | End: 2021-10-03

## 2020-10-08 RX ORDER — SULFAMETHOXAZOLE AND TRIMETHOPRIM 800; 160 MG/1; MG/1
1 TABLET ORAL 2 TIMES DAILY
Qty: 20 TABLET | Refills: 0 | Status: SHIPPED | OUTPATIENT
Start: 2020-10-08 | End: 2020-10-18

## 2020-10-08 NOTE — PROGRESS NOTES
CHIEF COMPLAINT:   Patient presents with:  Bump: right upper thigh, redness, whitness, no drainage s/s for 1 month increase in size       HPI:    Josué Mendez is a 79year old female who presents for evaluation of an irritated area on right thigh.   Pe Take 1,000 capsules by mouth daily. • CALCIUM OR Take 1,000 mg by mouth daily. • Multiple Vitamin (DAILY VALUE MULTIVITAMIN) Oral Tab Take 1 tablet by mouth daily. • Cholecalciferol (VITAMIN D) 2000 UNITS Oral Cap Take 1 capsule by mouth daily. Age of Onset   • Cancer Sister         cervical cancer   • Hypertension Mother          2019   • Breast Cancer Daughter 44        BRCA 1 GENE   • Hypertension Brother    • Hypertension Sister    • Hypertension Sister    • Hypertension Brother murmur. LYMPH: No lymphadenopathy. ASSESSMENT AND PLAN:   Opal Wright is a 79year old female who presents for evaluation of a rash.  Findings are consistent with:    ASSESSMENT:  Abscess  (primary encounter diagnosis)     ---Area cleansed with i

## 2020-10-13 ENCOUNTER — TELEPHONE (OUTPATIENT)
Dept: NEUROLOGY | Facility: CLINIC | Age: 70
End: 2020-10-13

## 2020-10-13 NOTE — TELEPHONE ENCOUNTER
Received incoming fax from [employer] that requires provider signature. Placed in provider folder for review and signature.

## 2020-10-14 ENCOUNTER — APPOINTMENT (OUTPATIENT)
Dept: PHYSICAL THERAPY | Age: 70
End: 2020-10-14
Attending: NURSE PRACTITIONER
Payer: MEDICARE

## 2020-10-16 RX ORDER — DIMETHYL FUMARATE 240 MG/1
240 CAPSULE ORAL 2 TIMES DAILY
Qty: 1 CAPSULE | Refills: 0 | COMMUNITY
Start: 2020-10-16 | End: 2021-04-14

## 2020-10-16 NOTE — TELEPHONE ENCOUNTER
[facility].  Refill updated historically as Rx Tecfidera for [employer] Patient Assistance Program.

## 2020-10-21 ENCOUNTER — OFFICE VISIT (OUTPATIENT)
Dept: PHYSICAL THERAPY | Age: 70
End: 2020-10-21
Attending: NURSE PRACTITIONER
Payer: MEDICARE

## 2020-10-21 PROCEDURE — 97112 NEUROMUSCULAR REEDUCATION: CPT

## 2020-10-21 PROCEDURE — 97140 MANUAL THERAPY 1/> REGIONS: CPT

## 2020-10-21 PROCEDURE — 97110 THERAPEUTIC EXERCISES: CPT

## 2020-10-21 NOTE — PROGRESS NOTES
Dx: Sprain of anterior talofibular ligament of right ankle, initial encounter          Insurance (Authorized # of Visits):  15           Authorizing Physician: Dr. Dana Medel    Next MD visit: none scheduled  Fall Risk: standard         Precautions: n/a ankle stability with ambulation on uneven surfaces such as gravel and grass - PARTIALLY MET  · Pt will be independent and compliant with comprehensive HEP to maintain progress achieved in PT - MET      Plan: POC will progress static/dynamic balance, flexib RB -- --   LBW/MWBMW w/ GB inside parallel bars - 20 feet - 4 sets each LBW w/ YB on stair case x 10 stairs, 2 sets, each side -- LBW/MW w/ YB AND 2 lb ankle weights on stair case x 10 stairs, 2 sets, each side   Ice to R ankle - 10 mins Ice to R ankle - 1

## 2020-10-28 ENCOUNTER — OFFICE VISIT (OUTPATIENT)
Dept: PHYSICAL THERAPY | Age: 70
End: 2020-10-28
Attending: NURSE PRACTITIONER
Payer: MEDICARE

## 2020-10-28 PROCEDURE — 97112 NEUROMUSCULAR REEDUCATION: CPT

## 2020-10-28 PROCEDURE — 97110 THERAPEUTIC EXERCISES: CPT

## 2020-10-28 PROCEDURE — 97140 MANUAL THERAPY 1/> REGIONS: CPT

## 2020-10-28 NOTE — PROGRESS NOTES
Dx: Sprain of anterior talofibular ligament of right ankle, initial encounter          Insurance (Authorized # of Visits):  15           Authorizing Physician: Dr. Dana Medel    Next MD visit: none scheduled  Fall Risk: standard         Precautions: n/a PROGRESS  · Pt will have improved tandem stance balance to > or = to 30s for increased ankle stability with ambulation on uneven surfaces such as gravel and grass - PARTIALLY MET  · Pt will be independent and compliant with comprehensive HEP to maintain pr w/ RB -- -- Gait w/ cone taps and CGA-Radha - 4 mins   LBW w/ YB on stair case x 10 stairs, 2 sets, each side -- LBW/MW w/ YB AND 2 lb ankle weights on stair case x 10 stairs, 2 sets, each side LBW/MW w/ 2 lb ankle weights on stair case x 10 stairs, 2 sets,

## 2020-11-04 ENCOUNTER — OFFICE VISIT (OUTPATIENT)
Dept: PHYSICAL THERAPY | Age: 70
End: 2020-11-04
Attending: NURSE PRACTITIONER
Payer: MEDICARE

## 2020-11-04 PROCEDURE — 97140 MANUAL THERAPY 1/> REGIONS: CPT

## 2020-11-04 PROCEDURE — 97112 NEUROMUSCULAR REEDUCATION: CPT

## 2020-11-04 PROCEDURE — 97110 THERAPEUTIC EXERCISES: CPT

## 2020-11-04 NOTE — PROGRESS NOTES
Dx: Sprain of anterior talofibular ligament of right ankle, initial encounter          Insurance (Authorized # of Visits):  15           Authorizing Physician: Dr. Flavio Johnson    Next MD visit: none scheduled  Fall Risk: standard         Precautions: n/a increased ankle stability with ambulation on uneven surfaces such as gravel and grass - PARTIALLY MET  · Pt will be independent and compliant with comprehensive HEP to maintain progress achieved in PT - MET      Plan: POC will progress static/dynamic maximilian LBW/MW w/ 2 lb ankle weights on stair case x 10 stairs, 2 sets, each side LBW/MW w/ 2 lb ankle weights on stair case x 10 stairs, 2 sets, each side   Ice to R ankle - 10 mins -- Standing marching w/ 2 lb ankle weights, x 10 reps, 2 sets Standing marching w

## 2020-11-09 ENCOUNTER — HOSPITAL ENCOUNTER (OUTPATIENT)
Age: 70
Discharge: HOME | End: 2020-11-09
Attending: EMERGENCY MEDICINE
Payer: MEDICARE

## 2020-11-09 VITALS
BODY MASS INDEX: 33.46 KG/M2 | HEART RATE: 98 BPM | HEIGHT: 64 IN | RESPIRATION RATE: 20 BRPM | DIASTOLIC BLOOD PRESSURE: 81 MMHG | WEIGHT: 196 LBS | SYSTOLIC BLOOD PRESSURE: 138 MMHG | OXYGEN SATURATION: 96 % | TEMPERATURE: 98 F

## 2020-11-09 DIAGNOSIS — T14.8XXA WOUND INFECTION: Primary | ICD-10-CM

## 2020-11-09 DIAGNOSIS — L08.9 WOUND INFECTION: Primary | ICD-10-CM

## 2020-11-09 PROCEDURE — 87186 SC STD MICRODIL/AGAR DIL: CPT | Performed by: EMERGENCY MEDICINE

## 2020-11-09 PROCEDURE — 87077 CULTURE AEROBIC IDENTIFY: CPT | Performed by: EMERGENCY MEDICINE

## 2020-11-09 PROCEDURE — 87070 CULTURE OTHR SPECIMN AEROBIC: CPT | Performed by: EMERGENCY MEDICINE

## 2020-11-09 PROCEDURE — 99214 OFFICE O/P EST MOD 30 MIN: CPT

## 2020-11-09 PROCEDURE — 87205 SMEAR GRAM STAIN: CPT | Performed by: EMERGENCY MEDICINE

## 2020-11-09 RX ORDER — CEFADROXIL 500 MG/1
500 CAPSULE ORAL 2 TIMES DAILY
Qty: 20 CAPSULE | Refills: 0 | Status: SHIPPED | OUTPATIENT
Start: 2020-11-09 | End: 2020-11-19

## 2020-11-09 NOTE — ED PROVIDER NOTES
Patient Seen in: [facility]      History   Patient presents with:  Abscess    Stated Complaint: pt has a boil on her hip that is draining at night and red     HPI    61-year-old female comes to the hospital complaint of having difficulty screened and evaluated during this visit. As a treating physician attending to the patient, I determined, within reasonable clinical confidence and prior to discharge, that an emergency medical condition was not or was no longer present.   There was no in

## 2020-11-09 NOTE — ED INITIAL ASSESSMENT (HPI)
Pt presents today with c/o abscess to right hip x 1 month. Pt states that she had it drained a month ago at another [facility] facility and took antibiotics at that time. Pt states that the area still drains at night. Pt denies any fevers.

## 2020-11-11 ENCOUNTER — APPOINTMENT (OUTPATIENT)
Dept: PHYSICAL THERAPY | Age: 70
End: 2020-11-11
Attending: NURSE PRACTITIONER
Payer: MEDICARE

## 2020-11-11 ENCOUNTER — TELEPHONE (OUTPATIENT)
Dept: PHYSICAL THERAPY | Age: 70
End: 2020-11-11

## 2020-11-12 NOTE — ED NOTES
Spoke with patient and told her to finish her antibiotic course as she has good coverage. Her wound is healing with minimal drainage. Will f/u as needed.

## 2020-11-16 DIAGNOSIS — F33.0 MILD RECURRENT MAJOR DEPRESSION (HCC): ICD-10-CM

## 2020-11-16 DIAGNOSIS — F41.9 ANXIETY: ICD-10-CM

## 2020-11-16 DIAGNOSIS — I10 ESSENTIAL HYPERTENSION: ICD-10-CM

## 2020-11-17 RX ORDER — TRIAMTERENE AND HYDROCHLOROTHIAZIDE 37.5; 25 MG/1; MG/1
TABLET ORAL
Qty: 90 TABLET | Refills: 0 | Status: SHIPPED | OUTPATIENT
Start: 2020-11-17 | End: 2021-01-14

## 2020-11-17 RX ORDER — ESCITALOPRAM OXALATE 20 MG/1
20 TABLET ORAL DAILY
Qty: 90 TABLET | Refills: 0 | Status: SHIPPED | OUTPATIENT
Start: 2020-11-17 | End: 2021-01-14

## 2020-11-17 RX ORDER — LEVOTHYROXINE SODIUM 0.03 MG/1
TABLET ORAL
Qty: 90 TABLET | Refills: 0 | Status: SHIPPED | OUTPATIENT
Start: 2020-11-17 | End: 2021-01-14

## 2020-11-17 RX ORDER — AMLODIPINE BESYLATE 5 MG/1
TABLET ORAL
Qty: 180 TABLET | Refills: 0 | Status: SHIPPED | OUTPATIENT
Start: 2020-11-17 | End: 2021-01-14

## 2020-11-17 NOTE — TELEPHONE ENCOUNTER
Levothyroxine 25 mg  Filled 8-7-20  Qty 90  0 refills  LOV: 6-25-20  No upcoming appt. Amlodipine 5 mg  Filled 8-17-20  Qty 180  0 refills   LOV: 6-25-20  No upcoming appt. Triamterene-hctz 37.5-25 mg  Filled 8-17-20  Qty 90  0 refill  LOV: 6-25-20  No upcoming appt. Escitalopram 20 mg  Filled 8-17-20  Qty 90  0 refills  LOV: 6-25-20  No upcoming appt.

## 2020-11-18 ENCOUNTER — APPOINTMENT (OUTPATIENT)
Dept: PHYSICAL THERAPY | Age: 70
End: 2020-11-18
Attending: NURSE PRACTITIONER
Payer: MEDICARE

## 2020-11-18 ENCOUNTER — TELEPHONE (OUTPATIENT)
Dept: PHYSICAL THERAPY | Age: 70
End: 2020-11-18

## 2020-11-30 ENCOUNTER — TELEMEDICINE (OUTPATIENT)
Dept: INTERNAL MEDICINE CLINIC | Facility: CLINIC | Age: 70
End: 2020-11-30
Payer: MEDICARE

## 2020-11-30 DIAGNOSIS — R39.9 URINARY SYMPTOM OR SIGN: Primary | ICD-10-CM

## 2020-11-30 PROCEDURE — 99213 OFFICE O/P EST LOW 20 MIN: CPT | Performed by: FAMILY MEDICINE

## 2020-11-30 RX ORDER — NITROFURANTOIN 25; 75 MG/1; MG/1
100 CAPSULE ORAL 2 TIMES DAILY
Qty: 14 CAPSULE | Refills: 0 | Status: SHIPPED | OUTPATIENT
Start: 2020-11-30 | End: 2021-01-07 | Stop reason: ALTCHOICE

## 2020-11-30 NOTE — PROGRESS NOTES
Virtual Telephone Check-In    Fran Tapia verbally consents to a Virtual/VideoTelephone Check-In visit on 11/30/20. Patient has been referred to the [facility] website at [email or web address] to review the yearly Consent to Treat document.     Patient mg one bid  Instructions given on increasing fluid intake, and rest  The patient indicates understanding of these issues and agrees to the plan. The patient is asked to Call if fever, vomiting, worsening symptoms.         Please note that the following vis

## 2020-12-02 ENCOUNTER — APPOINTMENT (OUTPATIENT)
Dept: PHYSICAL THERAPY | Age: 70
End: 2020-12-02
Attending: NURSE PRACTITIONER
Payer: MEDICARE

## 2020-12-13 DIAGNOSIS — IMO0002 OSTEOARTHROSIS INVOLVING LOWER LEG: ICD-10-CM

## 2020-12-13 RX ORDER — CELECOXIB 200 MG/1
CAPSULE ORAL
Qty: 90 CAPSULE | Refills: 0 | Status: SHIPPED | OUTPATIENT
Start: 2020-12-13 | End: 2021-02-11

## 2021-01-05 ENCOUNTER — TELEPHONE (OUTPATIENT)
Dept: NEUROLOGY | Facility: CLINIC | Age: 71
End: 2021-01-05

## 2021-01-05 DIAGNOSIS — G35 MULTIPLE SCLEROSIS (HCC): Primary | ICD-10-CM

## 2021-01-07 ENCOUNTER — OFFICE VISIT (OUTPATIENT)
Dept: INTERNAL MEDICINE CLINIC | Facility: CLINIC | Age: 71
End: 2021-01-07
Payer: MEDICARE

## 2021-01-07 VITALS
SYSTOLIC BLOOD PRESSURE: 138 MMHG | RESPIRATION RATE: 18 BRPM | DIASTOLIC BLOOD PRESSURE: 70 MMHG | BODY MASS INDEX: 33.8 KG/M2 | TEMPERATURE: 98 F | WEIGHT: 198 LBS | HEART RATE: 88 BPM | HEIGHT: 64 IN

## 2021-01-07 DIAGNOSIS — M54.32 SCIATICA OF LEFT SIDE: Primary | ICD-10-CM

## 2021-01-07 PROCEDURE — 3078F DIAST BP <80 MM HG: CPT | Performed by: FAMILY MEDICINE

## 2021-01-07 PROCEDURE — 3075F SYST BP GE 130 - 139MM HG: CPT | Performed by: FAMILY MEDICINE

## 2021-01-07 PROCEDURE — 3008F BODY MASS INDEX DOCD: CPT | Performed by: FAMILY MEDICINE

## 2021-01-07 PROCEDURE — 99213 OFFICE O/P EST LOW 20 MIN: CPT | Performed by: FAMILY MEDICINE

## 2021-01-07 RX ORDER — PREDNISONE 10 MG/1
TABLET ORAL
Qty: 20 TABLET | Refills: 0 | Status: SHIPPED | OUTPATIENT
Start: 2021-01-07 | End: 2021-04-14

## 2021-01-07 RX ORDER — HYDROCODONE BITARTRATE AND ACETAMINOPHEN 5; 325 MG/1; MG/1
1 TABLET ORAL EVERY 6 HOURS PRN
Qty: 30 TABLET | Refills: 0 | Status: SHIPPED | OUTPATIENT
Start: 2021-01-07 | End: 2021-04-14

## 2021-01-07 NOTE — PROGRESS NOTES
CHIEF COMPLAINT:     Patient presents with:  Back Pain: Siatic pain, L side. Started about a week ago. Shooting burn from lower back down to knee. HPI:   Janes Oconnor is a 79year old female who is here for complaints of lower pain.   Pain is loc Oral Tab Take 1 tablet (20 mg total) by mouth 2 (two) times daily.  180 tablet 3   • OMEPRAZOLE 20 MG Oral Capsule Delayed Release TAKE 1 CAPSULE EVERY DAY 30 MINUTES BEFORE BREAKFAST 90 capsule 3   • Omega-3 Fatty Acids (OMEGA-3 FISH OIL) 1000 MG Oral Cap breath   CARDIOVASCULAR: denies chest pain or palpitations  GI: denies abdominal pain, N/VC/D. Denies heartburn  : no dysuria, urgency or flank pain. MUSCULOSKELETAL: Per HPI. No other joints are affected  NEURO: No numbness or tingling.   No loss of b patient indicates understanding of these issues and agrees to the plan. The patient is asked to return if sx's persist or worsen.

## 2021-01-13 DIAGNOSIS — I10 ESSENTIAL HYPERTENSION: ICD-10-CM

## 2021-01-13 DIAGNOSIS — F41.9 ANXIETY: ICD-10-CM

## 2021-01-13 DIAGNOSIS — F33.0 MILD RECURRENT MAJOR DEPRESSION (HCC): ICD-10-CM

## 2021-01-13 NOTE — TELEPHONE ENCOUNTER
escitalopram 20 mg  Filled 11-17-20  Qty 90  0 refills  No upcoming appt. Amlodipine 5 mg  Filled 11-17-20  Qty 180  0 refills  No upcoming appt. Levothyroxine 25 mcg   Filled 11-17-20  Qty 90  0 refills  No upcoming appt.      Triamterene-hydrochlo

## 2021-01-14 RX ORDER — LEVOTHYROXINE SODIUM 0.03 MG/1
TABLET ORAL
Qty: 90 TABLET | Refills: 0 | Status: SHIPPED | OUTPATIENT
Start: 2021-01-14 | End: 2021-03-24

## 2021-01-14 RX ORDER — AMLODIPINE BESYLATE 5 MG/1
TABLET ORAL
Qty: 180 TABLET | Refills: 0 | Status: SHIPPED | OUTPATIENT
Start: 2021-01-14 | End: 2021-03-24

## 2021-01-14 RX ORDER — ESCITALOPRAM OXALATE 20 MG/1
TABLET ORAL
Qty: 90 TABLET | Refills: 0 | Status: SHIPPED | OUTPATIENT
Start: 2021-01-14 | End: 2021-03-24

## 2021-01-14 RX ORDER — TRIAMTERENE AND HYDROCHLOROTHIAZIDE 37.5; 25 MG/1; MG/1
TABLET ORAL
Qty: 90 TABLET | Refills: 0 | Status: SHIPPED | OUTPATIENT
Start: 2021-01-14 | End: 2021-03-24

## 2021-02-09 ENCOUNTER — TELEPHONE (OUTPATIENT)
Dept: INTERNAL MEDICINE CLINIC | Facility: CLINIC | Age: 71
End: 2021-02-09

## 2021-02-09 NOTE — TELEPHONE ENCOUNTER
Colonoscopy report from Dr Joana Linares MD at [facility] received.       updated     Per report patient to return in 1 year for repeat colonoscopy

## 2021-02-11 DIAGNOSIS — IMO0002 OSTEOARTHROSIS INVOLVING LOWER LEG: ICD-10-CM

## 2021-02-11 RX ORDER — CELECOXIB 200 MG/1
CAPSULE ORAL
Qty: 90 CAPSULE | Refills: 0 | Status: SHIPPED | OUTPATIENT
Start: 2021-02-11 | End: 2021-04-18

## 2021-02-15 PROBLEM — K63.5 POLYP OF COLON: Status: ACTIVE | Noted: 2020-02-06

## 2021-03-10 ENCOUNTER — TELEPHONE (OUTPATIENT)
Dept: CASE MANAGEMENT | Age: 71
End: 2021-03-10

## 2021-03-10 NOTE — TELEPHONE ENCOUNTER
Pt informed is eligible for 2021 Medicare Advantage Supervisit, requested a call back in May to schedule in the Summer.

## 2021-03-12 DIAGNOSIS — Z23 NEED FOR VACCINATION: ICD-10-CM

## 2021-03-18 ENCOUNTER — TELEPHONE (OUTPATIENT)
Dept: INTERNAL MEDICINE CLINIC | Facility: CLINIC | Age: 71
End: 2021-03-18

## 2021-03-22 DIAGNOSIS — F33.0 MILD RECURRENT MAJOR DEPRESSION (HCC): ICD-10-CM

## 2021-03-22 DIAGNOSIS — I10 ESSENTIAL HYPERTENSION: ICD-10-CM

## 2021-03-22 DIAGNOSIS — F41.9 ANXIETY: ICD-10-CM

## 2021-03-24 RX ORDER — TRIAMTERENE AND HYDROCHLOROTHIAZIDE 37.5; 25 MG/1; MG/1
TABLET ORAL
Qty: 90 TABLET | Refills: 0 | Status: SHIPPED | OUTPATIENT
Start: 2021-03-24 | End: 2021-07-01

## 2021-03-24 RX ORDER — ESCITALOPRAM OXALATE 20 MG/1
TABLET ORAL
Qty: 90 TABLET | Refills: 0 | Status: SHIPPED | OUTPATIENT
Start: 2021-03-24 | End: 2021-07-01

## 2021-03-24 RX ORDER — LEVOTHYROXINE SODIUM 0.03 MG/1
TABLET ORAL
Qty: 90 TABLET | Refills: 0 | Status: SHIPPED | OUTPATIENT
Start: 2021-03-24 | End: 2021-07-01

## 2021-03-24 RX ORDER — AMLODIPINE BESYLATE 5 MG/1
TABLET ORAL
Qty: 180 TABLET | Refills: 0 | Status: SHIPPED | OUTPATIENT
Start: 2021-03-24 | End: 2021-07-01

## 2021-03-24 NOTE — TELEPHONE ENCOUNTER
LOV: 1/7/2021 with HUSSAIN Sheppard  RTC: no follow-up on file  Last Relevant Labs: 8/20/2020   Filled: 1/14/2021    #3-month supply with 0 refills    Future Appointments   Date Time Provider Keeley Barajas   5/3/2021  9:00 AM HUSSAIN Milian [facility]

## 2021-04-14 ENCOUNTER — OFFICE VISIT (OUTPATIENT)
Dept: SURGERY | Facility: CLINIC | Age: 71
End: 2021-04-14
Payer: MEDICARE

## 2021-04-14 VITALS
DIASTOLIC BLOOD PRESSURE: 70 MMHG | SYSTOLIC BLOOD PRESSURE: 122 MMHG | HEIGHT: 64 IN | BODY MASS INDEX: 33.8 KG/M2 | WEIGHT: 198 LBS

## 2021-04-14 DIAGNOSIS — Z98.1 S/P LUMBAR FUSION: ICD-10-CM

## 2021-04-14 DIAGNOSIS — G35 MULTIPLE SCLEROSIS (HCC): Primary | ICD-10-CM

## 2021-04-14 DIAGNOSIS — M54.50 ACUTE BILATERAL LOW BACK PAIN WITHOUT SCIATICA: ICD-10-CM

## 2021-04-14 PROCEDURE — 99203 OFFICE O/P NEW LOW 30 MIN: CPT | Performed by: PHYSICIAN ASSISTANT

## 2021-04-14 PROCEDURE — 3074F SYST BP LT 130 MM HG: CPT | Performed by: PHYSICIAN ASSISTANT

## 2021-04-14 PROCEDURE — 3008F BODY MASS INDEX DOCD: CPT | Performed by: PHYSICIAN ASSISTANT

## 2021-04-14 PROCEDURE — 3078F DIAST BP <80 MM HG: CPT | Performed by: PHYSICIAN ASSISTANT

## 2021-04-14 NOTE — PROGRESS NOTES
Neurosurgery Clinic Visit  2021    Erika Moss PCP:  Migdalia Zhang MD    3/21/1950 MRN [id number]       CC:  Back Pain    HPI:    Gt Mendoza is a very pleasant 70year old female with PMH of MS and LS Fusion x 2 who presents with recent exacerbat • Muscle weakness     has MS   • OSTEOARTHRITIS    • Osteoarthritis    • OTHER DISEASES     MS dx 1981 Dr. Fadia Mcgill   • Pain in joints At times/left knee   • PONV (postoperative nausea and vomiting)     severe - projectile vomiting for the entire night SILT, Gait Normal, DTRs equal without reyes's or clonus  Upper extremity strength:      Deltoid Biceps Triceps Wrist Extension  Finger Abduction Finger Extension Thumb Opposition   Right 5 5 5 5 5 5 5 5   Left 5 5 5 5 5 5 5 5   Lower extremity stren

## 2021-04-14 NOTE — PROGRESS NOTES
Bought a new mattress and woke up with horrific pain in back, this was 2 months ago    Did make this appt 1 week ago and feeling a little better  But still wanted to keep the appt    Heat, ice, CBD freezing cream.    Having very little pain, it is worse wh

## 2021-04-16 DIAGNOSIS — IMO0002 OSTEOARTHROSIS INVOLVING LOWER LEG: ICD-10-CM

## 2021-04-18 RX ORDER — CELECOXIB 200 MG/1
CAPSULE ORAL
Qty: 90 CAPSULE | Refills: 0 | Status: SHIPPED | OUTPATIENT
Start: 2021-04-18 | End: 2021-06-24

## 2021-05-03 ENCOUNTER — LAB ENCOUNTER (OUTPATIENT)
Dept: LAB | Age: 71
End: 2021-05-03
Attending: FAMILY MEDICINE
Payer: MEDICARE

## 2021-05-03 ENCOUNTER — OFFICE VISIT (OUTPATIENT)
Dept: INTERNAL MEDICINE CLINIC | Facility: CLINIC | Age: 71
End: 2021-05-03
Payer: MEDICARE

## 2021-05-03 VITALS
OXYGEN SATURATION: 98 % | BODY MASS INDEX: 33.99 KG/M2 | HEART RATE: 89 BPM | SYSTOLIC BLOOD PRESSURE: 132 MMHG | WEIGHT: 194.25 LBS | RESPIRATION RATE: 16 BRPM | HEIGHT: 63.25 IN | TEMPERATURE: 98 F | DIASTOLIC BLOOD PRESSURE: 84 MMHG

## 2021-05-03 DIAGNOSIS — I10 ESSENTIAL HYPERTENSION: ICD-10-CM

## 2021-05-03 DIAGNOSIS — M47.816 FACET ARTHRITIS OF LUMBAR REGION: ICD-10-CM

## 2021-05-03 DIAGNOSIS — R53.1 INCREASED WEAKNESS WHEN AMBULATING: ICD-10-CM

## 2021-05-03 DIAGNOSIS — R29.898 WEAKNESS OF RIGHT LEG: ICD-10-CM

## 2021-05-03 DIAGNOSIS — M25.471 EDEMA OF RIGHT ANKLE: ICD-10-CM

## 2021-05-03 DIAGNOSIS — L81.4 LENTIGO: ICD-10-CM

## 2021-05-03 DIAGNOSIS — Z12.31 ENCOUNTER FOR SCREENING MAMMOGRAM FOR BREAST CANCER: ICD-10-CM

## 2021-05-03 DIAGNOSIS — K22.70 BARRETT'S ESOPHAGUS WITHOUT DYSPLASIA: ICD-10-CM

## 2021-05-03 DIAGNOSIS — K29.30 CHRONIC SUPERFICIAL GASTRITIS WITHOUT BLEEDING: ICD-10-CM

## 2021-05-03 DIAGNOSIS — Z85.828 PERSONAL HISTORY OF MALIGNANT NEOPLASM OF SKIN: ICD-10-CM

## 2021-05-03 DIAGNOSIS — M85.859 OSTEOPENIA OF NECK OF FEMUR, UNSPECIFIED LATERALITY: ICD-10-CM

## 2021-05-03 DIAGNOSIS — K31.7 POLYP OF STOMACH AND DUODENUM: ICD-10-CM

## 2021-05-03 DIAGNOSIS — K21.9 GASTROESOPHAGEAL REFLUX DISEASE WITHOUT ESOPHAGITIS: ICD-10-CM

## 2021-05-03 DIAGNOSIS — M47.812 FACET ARTHRITIS OF CERVICAL REGION: ICD-10-CM

## 2021-05-03 DIAGNOSIS — R53.82 CHRONIC FATIGUE: ICD-10-CM

## 2021-05-03 DIAGNOSIS — K63.5 POLYP OF COLON, UNSPECIFIED PART OF COLON, UNSPECIFIED TYPE: ICD-10-CM

## 2021-05-03 DIAGNOSIS — M62.838 MUSCLE SPASMS OF BOTH LOWER EXTREMITIES: ICD-10-CM

## 2021-05-03 DIAGNOSIS — Z00.00 ENCOUNTER FOR ANNUAL HEALTH EXAMINATION: ICD-10-CM

## 2021-05-03 DIAGNOSIS — L82.1 SEBORRHEIC KERATOSES: ICD-10-CM

## 2021-05-03 DIAGNOSIS — L92.0 GRANULOMA ANNULARE: ICD-10-CM

## 2021-05-03 DIAGNOSIS — R73.03 PREDIABETES: ICD-10-CM

## 2021-05-03 DIAGNOSIS — E03.9 HYPOTHYROIDISM, UNSPECIFIED TYPE: ICD-10-CM

## 2021-05-03 DIAGNOSIS — Z00.00 ENCOUNTER FOR ANNUAL HEALTH EXAMINATION: Primary | ICD-10-CM

## 2021-05-03 DIAGNOSIS — R60.0 BILATERAL LEG EDEMA: ICD-10-CM

## 2021-05-03 DIAGNOSIS — F33.0 MILD RECURRENT MAJOR DEPRESSION (HCC): ICD-10-CM

## 2021-05-03 DIAGNOSIS — G25.81 RLS (RESTLESS LEGS SYNDROME): ICD-10-CM

## 2021-05-03 DIAGNOSIS — E78.5 HYPERLIPIDEMIA, UNSPECIFIED HYPERLIPIDEMIA TYPE: ICD-10-CM

## 2021-05-03 DIAGNOSIS — N95.1 MENOPAUSAL VAGINAL DRYNESS: ICD-10-CM

## 2021-05-03 DIAGNOSIS — D12.2 BENIGN NEOPLASM OF ASCENDING COLON: ICD-10-CM

## 2021-05-03 DIAGNOSIS — F41.9 ANXIETY: ICD-10-CM

## 2021-05-03 DIAGNOSIS — Z98.1 HISTORY OF LUMBAR FUSION: ICD-10-CM

## 2021-05-03 DIAGNOSIS — Z01.89 ENCOUNTER FOR ROUTINE LABORATORY TESTING: ICD-10-CM

## 2021-05-03 DIAGNOSIS — D12.0 BENIGN NEOPLASM OF CECUM: ICD-10-CM

## 2021-05-03 DIAGNOSIS — G35 MULTIPLE SCLEROSIS (HCC): ICD-10-CM

## 2021-05-03 DIAGNOSIS — IMO0002 OSTEOARTHROSIS INVOLVING LOWER LEG: ICD-10-CM

## 2021-05-03 DIAGNOSIS — K44.9 HIATAL HERNIA: ICD-10-CM

## 2021-05-03 PROBLEM — R12 HEARTBURN: Status: RESOLVED | Noted: 2019-11-05 | Resolved: 2021-05-03

## 2021-05-03 PROBLEM — Z86.0100 PERSONAL HISTORY OF COLONIC POLYPS: Status: RESOLVED | Noted: 2019-11-05 | Resolved: 2021-05-03

## 2021-05-03 PROBLEM — Z86.010 PERSONAL HISTORY OF COLONIC POLYPS: Status: RESOLVED | Noted: 2019-11-05 | Resolved: 2021-05-03

## 2021-05-03 PROCEDURE — 83036 HEMOGLOBIN GLYCOSYLATED A1C: CPT

## 2021-05-03 PROCEDURE — 3075F SYST BP GE 130 - 139MM HG: CPT | Performed by: FAMILY MEDICINE

## 2021-05-03 PROCEDURE — 80053 COMPREHEN METABOLIC PANEL: CPT

## 2021-05-03 PROCEDURE — 82306 VITAMIN D 25 HYDROXY: CPT

## 2021-05-03 PROCEDURE — 99397 PER PM REEVAL EST PAT 65+ YR: CPT | Performed by: FAMILY MEDICINE

## 2021-05-03 PROCEDURE — 36415 COLL VENOUS BLD VENIPUNCTURE: CPT

## 2021-05-03 PROCEDURE — 80061 LIPID PANEL: CPT

## 2021-05-03 PROCEDURE — 84443 ASSAY THYROID STIM HORMONE: CPT

## 2021-05-03 PROCEDURE — G0439 PPPS, SUBSEQ VISIT: HCPCS | Performed by: FAMILY MEDICINE

## 2021-05-03 PROCEDURE — 3079F DIAST BP 80-89 MM HG: CPT | Performed by: FAMILY MEDICINE

## 2021-05-03 PROCEDURE — 3008F BODY MASS INDEX DOCD: CPT | Performed by: FAMILY MEDICINE

## 2021-05-03 PROCEDURE — 96160 PT-FOCUSED HLTH RISK ASSMT: CPT | Performed by: FAMILY MEDICINE

## 2021-05-03 PROCEDURE — 85025 COMPLETE CBC W/AUTO DIFF WBC: CPT

## 2021-05-03 NOTE — PROGRESS NOTES
HPI:   Ned Corado is a 70year old female who presents for a MA (Medicare Advantage) [facility] (Once per calendar year). Patient reports improvement with lower back pain that was triggered by a new firm mattress 3 months ago.  She continues to ex status. Vision Problems? : Yes   She has Walking problems based on screening of functional status.    Difficulty walking?: Yes       Depression Screening (PHQ-2/PHQ-9): Over the LAST 2 WEEKS   Little interest or pleasure in doing things: Not at all  [facility] duodenum     Alas's esophagus without dysplasia     Benign neoplasm of cecum     Benign neoplasm of ascending colon     Polyp of colon     Weakness of right leg     Seborrheic keratoses     Lentigo     Personal history of malignant neoplasm of skin    W PAIN, Back problem, Basal cell carcinoma of ear (4/2008), Change in hair (Hair is thinning/age related), Constipation, Depression, Disorder of thyroid, Easy bruising, Fatigue, Heartburn (11/5/2019), Hemorrhoids (After having a baby), High blood pressure, H history  ALL/ASTHMA: denies hx of allergy or asthma    EXAM:   /84   Pulse 89   Temp 98.2 °F (36.8 °C) (Oral)   Resp 16   Ht 5' 3.25\" (1.607 m)   Wt 194 lb 4 oz (88.1 kg)   SpO2 98%   BMI 34.14 kg/m²  Estimated body mass index is 34.14 kg/m² as calc Immunization History   Administered Date(s) Administered   • >=3 YRS TRI  MULTIDOSE VIAL (08032) FLU CLINIC 12/04/2014   • FLU VAC High Dose 65 YRS & Older PRSV Free (93293) 10/20/2017, 09/26/2018, 09/25/2019, 10/06/2020   • Fluvirin, 3 Years & >, Im 1 type    Osteoarthrosis involving lower leg    Edema of right ankle    Encounter for screening mammogram for breast cancer  -     [facility] CLINT 2D+3D SCREENING BILAT (CPT=77067/20139);  Future    Osteopenia of neck of femur, unspecified laterality  -     XR DEXA and anaerobic exercises, choose better fats, increase fiber and avoid processed foods     21.  Essential hypertension  - continue medication  - conservative measures dicussed  - diet and exercise explained and encouraged  - fasting labs due.  - home blood p (restless legs syndrome)  History of lumbar fusion  Increased weakness when ambulating  Multiple sclerosis (hcc)  Lentigo  Polyp of colon, unspecified part of colon, unspecified type  Gastroesophageal reflux disease without esophagitis  Benign neoplasm of Procedure   Diabetes Screening      HbgA1C   Annually Lab Results   Component Value Date    A1C 5.8 (H) 08/20/2020    No flowsheet data found.     Fasting Blood Sugar (FSB)Annually Glucose (mg/dL)   Date Value   08/20/2020 113 (H)     GLUCOSE (mg/dL)   Date get once after your 65th birthday    Hepatitis B for Moderate/High Risk No vaccine history found Medium/high risk factors:   End-stage renal disease   Hemophiliacs who received Factor VIII or IX concentrates   Clients of institutions for the mentally retar

## 2021-05-03 NOTE — PATIENT INSTRUCTIONS
Rosa Reyes's SCREENING SCHEDULE   Tests on this list are recommended by your physician but may not be covered, or covered at this frequency, by your insurer. Please check with your insurance carrier before scheduling to verify coverage.    Ling Morley for this or any previous visit.  Limited to patients who meet one of the following criteria:   • Men who are 73-68 years old and have smoked more than 100 cigarettes in their lifetime   • Anyone with a family history    Colorectal Cancer Screening  Covered Mammogram regularly   Immunizations      Influenza  Covered Annually Orders placed or performed in visit on 12/04/14   • INFLUENZA VIRUS VACCINE, >=1YEARS OF AGE    Please get every year    Pneumococcal 13 (Prevnar)  Covered Once after 65 Orders placed or on it's website for anyone to review and print using their home computer and printer. (the forms are also available in [address])  [email or web address]  This link also has information from the [facility] regarding Advance Directives.   Sh non-screening if indicated for medical reasons Electrocardiogram date Routine EKG is not a screening covered service except at the [place] to Medicare Visit    Abdominal aortic aneurysm screening (once between ages 73-68) IPPE only No results found for [facility] patient. Chlamydia  Annually if high risk No results found for: CHLAMYDIA No flowsheet data found.     Screening Mammogram      Mammogram    Recommend Annually to at least age 76, and as needed after 76 Mammogram due on 08/03/2021 Please get this Mammog website. [email or web address].[email or web address]/public/books/[email or web address].htm  A link to the [facility]. This site has a lot of good information including definitions of the different types of Advance Directives.  It also has the [facility] forms available

## 2021-06-02 ENCOUNTER — HOSPITAL ENCOUNTER (OUTPATIENT)
Dept: BONE DENSITY | Age: 71
Discharge: HOME | End: 2021-06-02
Attending: FAMILY MEDICINE
Payer: MEDICARE

## 2021-06-02 DIAGNOSIS — M85.859 OSTEOPENIA OF NECK OF FEMUR, UNSPECIFIED LATERALITY: ICD-10-CM

## 2021-06-02 PROCEDURE — 77080 DXA BONE DENSITY AXIAL: CPT | Performed by: FAMILY MEDICINE

## 2021-06-10 ENCOUNTER — PATIENT MESSAGE (OUTPATIENT)
Dept: INTERNAL MEDICINE CLINIC | Facility: CLINIC | Age: 71
End: 2021-06-10

## 2021-06-10 DIAGNOSIS — M25.571 RIGHT ANKLE PAIN, UNSPECIFIED CHRONICITY: Primary | ICD-10-CM

## 2021-06-11 NOTE — TELEPHONE ENCOUNTER
From: Josh Garcia  To: HUSSANI Pineda  Sent: 6/10/2021 6:42 PM CDT  Subject: Referral Request    Chandler Pearl,     I'm requesting a referral to see Dr. Natalia Duenas, for a follow up on my right ankle.  The ankle has no pain, I can stand, walk and exercise

## 2021-06-23 DIAGNOSIS — IMO0002 OSTEOARTHROSIS INVOLVING LOWER LEG: ICD-10-CM

## 2021-06-24 RX ORDER — CELECOXIB 200 MG/1
CAPSULE ORAL
Qty: 90 CAPSULE | Refills: 0 | Status: SHIPPED | OUTPATIENT
Start: 2021-06-24 | End: 2021-09-01

## 2021-06-24 NOTE — TELEPHONE ENCOUNTER
Name from pharmacy: CELECOXIB 200 MG Capsule          Will file in chart as: CELECOXIB 200 MG Oral Cap    Sig: TAKE 1 CAPSULE EVERY DAY    Disp:  90 capsule    Refills:  0 (Pharmacy requested: Not specified)    Start: 6/23/2021    Class: Normal    Non-form

## 2021-06-30 DIAGNOSIS — F33.0 MILD RECURRENT MAJOR DEPRESSION (HCC): ICD-10-CM

## 2021-06-30 DIAGNOSIS — F41.9 ANXIETY: ICD-10-CM

## 2021-06-30 DIAGNOSIS — I10 ESSENTIAL HYPERTENSION: ICD-10-CM

## 2021-07-01 RX ORDER — AMLODIPINE BESYLATE 5 MG/1
TABLET ORAL
Qty: 180 TABLET | Refills: 0 | Status: SHIPPED | OUTPATIENT
Start: 2021-07-01 | End: 2021-08-30

## 2021-07-01 RX ORDER — ESCITALOPRAM OXALATE 20 MG/1
20 TABLET ORAL DAILY
Qty: 90 TABLET | Refills: 0 | Status: SHIPPED | OUTPATIENT
Start: 2021-07-01 | End: 2021-08-30

## 2021-07-01 RX ORDER — LEVOTHYROXINE SODIUM 0.03 MG/1
TABLET ORAL
Qty: 90 TABLET | Refills: 0 | Status: SHIPPED | OUTPATIENT
Start: 2021-07-01 | End: 2021-08-30

## 2021-07-01 RX ORDER — TRIAMTERENE AND HYDROCHLOROTHIAZIDE 37.5; 25 MG/1; MG/1
TABLET ORAL
Qty: 90 TABLET | Refills: 0 | Status: SHIPPED | OUTPATIENT
Start: 2021-07-01 | End: 2021-08-30

## 2021-07-01 NOTE — TELEPHONE ENCOUNTER
Amlodipine 5 mg  Filled 3-24-21  Qty 180  0 refills  No upcoming appt. LOV 5-3-21    Triamterene-hydrochlorothiazide 37.5-25 mg  Filled 3-24-21  Qty 90  0 refills  No upcoming appt.   LOV 5-3-21    Levothyroxine 25 mg  Filled 3-24-21  Qty 90  0 refills  No

## 2021-08-05 ENCOUNTER — TELEPHONE (OUTPATIENT)
Dept: ORTHOPEDICS CLINIC | Facility: CLINIC | Age: 71
End: 2021-08-05

## 2021-08-05 DIAGNOSIS — M25.562 LEFT KNEE PAIN, UNSPECIFIED CHRONICITY: Primary | ICD-10-CM

## 2021-08-05 RX ORDER — BACLOFEN 20 MG/1
TABLET ORAL
Qty: 180 TABLET | Refills: 3 | Status: SHIPPED | OUTPATIENT
Start: 2021-08-05

## 2021-08-05 NOTE — TELEPHONE ENCOUNTER
Please enter an Xray RX for a LT KNEE  for this patient’s upcoming appointment, as the patient has not had any imaging SINCE 2019. Patient was instructed to come in 30 minutes before their appointment to get X-rays. Thank you so much!       Future Appoin

## 2021-08-05 NOTE — TELEPHONE ENCOUNTER
Medication: baclofen 20 MG Oral Tab    Date of last refill: 9/17/2020 (#180/3)  [place]    Last office visit: 9/17/2020  Due back to clinic per last office note:  1 year  Date next office visit scheduled:    Future A

## 2021-08-18 ENCOUNTER — HOSPITAL ENCOUNTER (OUTPATIENT)
Dept: MAMMOGRAPHY | Age: 71
Discharge: HOME | End: 2021-08-18
Attending: FAMILY MEDICINE
Payer: MEDICARE

## 2021-08-18 DIAGNOSIS — Z12.31 ENCOUNTER FOR SCREENING MAMMOGRAM FOR BREAST CANCER: ICD-10-CM

## 2021-08-18 PROCEDURE — 77067 SCR MAMMO BI INCL CAD: CPT | Performed by: FAMILY MEDICINE

## 2021-08-18 PROCEDURE — 77063 BREAST TOMOSYNTHESIS BI: CPT | Performed by: FAMILY MEDICINE

## 2021-08-28 DIAGNOSIS — F33.0 MILD RECURRENT MAJOR DEPRESSION (HCC): ICD-10-CM

## 2021-08-28 DIAGNOSIS — I10 ESSENTIAL HYPERTENSION: ICD-10-CM

## 2021-08-28 DIAGNOSIS — F41.9 ANXIETY: ICD-10-CM

## 2021-08-30 RX ORDER — LEVOTHYROXINE SODIUM 0.03 MG/1
TABLET ORAL
Qty: 90 TABLET | Refills: 0 | Status: SHIPPED | OUTPATIENT
Start: 2021-08-30 | End: 2021-11-08

## 2021-08-30 RX ORDER — TRIAMTERENE AND HYDROCHLOROTHIAZIDE 37.5; 25 MG/1; MG/1
TABLET ORAL
Qty: 90 TABLET | Refills: 0 | Status: SHIPPED | OUTPATIENT
Start: 2021-08-30 | End: 2021-11-08

## 2021-08-30 RX ORDER — AMLODIPINE BESYLATE 5 MG/1
TABLET ORAL
Qty: 180 TABLET | Refills: 0 | Status: SHIPPED | OUTPATIENT
Start: 2021-08-30 | End: 2021-11-08

## 2021-08-30 RX ORDER — ESCITALOPRAM OXALATE 20 MG/1
TABLET ORAL
Qty: 90 TABLET | Refills: 0 | Status: SHIPPED | OUTPATIENT
Start: 2021-08-30 | End: 2021-12-16

## 2021-08-30 NOTE — TELEPHONE ENCOUNTER
Name from pharmacy: LEVOTHYROXINE SODIUM 25 [address]         Will file in chart as: LEVOTHYROXINE 25 MCG Oral Tab    Sig: TAKE 1 TABLET BEFORE BREAKFAST    Disp:  90 tablet    Refills:  0 (Pharmacy requested: Not specified)    Start: 8/28/2021    Class: requested: Not specified)    Start: 8/28/2021    Class: Normal    Non-formulary For: Mild recurrent major depression ([facility] Anxiety    Last ordered: 2 months ago by HUSSAIN Mercado Last refill: 7/2/2021    Rx #: [id number]     Last OV:5/3/21  Last labs:5

## 2021-09-01 RX ORDER — CELECOXIB 200 MG/1
CAPSULE ORAL
Qty: 90 CAPSULE | Refills: 0 | Status: SHIPPED | OUTPATIENT
Start: 2021-09-01 | End: 2021-12-16

## 2021-09-15 ENCOUNTER — HOSPITAL ENCOUNTER (OUTPATIENT)
Dept: GENERAL RADIOLOGY | Age: 71
Discharge: HOME | End: 2021-09-15
Attending: ORTHOPAEDIC SURGERY
Payer: MEDICARE

## 2021-09-15 ENCOUNTER — OFFICE VISIT (OUTPATIENT)
Dept: ORTHOPEDICS CLINIC | Facility: CLINIC | Age: 71
End: 2021-09-15
Payer: MEDICARE

## 2021-09-15 VITALS — BODY MASS INDEX: 33.22 KG/M2 | HEIGHT: 64.5 IN | OXYGEN SATURATION: 99 % | HEART RATE: 78 BPM | WEIGHT: 197 LBS

## 2021-09-15 DIAGNOSIS — M25.562 LEFT KNEE PAIN, UNSPECIFIED CHRONICITY: ICD-10-CM

## 2021-09-15 DIAGNOSIS — Z01.89 ENCOUNTER FOR LOWER EXTREMITY COMPARISON IMAGING STUDY: ICD-10-CM

## 2021-09-15 DIAGNOSIS — M17.12 PRIMARY OSTEOARTHRITIS OF LEFT KNEE: Primary | ICD-10-CM

## 2021-09-15 PROCEDURE — 3008F BODY MASS INDEX DOCD: CPT | Performed by: ORTHOPAEDIC SURGERY

## 2021-09-15 PROCEDURE — 73564 X-RAY EXAM KNEE 4 OR MORE: CPT | Performed by: ORTHOPAEDIC SURGERY

## 2021-09-15 PROCEDURE — 99213 OFFICE O/P EST LOW 20 MIN: CPT | Performed by: ORTHOPAEDIC SURGERY

## 2021-09-15 PROCEDURE — 20610 DRAIN/INJ JOINT/BURSA W/O US: CPT | Performed by: ORTHOPAEDIC SURGERY

## 2021-09-15 PROCEDURE — 73562 X-RAY EXAM OF KNEE 3: CPT | Performed by: ORTHOPAEDIC SURGERY

## 2021-09-15 RX ORDER — TRIAMCINOLONE ACETONIDE 40 MG/ML
40 INJECTION, SUSPENSION INTRA-ARTICULAR; INTRAMUSCULAR ONCE
Status: COMPLETED | OUTPATIENT
Start: 2021-09-15 | End: 2021-09-15

## 2021-09-15 RX ADMIN — TRIAMCINOLONE ACETONIDE 40 MG: 40 INJECTION, SUSPENSION INTRA-ARTICULAR; INTRAMUSCULAR at 16:13:00

## 2021-09-15 NOTE — PROGRESS NOTES
[facility] Progress Note    Subjective: Patient previously seen for left ankle injury, now here today with left knee pain. Patient states this is been going on for a few weeks.  She has had it in the past before, it occurs intermittently, and notes th every 3 months as needed. She expressed understanding and agreement with this discussion and plan.     Saurav Flores MD, [address] Orthopedic Surgery  Phone [phone]  Fax [phone]

## 2021-10-03 DIAGNOSIS — E78.5 HYPERLIPIDEMIA, UNSPECIFIED HYPERLIPIDEMIA TYPE: ICD-10-CM

## 2021-10-04 RX ORDER — ATORVASTATIN CALCIUM 20 MG/1
20 TABLET, FILM COATED ORAL NIGHTLY
Qty: 90 TABLET | Refills: 0 | Status: SHIPPED | OUTPATIENT
Start: 2021-10-04 | End: 2021-12-27

## 2021-10-04 NOTE — TELEPHONE ENCOUNTER
atorvastatin 20 MG Oral Tab          Sig: Take 1 tablet (20 mg total) by mouth nightly.     Disp:  90 tablet    Refills:  1    Start: 10/3/2021    Class: Normal    Non-formulary For: Hyperlipidemia, unspecified hyperlipidemia type    Last ordered: 12 months

## 2021-11-08 DIAGNOSIS — F33.0 MILD RECURRENT MAJOR DEPRESSION (HCC): ICD-10-CM

## 2021-11-08 DIAGNOSIS — F41.9 ANXIETY: ICD-10-CM

## 2021-11-08 DIAGNOSIS — I10 ESSENTIAL HYPERTENSION: ICD-10-CM

## 2021-11-08 RX ORDER — TRIAMTERENE AND HYDROCHLOROTHIAZIDE 37.5; 25 MG/1; MG/1
1 TABLET ORAL DAILY
Qty: 90 TABLET | Refills: 0 | Status: SHIPPED | OUTPATIENT
Start: 2021-11-08 | End: 2022-01-22

## 2021-11-08 RX ORDER — ESCITALOPRAM OXALATE 20 MG/1
TABLET ORAL
Qty: 90 TABLET | Refills: 0 | OUTPATIENT
Start: 2021-11-08

## 2021-11-08 RX ORDER — CELECOXIB 200 MG/1
CAPSULE ORAL
Qty: 90 CAPSULE | Refills: 0 | OUTPATIENT
Start: 2021-11-08

## 2021-11-08 RX ORDER — AMLODIPINE BESYLATE 5 MG/1
10 TABLET ORAL DAILY
Qty: 180 TABLET | Refills: 0 | Status: SHIPPED | OUTPATIENT
Start: 2021-11-08 | End: 2022-01-22

## 2021-11-08 RX ORDER — LEVOTHYROXINE SODIUM 0.03 MG/1
TABLET ORAL
Qty: 90 TABLET | Refills: 0 | Status: SHIPPED | OUTPATIENT
Start: 2021-11-08 | End: 2022-01-22

## 2021-12-06 ENCOUNTER — TELEPHONE (OUTPATIENT)
Dept: INTERNAL MEDICINE CLINIC | Facility: CLINIC | Age: 71
End: 2021-12-06

## 2021-12-15 DIAGNOSIS — F33.0 MILD RECURRENT MAJOR DEPRESSION (HCC): ICD-10-CM

## 2021-12-15 DIAGNOSIS — F41.9 ANXIETY: ICD-10-CM

## 2021-12-16 RX ORDER — CELECOXIB 200 MG/1
CAPSULE ORAL
Qty: 90 CAPSULE | Refills: 0 | Status: SHIPPED | OUTPATIENT
Start: 2021-12-16

## 2021-12-16 RX ORDER — ESCITALOPRAM OXALATE 20 MG/1
TABLET ORAL
Qty: 90 TABLET | Refills: 0 | Status: SHIPPED | OUTPATIENT
Start: 2021-12-16

## 2021-12-16 NOTE — TELEPHONE ENCOUNTER
escitalopram 20 mg  Filled 8-30-21  Qty 90  0 refills  Upcoming appt. 12-27-21  LOV 5-3-21    Celecoxib 200 mg  Filled 9-1-21  Qty 90  0 refills  Upcoming appt.  12-27-21  LOV 5-3-21

## 2021-12-22 ENCOUNTER — LAB ENCOUNTER (OUTPATIENT)
Dept: LAB | Age: 71
End: 2021-12-22
Attending: FAMILY MEDICINE
Payer: MEDICARE

## 2021-12-22 DIAGNOSIS — R73.03 PREDIABETES: ICD-10-CM

## 2021-12-22 PROCEDURE — 83036 HEMOGLOBIN GLYCOSYLATED A1C: CPT

## 2021-12-22 PROCEDURE — 36415 COLL VENOUS BLD VENIPUNCTURE: CPT

## 2021-12-27 ENCOUNTER — OFFICE VISIT (OUTPATIENT)
Dept: INTERNAL MEDICINE CLINIC | Facility: CLINIC | Age: 71
End: 2021-12-27
Payer: MEDICARE

## 2021-12-27 VITALS
SYSTOLIC BLOOD PRESSURE: 120 MMHG | RESPIRATION RATE: 16 BRPM | TEMPERATURE: 98 F | DIASTOLIC BLOOD PRESSURE: 60 MMHG | WEIGHT: 198 LBS | OXYGEN SATURATION: 95 % | BODY MASS INDEX: 33.8 KG/M2 | HEART RATE: 74 BPM | HEIGHT: 64 IN

## 2021-12-27 DIAGNOSIS — R73.01 IMPAIRED FASTING GLUCOSE: ICD-10-CM

## 2021-12-27 DIAGNOSIS — E78.5 HYPERLIPIDEMIA, UNSPECIFIED HYPERLIPIDEMIA TYPE: ICD-10-CM

## 2021-12-27 DIAGNOSIS — E03.9 HYPOTHYROIDISM, UNSPECIFIED TYPE: ICD-10-CM

## 2021-12-27 DIAGNOSIS — I10 ESSENTIAL HYPERTENSION: Primary | ICD-10-CM

## 2021-12-27 DIAGNOSIS — F41.9 ANXIETY: ICD-10-CM

## 2021-12-27 PROCEDURE — 3074F SYST BP LT 130 MM HG: CPT | Performed by: FAMILY MEDICINE

## 2021-12-27 PROCEDURE — 3008F BODY MASS INDEX DOCD: CPT | Performed by: FAMILY MEDICINE

## 2021-12-27 PROCEDURE — 3078F DIAST BP <80 MM HG: CPT | Performed by: FAMILY MEDICINE

## 2021-12-27 PROCEDURE — 99214 OFFICE O/P EST MOD 30 MIN: CPT | Performed by: FAMILY MEDICINE

## 2021-12-27 RX ORDER — ATORVASTATIN CALCIUM 20 MG/1
20 TABLET, FILM COATED ORAL NIGHTLY
Qty: 90 TABLET | Refills: 0 | Status: SHIPPED | OUTPATIENT
Start: 2021-12-27

## 2021-12-27 NOTE — PROGRESS NOTES
CHIEF COMPLAINT:     Patient presents with: Follow - Up: Medications      HPI:   Janes Oconnor is a 70year old female   Patient presents for recheck of  Hypertension and hyperlipdemia.  Pt has been taking medications as instructed, no medication side TWICE DAILY 180 tablet 3   • Omega-3 Fatty Acids (OMEGA-3 FISH OIL) 1000 MG Oral Cap Take 1,000 capsules by mouth daily. • CALCIUM OR Take 1,000 mg by mouth daily. • Multiple Vitamin (DAILY VALUE MULTIVITAMIN) Oral Tab Take 1 tablet by mouth daily. facial weakness, congestion, rhinorrhea, sore throat or ear pain.     CHEST: Denies chest pain, or palpitations  LUNGS: Denies shortness of breath, cough, or wheezing  GI: Denies abdominal pain, N/V/C/D.   MUSCULOSKELETAL: Denies any arthralgia,myalgias or Tab; Take 1 tablet (20 mg total) by mouth nightly. Dispense: 90 tablet; Refill: 0    3. Essential hypertension  Conservative measures dicussed. Continue medication. Diet and exercise explained and encouraged. Home blood pressure monitoring.  Pt should me

## 2022-01-12 NOTE — PLAN OF CARE
PT STILL REFUSED STRAIGHT CATH. PT INFORMED BY 8PM NO VOID UROLOGIST WILL BE CONSULTED,IF NOT AGREE FOR STRAIGHT CATH. PT STATES\" I WILL TRY AGAIN AT 8PM. WILL ENDORSED NEXT PCT AND RN. normal (ped)...

## 2022-01-21 DIAGNOSIS — I10 ESSENTIAL HYPERTENSION: ICD-10-CM

## 2022-01-22 RX ORDER — AMLODIPINE BESYLATE 5 MG/1
TABLET ORAL
Qty: 180 TABLET | Refills: 0 | Status: SHIPPED | OUTPATIENT
Start: 2022-01-22

## 2022-01-22 RX ORDER — LEVOTHYROXINE SODIUM 0.03 MG/1
TABLET ORAL
Qty: 90 TABLET | Refills: 0 | Status: SHIPPED | OUTPATIENT
Start: 2022-01-22

## 2022-01-22 RX ORDER — TRIAMTERENE AND HYDROCHLOROTHIAZIDE 37.5; 25 MG/1; MG/1
TABLET ORAL
Qty: 90 TABLET | Refills: 0 | Status: SHIPPED | OUTPATIENT
Start: 2022-01-22

## 2022-01-22 NOTE — TELEPHONE ENCOUNTER
Name from pharmacy: LEVOTHYROXINE SODIUM 25 [address]          Will file in chart as: LEVOTHYROXINE 25 MCG Oral Tab    Sig: TAKE 1 TABLET BEFORE BREAKFAST    Disp:  90 tablet    Refills:  0 (Pharmacy requested: Not specified)    Start: 1/21/2022    Class: 11/8/2021 for 90 days     RTC: 6 months     FOV: No FOV

## 2022-02-17 RX ORDER — CELECOXIB 200 MG/1
CAPSULE ORAL
Qty: 90 CAPSULE | Refills: 0 | OUTPATIENT
Start: 2022-02-17

## 2022-02-17 RX ORDER — ESCITALOPRAM OXALATE 20 MG/1
TABLET ORAL
Qty: 90 TABLET | Refills: 0 | OUTPATIENT
Start: 2022-02-17

## 2022-03-01 RX ORDER — ATORVASTATIN CALCIUM 20 MG/1
TABLET, FILM COATED ORAL
Qty: 90 TABLET | Refills: 0 | Status: SHIPPED | OUTPATIENT
Start: 2022-03-01

## 2022-03-20 ENCOUNTER — PATIENT MESSAGE (OUTPATIENT)
Dept: ORTHOPEDICS CLINIC | Facility: CLINIC | Age: 72
End: 2022-03-20

## 2022-03-21 NOTE — TELEPHONE ENCOUNTER
Verified voicemail reached with verbal consent signed. Attempted to call Fouzia Alvarado regarding appt scheduling question. Reached voicemail, left voicemail and provided a detail message with my call back contact information.

## 2022-03-21 NOTE — TELEPHONE ENCOUNTER
From: Ceci Hearn  To: Marcia Solis MD  Sent: 3/20/2022 7:29 PM CDT  Subject: appointment for knee injection    Dr. Tong Garibay, when I came to see you in October for a knee injection, you told me I do not need to schedule an appointment should I need another injection in the future. I called your office last week to schedule an appointment for a Baker's cyst. I mentioned that I will come into the office for an injection in my knee. The  advised me that I need an appointment for the injection. I told her what you said about coming in without an appointment. She insisted I need an appointment. The appointment was scheduled for April 20th. I am going to [facility] for [place]. I can not wait that long for a knee injection. The pain is severe and causing me to limp. Dr. Tong Garibay, can I please come into your office for an injection before April 20th?   Thanking you in advance,  Mi Kelsey  03/21/1950  [phone]

## 2022-03-21 NOTE — TELEPHONE ENCOUNTER
Appt set and date/time/location confirmed with patient. Pt verbalized understanding. No further questions at this time.

## 2022-04-01 RX ORDER — LEVOTHYROXINE SODIUM 0.03 MG/1
TABLET ORAL
Qty: 90 TABLET | Refills: 0 | Status: SHIPPED | OUTPATIENT
Start: 2022-04-01

## 2022-04-01 RX ORDER — AMLODIPINE BESYLATE 5 MG/1
10 TABLET ORAL DAILY
Qty: 180 TABLET | Refills: 0 | Status: SHIPPED | OUTPATIENT
Start: 2022-04-01

## 2022-04-01 RX ORDER — TRIAMTERENE AND HYDROCHLOROTHIAZIDE 37.5; 25 MG/1; MG/1
1 TABLET ORAL DAILY
Qty: 90 TABLET | Refills: 0 | Status: SHIPPED | OUTPATIENT
Start: 2022-04-01

## 2022-04-01 NOTE — TELEPHONE ENCOUNTER
Amlodipine 5 mg  Filled 1-22-22  Qty 180  0 refill  Upcoming appt. 4-6-22  LOV 12-27-21    Triamterene-hydrochlorothiazide 37.5-25 mg  Filled 1-22-22  Qty 90  0 refills  Upcoming appt. 4-6-22  LOV 12-27-21    Levothyroxine 25 mcg  Filled 1-22-22  Qty 90  0 refills  Upcoming appt. 4-6-22  LOV 12-27-21  Labs: 5-3-21: TSH

## 2022-04-04 ENCOUNTER — OFFICE VISIT (OUTPATIENT)
Dept: ORTHOPEDICS CLINIC | Facility: CLINIC | Age: 72
End: 2022-04-04
Payer: MEDICARE

## 2022-04-04 VITALS — HEIGHT: 64 IN | BODY MASS INDEX: 31.58 KG/M2 | WEIGHT: 185 LBS

## 2022-04-04 DIAGNOSIS — M71.22 SYNOVIAL CYST OF LEFT POPLITEAL SPACE: ICD-10-CM

## 2022-04-04 DIAGNOSIS — M17.12 PRIMARY OSTEOARTHRITIS OF LEFT KNEE: Primary | ICD-10-CM

## 2022-04-04 PROCEDURE — 3008F BODY MASS INDEX DOCD: CPT | Performed by: ORTHOPAEDIC SURGERY

## 2022-04-04 PROCEDURE — 99213 OFFICE O/P EST LOW 20 MIN: CPT | Performed by: ORTHOPAEDIC SURGERY

## 2022-04-04 PROCEDURE — 20610 DRAIN/INJ JOINT/BURSA W/O US: CPT | Performed by: ORTHOPAEDIC SURGERY

## 2022-04-04 RX ORDER — TRIAMCINOLONE ACETONIDE 40 MG/ML
40 INJECTION, SUSPENSION INTRA-ARTICULAR; INTRAMUSCULAR ONCE
Status: COMPLETED | OUTPATIENT
Start: 2022-04-04 | End: 2022-04-04

## 2022-04-04 RX ADMIN — TRIAMCINOLONE ACETONIDE 40 MG: 40 INJECTION, SUSPENSION INTRA-ARTICULAR; INTRAMUSCULAR at 14:28:00

## 2022-04-04 NOTE — PROCEDURES
After informed consent, the patient's left knee was marked, locally anesthetized with skin refrigerant, prepped with topical antiseptic, and injected with a mixture of 1mL 40mg/mL Kenalog, 2mL 1% lidocaine and 2mL 0.5% marcaine through the inferolateral portal.  A band-aid was applied. The patient tolerated the procedure well.     Dipti Gregory MD, [address] [place] Orthopedic Surgery  Phone [phone]  Fax [phone]

## 2022-04-04 NOTE — PROGRESS NOTES
[facility] Progress Note    Subjective: Very pleasant 70-year-old female, history of severe valgus left knee osteoarthritis as well as history of patella fracture treated with surgery decades ago, presents for repeat evaluation of her left knee as well as new complaint of swelling behind the knee and diagnosis of Baker's cyst.  Patient reports that the steroid injection performed 7 months ago helped for about 3 to 4 months. Symptoms are now back. She has pain around the joint line laterally. Additionally now she has noticed swelling in the back of the knee as well as pain and tightness. There is no radiation of pain. No redness, fever or chills. Objective: Patient is awake alert no distress. Left knee has healed midline incision. She has near full extension of the knee. She does have valgus alignment. She is tender to palpation about lateral joint line, nontender over the posterior knee joint however there is a palpable soft tissue mass. Assessment/Plan: 70-year-old female with symptomatic left knee valgus osteoarthritis, also with popliteal cyst in the posterior knee. I discussed the anatomy, etiology, pathophysiology and natural history of a popliteal cyst.  I discussed that it is typically related to pathology in the knee, in this case osteoarthritis. We discussed that it can wax and wane in size, and that typically management is directed at the underlying pathology, in this case osteoarthritis. We did revisit the discussion of treatment options for arthritis. Patient has had good relief from steroid injection and would like to repeat this again today. She would like to set up an appointment for 3 months to repeat injection, but may cancel it if she is doing well. We did briefly discussed knee replacement as well, expectations regarding perioperative management and postoperative timeframe for recovery. She expressed understanding and agreement with this discussion and plan.     Clarence Webb Fernando Greene MD, [address] [place] [address] Orthopedic Surgery  Phone [phone]  Fax [phone]

## 2022-04-20 ENCOUNTER — OFFICE VISIT (OUTPATIENT)
Dept: INTERNAL MEDICINE CLINIC | Facility: CLINIC | Age: 72
End: 2022-04-20
Payer: MEDICARE

## 2022-04-20 VITALS
TEMPERATURE: 98 F | HEART RATE: 76 BPM | RESPIRATION RATE: 16 BRPM | WEIGHT: 196 LBS | BODY MASS INDEX: 33.46 KG/M2 | HEIGHT: 64 IN | SYSTOLIC BLOOD PRESSURE: 134 MMHG | DIASTOLIC BLOOD PRESSURE: 64 MMHG

## 2022-04-20 DIAGNOSIS — L82.1 SEBORRHEIC KERATOSES: ICD-10-CM

## 2022-04-20 DIAGNOSIS — G89.29 CHRONIC PAIN OF LEFT KNEE: ICD-10-CM

## 2022-04-20 DIAGNOSIS — F33.0 MILD RECURRENT MAJOR DEPRESSION (HCC): ICD-10-CM

## 2022-04-20 DIAGNOSIS — K21.9 GASTROESOPHAGEAL REFLUX DISEASE WITHOUT ESOPHAGITIS: ICD-10-CM

## 2022-04-20 DIAGNOSIS — K22.70 BARRETT'S ESOPHAGUS WITHOUT DYSPLASIA: ICD-10-CM

## 2022-04-20 DIAGNOSIS — I10 ESSENTIAL HYPERTENSION: ICD-10-CM

## 2022-04-20 DIAGNOSIS — Z98.1 HISTORY OF LUMBAR FUSION: ICD-10-CM

## 2022-04-20 DIAGNOSIS — M25.562 CHRONIC PAIN OF LEFT KNEE: ICD-10-CM

## 2022-04-20 DIAGNOSIS — R73.03 PREDIABETES: ICD-10-CM

## 2022-04-20 DIAGNOSIS — G25.81 RLS (RESTLESS LEGS SYNDROME): ICD-10-CM

## 2022-04-20 DIAGNOSIS — G35 MULTIPLE SCLEROSIS (HCC): ICD-10-CM

## 2022-04-20 DIAGNOSIS — R29.898 WEAKNESS OF RIGHT LEG: ICD-10-CM

## 2022-04-20 DIAGNOSIS — K31.7 POLYP OF STOMACH AND DUODENUM: ICD-10-CM

## 2022-04-20 DIAGNOSIS — M47.816 FACET ARTHRITIS OF LUMBAR REGION: ICD-10-CM

## 2022-04-20 DIAGNOSIS — F41.9 ANXIETY: ICD-10-CM

## 2022-04-20 DIAGNOSIS — R53.82 CHRONIC FATIGUE: ICD-10-CM

## 2022-04-20 DIAGNOSIS — K29.30 CHRONIC SUPERFICIAL GASTRITIS WITHOUT BLEEDING: ICD-10-CM

## 2022-04-20 DIAGNOSIS — Z00.00 ENCOUNTER FOR ANNUAL HEALTH EXAMINATION: ICD-10-CM

## 2022-04-20 DIAGNOSIS — N95.1 MENOPAUSAL VAGINAL DRYNESS: ICD-10-CM

## 2022-04-20 DIAGNOSIS — M46.92 UNSPECIFIED INFLAMMATORY SPONDYLOPATHY, CERVICAL REGION (HCC): ICD-10-CM

## 2022-04-20 DIAGNOSIS — L92.0 GRANULOMA ANNULARE: ICD-10-CM

## 2022-04-20 DIAGNOSIS — E03.9 HYPOTHYROIDISM, UNSPECIFIED TYPE: ICD-10-CM

## 2022-04-20 DIAGNOSIS — M47.812 FACET ARTHRITIS OF CERVICAL REGION: ICD-10-CM

## 2022-04-20 DIAGNOSIS — Z85.828 PERSONAL HISTORY OF MALIGNANT NEOPLASM OF SKIN: ICD-10-CM

## 2022-04-20 DIAGNOSIS — Z12.31 ENCOUNTER FOR SCREENING MAMMOGRAM FOR BREAST CANCER: Primary | ICD-10-CM

## 2022-04-20 DIAGNOSIS — M48.061 SPINAL STENOSIS OF LUMBAR REGION, UNSPECIFIED WHETHER NEUROGENIC CLAUDICATION PRESENT: ICD-10-CM

## 2022-04-20 DIAGNOSIS — M62.838 MUSCLE SPASMS OF BOTH LOWER EXTREMITIES: ICD-10-CM

## 2022-04-20 DIAGNOSIS — K44.9 HIATAL HERNIA: ICD-10-CM

## 2022-04-20 DIAGNOSIS — L81.4 LENTIGO: ICD-10-CM

## 2022-04-20 DIAGNOSIS — E78.5 HYPERLIPIDEMIA, UNSPECIFIED HYPERLIPIDEMIA TYPE: ICD-10-CM

## 2022-04-20 DIAGNOSIS — K63.5 POLYP OF COLON, UNSPECIFIED PART OF COLON, UNSPECIFIED TYPE: ICD-10-CM

## 2022-04-20 DIAGNOSIS — D12.0 BENIGN NEOPLASM OF CECUM: ICD-10-CM

## 2022-04-20 DIAGNOSIS — D12.2 BENIGN NEOPLASM OF ASCENDING COLON: ICD-10-CM

## 2022-04-20 PROCEDURE — 99397 PER PM REEVAL EST PAT 65+ YR: CPT | Performed by: FAMILY MEDICINE

## 2022-04-20 PROCEDURE — G0439 PPPS, SUBSEQ VISIT: HCPCS | Performed by: FAMILY MEDICINE

## 2022-04-20 PROCEDURE — 3078F DIAST BP <80 MM HG: CPT | Performed by: FAMILY MEDICINE

## 2022-04-20 PROCEDURE — 3008F BODY MASS INDEX DOCD: CPT | Performed by: FAMILY MEDICINE

## 2022-04-20 PROCEDURE — 1123F ACP DISCUSS/DSCN MKR DOCD: CPT | Performed by: FAMILY MEDICINE

## 2022-04-20 PROCEDURE — 96160 PT-FOCUSED HLTH RISK ASSMT: CPT | Performed by: FAMILY MEDICINE

## 2022-04-20 PROCEDURE — 3075F SYST BP GE 130 - 139MM HG: CPT | Performed by: FAMILY MEDICINE

## 2022-04-20 RX ORDER — ESCITALOPRAM OXALATE 20 MG/1
20 TABLET ORAL DAILY
Qty: 90 TABLET | Refills: 0 | Status: SHIPPED | OUTPATIENT
Start: 2022-04-20

## 2022-04-20 RX ORDER — CELECOXIB 200 MG/1
200 CAPSULE ORAL DAILY
Qty: 90 CAPSULE | Refills: 0 | Status: SHIPPED | OUTPATIENT
Start: 2022-04-20

## 2022-05-11 RX ORDER — ATORVASTATIN CALCIUM 20 MG/1
TABLET, FILM COATED ORAL
Qty: 90 TABLET | Refills: 0 | Status: SHIPPED | OUTPATIENT
Start: 2022-05-11

## 2022-05-11 NOTE — TELEPHONE ENCOUNTER
[facility] sent for pt to complete labs. Atorvastatin 20 mg  Cholesterol Medication Protocol Failed 05/10/2022 06:55 PM   Protocol Details  ALT < 80    ALT resulted within past year    Lipid panel within past 12 months   Filled 3-1-22  Qty 90  0 refills  No upcoming appt.    LOV 4-20-22

## 2022-05-13 ENCOUNTER — LAB ENCOUNTER (OUTPATIENT)
Dept: LAB | Age: 72
End: 2022-05-13
Attending: FAMILY MEDICINE
Payer: MEDICARE

## 2022-05-13 DIAGNOSIS — I10 ESSENTIAL HYPERTENSION: ICD-10-CM

## 2022-05-13 DIAGNOSIS — R73.01 IMPAIRED FASTING GLUCOSE: ICD-10-CM

## 2022-05-13 DIAGNOSIS — K21.9 GASTROESOPHAGEAL REFLUX DISEASE WITHOUT ESOPHAGITIS: ICD-10-CM

## 2022-05-13 DIAGNOSIS — K29.30 CHRONIC SUPERFICIAL GASTRITIS WITHOUT BLEEDING: ICD-10-CM

## 2022-05-13 DIAGNOSIS — R73.03 PREDIABETES: ICD-10-CM

## 2022-05-13 DIAGNOSIS — E78.5 HYPERLIPIDEMIA, UNSPECIFIED HYPERLIPIDEMIA TYPE: ICD-10-CM

## 2022-05-13 DIAGNOSIS — E03.9 HYPOTHYROIDISM, UNSPECIFIED TYPE: ICD-10-CM

## 2022-05-13 DIAGNOSIS — R53.82 CHRONIC FATIGUE: ICD-10-CM

## 2022-05-13 LAB
ALBUMIN SERPL-MCNC: 3.8 G/DL (ref 3.4–5)
ALBUMIN/GLOB SERPL: 1 {RATIO} (ref 1–2)
ALP LIVER SERPL-CCNC: 56 U/L
ALT SERPL-CCNC: 27 U/L
ANION GAP SERPL CALC-SCNC: 6 MMOL/L (ref 0–18)
AST SERPL-CCNC: 16 U/L (ref 15–37)
BASOPHILS # BLD AUTO: 0.06 X10(3) UL (ref 0–0.2)
BASOPHILS NFR BLD AUTO: 0.8 %
BILIRUB SERPL-MCNC: 0.5 MG/DL (ref 0.1–2)
BUN BLD-MCNC: 18 MG/DL (ref 7–18)
CALCIUM BLD-MCNC: 9.9 MG/DL (ref 8.5–10.1)
CHLORIDE SERPL-SCNC: 105 MMOL/L (ref 98–112)
CHOLEST SERPL-MCNC: 206 MG/DL (ref ?–200)
CO2 SERPL-SCNC: 29 MMOL/L (ref 21–32)
CREAT BLD-MCNC: 0.72 MG/DL
EOSINOPHIL # BLD AUTO: 0.31 X10(3) UL (ref 0–0.7)
EOSINOPHIL NFR BLD AUTO: 4 %
ERYTHROCYTE [DISTWIDTH] IN BLOOD BY AUTOMATED COUNT: 14 %
EST. AVERAGE GLUCOSE BLD GHB EST-MCNC: 123 MG/DL (ref 68–126)
FASTING PATIENT LIPID ANSWER: YES
FASTING STATUS PATIENT QL REPORTED: YES
GLOBULIN PLAS-MCNC: 3.7 G/DL (ref 2.8–4.4)
GLUCOSE BLD-MCNC: 104 MG/DL (ref 70–99)
HBA1C MFR BLD: 5.9 % (ref ?–5.7)
HCT VFR BLD AUTO: 43.6 %
HDLC SERPL-MCNC: 93 MG/DL (ref 40–59)
HGB BLD-MCNC: 14.4 G/DL
IMM GRANULOCYTES # BLD AUTO: 0.02 X10(3) UL (ref 0–1)
IMM GRANULOCYTES NFR BLD: 0.3 %
LDLC SERPL CALC-MCNC: 101 MG/DL (ref ?–100)
LYMPHOCYTES # BLD AUTO: 1.5 X10(3) UL (ref 1–4)
LYMPHOCYTES NFR BLD AUTO: 19.2 %
MCH RBC QN AUTO: 30.7 PG (ref 26–34)
MCHC RBC AUTO-ENTMCNC: 33 G/DL (ref 31–37)
MCV RBC AUTO: 93 FL
MONOCYTES # BLD AUTO: 0.55 X10(3) UL (ref 0.1–1)
MONOCYTES NFR BLD AUTO: 7 %
NEUTROPHILS # BLD AUTO: 5.37 X10 (3) UL (ref 1.5–7.7)
NEUTROPHILS # BLD AUTO: 5.37 X10(3) UL (ref 1.5–7.7)
NEUTROPHILS NFR BLD AUTO: 68.7 %
NONHDLC SERPL-MCNC: 113 MG/DL (ref ?–130)
OSMOLALITY SERPL CALC.SUM OF ELEC: 292 MOSM/KG (ref 275–295)
PLATELET # BLD AUTO: 267 10(3)UL (ref 150–450)
POTASSIUM SERPL-SCNC: 4 MMOL/L (ref 3.5–5.1)
PROT SERPL-MCNC: 7.5 G/DL (ref 6.4–8.2)
RBC # BLD AUTO: 4.69 X10(6)UL
SODIUM SERPL-SCNC: 140 MMOL/L (ref 136–145)
TRIGL SERPL-MCNC: 69 MG/DL (ref 30–149)
TSI SER-ACNC: 2.77 MIU/ML (ref 0.36–3.74)
VIT D+METAB SERPL-MCNC: 33.1 NG/ML (ref 30–100)
VLDLC SERPL CALC-MCNC: 11 MG/DL (ref 0–30)
WBC # BLD AUTO: 7.8 X10(3) UL (ref 4–11)

## 2022-05-13 PROCEDURE — 80053 COMPREHEN METABOLIC PANEL: CPT

## 2022-05-13 PROCEDURE — 83036 HEMOGLOBIN GLYCOSYLATED A1C: CPT

## 2022-05-13 PROCEDURE — 84443 ASSAY THYROID STIM HORMONE: CPT

## 2022-05-13 PROCEDURE — 85025 COMPLETE CBC W/AUTO DIFF WBC: CPT

## 2022-05-13 PROCEDURE — 80061 LIPID PANEL: CPT

## 2022-05-13 PROCEDURE — 36415 COLL VENOUS BLD VENIPUNCTURE: CPT

## 2022-05-13 PROCEDURE — 82306 VITAMIN D 25 HYDROXY: CPT

## 2022-05-26 NOTE — TELEPHONE ENCOUNTER
Refused Medication: BACLOFEN 20 MG Oral Tab     Patient has not been seen in over 1 year. Sent patient message via [address]

## 2022-05-27 RX ORDER — BACLOFEN 20 MG/1
TABLET ORAL
Qty: 180 TABLET | Refills: 3 | OUTPATIENT
Start: 2022-05-27

## 2022-06-07 DIAGNOSIS — I10 ESSENTIAL HYPERTENSION: ICD-10-CM

## 2022-06-08 RX ORDER — AMLODIPINE BESYLATE 5 MG/1
TABLET ORAL
Qty: 180 TABLET | Refills: 0 | Status: SHIPPED | OUTPATIENT
Start: 2022-06-08

## 2022-06-08 RX ORDER — TRIAMTERENE AND HYDROCHLOROTHIAZIDE 37.5; 25 MG/1; MG/1
TABLET ORAL
Qty: 90 TABLET | Refills: 0 | Status: SHIPPED | OUTPATIENT
Start: 2022-06-08

## 2022-06-08 RX ORDER — LEVOTHYROXINE SODIUM 0.03 MG/1
TABLET ORAL
Qty: 90 TABLET | Refills: 0 | Status: SHIPPED | OUTPATIENT
Start: 2022-06-08

## 2022-06-08 NOTE — TELEPHONE ENCOUNTER
Amlodipine 5 mg  Filled 4-1-22  Qty 180  0 refills  No upcoming appt. LOV 4-20-22    Levothyroxine 25 mg  Filled 4-1-22  Qty 90  0 refills  No upcoming appt. LOV 4-20-22    Triamterene-hydrochlorothiazide 37.5-25 mg  Filled 4-1-22  Qty 90  0 refills  No upcoming appt.    LOV 4-20-22

## 2022-06-28 ENCOUNTER — PATIENT MESSAGE (OUTPATIENT)
Dept: INTERNAL MEDICINE CLINIC | Facility: CLINIC | Age: 72
End: 2022-06-28

## 2022-06-28 DIAGNOSIS — M25.562 CHRONIC PAIN OF LEFT KNEE: Primary | ICD-10-CM

## 2022-06-28 DIAGNOSIS — G89.29 CHRONIC PAIN OF LEFT KNEE: Primary | ICD-10-CM

## 2022-06-28 NOTE — TELEPHONE ENCOUNTER
From: Alexandrea Yi  To: HUSSAIN Vázquez  Sent: 6/28/2022 3:56 PM CDT  Subject: Referral    Ronni James, can I please have a referral to see Dipti Gregory. I have an appointment scheduled on July 11th, to have an injection in my knee before I go to [facility].   Thank Juan Carlos Viera

## 2022-07-11 ENCOUNTER — OFFICE VISIT (OUTPATIENT)
Dept: ORTHOPEDICS CLINIC | Facility: CLINIC | Age: 72
End: 2022-07-11
Payer: MEDICARE

## 2022-07-11 VITALS — OXYGEN SATURATION: 98 % | HEART RATE: 72 BPM

## 2022-07-11 DIAGNOSIS — M17.12 PRIMARY OSTEOARTHRITIS OF LEFT KNEE: Primary | ICD-10-CM

## 2022-07-11 RX ORDER — TRIAMCINOLONE ACETONIDE 40 MG/ML
40 INJECTION, SUSPENSION INTRA-ARTICULAR; INTRAMUSCULAR ONCE
Status: COMPLETED | OUTPATIENT
Start: 2022-07-11 | End: 2022-07-11

## 2022-07-11 RX ADMIN — TRIAMCINOLONE ACETONIDE 40 MG: 40 INJECTION, SUSPENSION INTRA-ARTICULAR; INTRAMUSCULAR at 11:00:00

## 2022-07-11 NOTE — PROCEDURES
Patient has been getting excellent relief of her knee pain for 3 months from the injection most recently performed in April, however she may want to consider knee replacement surgery. She is going to follow-up in 3 months, may repeat injection if she is doing very well from this most recent injection, or may consider scheduling knee replacement if effectiveness is diminishing. Did review risks benefits, went over knee replacement on the knee replacement model. After informed consent, the patient's left knee was marked, locally anesthetized with skin refrigerant, prepped with topical antiseptic, and injected with a mixture of 1mL 40mg/mL Kenalog, 2mL 1% lidocaine and 2mL 0.5% marcaine through the inferolateral portal.  A band-aid was applied. The patient tolerated the procedure well.     Frederick Frye MD, [address] Orthopedic Surgery  Phone [phone]  Fax [phone]

## 2022-07-12 ENCOUNTER — OFFICE VISIT (OUTPATIENT)
Dept: INTERNAL MEDICINE CLINIC | Facility: CLINIC | Age: 72
End: 2022-07-12
Payer: MEDICARE

## 2022-07-12 VITALS
TEMPERATURE: 98 F | RESPIRATION RATE: 16 BRPM | HEART RATE: 68 BPM | HEIGHT: 64 IN | BODY MASS INDEX: 33.63 KG/M2 | DIASTOLIC BLOOD PRESSURE: 72 MMHG | WEIGHT: 197 LBS | SYSTOLIC BLOOD PRESSURE: 126 MMHG

## 2022-07-12 DIAGNOSIS — M79.89 SWOLLEN FEET: ICD-10-CM

## 2022-07-12 DIAGNOSIS — R22.41 LOCALIZED SWELLING OF RIGHT LOWER LEG: ICD-10-CM

## 2022-07-12 DIAGNOSIS — R22.42 LOCALIZED SWELLING OF LEFT LOWER LEG: Primary | ICD-10-CM

## 2022-07-12 PROCEDURE — 3074F SYST BP LT 130 MM HG: CPT | Performed by: FAMILY MEDICINE

## 2022-07-12 PROCEDURE — 3078F DIAST BP <80 MM HG: CPT | Performed by: FAMILY MEDICINE

## 2022-07-12 PROCEDURE — 3008F BODY MASS INDEX DOCD: CPT | Performed by: FAMILY MEDICINE

## 2022-07-12 PROCEDURE — 99213 OFFICE O/P EST LOW 20 MIN: CPT | Performed by: FAMILY MEDICINE

## 2022-07-13 DIAGNOSIS — F33.0 MILD RECURRENT MAJOR DEPRESSION (HCC): ICD-10-CM

## 2022-07-13 DIAGNOSIS — G89.29 CHRONIC PAIN OF LEFT KNEE: ICD-10-CM

## 2022-07-13 DIAGNOSIS — M25.562 CHRONIC PAIN OF LEFT KNEE: ICD-10-CM

## 2022-07-13 DIAGNOSIS — M47.812 FACET ARTHRITIS OF CERVICAL REGION: ICD-10-CM

## 2022-07-13 DIAGNOSIS — M48.061 SPINAL STENOSIS OF LUMBAR REGION, UNSPECIFIED WHETHER NEUROGENIC CLAUDICATION PRESENT: ICD-10-CM

## 2022-07-13 DIAGNOSIS — M46.92 UNSPECIFIED INFLAMMATORY SPONDYLOPATHY, CERVICAL REGION (HCC): ICD-10-CM

## 2022-07-13 DIAGNOSIS — M47.816 FACET ARTHRITIS OF LUMBAR REGION: ICD-10-CM

## 2022-07-13 DIAGNOSIS — F41.9 ANXIETY: ICD-10-CM

## 2022-07-13 RX ORDER — CELECOXIB 200 MG/1
200 CAPSULE ORAL DAILY
Qty: 90 CAPSULE | Refills: 0 | Status: SHIPPED | OUTPATIENT
Start: 2022-07-13

## 2022-07-13 RX ORDER — ESCITALOPRAM OXALATE 20 MG/1
20 TABLET ORAL DAILY
Qty: 90 TABLET | Refills: 0 | Status: SHIPPED | OUTPATIENT
Start: 2022-07-13

## 2022-07-13 NOTE — TELEPHONE ENCOUNTER
Escitalopram 20 mg  Filled 4-20-22  Qty 90  0 refills  No upcoming appt. LOV 4-20-22    Celecoxib 200 mg  Filled 4-20-22  Qty 90  0 refills  No upcoming appt.   LOV 4-20-22

## 2022-08-08 ENCOUNTER — TELEPHONE (OUTPATIENT)
Dept: INTERNAL MEDICINE CLINIC | Facility: CLINIC | Age: 72
End: 2022-08-08

## 2022-08-08 ENCOUNTER — HOSPITAL ENCOUNTER (OUTPATIENT)
Dept: ULTRASOUND IMAGING | Age: 72
Discharge: HOME | End: 2022-08-08
Attending: FAMILY MEDICINE
Payer: MEDICARE

## 2022-08-08 DIAGNOSIS — M79.89 SWOLLEN FEET: ICD-10-CM

## 2022-08-08 DIAGNOSIS — R22.42 LOCALIZED SWELLING OF LEFT LOWER LEG: ICD-10-CM

## 2022-08-08 DIAGNOSIS — R22.41 LOCALIZED SWELLING OF RIGHT LOWER LEG: ICD-10-CM

## 2022-08-08 PROCEDURE — 93970 EXTREMITY STUDY: CPT | Performed by: FAMILY MEDICINE

## 2022-08-08 NOTE — TELEPHONE ENCOUNTER
Call received from imaging department, spoke to [facility] who wanted to clarify US order. Discussed with SM that she is seeking venous insufficiency, not venous blood flow which is looking for clots. [facility] thankful for the assistance.

## 2022-08-11 ENCOUNTER — TELEPHONE (OUTPATIENT)
Dept: INTERNAL MEDICINE CLINIC | Facility: CLINIC | Age: 72
End: 2022-08-11

## 2022-08-11 DIAGNOSIS — R09.89 VEIN SYMPTOM: Primary | ICD-10-CM

## 2022-08-11 NOTE — TELEPHONE ENCOUNTER
Referral for Dr. Edith Torrez placed per [facility] written order. See US result note from 8/8/22.

## 2022-08-11 NOTE — TELEPHONE ENCOUNTER
----- Message from HUSSAIN Valentine sent at 8/10/2022  1:16 PM CDT -----  There is reversal of flow suggesting valvular incompetence in the right greater saphenous vein in the distal thigh. There is moderate reflux noted. Dr. Emily Marcelino would like Pt to see Dr. Arabella Mtz for the vein issue. Pt also has a large right popliteal Baker's cyst. If it is bothering her she can see Ortho/Dr. Georges Barlow for this.

## 2022-08-17 ENCOUNTER — OFFICE VISIT (OUTPATIENT)
Facility: LOCATION | Age: 72
End: 2022-08-17
Payer: MEDICARE

## 2022-08-17 VITALS — TEMPERATURE: 99 F | HEART RATE: 89 BPM

## 2022-08-17 DIAGNOSIS — M79.89 LEG SWELLING: Primary | ICD-10-CM

## 2022-08-17 DIAGNOSIS — I87.2 VENOUS INSUFFICIENCY OF RIGHT LEG: ICD-10-CM

## 2022-08-19 ENCOUNTER — HOSPITAL ENCOUNTER (OUTPATIENT)
Dept: MAMMOGRAPHY | Age: 72
Discharge: HOME | End: 2022-08-19
Attending: FAMILY MEDICINE
Payer: MEDICARE

## 2022-08-19 DIAGNOSIS — Z12.31 ENCOUNTER FOR SCREENING MAMMOGRAM FOR BREAST CANCER: ICD-10-CM

## 2022-08-19 PROCEDURE — 77063 BREAST TOMOSYNTHESIS BI: CPT | Performed by: FAMILY MEDICINE

## 2022-08-19 PROCEDURE — 77067 SCR MAMMO BI INCL CAD: CPT | Performed by: FAMILY MEDICINE

## 2022-08-24 ENCOUNTER — TELEPHONE (OUTPATIENT)
Dept: INTERNAL MEDICINE CLINIC | Facility: CLINIC | Age: 72
End: 2022-08-24

## 2022-09-14 ENCOUNTER — PATIENT MESSAGE (OUTPATIENT)
Dept: INTERNAL MEDICINE CLINIC | Facility: CLINIC | Age: 72
End: 2022-09-14

## 2022-09-15 NOTE — TELEPHONE ENCOUNTER
From: Kosta Silver  To: HUSSAIN Lucio  Sent: 9/14/2022 6:08 PM CDT  Subject: referral seeing Dr. Eda Pantoja,  I need to see Dr. Celso Sagastume. I think I have a boil /cyst on my inner thigh. The problem is, it drains for a few days then appears to be healing. This has been a repeating process for two months.    Thank you,  Abida Contreras

## 2022-09-28 DIAGNOSIS — E78.5 HYPERLIPIDEMIA, UNSPECIFIED HYPERLIPIDEMIA TYPE: ICD-10-CM

## 2022-09-28 RX ORDER — ATORVASTATIN CALCIUM 20 MG/1
TABLET, FILM COATED ORAL
Qty: 90 TABLET | Refills: 0 | Status: SHIPPED | OUTPATIENT
Start: 2022-09-28

## 2022-10-03 ENCOUNTER — OFFICE VISIT (OUTPATIENT)
Dept: ORTHOPEDICS CLINIC | Facility: CLINIC | Age: 72
End: 2022-10-03
Payer: MEDICARE

## 2022-10-03 ENCOUNTER — TELEPHONE (OUTPATIENT)
Dept: ORTHOPEDICS CLINIC | Facility: CLINIC | Age: 72
End: 2022-10-03

## 2022-10-03 ENCOUNTER — HOSPITAL ENCOUNTER (OUTPATIENT)
Dept: GENERAL RADIOLOGY | Age: 72
Discharge: HOME | End: 2022-10-03
Attending: ORTHOPAEDIC SURGERY
Payer: MEDICARE

## 2022-10-03 VITALS — HEIGHT: 64.5 IN | HEART RATE: 74 BPM | BODY MASS INDEX: 33.36 KG/M2 | OXYGEN SATURATION: 99 % | WEIGHT: 197.81 LBS

## 2022-10-03 DIAGNOSIS — M70.62 TROCHANTERIC BURSITIS OF LEFT HIP: ICD-10-CM

## 2022-10-03 DIAGNOSIS — M17.12 PRIMARY OSTEOARTHRITIS OF LEFT KNEE: Primary | ICD-10-CM

## 2022-10-03 DIAGNOSIS — M17.12 PRIMARY OSTEOARTHRITIS OF LEFT KNEE: ICD-10-CM

## 2022-10-03 PROCEDURE — 73562 X-RAY EXAM OF KNEE 3: CPT | Performed by: ORTHOPAEDIC SURGERY

## 2022-10-03 PROCEDURE — 77073 BONE LENGTH STUDIES: CPT | Performed by: ORTHOPAEDIC SURGERY

## 2022-10-03 RX ORDER — TRIAMCINOLONE ACETONIDE 40 MG/ML
40 INJECTION, SUSPENSION INTRA-ARTICULAR; INTRAMUSCULAR ONCE
Status: COMPLETED | OUTPATIENT
Start: 2022-10-03 | End: 2022-10-03

## 2022-10-03 RX ADMIN — TRIAMCINOLONE ACETONIDE 40 MG: 40 INJECTION, SUSPENSION INTRA-ARTICULAR; INTRAMUSCULAR at 09:56:00

## 2022-10-03 NOTE — PROGRESS NOTES
[facility] Progress Note    Subjective: Patient returns for left knee, also her left thigh and hip. From the knee standpoint she states that the injections are not providing the relief that she needs anymore. She is considering knee replacement surgery. She would like to do this in December. She is also noticed new onset pain around her left hip, points near the troches bursa and iliac crest on the left. She states that the symptoms do radiate down the thigh towards the knee. This is become more bothersome recently, she has begun walking with a cane due to them. Objective: Patient is very pleasant, awake and alert. She ambulates with a cane on the right side. She has some tenderness to palpation over the greater trochanter, reproduces some of her pain complaint. There is no tenderness down the IT band. She has knee range of motion from full extension, flexion to around 110. Imaging: Updated x-rays of the left knee personally viewed, independently interpreted and radiology report read. Demonstrates severe valgus osteoarthritis with complete loss of lateral joint space, osteophyte formation. Assessment/Plan: 77-year-old female with symptomatic radiographically severe left knee valgus osteoarthritis. The patient has reasonably attempted nonsurgical treatments as mentioned above and remains limited in activities of daily living secondary to pain from knee arthritis. I discussed risks and benefits of surgery, with risks including but not limited to infection, blood clots, fracture, bleeding/need for blood transfusion, injury to blood vessels and nerves, loosening or failure of components, stiffness, continued pain, need for further intervention or revision surgery.   Additionally I discussed expectations with regards to the postoperative recovery timeframe, the possibility of mechanical clicking with the knee, numbness on the lateral side of the knee/leg from sacrifice of the infrapatellar branch of the saphenous nerve, and potential for difficulty/pain with kneeling and performing deep flexion activities. The patient understands this discussion and elects to proceed with knee replacement surgery. We did review a binder with her today. She will need to see her primary care doctor for preoperative valuation within 30 days of surgery. X-rays were updated today as well. We also discussed treatment for bursitis/IT band tendinitis. Potentially this is contributed to by her severe valgus osteoarthritis of the knee and patellofemoral arthritis. We did discuss treatment options with therapy and injection. She would like to proceed with the injection today and this was performed in clinic today.     Luke Robledo MD, [address] Orthopedic Surgery  Phone [phone]  Fax [phone]

## 2022-10-03 NOTE — PROGRESS NOTES
Surgery Scheduling Sheet for Total Knee Arthroplasty  Lamin Hayes  3/21/1950    Procedure: Left total knee arthroplasty  Diagnosis: Left knee osteoarthritis   CPT Code: 55524  Anesthesia: Choice  Length of Surgery: 2 hours  Instruments: Medacta TKA   Assist: If case scheduled on Thurs/Fri, then Edna Ram first assist.  If case scheduled on Tues, then Desiree Contreras ([phone]) first assist. If Sierra Pinna unavailable, then [facility] ([phone]) for first assist. If Sierra Pinna and Bogdan Ruth unavailable, then contact [facility] for first assist.    TESTING NEEDED  PAT: MRSA/MSSA, Covid, CBC, CMP, INR, PTT, T&S  Medical Clearance: Yes  Cardiac Clearance: No    Dental Clearance: No      MEDICAL HISTORY   Pacemaker:   Verena Given Allergy:   Latex Allergy:   Anticoagulants:   Medication Allergy:   Pre-Op sent to PCP:     Case Number:  Authorization Number:   PA Completed:   Home Health Order:   Care Partner:     Date of Surgery:   Post op Follow up:     Laura Braxton MD, [address] [facility]  Phone [phone]  Fax [phone]

## 2022-10-03 NOTE — PROCEDURES
After informed consent, the patient's left hip was marked laterally over the greater trochanter, locally anesthetized with skin refrigerant, prepped with topical antiseptic, and injected with a mixture of 1mL 40mg/mL Kenalog, 2mL 1% lidocaine and 2mL 0.5% marcaine directly into the greater trochanteric bursa. A band-aid was applied. The patient tolerated the procedure well.     Iwona Denney MD, [address] [place] [facility]  Phone [phone]  Fax [phone]

## 2022-10-04 NOTE — TELEPHONE ENCOUNTER
Surgeon: Dr. Tio Moses    Date of Surgery: 12/9/22     Post Op Appt: 12/21/22 WITH CORAL     Prior Authorization:   Inpatient or Outpatient: INPATIENT   Facility: [facility]  Work Comp: NO  CPT: 34551  DX: M17.12    Surgical Assistant: Rohit Rossi    Preadmission Testing: PER ANESTHESIA GUIDELINES     Pre-Op Clearance Requested: HISTORY AND PHYSICAL and MEDICAL CLEARANCE    DME:  NONE NEEDED    Rehab Services Ordered: HOME HEALTH     Disability Paperwork: NO    On [place] Calendar: NONE    Notes: LEFT TOTAL KNEE ARTHROPLASTY -[facility] TKA

## 2022-10-05 ENCOUNTER — TELEPHONE (OUTPATIENT)
Dept: INTERNAL MEDICINE CLINIC | Facility: CLINIC | Age: 72
End: 2022-10-05

## 2022-10-05 DIAGNOSIS — Z01.818 PREOP EXAMINATION: Primary | ICD-10-CM

## 2022-10-05 NOTE — TELEPHONE ENCOUNTER
Pt scheduled pre op appointment - please place orders required by surgeon office     Future Appointments   Date Time Provider Keeley Newtoni   10/28/2022 10:30 AM Bubba Zeng MD [facility]   11/15/2022  9:00 AM HUSSAIN Wei [facility]   12/21/2022 11:30 AM Iva Dyson PA-C [facility]   5/8/2023 10:00 AM Chip Meadows MD [facility] 61

## 2022-10-05 NOTE — TELEPHONE ENCOUNTER
Incoming fax from [facility]     Patient is having left total knee arthroplasty done on 12/9/2022    Patient needs the following 2-3 weeks prior to surgery:    H&P  Medical Clearance  CBC w/ diff  CMP  MRSA/MSSA  PT/INR  PTT  Type and screen      Placed paperwork in upcoming appointments folder on [facility] desk please assist in scheduling Pre-op appointment closer to surgery      Fax pre-op paperwork to [phone] and [phone] when completed

## 2022-10-17 NOTE — Clinical Note
FYI: ROSE completed. NCM attempted to schedule ER follow up, patient declined will call office. TE to PCP office re: appointment. Thank you.   Infliximab Counseling:  I discussed with the patient the risks of infliximab including but not limited to myelosuppression, immunosuppression, autoimmune hepatitis, demyelinating diseases, lymphoma, and serious infections.  The patient understands that monitoring is required including a PPD at baseline and must alert us or the primary physician if symptoms of infection or other concerning signs are noted.

## 2022-11-15 ENCOUNTER — TELEPHONE (OUTPATIENT)
Dept: INTERNAL MEDICINE CLINIC | Facility: CLINIC | Age: 72
End: 2022-11-15

## 2022-11-15 ENCOUNTER — OFFICE VISIT (OUTPATIENT)
Dept: INTERNAL MEDICINE CLINIC | Facility: CLINIC | Age: 72
End: 2022-11-15
Payer: MEDICARE

## 2022-11-15 ENCOUNTER — LAB ENCOUNTER (OUTPATIENT)
Dept: LAB | Age: 72
End: 2022-11-15
Attending: FAMILY MEDICINE
Payer: MEDICARE

## 2022-11-15 VITALS
HEIGHT: 64.5 IN | BODY MASS INDEX: 33.22 KG/M2 | HEART RATE: 80 BPM | DIASTOLIC BLOOD PRESSURE: 76 MMHG | SYSTOLIC BLOOD PRESSURE: 128 MMHG | RESPIRATION RATE: 14 BRPM | TEMPERATURE: 98 F | WEIGHT: 197 LBS

## 2022-11-15 DIAGNOSIS — M17.12 OSTEOARTHRITIS OF LEFT KNEE, UNSPECIFIED OSTEOARTHRITIS TYPE: ICD-10-CM

## 2022-11-15 DIAGNOSIS — E83.52 SERUM CALCIUM ELEVATED: Primary | ICD-10-CM

## 2022-11-15 DIAGNOSIS — Z01.818 PRE-OP EXAMINATION: Primary | ICD-10-CM

## 2022-11-15 LAB
ALBUMIN SERPL-MCNC: 3.9 G/DL (ref 3.4–5)
ALBUMIN/GLOB SERPL: 1.1 {RATIO} (ref 1–2)
ALP LIVER SERPL-CCNC: 67 U/L
ALT SERPL-CCNC: 30 U/L
ANION GAP SERPL CALC-SCNC: 6 MMOL/L (ref 0–18)
ANTIBODY SCREEN: NEGATIVE
APTT PPP: 26.6 SECONDS (ref 23.3–35.6)
AST SERPL-CCNC: 16 U/L (ref 15–37)
BASOPHILS # BLD AUTO: 0.05 X10(3) UL (ref 0–0.2)
BASOPHILS NFR BLD AUTO: 0.6 %
BILIRUB SERPL-MCNC: 0.5 MG/DL (ref 0.1–2)
BUN BLD-MCNC: 21 MG/DL (ref 7–18)
CALCIUM BLD-MCNC: 10.4 MG/DL (ref 8.5–10.1)
CHLORIDE SERPL-SCNC: 104 MMOL/L (ref 98–112)
CO2 SERPL-SCNC: 31 MMOL/L (ref 21–32)
CREAT BLD-MCNC: 0.72 MG/DL
EOSINOPHIL # BLD AUTO: 0.22 X10(3) UL (ref 0–0.7)
EOSINOPHIL NFR BLD AUTO: 2.6 %
ERYTHROCYTE [DISTWIDTH] IN BLOOD BY AUTOMATED COUNT: 13.2 %
FASTING STATUS PATIENT QL REPORTED: YES
GFR SERPLBLD BASED ON 1.73 SQ M-ARVRAT: 89 ML/MIN/1.73M2 (ref 60–?)
GLOBULIN PLAS-MCNC: 3.4 G/DL (ref 2.8–4.4)
GLUCOSE BLD-MCNC: 126 MG/DL (ref 70–99)
HCT VFR BLD AUTO: 45.1 %
HGB BLD-MCNC: 14.6 G/DL
IMM GRANULOCYTES # BLD AUTO: 0.03 X10(3) UL (ref 0–1)
IMM GRANULOCYTES NFR BLD: 0.4 %
INR BLD: 0.93 (ref 0.85–1.16)
LYMPHOCYTES # BLD AUTO: 1.34 X10(3) UL (ref 1–4)
LYMPHOCYTES NFR BLD AUTO: 15.8 %
MCH RBC QN AUTO: 30.7 PG (ref 26–34)
MCHC RBC AUTO-ENTMCNC: 32.4 G/DL (ref 31–37)
MCV RBC AUTO: 94.7 FL
MONOCYTES # BLD AUTO: 0.66 X10(3) UL (ref 0.1–1)
MONOCYTES NFR BLD AUTO: 7.8 %
NEUTROPHILS # BLD AUTO: 6.19 X10 (3) UL (ref 1.5–7.7)
NEUTROPHILS # BLD AUTO: 6.19 X10(3) UL (ref 1.5–7.7)
NEUTROPHILS NFR BLD AUTO: 72.8 %
OSMOLALITY SERPL CALC.SUM OF ELEC: 297 MOSM/KG (ref 275–295)
PLATELET # BLD AUTO: 334 10(3)UL (ref 150–450)
POTASSIUM SERPL-SCNC: 4.5 MMOL/L (ref 3.5–5.1)
PROT SERPL-MCNC: 7.3 G/DL (ref 6.4–8.2)
PROTHROMBIN TIME: 12.5 SECONDS (ref 11.6–14.8)
RBC # BLD AUTO: 4.76 X10(6)UL
RH BLOOD TYPE: POSITIVE
SODIUM SERPL-SCNC: 141 MMOL/L (ref 136–145)
WBC # BLD AUTO: 8.5 X10(3) UL (ref 4–11)

## 2022-11-15 PROCEDURE — 3074F SYST BP LT 130 MM HG: CPT | Performed by: FAMILY MEDICINE

## 2022-11-15 PROCEDURE — 85610 PROTHROMBIN TIME: CPT | Performed by: FAMILY MEDICINE

## 2022-11-15 PROCEDURE — 87081 CULTURE SCREEN ONLY: CPT | Performed by: FAMILY MEDICINE

## 2022-11-15 PROCEDURE — 86901 BLOOD TYPING SEROLOGIC RH(D): CPT | Performed by: FAMILY MEDICINE

## 2022-11-15 PROCEDURE — 86850 RBC ANTIBODY SCREEN: CPT | Performed by: FAMILY MEDICINE

## 2022-11-15 PROCEDURE — 86900 BLOOD TYPING SEROLOGIC ABO: CPT | Performed by: FAMILY MEDICINE

## 2022-11-15 PROCEDURE — 85730 THROMBOPLASTIN TIME PARTIAL: CPT | Performed by: FAMILY MEDICINE

## 2022-11-15 PROCEDURE — 3008F BODY MASS INDEX DOCD: CPT | Performed by: FAMILY MEDICINE

## 2022-11-15 PROCEDURE — 80053 COMPREHEN METABOLIC PANEL: CPT | Performed by: FAMILY MEDICINE

## 2022-11-15 PROCEDURE — 99214 OFFICE O/P EST MOD 30 MIN: CPT | Performed by: FAMILY MEDICINE

## 2022-11-15 PROCEDURE — 36415 COLL VENOUS BLD VENIPUNCTURE: CPT | Performed by: FAMILY MEDICINE

## 2022-11-15 PROCEDURE — 85025 COMPLETE CBC W/AUTO DIFF WBC: CPT | Performed by: FAMILY MEDICINE

## 2022-11-15 PROCEDURE — 3078F DIAST BP <80 MM HG: CPT | Performed by: FAMILY MEDICINE

## 2022-11-22 ENCOUNTER — TELEPHONE (OUTPATIENT)
Dept: INTERNAL MEDICINE CLINIC | Facility: CLINIC | Age: 72
End: 2022-11-22

## 2022-11-28 ENCOUNTER — TELEPHONE (OUTPATIENT)
Dept: ORTHOPEDICS CLINIC | Facility: CLINIC | Age: 72
End: 2022-11-28

## 2022-11-28 NOTE — TELEPHONE ENCOUNTER
Patient would like to request if she can have a Homehealth Therapy instead because she has multiple scoliosis and afraid of the snow. Please advise. Thanks. Patient would like to get an update.     Patient can be reached at [phone]

## 2022-11-28 NOTE — TELEPHONE ENCOUNTER
INFORMED PATIENT HOME HEALTH WILL BE ORDERED FOR 2 WEEKS POST OPERATIVELY. PATIENT STATED SHE WOULD PREFER TO CONTINUE HOME HEALTH AFTER THE 2 WEEKS DUE TO HER MS.     INFORMED PATIENT AT HER 2 WEEK POST OP APPOINTMENT DR. Phoebe Grimm WILL DISCUSS THE BEST PLAN OF CARE AFTER HOME HEALTH. PATIENT STATED UNDERSTANDING AND WAS APPRECIATIVE FOR OUR CALL.

## 2022-11-28 NOTE — TELEPHONE ENCOUNTER
Patient is returning Chiara's call, she said to call her back regarding some questiuon about surgery. Please advise. Thanks.     Patient can be reached at [phone]

## 2022-11-28 NOTE — TELEPHONE ENCOUNTER
LVM RETURN CALL TO ADDRESS ALL PRE OP CONCERNS AND QUESTIONS. SURGERY SCHEDULER'S DIRECT NUMBER GIVEN.

## 2022-11-30 DIAGNOSIS — M47.812 FACET ARTHRITIS OF CERVICAL REGION: ICD-10-CM

## 2022-11-30 DIAGNOSIS — F41.9 ANXIETY: ICD-10-CM

## 2022-11-30 DIAGNOSIS — M48.061 SPINAL STENOSIS OF LUMBAR REGION, UNSPECIFIED WHETHER NEUROGENIC CLAUDICATION PRESENT: ICD-10-CM

## 2022-11-30 DIAGNOSIS — G89.29 CHRONIC PAIN OF LEFT KNEE: ICD-10-CM

## 2022-11-30 DIAGNOSIS — F33.0 MILD RECURRENT MAJOR DEPRESSION (HCC): ICD-10-CM

## 2022-11-30 DIAGNOSIS — M46.92 UNSPECIFIED INFLAMMATORY SPONDYLOPATHY, CERVICAL REGION (HCC): ICD-10-CM

## 2022-11-30 DIAGNOSIS — M25.562 CHRONIC PAIN OF LEFT KNEE: ICD-10-CM

## 2022-11-30 DIAGNOSIS — M47.816 FACET ARTHRITIS OF LUMBAR REGION: ICD-10-CM

## 2022-12-01 RX ORDER — CELECOXIB 200 MG/1
CAPSULE ORAL
Qty: 90 CAPSULE | Refills: 0 | Status: SHIPPED | OUTPATIENT
Start: 2022-12-01

## 2022-12-01 RX ORDER — ESCITALOPRAM OXALATE 20 MG/1
TABLET ORAL
Qty: 90 TABLET | Refills: 0 | Status: SHIPPED | OUTPATIENT
Start: 2022-12-01

## 2022-12-06 ENCOUNTER — ORDER TRANSCRIPTION (OUTPATIENT)
Dept: PHYSICAL THERAPY | Facility: HOSPITAL | Age: 72
End: 2022-12-06

## 2022-12-06 DIAGNOSIS — M17.12 PRIMARY OSTEOARTHRITIS OF LEFT KNEE: Primary | ICD-10-CM

## 2022-12-07 ENCOUNTER — TELEPHONE (OUTPATIENT)
Dept: PHYSICAL THERAPY | Facility: HOSPITAL | Age: 72
End: 2022-12-07

## 2022-12-07 ENCOUNTER — LAB ENCOUNTER (OUTPATIENT)
Dept: LAB | Age: 72
End: 2022-12-07
Attending: ORTHOPAEDIC SURGERY
Payer: MEDICARE

## 2022-12-07 ENCOUNTER — EKG ENCOUNTER (OUTPATIENT)
Dept: LAB | Age: 72
End: 2022-12-07
Attending: ORTHOPAEDIC SURGERY
Payer: MEDICARE

## 2022-12-07 DIAGNOSIS — Z01.818 PRE-OP TESTING: ICD-10-CM

## 2022-12-07 PROCEDURE — 93005 ELECTROCARDIOGRAM TRACING: CPT

## 2022-12-07 PROCEDURE — 93010 ELECTROCARDIOGRAM REPORT: CPT | Performed by: INTERNAL MEDICINE

## 2022-12-08 LAB
ATRIAL RATE: 67 BPM
P AXIS: -17 DEGREES
P-R INTERVAL: 192 MS
Q-T INTERVAL: 424 MS
QRS DURATION: 80 MS
QTC CALCULATION (BEZET): 448 MS
R AXIS: 3 DEGREES
SARS-COV-2 RNA RESP QL NAA+PROBE: NOT DETECTED
T AXIS: 44 DEGREES
VENTRICULAR RATE: 67 BPM

## 2022-12-09 ENCOUNTER — ANESTHESIA (OUTPATIENT)
Dept: SURGERY | Facility: HOSPITAL | Age: 72
End: 2022-12-09
Payer: MEDICARE

## 2022-12-09 ENCOUNTER — APPOINTMENT (OUTPATIENT)
Dept: GENERAL RADIOLOGY | Facility: HOSPITAL | Age: 72
End: 2022-12-09
Attending: PHYSICIAN ASSISTANT
Payer: MEDICARE

## 2022-12-09 ENCOUNTER — ANESTHESIA EVENT (OUTPATIENT)
Dept: SURGERY | Facility: HOSPITAL | Age: 72
End: 2022-12-09
Payer: MEDICARE

## 2022-12-09 ENCOUNTER — HOSPITAL ENCOUNTER (INPATIENT)
Facility: HOSPITAL | Age: 72
LOS: 1 days | Discharge: HOME | End: 2022-12-10
Attending: ORTHOPAEDIC SURGERY | Admitting: ORTHOPAEDIC SURGERY
Payer: MEDICARE

## 2022-12-09 DIAGNOSIS — Z01.818 PRE-OP TESTING: Primary | ICD-10-CM

## 2022-12-09 DIAGNOSIS — M17.12 PRIMARY OSTEOARTHRITIS OF LEFT KNEE: ICD-10-CM

## 2022-12-09 PROCEDURE — 73560 X-RAY EXAM OF KNEE 1 OR 2: CPT | Performed by: PHYSICIAN ASSISTANT

## 2022-12-09 PROCEDURE — 99223 1ST HOSP IP/OBS HIGH 75: CPT | Performed by: INTERNAL MEDICINE

## 2022-12-09 PROCEDURE — 3E0T3BZ INTRODUCTION OF ANESTHETIC AGENT INTO PERIPHERAL NERVES AND PLEXI, PERCUTANEOUS APPROACH: ICD-10-PCS | Performed by: STUDENT IN AN ORGANIZED HEALTH CARE EDUCATION/TRAINING PROGRAM

## 2022-12-09 PROCEDURE — 76942 ECHO GUIDE FOR BIOPSY: CPT | Performed by: STUDENT IN AN ORGANIZED HEALTH CARE EDUCATION/TRAINING PROGRAM

## 2022-12-09 PROCEDURE — 0SRD0J9 REPLACEMENT OF LEFT KNEE JOINT WITH SYNTHETIC SUBSTITUTE, CEMENTED, OPEN APPROACH: ICD-10-PCS | Performed by: ORTHOPAEDIC SURGERY

## 2022-12-09 PROCEDURE — 3E0T33Z INTRODUCTION OF ANTI-INFLAMMATORY INTO PERIPHERAL NERVES AND PLEXI, PERCUTANEOUS APPROACH: ICD-10-PCS | Performed by: STUDENT IN AN ORGANIZED HEALTH CARE EDUCATION/TRAINING PROGRAM

## 2022-12-09 DEVICE — FIXED TIBIAL TRAY CEMENTED LEFT, SIZE 2
Type: IMPLANTABLE DEVICE | Site: KNEE | Status: FUNCTIONAL
Brand: GMK PRIMARY TOTAL KNEE SYSTEM

## 2022-12-09 DEVICE — TIBIAL INSERT FIXED SPHERE FLEX  #2/11 MM L
Type: IMPLANTABLE DEVICE | Site: KNEE | Status: FUNCTIONAL
Brand: GMK SPHERE TOTAL KNEE SYSTEM

## 2022-12-09 DEVICE — RESURFACING PATELLA SIZE 1
Type: IMPLANTABLE DEVICE | Site: KNEE | Status: FUNCTIONAL
Brand: GMK PRIMARY TOTAL KNEE SYSTEM

## 2022-12-09 DEVICE — IMPLANTABLE DEVICE
Type: IMPLANTABLE DEVICE | Site: KNEE | Status: FUNCTIONAL
Brand: BIOMET® BONE CEMENT R

## 2022-12-09 RX ORDER — ACETAMINOPHEN 500 MG
1000 TABLET ORAL ONCE
Status: DISCONTINUED | OUTPATIENT
Start: 2022-12-09 | End: 2022-12-09 | Stop reason: HOSPADM

## 2022-12-09 RX ORDER — TRIAMTERENE AND HYDROCHLOROTHIAZIDE 37.5; 25 MG/1; MG/1
1 TABLET ORAL DAILY
Status: DISCONTINUED | OUTPATIENT
Start: 2022-12-10 | End: 2022-12-09 | Stop reason: SDUPTHER

## 2022-12-09 RX ORDER — DIPHENHYDRAMINE HCL 25 MG
25 CAPSULE ORAL EVERY 4 HOURS PRN
Status: DISCONTINUED | OUTPATIENT
Start: 2022-12-09 | End: 2022-12-10

## 2022-12-09 RX ORDER — ACETAMINOPHEN 325 MG/1
TABLET ORAL
Status: COMPLETED
Start: 2022-12-09 | End: 2022-12-09

## 2022-12-09 RX ORDER — MIDAZOLAM HYDROCHLORIDE 1 MG/ML
INJECTION INTRAMUSCULAR; INTRAVENOUS AS NEEDED
Status: DISCONTINUED | OUTPATIENT
Start: 2022-12-09 | End: 2022-12-09 | Stop reason: SURG

## 2022-12-09 RX ORDER — AMLODIPINE BESYLATE 5 MG/1
10 TABLET ORAL DAILY
COMMUNITY

## 2022-12-09 RX ORDER — DIPHENHYDRAMINE HYDROCHLORIDE 50 MG/ML
12.5 INJECTION INTRAMUSCULAR; INTRAVENOUS EVERY 4 HOURS PRN
Status: DISCONTINUED | OUTPATIENT
Start: 2022-12-09 | End: 2022-12-10

## 2022-12-09 RX ORDER — ATORVASTATIN CALCIUM 20 MG/1
20 TABLET, FILM COATED ORAL NIGHTLY
COMMUNITY

## 2022-12-09 RX ORDER — TRAMADOL HYDROCHLORIDE 50 MG/1
50 TABLET ORAL EVERY 12 HOURS SCHEDULED
Status: DISCONTINUED | OUTPATIENT
Start: 2022-12-09 | End: 2022-12-10

## 2022-12-09 RX ORDER — CEFAZOLIN SODIUM/WATER 2 G/20 ML
2 SYRINGE (ML) INTRAVENOUS ONCE
Status: COMPLETED | OUTPATIENT
Start: 2022-12-09 | End: 2022-12-09

## 2022-12-09 RX ORDER — BUPRENORPHINE HYDROCHLORIDE 0.32 MG/ML
INJECTION INTRAMUSCULAR; INTRAVENOUS AS NEEDED
Status: DISCONTINUED | OUTPATIENT
Start: 2022-12-09 | End: 2022-12-09 | Stop reason: SURG

## 2022-12-09 RX ORDER — KETOROLAC TROMETHAMINE 30 MG/ML
15 INJECTION, SOLUTION INTRAMUSCULAR; INTRAVENOUS EVERY 6 HOURS
Status: COMPLETED | OUTPATIENT
Start: 2022-12-09 | End: 2022-12-10

## 2022-12-09 RX ORDER — DEXAMETHASONE SODIUM PHOSPHATE 10 MG/ML
INJECTION, SOLUTION INTRAMUSCULAR; INTRAVENOUS AS NEEDED
Status: DISCONTINUED | OUTPATIENT
Start: 2022-12-09 | End: 2022-12-09 | Stop reason: SURG

## 2022-12-09 RX ORDER — OXYCODONE HYDROCHLORIDE 5 MG/1
5 TABLET ORAL EVERY 4 HOURS PRN
Status: DISCONTINUED | OUTPATIENT
Start: 2022-12-09 | End: 2022-12-10

## 2022-12-09 RX ORDER — ONDANSETRON 2 MG/ML
4 INJECTION INTRAMUSCULAR; INTRAVENOUS EVERY 6 HOURS PRN
Status: DISCONTINUED | OUTPATIENT
Start: 2022-12-09 | End: 2022-12-10

## 2022-12-09 RX ORDER — DOCUSATE SODIUM 100 MG/1
100 CAPSULE, LIQUID FILLED ORAL 2 TIMES DAILY
Status: DISCONTINUED | OUTPATIENT
Start: 2022-12-09 | End: 2022-12-10

## 2022-12-09 RX ORDER — METOCLOPRAMIDE HYDROCHLORIDE 5 MG/ML
INJECTION INTRAMUSCULAR; INTRAVENOUS AS NEEDED
Status: DISCONTINUED | OUTPATIENT
Start: 2022-12-09 | End: 2022-12-09 | Stop reason: SURG

## 2022-12-09 RX ORDER — ASPIRIN 325 MG
325 TABLET, DELAYED RELEASE (ENTERIC COATED) ORAL 2 TIMES DAILY
Status: DISCONTINUED | OUTPATIENT
Start: 2022-12-09 | End: 2022-12-10

## 2022-12-09 RX ORDER — HYDROCODONE BITARTRATE AND ACETAMINOPHEN 5; 325 MG/1; MG/1
1 TABLET ORAL ONCE AS NEEDED
Status: DISCONTINUED | OUTPATIENT
Start: 2022-12-09 | End: 2022-12-09 | Stop reason: HOSPADM

## 2022-12-09 RX ORDER — HYDROCODONE BITARTRATE AND ACETAMINOPHEN 5; 325 MG/1; MG/1
2 TABLET ORAL ONCE AS NEEDED
Status: DISCONTINUED | OUTPATIENT
Start: 2022-12-09 | End: 2022-12-09 | Stop reason: HOSPADM

## 2022-12-09 RX ORDER — DIPHENHYDRAMINE HYDROCHLORIDE 50 MG/ML
25 INJECTION INTRAMUSCULAR; INTRAVENOUS ONCE AS NEEDED
Status: ACTIVE | OUTPATIENT
Start: 2022-12-09 | End: 2022-12-09

## 2022-12-09 RX ORDER — TRIAMTERENE AND HYDROCHLOROTHIAZIDE 37.5; 25 MG/1; MG/1
1 CAPSULE ORAL DAILY
Status: DISCONTINUED | OUTPATIENT
Start: 2022-12-10 | End: 2022-12-10

## 2022-12-09 RX ORDER — OXYCODONE HYDROCHLORIDE 5 MG/1
2.5 TABLET ORAL EVERY 4 HOURS PRN
Status: DISCONTINUED | OUTPATIENT
Start: 2022-12-09 | End: 2022-12-10

## 2022-12-09 RX ORDER — TRIAMTERENE AND HYDROCHLOROTHIAZIDE 37.5; 25 MG/1; MG/1
1 TABLET ORAL DAILY
COMMUNITY

## 2022-12-09 RX ORDER — ROPIVACAINE HYDROCHLORIDE 5 MG/ML
INJECTION, SOLUTION EPIDURAL; INFILTRATION; PERINEURAL AS NEEDED
Status: DISCONTINUED | OUTPATIENT
Start: 2022-12-09 | End: 2022-12-09 | Stop reason: SURG

## 2022-12-09 RX ORDER — LEVOTHYROXINE SODIUM 0.03 MG/1
25 TABLET ORAL
Status: DISCONTINUED | OUTPATIENT
Start: 2022-12-10 | End: 2022-12-10

## 2022-12-09 RX ORDER — LEVOTHYROXINE SODIUM 0.03 MG/1
25 TABLET ORAL
COMMUNITY

## 2022-12-09 RX ORDER — METOCLOPRAMIDE HYDROCHLORIDE 5 MG/ML
10 INJECTION INTRAMUSCULAR; INTRAVENOUS EVERY 8 HOURS PRN
Status: DISCONTINUED | OUTPATIENT
Start: 2022-12-09 | End: 2022-12-10

## 2022-12-09 RX ORDER — OMEPRAZOLE 20 MG/1
20 CAPSULE, DELAYED RELEASE ORAL
COMMUNITY

## 2022-12-09 RX ORDER — HYDROMORPHONE HYDROCHLORIDE 1 MG/ML
0.4 INJECTION, SOLUTION INTRAMUSCULAR; INTRAVENOUS; SUBCUTANEOUS EVERY 5 MIN PRN
Status: DISCONTINUED | OUTPATIENT
Start: 2022-12-09 | End: 2022-12-09 | Stop reason: HOSPADM

## 2022-12-09 RX ORDER — NALOXONE HYDROCHLORIDE 0.4 MG/ML
80 INJECTION, SOLUTION INTRAMUSCULAR; INTRAVENOUS; SUBCUTANEOUS AS NEEDED
Status: DISCONTINUED | OUTPATIENT
Start: 2022-12-09 | End: 2022-12-09 | Stop reason: HOSPADM

## 2022-12-09 RX ORDER — AMLODIPINE BESYLATE 5 MG/1
10 TABLET ORAL DAILY
Status: DISCONTINUED | OUTPATIENT
Start: 2022-12-10 | End: 2022-12-10

## 2022-12-09 RX ORDER — HYDROMORPHONE HYDROCHLORIDE 1 MG/ML
0.6 INJECTION, SOLUTION INTRAMUSCULAR; INTRAVENOUS; SUBCUTANEOUS EVERY 5 MIN PRN
Status: DISCONTINUED | OUTPATIENT
Start: 2022-12-09 | End: 2022-12-09 | Stop reason: HOSPADM

## 2022-12-09 RX ORDER — SODIUM CHLORIDE, SODIUM LACTATE, POTASSIUM CHLORIDE, CALCIUM CHLORIDE 600; 310; 30; 20 MG/100ML; MG/100ML; MG/100ML; MG/100ML
INJECTION, SOLUTION INTRAVENOUS CONTINUOUS
Status: DISCONTINUED | OUTPATIENT
Start: 2022-12-09 | End: 2022-12-10

## 2022-12-09 RX ORDER — ACETAMINOPHEN 325 MG/1
650 TABLET ORAL ONCE
Status: COMPLETED | OUTPATIENT
Start: 2022-12-09 | End: 2022-12-09

## 2022-12-09 RX ORDER — CEFAZOLIN SODIUM/WATER 2 G/20 ML
2 SYRINGE (ML) INTRAVENOUS EVERY 8 HOURS
Status: COMPLETED | OUTPATIENT
Start: 2022-12-09 | End: 2022-12-10

## 2022-12-09 RX ORDER — ONDANSETRON 2 MG/ML
INJECTION INTRAMUSCULAR; INTRAVENOUS AS NEEDED
Status: DISCONTINUED | OUTPATIENT
Start: 2022-12-09 | End: 2022-12-09 | Stop reason: SURG

## 2022-12-09 RX ORDER — KETOROLAC TROMETHAMINE 30 MG/ML
INJECTION, SOLUTION INTRAMUSCULAR; INTRAVENOUS AS NEEDED
Status: DISCONTINUED | OUTPATIENT
Start: 2022-12-09 | End: 2022-12-09 | Stop reason: SURG

## 2022-12-09 RX ORDER — CELECOXIB 200 MG/1
200 CAPSULE ORAL DAILY
Status: ON HOLD | COMMUNITY
End: 2022-12-10

## 2022-12-09 RX ORDER — HYDROMORPHONE HYDROCHLORIDE 1 MG/ML
INJECTION, SOLUTION INTRAMUSCULAR; INTRAVENOUS; SUBCUTANEOUS
Status: COMPLETED
Start: 2022-12-09 | End: 2022-12-09

## 2022-12-09 RX ORDER — ESCITALOPRAM OXALATE 20 MG/1
20 TABLET ORAL DAILY
Status: DISCONTINUED | OUTPATIENT
Start: 2022-12-10 | End: 2022-12-10

## 2022-12-09 RX ORDER — BUPIVACAINE HYDROCHLORIDE 5 MG/ML
INJECTION, SOLUTION EPIDURAL; INTRACAUDAL AS NEEDED
Status: DISCONTINUED | OUTPATIENT
Start: 2022-12-09 | End: 2022-12-09 | Stop reason: SURG

## 2022-12-09 RX ORDER — HYDROMORPHONE HYDROCHLORIDE 1 MG/ML
0.4 INJECTION, SOLUTION INTRAMUSCULAR; INTRAVENOUS; SUBCUTANEOUS EVERY 2 HOUR PRN
Status: DISCONTINUED | OUTPATIENT
Start: 2022-12-09 | End: 2022-12-10

## 2022-12-09 RX ORDER — POLYETHYLENE GLYCOL 3350 17 G/17G
17 POWDER, FOR SOLUTION ORAL DAILY PRN
Status: DISCONTINUED | OUTPATIENT
Start: 2022-12-09 | End: 2022-12-10

## 2022-12-09 RX ORDER — MELATONIN
325
Status: DISCONTINUED | OUTPATIENT
Start: 2022-12-10 | End: 2022-12-10

## 2022-12-09 RX ORDER — DEXAMETHASONE SODIUM PHOSPHATE 10 MG/ML
8 INJECTION, SOLUTION INTRAMUSCULAR; INTRAVENOUS ONCE
Status: COMPLETED | OUTPATIENT
Start: 2022-12-10 | End: 2022-12-10

## 2022-12-09 RX ORDER — KETOROLAC TROMETHAMINE 30 MG/ML
15 INJECTION, SOLUTION INTRAMUSCULAR; INTRAVENOUS EVERY 6 HOURS
Status: DISCONTINUED | OUTPATIENT
Start: 2022-12-09 | End: 2022-12-09

## 2022-12-09 RX ORDER — HYDROMORPHONE HYDROCHLORIDE 1 MG/ML
0.2 INJECTION, SOLUTION INTRAMUSCULAR; INTRAVENOUS; SUBCUTANEOUS EVERY 5 MIN PRN
Status: DISCONTINUED | OUTPATIENT
Start: 2022-12-09 | End: 2022-12-09 | Stop reason: HOSPADM

## 2022-12-09 RX ORDER — PANTOPRAZOLE SODIUM 20 MG/1
20 TABLET, DELAYED RELEASE ORAL
Status: DISCONTINUED | OUTPATIENT
Start: 2022-12-10 | End: 2022-12-10

## 2022-12-09 RX ORDER — BACLOFEN 10 MG/1
20 TABLET ORAL 2 TIMES DAILY
Status: DISCONTINUED | OUTPATIENT
Start: 2022-12-09 | End: 2022-12-10

## 2022-12-09 RX ORDER — HYDROMORPHONE HYDROCHLORIDE 1 MG/ML
0.2 INJECTION, SOLUTION INTRAMUSCULAR; INTRAVENOUS; SUBCUTANEOUS EVERY 2 HOUR PRN
Status: DISCONTINUED | OUTPATIENT
Start: 2022-12-09 | End: 2022-12-10

## 2022-12-09 RX ORDER — SENNOSIDES 8.6 MG
17.2 TABLET ORAL NIGHTLY
Status: DISCONTINUED | OUTPATIENT
Start: 2022-12-09 | End: 2022-12-10

## 2022-12-09 RX ORDER — ATORVASTATIN CALCIUM 20 MG/1
20 TABLET, FILM COATED ORAL NIGHTLY
Status: DISCONTINUED | OUTPATIENT
Start: 2022-12-09 | End: 2022-12-10

## 2022-12-09 RX ORDER — SODIUM CHLORIDE, SODIUM LACTATE, POTASSIUM CHLORIDE, CALCIUM CHLORIDE 600; 310; 30; 20 MG/100ML; MG/100ML; MG/100ML; MG/100ML
INJECTION, SOLUTION INTRAVENOUS CONTINUOUS
Status: DISCONTINUED | OUTPATIENT
Start: 2022-12-09 | End: 2022-12-09 | Stop reason: HOSPADM

## 2022-12-09 RX ORDER — ONDANSETRON 2 MG/ML
4 INJECTION INTRAMUSCULAR; INTRAVENOUS EVERY 6 HOURS PRN
Status: DISCONTINUED | OUTPATIENT
Start: 2022-12-09 | End: 2022-12-09 | Stop reason: HOSPADM

## 2022-12-09 RX ORDER — SODIUM PHOSPHATE, DIBASIC AND SODIUM PHOSPHATE, MONOBASIC 7; 19 G/133ML; G/133ML
1 ENEMA RECTAL ONCE AS NEEDED
Status: DISCONTINUED | OUTPATIENT
Start: 2022-12-09 | End: 2022-12-10

## 2022-12-09 RX ORDER — ESCITALOPRAM OXALATE 20 MG/1
20 TABLET ORAL DAILY
COMMUNITY

## 2022-12-09 RX ORDER — BISACODYL 10 MG
10 SUPPOSITORY, RECTAL RECTAL
Status: DISCONTINUED | OUTPATIENT
Start: 2022-12-09 | End: 2022-12-10

## 2022-12-09 RX ORDER — METOCLOPRAMIDE HYDROCHLORIDE 5 MG/ML
10 INJECTION INTRAMUSCULAR; INTRAVENOUS EVERY 8 HOURS PRN
Status: DISCONTINUED | OUTPATIENT
Start: 2022-12-09 | End: 2022-12-09 | Stop reason: HOSPADM

## 2022-12-09 RX ORDER — ACETAMINOPHEN 500 MG
1000 TABLET ORAL ONCE AS NEEDED
Status: DISCONTINUED | OUTPATIENT
Start: 2022-12-09 | End: 2022-12-09 | Stop reason: HOSPADM

## 2022-12-09 RX ORDER — KETAMINE HYDROCHLORIDE 50 MG/ML
INJECTION, SOLUTION, CONCENTRATE INTRAMUSCULAR; INTRAVENOUS AS NEEDED
Status: DISCONTINUED | OUTPATIENT
Start: 2022-12-09 | End: 2022-12-09 | Stop reason: SURG

## 2022-12-09 RX ORDER — ACETAMINOPHEN 325 MG/1
650 TABLET ORAL 4 TIMES DAILY
Status: DISCONTINUED | OUTPATIENT
Start: 2022-12-09 | End: 2022-12-10

## 2022-12-09 RX ORDER — DEXAMETHASONE SODIUM PHOSPHATE 4 MG/ML
VIAL (ML) INJECTION AS NEEDED
Status: DISCONTINUED | OUTPATIENT
Start: 2022-12-09 | End: 2022-12-09 | Stop reason: SURG

## 2022-12-09 RX ORDER — TRANEXAMIC ACID 10 MG/ML
1000 INJECTION, SOLUTION INTRAVENOUS ONCE
Status: COMPLETED | OUTPATIENT
Start: 2022-12-09 | End: 2022-12-09

## 2022-12-09 RX ADMIN — MIDAZOLAM HYDROCHLORIDE 2 MG: 1 INJECTION INTRAMUSCULAR; INTRAVENOUS at 10:49:00

## 2022-12-09 RX ADMIN — SODIUM CHLORIDE, SODIUM LACTATE, POTASSIUM CHLORIDE, CALCIUM CHLORIDE: 600; 310; 30; 20 INJECTION, SOLUTION INTRAVENOUS at 13:31:00

## 2022-12-09 RX ADMIN — KETAMINE HYDROCHLORIDE 20 MG: 50 INJECTION, SOLUTION, CONCENTRATE INTRAMUSCULAR; INTRAVENOUS at 11:08:00

## 2022-12-09 RX ADMIN — KETAMINE HYDROCHLORIDE 15 MG: 50 INJECTION, SOLUTION, CONCENTRATE INTRAMUSCULAR; INTRAVENOUS at 13:11:00

## 2022-12-09 RX ADMIN — BUPIVACAINE HYDROCHLORIDE 2.5 ML: 5 INJECTION, SOLUTION EPIDURAL; INTRACAUDAL at 10:59:00

## 2022-12-09 RX ADMIN — DEXAMETHASONE SODIUM PHOSPHATE 2 MG: 10 INJECTION, SOLUTION INTRAMUSCULAR; INTRAVENOUS at 11:05:00

## 2022-12-09 RX ADMIN — TRANEXAMIC ACID 1000 MG: 10 INJECTION, SOLUTION INTRAVENOUS at 11:07:00

## 2022-12-09 RX ADMIN — KETOROLAC TROMETHAMINE 15 MG: 30 INJECTION, SOLUTION INTRAMUSCULAR; INTRAVENOUS at 13:11:00

## 2022-12-09 RX ADMIN — CEFAZOLIN SODIUM/WATER 2 G: 2 G/20 ML SYRINGE (ML) INTRAVENOUS at 11:07:00

## 2022-12-09 RX ADMIN — BUPRENORPHINE HYDROCHLORIDE 150 MCG: 0.32 INJECTION INTRAMUSCULAR; INTRAVENOUS at 11:05:00

## 2022-12-09 RX ADMIN — METOCLOPRAMIDE HYDROCHLORIDE 10 MG: 5 INJECTION INTRAMUSCULAR; INTRAVENOUS at 11:08:00

## 2022-12-09 RX ADMIN — DEXAMETHASONE SODIUM PHOSPHATE 8 MG: 4 MG/ML VIAL (ML) INJECTION at 11:08:00

## 2022-12-09 RX ADMIN — ROPIVACAINE HYDROCHLORIDE 20 ML: 5 INJECTION, SOLUTION EPIDURAL; INFILTRATION; PERINEURAL at 11:05:00

## 2022-12-09 RX ADMIN — ONDANSETRON 4 MG: 2 INJECTION INTRAMUSCULAR; INTRAVENOUS at 11:08:00

## 2022-12-09 NOTE — INTERVAL H&P NOTE
Pre-op Diagnosis: Primary osteoarthritis of left knee [M17.12]    The above referenced H&P was reviewed by Tripp Shirley MD on 12/9/2022, the patient was examined and no significant changes have occurred in the patient's condition since the H&P was performed. I discussed with the patient and/or legal representative the potential benefits, risks and side effects of this procedure; the likelihood of the patient achieving goals; and potential problems that might occur during recuperation. I discussed reasonable alternatives to the procedure, including risks, benefits and side effects related to the alternatives and risks related to not receiving this procedure. We will proceed with procedure as planned.

## 2022-12-09 NOTE — OPERATIVE REPORT
Operative Note    Patient Name/: Kosta Silver 3/21/1950  Date: 2022  Location: [facility]  Preoperative Diagnosis: Left knee posttraumatic osteoarthritis  Postoperative Diagnosis: Same  Operation: Left total knee arthroplasty mechanical alignment  Primary Surgeon: Orlando Gómez  Assistant: LORELEI Butler first assist.  Arpit Bloom surgical assistant also assisted in this case. Please note a skilled surgical assistant was necessary for this case in order to assist with patient positioning, prepping, draping, incision, exposure, implant placement, wound closure. Indications: 79-year-old female with end-stage left knee osteoarthritis failed nonsurgical treatment, previous history of patella fracture treated operatively  Surgical Findings: End-stage OA, valgus  Operative Report: After informed consent the left lower extremity was marked. Patient was brought to the operating room where spinal anesthesia was induced. Preoperative exam demonstrated range of motion from just short of full extension, flexion to 120. The left lower extremity was prepped and draped in sterile fashion. Timeout was performed to verify correct surgical site and procedure. 2 g of Ancef was given within 1 hour of incision. Additionally, 1 g tranexamic acid was given intravenously. Next the limb was gravity exsanguinated and tourniquet inflated. Incision was made anteriorly from 2 fingerbreadths proximal to the patella down along the medial aspect of the tibial tubercle. This was carried down to the extensor mechanism. Medial and lateral flaps were developed. The VMO muscle belly was identified. Full-thickness medial parapatellar arthrotomy was made from 2 fingerbreadths proximal to the patella along the VMO muscle belly, around the medial patella and patellar tendon. The fat pad was dissected free from the patellar tendon and reflected medially.   Subperiosteal dissection was carried out posteriorly about the proximal medial aspect of the tibia. Osteophytes were removed from the proximal medial tibia. The lateral patellofemoral plica was incised, and the patella was everted and denervated. The synovium over the anterior distal femur was teed open. Next the knee was flexed up, and remnant of the ACL and lateral meniscus were excised. Whitesides line was marked on the trochlear groove. I used an intramedullary reamer to open up the femur for our intramedullary alignment guide. The distal femoral cutting block was placed on the distal femur to take off 12 mm of bone from 2 mm of intact medial femoral cartilage. It sat down on the distal medial femur, measuring to take off more medial bone than lateral.  This was pinned in place. A burke's wing was used to check the cut, and the cut was performed. The distal medial femur fragment measured 10 mm. Next the knee was flexed and the tibial crest was marked. An extra medullary tibial cutting guide was placed above the ankle, measuring to take off a few millimeters off the medial side, pinned in line with the tibial crest in the coronal plane and with a few degrees of posterior slope in the sagittal plane. The proximal tibia was cut, using a Z retractor to protect MCL, patellar tendon and lateral soft tissues. After this portion of bone was removed, the knee was brought into extension and a lamina  was used to open up the extension space. What remained of the medial and lateral menisci were removed. Spacer block was placed with a T-handle drop-down madelyn to verify appropriate cut angle. The knee was noted to be a touch tight lateral.  Next the knee was flexed up, and the femoral sizing block with stylus was used. This sized for a size 3 femur. Pins were placed, and the sizing block removed. Pins were noted to be parallel to the epicondylar axis and perpendicular to [employer]. The 4-in-1 cutting block was placed and screwed down.   Anterior distal femur was cut, demonstrating a positive grand piano sign. Posterior condyles were cut, measuring 7 on the posterior medial cut. Chamfers were then cut. The cutting block was removed, and the knee was flexed with a lamina  to open up the posterior knee joint. Notch contents and PCL were removed. Posterior condylar recesses were opened, and posterior osteophytes chiseled out. The knee was brought into extension, and sized for a tibial baseplate. Trials were then placed. With the 3 femur, the 2 tibia, and a 10 polyethylene liner, the knee came just to full extension, 1 mm valgus laxity, no varus laxity and tight on the lateral side. I therefore performed a lateral release and extension, posterior lateral capsule anterior to the popliteus as well as pie crusted the IT band. We did have a satisfying release, and following this with the same trials in place the knee came to full extension with an 11 poly with great varus valgus stability/less than half a millimeter valgus laxity. In mid flexion, there was 1 mm valgus laxity, 2 to 3 mm varus laxity, Lachman's rotating around the medial femoral condyle. At 90 degrees, anterior drawer was less than 5 mm rotating around the medial femoral condyle. Gravity flexion was to 1/21/1930 with the patella tracking centrally. The tibial component rotation was marked, and the tibial trial removed. The femoral lugs were drilled, and the anterior chamfer finishing guide was placed to cut the finishing anterior chamfer cut. The knee was then flexed, and the tibia brought forward to place the tibial baseplate trial.  This was pinned in place, and the drill and keel punch were used to prepare the proximal tibia. The knee was then brought into extension, the patella everted, and it was freehand cut from a thickness of 22 mm and cut to 12 mm.   Next final components were opened, bony surfaces were washed and dried, and periarticular pain cocktail was injected with 30 mL in the posterior capsule, 10 mL into lateral femoral and tibial periosteum, 10 mL into medial femoral and tibial periosteum, and 10 mL into extensor mechanism. Cement was then mixed. The tibial component was cemented along with the proximal tibial bone surface, the component was impacted into place followed by removal of excess cement. The femur was reduced onto the tibia, and cement was placed on the cut surface of the femur. The cemented femoral component was then impacted down, with removal of excess cement. The femur was left up approximately 1 mm on the medial side. The knee was brought into full extension and an axial load was held across the joint. The patella was cemented along with the button, and this was compressed and held in place. Once cement had hardened, the knee was trialed again. Optimal stability was achieved with a 11 mm polyethylene liner, with exam demonstrating same as with trials, but now excellent varus valgus stability in full extension, less than 1 mm valgus laxity in mid flexion. The trial poly was removed, the knee was washed out, any loose bodies removed, and final poly was placed. Betadine lavage was performed. Closure was performed with 5 Ethibond for the arthrotomy, 2-0 Vicryl for skin, followed by staples. A sterile dressing was applied, and the patient was brought to PACU in good condition.   Anesthesia: Spinal plus abductor canal block  Complications: None  Specimens: None  Blood loss: 25 mL  Fluids: See anesthesia report  Implants: Medacta GM K sphere 3 femur, 2 tibia, 11 poly-, 1 patella  Drains: None  Condition: Good  Disposition: Floor    -Weightbearing as tolerated, PT OT  -DVT prophylaxis mechanical plus aspirin twice daily  -Pain control with oral meds, IV for breakthrough  -Perioperative Elina Braxton MD, [address] [facility] Surgery  Phone [phone]  Fax [phone]

## 2022-12-09 NOTE — PROGRESS NOTES
Patient reports mild pain in her right anterior shoulder. She initially asked for an ice pack to the right shoulder and then asked for heat (warm blankets placed to site). States that this is not new, she has pain frequently at home and applies a heating pad with pressure to the site and it is relieved within 20 min. She has not sought medical advice or treatment. Patient also reports pain to the right posterior forearm. She asked for an ice pack and one was placed on the forearm with good results. Will continue to monitor and treat as needed.

## 2022-12-09 NOTE — ANESTHESIA PROCEDURE NOTES
Regional Block  Performed by: Carlos Ellington DO  Authorized by: Carlos Ellington DO       General Information and Staff    Start Time:   Anesthesiologist:  Carlos Ellington DO  Performed by: Anesthesiologist  Patient Location:  OR      Site Identification: real time ultrasound guided and image stored and retrievable    Block site/laterality marked before start: site marked  Reason for Block: at surgeon's request and post-op pain management    Preanesthetic Checklist: 2 patient identifers, IV checked, risks and benefits discussed, monitors and equipment checked, pre-op evaluation, timeout performed, anesthesia consent, sterile technique used, no prohibitive neurological deficits and no local skin infection at insertion site      Procedure Details    Patient Position:  Supine  Prep: ChloraPrep    Monitoring:  Cardiac monitor, continuous pulse ox and blood pressure cuff  Block Type: Adductor canal  Laterality:  Left  Injection Technique:  Single-shot    Needle    Needle Type:  Short-bevel and echogenic  Needle Localization:  Ultrasound guidance  Reason for Ultrasound Use: appropriate spread of the medication was noted in real time and no ultrasound evidence of intravascular and/or intraneural injection            Assessment    Injection Assessment:  Good spread noted, negative resistance, negative aspiration for heme, incremental injection and low pressure  Heart Rate Change: No    - Patient tolerated block procedure well without evidence of immediate block related complications.      Medications      Additional Comments    Medication:  Ropivacaine 0.5% 20mL with 2mg PF dexamethasone and 150 mcg buprenorphine

## 2022-12-09 NOTE — ANESTHESIA PROCEDURE NOTES
Spinal Block  Performed by: Alondra Neely DO  Authorized by: Alondra Neely DO       General Information and Staff    Start Time:   Anesthesiologist:  Alondra Neely DO  Performed by:   Anesthesiologist  Site identification: surface landmarks    Preanesthetic Checklist: patient identified, IV checked, risks and benefits discussed, monitors and equipment checked, pre-op evaluation, timeout performed, anesthesia consent and sterile technique used      Procedure Details    Patient Position:  Sitting  Prep: ChloraPrep    Monitoring:  Cardiac monitor, heart rate and continuous pulse ox  Approach:  Midline  Location:  L2-3  Injection Technique:  Single-shot    Needle    Needle Type:  Sprotte  Needle Gauge:  24 G  Needle Length:  3.5 in    Assessment    Sensory Level:   Events: clear CSF, CSF aspirated, well tolerated and blood negative      Additional Comments

## 2022-12-10 ENCOUNTER — MED REC SCAN ONLY (OUTPATIENT)
Dept: ORTHOPEDICS CLINIC | Facility: CLINIC | Age: 72
End: 2022-12-10

## 2022-12-10 VITALS
HEART RATE: 69 BPM | WEIGHT: 201.63 LBS | OXYGEN SATURATION: 92 % | TEMPERATURE: 98 F | SYSTOLIC BLOOD PRESSURE: 133 MMHG | BODY MASS INDEX: 34.01 KG/M2 | DIASTOLIC BLOOD PRESSURE: 55 MMHG | RESPIRATION RATE: 20 BRPM | HEIGHT: 64.5 IN

## 2022-12-10 RX ORDER — TRAMADOL HYDROCHLORIDE 50 MG/1
50 TABLET ORAL EVERY 12 HOURS SCHEDULED
Qty: 30 TABLET | Refills: 0 | Status: SHIPPED | OUTPATIENT
Start: 2022-12-10 | End: 2022-12-25

## 2022-12-10 RX ORDER — PSEUDOEPHEDRINE HCL 30 MG
100 TABLET ORAL 2 TIMES DAILY PRN
Qty: 30 CAPSULE | Refills: 0 | Status: SHIPPED | OUTPATIENT
Start: 2022-12-10

## 2022-12-10 RX ORDER — OXYCODONE HYDROCHLORIDE 5 MG/1
TABLET ORAL EVERY 4 HOURS PRN
Qty: 40 TABLET | Refills: 0 | Status: SHIPPED | OUTPATIENT
Start: 2022-12-10

## 2022-12-10 RX ORDER — CELECOXIB 200 MG/1
200 CAPSULE ORAL DAILY
Qty: 30 CAPSULE | Refills: 0 | Status: SHIPPED | OUTPATIENT
Start: 2022-12-10 | End: 2023-01-09

## 2022-12-10 RX ORDER — ACETAMINOPHEN 500 MG
1000 TABLET ORAL EVERY 8 HOURS
Qty: 90 TABLET | Refills: 0 | Status: SHIPPED | OUTPATIENT
Start: 2022-12-10 | End: 2022-12-25

## 2022-12-10 NOTE — PLAN OF CARE
Pt a/ox4. Reports pain as minimal, started on scheduled pain medications. Up with nursing staff this evening, 1 person assist and walker to [facility] and chair. Tolerated well. PT/OT to evaluate in am.   Voiding without difficulty. IVF saline locked. O2 2L as needed to keep sats > 90%, weaning as tolerated to room air. IS to 2000. Plan to home 12/10/22 if cleared by therapy and all MDs.

## 2022-12-10 NOTE — PROGRESS NOTES
Patient admitted from PACU. A/ox4. On 3L O2, weaning off as tolerated. Oriented to unit and room. Hospitalist paged for admission consult.

## 2022-12-10 NOTE — PROGRESS NOTES
Pt cleared by all MD's for discharge. Pt and spouse watched education video - knee replacement discharge instructions. Discharge education completed at bedside with pt and spouse, all questions answered. PIV removed, belongings packed. Al;l printed scripts given to patient. Pt discharging to home via wheelchair.

## 2022-12-10 NOTE — PROGRESS NOTES
[facility] Progress Note    Subjective: pain is minimal. Tolerating diet. No n/v, no n/t. Has been voiding. Objective: awake, alert, NAD  NVI LLE, dressing CDI      Assessment/Plan: 67year old female postop day #1 status post L TKA.   Doing exceptionally well despite medical hx including HTN, prediabetes, multiple sclerosis, HLD  -Weightbearing as tolerated, PT OT  -DVT prophylaxis mechanical plus aspirin twice daily  -Pain control with oral meds  -Perioperative Ancef completed  Disposition: likely dc home    Fany Hoff MD, [address] Orthopedic Surgery  Phone [phone]  Fax [phone]

## 2022-12-10 NOTE — PLAN OF CARE
Pt AOx4. Denies numbness/tingling. C/o severe pain to left knee, relieved by scheduled pain meds. Aquacel dressing to left knee, CDI. VS remain stable on room air. Voiding freely. Last BM 12/8, see MAR. Tolerating diet, denies nausea. Worked with therapy, up x1 assist w/walker. Spandagrip to LLE. SCDs bilaterally, ankle pumps encouraged. IS encouraged. Plan to discharge to home today. Pt updated on plan of care, all questions answered. Belongings within reach, instructed to call for assistance.

## 2022-12-12 ENCOUNTER — TELEPHONE (OUTPATIENT)
Dept: ORTHOPEDICS CLINIC | Facility: CLINIC | Age: 72
End: 2022-12-12

## 2022-12-12 ENCOUNTER — PATIENT OUTREACH (OUTPATIENT)
Dept: CASE MANAGEMENT | Age: 72
End: 2022-12-12

## 2022-12-12 DIAGNOSIS — Z02.9 ENCOUNTERS FOR ADMINISTRATIVE PURPOSE: ICD-10-CM

## 2022-12-12 PROCEDURE — 1111F DSCHRG MED/CURRENT MED MERGE: CPT

## 2022-12-12 NOTE — PAYOR COMM NOTE
Discharge Notification    Patient Name: Gabby Bartholomew  Payor: [employer]  Subscriber #: [id number]  Authorization Number: [id number]  Admit Date/Time: 12/9/2022 7:50 AM  Discharge Date/Time: 12/10/2022 2:45 PM

## 2022-12-12 NOTE — PROGRESS NOTES
NCM attempted to contact the patient for HFU. A woman answered, said Hello and then hung up. NCM will try again later.

## 2022-12-12 NOTE — TELEPHONE ENCOUNTER
Jessica Ferrara from [facility] called to let Dr. Radha Martinez know that patient will be seen 2 times this week and once next week.

## 2022-12-13 ENCOUNTER — TELEPHONE (OUTPATIENT)
Dept: INTERNAL MEDICINE CLINIC | Facility: CLINIC | Age: 72
End: 2022-12-13

## 2022-12-13 NOTE — TELEPHONE ENCOUNTER
STEPHANIE, Spoke to pt for TCM today. Pt declined to schedule HFU at this time stating that she is following up with Ortho and doing very well. TCM/HFU appt recommended by 12/24/2022 as pt is a moderate risk for readmission.      BOOK BY DATE (last date for TCM): 12/24/2022

## 2022-12-14 RX ORDER — AMLODIPINE BESYLATE 5 MG/1
TABLET ORAL
Qty: 180 TABLET | Refills: 0 | Status: SHIPPED | OUTPATIENT
Start: 2022-12-14

## 2022-12-14 RX ORDER — TRIAMTERENE AND HYDROCHLOROTHIAZIDE 37.5; 25 MG/1; MG/1
TABLET ORAL
Qty: 90 TABLET | Refills: 0 | Status: SHIPPED | OUTPATIENT
Start: 2022-12-14

## 2022-12-14 RX ORDER — LEVOTHYROXINE SODIUM 0.03 MG/1
TABLET ORAL
Qty: 90 TABLET | Refills: 0 | Status: SHIPPED | OUTPATIENT
Start: 2022-12-14

## 2022-12-19 ENCOUNTER — TELEPHONE (OUTPATIENT)
Dept: PHYSICAL THERAPY | Facility: HOSPITAL | Age: 72
End: 2022-12-19

## 2022-12-20 ENCOUNTER — TELEPHONE (OUTPATIENT)
Dept: ORTHOPEDICS CLINIC | Facility: CLINIC | Age: 72
End: 2022-12-20

## 2022-12-20 NOTE — TELEPHONE ENCOUNTER
Torin Luu from [facility] called stating that today, 12/20, will be the last day for nursing services for the pt as she is stable and doing well. She will have therapy on Thursday with [facility] and then will start outpatient on 12/28. Thanks!

## 2022-12-28 ENCOUNTER — OFFICE VISIT (OUTPATIENT)
Dept: ORTHOPEDICS CLINIC | Facility: CLINIC | Age: 72
End: 2022-12-28
Payer: MEDICARE

## 2022-12-28 ENCOUNTER — OFFICE VISIT (OUTPATIENT)
Dept: PHYSICAL THERAPY | Age: 72
End: 2022-12-28
Attending: ORTHOPAEDIC SURGERY
Payer: MEDICARE

## 2022-12-28 VITALS — HEIGHT: 64.5 IN | HEART RATE: 75 BPM | BODY MASS INDEX: 33.9 KG/M2 | WEIGHT: 201 LBS | OXYGEN SATURATION: 96 %

## 2022-12-28 DIAGNOSIS — Z96.652 STATUS POST LEFT KNEE REPLACEMENT: Primary | ICD-10-CM

## 2022-12-28 DIAGNOSIS — M17.12 PRIMARY OSTEOARTHRITIS OF LEFT KNEE: ICD-10-CM

## 2022-12-28 PROCEDURE — 1111F DSCHRG MED/CURRENT MED MERGE: CPT | Performed by: PHYSICIAN ASSISTANT

## 2022-12-28 PROCEDURE — 99024 POSTOP FOLLOW-UP VISIT: CPT | Performed by: PHYSICIAN ASSISTANT

## 2022-12-28 PROCEDURE — 3008F BODY MASS INDEX DOCD: CPT | Performed by: PHYSICIAN ASSISTANT

## 2022-12-28 PROCEDURE — 97162 PT EVAL MOD COMPLEX 30 MIN: CPT

## 2022-12-28 PROCEDURE — 97110 THERAPEUTIC EXERCISES: CPT

## 2022-12-28 RX ORDER — TRAMADOL HYDROCHLORIDE 50 MG/1
50 TABLET ORAL EVERY 8 HOURS PRN
Qty: 30 TABLET | Refills: 0 | Status: SHIPPED | OUTPATIENT
Start: 2022-12-28

## 2022-12-28 NOTE — PROGRESS NOTES
[facility] Progress Note    Subjective: Patient is about 3 weeks status post left total knee arthroplasty performed on 12/9/2022. She has been doing very well since surgery with substantial pain relief from preoperative pain. She is only taking Celebrex and Tylenol for pain control and takes tramadol sparingly, usually around her physical therapy sessions. She reports that her flexion is now documented at about 110 degrees. She continues to take aspirin twice per day for DVT prophylaxis. Denies any fevers, chills, night sweats. Objective: Incision is clean, dry, and intact with staples in place. No drainage or redness concerning for infection. Patient is alert and oriented x3. No acute distress. Nonlabored breathing. Can flex the knee to approximately 100 degrees in clinic today and stretch out to about 0 degrees. Calves are soft and nontender. Assessment/Plan: Patient doing substantially well about 3 weeks status post left total knee arthroplasty. Staples were removed in clinic today. The incision was swabbed with Betadine, Mastisol was applied to either side of the incision, and Steri-Strips were applied over the incision. I instructed the patient to avoid getting the incision wet in the shower for the next 2 days to allow the staple sites to reepithelialize. I also recommended avoiding soaking the incision in a pool or tub until the patient is 6 weeks postop. Refill of her tramadol was sent to her pharmacy and physical therapy referral was written. She will follow-up in 4 weeks with Dr. Sharmaine Guido for routine 6-week postop visit or sooner if any concern. Her questions were sought and answered satisfactorily. She is happy with the plan and will follow as advised. X-rays needed at next visit: Postoperative radiographs of left knee and full leg length film        Hilton Zavala PA-C  [facility] Surgery    This note was dictated using Dragon software. While it was briefly proofread prior to completion, some grammatical, spelling, and word choice errors due to dictation may still occur.

## 2022-12-30 ENCOUNTER — OFFICE VISIT (OUTPATIENT)
Dept: PHYSICAL THERAPY | Age: 72
End: 2022-12-30
Attending: ORTHOPAEDIC SURGERY
Payer: MEDICARE

## 2022-12-30 DIAGNOSIS — Z96.652 STATUS POST LEFT KNEE REPLACEMENT: Primary | ICD-10-CM

## 2022-12-30 PROCEDURE — 97112 NEUROMUSCULAR REEDUCATION: CPT

## 2022-12-30 PROCEDURE — 97110 THERAPEUTIC EXERCISES: CPT

## 2023-01-03 ENCOUNTER — OFFICE VISIT (OUTPATIENT)
Dept: PHYSICAL THERAPY | Age: 73
End: 2023-01-03
Attending: ORTHOPAEDIC SURGERY
Payer: MEDICARE

## 2023-01-03 DIAGNOSIS — Z96.652 S/P TOTAL KNEE ARTHROPLASTY, LEFT: ICD-10-CM

## 2023-01-03 PROCEDURE — 97110 THERAPEUTIC EXERCISES: CPT

## 2023-01-03 PROCEDURE — 97140 MANUAL THERAPY 1/> REGIONS: CPT

## 2023-01-04 ENCOUNTER — APPOINTMENT (OUTPATIENT)
Dept: PHYSICAL THERAPY | Age: 73
End: 2023-01-04
Attending: ORTHOPAEDIC SURGERY
Payer: MEDICARE

## 2023-01-06 ENCOUNTER — OFFICE VISIT (OUTPATIENT)
Dept: PHYSICAL THERAPY | Age: 73
End: 2023-01-06
Attending: ORTHOPAEDIC SURGERY
Payer: MEDICARE

## 2023-01-06 DIAGNOSIS — Z96.652 S/P TOTAL KNEE ARTHROPLASTY, LEFT: Primary | ICD-10-CM

## 2023-01-06 PROCEDURE — 97112 NEUROMUSCULAR REEDUCATION: CPT

## 2023-01-06 PROCEDURE — 97110 THERAPEUTIC EXERCISES: CPT

## 2023-01-06 PROCEDURE — 97140 MANUAL THERAPY 1/> REGIONS: CPT

## 2023-01-10 ENCOUNTER — OFFICE VISIT (OUTPATIENT)
Dept: PHYSICAL THERAPY | Age: 73
End: 2023-01-10
Attending: ORTHOPAEDIC SURGERY
Payer: MEDICARE

## 2023-01-10 DIAGNOSIS — Z96.652 S/P TKR (TOTAL KNEE REPLACEMENT), LEFT: ICD-10-CM

## 2023-01-10 PROCEDURE — 97110 THERAPEUTIC EXERCISES: CPT

## 2023-01-10 PROCEDURE — 97140 MANUAL THERAPY 1/> REGIONS: CPT

## 2023-01-13 ENCOUNTER — OFFICE VISIT (OUTPATIENT)
Dept: PHYSICAL THERAPY | Age: 73
End: 2023-01-13
Attending: ORTHOPAEDIC SURGERY
Payer: MEDICARE

## 2023-01-13 DIAGNOSIS — Z96.652 S/P TKR (TOTAL KNEE REPLACEMENT), LEFT: ICD-10-CM

## 2023-01-13 PROCEDURE — 97110 THERAPEUTIC EXERCISES: CPT

## 2023-01-17 ENCOUNTER — APPOINTMENT (OUTPATIENT)
Dept: PHYSICAL THERAPY | Age: 73
End: 2023-01-17
Attending: ORTHOPAEDIC SURGERY
Payer: MEDICARE

## 2023-01-20 ENCOUNTER — OFFICE VISIT (OUTPATIENT)
Dept: PHYSICAL THERAPY | Age: 73
End: 2023-01-20
Attending: ORTHOPAEDIC SURGERY
Payer: MEDICARE

## 2023-01-20 DIAGNOSIS — Z96.652 S/P TKR (TOTAL KNEE REPLACEMENT), LEFT: ICD-10-CM

## 2023-01-20 PROCEDURE — 97110 THERAPEUTIC EXERCISES: CPT

## 2023-01-24 ENCOUNTER — OFFICE VISIT (OUTPATIENT)
Dept: PHYSICAL THERAPY | Age: 73
End: 2023-01-24
Attending: ORTHOPAEDIC SURGERY
Payer: MEDICARE

## 2023-01-24 DIAGNOSIS — Z96.652 S/P TKR (TOTAL KNEE REPLACEMENT), LEFT: ICD-10-CM

## 2023-01-24 PROCEDURE — 97110 THERAPEUTIC EXERCISES: CPT

## 2023-01-27 ENCOUNTER — TELEPHONE (OUTPATIENT)
Dept: ORTHOPEDICS CLINIC | Facility: CLINIC | Age: 73
End: 2023-01-27

## 2023-01-27 ENCOUNTER — OFFICE VISIT (OUTPATIENT)
Dept: PHYSICAL THERAPY | Age: 73
End: 2023-01-27
Attending: ORTHOPAEDIC SURGERY
Payer: MEDICARE

## 2023-01-27 DIAGNOSIS — Z96.652 S/P TKR (TOTAL KNEE REPLACEMENT), LEFT: ICD-10-CM

## 2023-01-27 DIAGNOSIS — Z96.652 STATUS POST LEFT KNEE REPLACEMENT: Primary | ICD-10-CM

## 2023-01-27 PROCEDURE — 97110 THERAPEUTIC EXERCISES: CPT

## 2023-01-27 PROCEDURE — 97140 MANUAL THERAPY 1/> REGIONS: CPT

## 2023-01-30 ENCOUNTER — HOSPITAL ENCOUNTER (OUTPATIENT)
Dept: GENERAL RADIOLOGY | Age: 73
Discharge: HOME | End: 2023-01-30
Attending: ORTHOPAEDIC SURGERY
Payer: MEDICARE

## 2023-01-30 ENCOUNTER — OFFICE VISIT (OUTPATIENT)
Dept: ORTHOPEDICS CLINIC | Facility: CLINIC | Age: 73
End: 2023-01-30
Payer: MEDICARE

## 2023-01-30 VITALS — HEART RATE: 89 BPM | OXYGEN SATURATION: 95 %

## 2023-01-30 DIAGNOSIS — Z96.652 STATUS POST LEFT KNEE REPLACEMENT: Primary | ICD-10-CM

## 2023-01-30 DIAGNOSIS — M70.62 TROCHANTERIC BURSITIS OF LEFT HIP: ICD-10-CM

## 2023-01-30 DIAGNOSIS — M76.892 TENDINITIS INVOLVING LEFT HIP ABDUCTORS: ICD-10-CM

## 2023-01-30 DIAGNOSIS — Z96.652 STATUS POST LEFT KNEE REPLACEMENT: ICD-10-CM

## 2023-01-30 PROCEDURE — 73562 X-RAY EXAM OF KNEE 3: CPT | Performed by: ORTHOPAEDIC SURGERY

## 2023-01-30 PROCEDURE — 77073 BONE LENGTH STUDIES: CPT | Performed by: ORTHOPAEDIC SURGERY

## 2023-01-30 RX ORDER — TRIAMCINOLONE ACETONIDE 40 MG/ML
40 INJECTION, SUSPENSION INTRA-ARTICULAR; INTRAMUSCULAR ONCE
Status: COMPLETED | OUTPATIENT
Start: 2023-01-30 | End: 2023-01-30

## 2023-01-30 RX ADMIN — TRIAMCINOLONE ACETONIDE 40 MG: 40 INJECTION, SUSPENSION INTRA-ARTICULAR; INTRAMUSCULAR at 10:00:00

## 2023-01-30 NOTE — PROGRESS NOTES
[facility] Progress Note    Subjective: Patient returns 6 weeks postop from left knee replacement. She reports her left knee is doing excellent, she has no pain or functional limitations due to the knee. Motion has been past 115. She is ambulating without assist device. She is very happy with her progress. She feels her incision has healed better than what it was in the past as well. She does report that her left hip pain has recurred. She feels that this is her limiting factor. She has pain near the troches bursa on the left side. The previous injection provided excellent relief. She has been working somewhat with therapy on the hip but mostly focusing on the knee. Objective: Patient is awake and alert, very pleasant. She demonstrates full extension of the knee, flexion 115. Knee is appropriately stable to varus and valgus and anterior drawer throughout range of motion. Incision is well-healed. She does have tenderness palpation over the greater trochanter/bursa, reproduces pain complaint. Imaging: X-rays of the left knee personally viewed, independently interpreted and radiology report read. Full-length films demonstrate mechanical axis falling through the center of the joint. Knee films demonstrate cemented left total knee arthroplasty unchanged from postop films. Assessment/Plan: 27-year-old female 6 weeks postop status post left knee replacement doing very well, also with left hip pain consistent with truck bursitis/abductor tendinitis. From the knee standpoint, discussed continued activities as tolerated. She is finishing up therapy over the next few weeks. She is done with DVT prophylaxis. No restrictions on the incision at this point. From the hip standpoint we did discuss the importance of physical therapy/stretching and strengthening. Advised another few weeks of therapy to work on the greater troches/bursitis.   Did offer injection today as this is what patient was requesting, however did  that we would not want to continue relying on injections for relief of her symptoms, and if symptoms were not alleviated long-term following this next injection and therapy, may need to consider alternate treatment options for bursitis. She expressed understanding. She will follow-up in 6 weeks.     Alec Tapia MD, [address] [facility]  Phone [phone]  Fax [phone]

## 2023-01-30 NOTE — PROCEDURES
After informed consent, the patient's left hip was marked laterally over the greater trochanter, locally anesthetized with skin refrigerant, prepped with topical antiseptic, and injected with a mixture of 1mL 40mg/mL Kenalog, 2mL 1% lidocaine and 2mL 0.5% marcaine directly into the greater trochanteric bursa. A band-aid was applied. The patient tolerated the procedure well.     Laura Braxton MD, [address] [facility]  Phone [phone]  Fax [phone]

## 2023-01-31 ENCOUNTER — OFFICE VISIT (OUTPATIENT)
Dept: PHYSICAL THERAPY | Age: 73
End: 2023-01-31
Attending: ORTHOPAEDIC SURGERY
Payer: MEDICARE

## 2023-01-31 DIAGNOSIS — Z96.652 S/P TKR (TOTAL KNEE REPLACEMENT), LEFT: ICD-10-CM

## 2023-01-31 PROCEDURE — 97140 MANUAL THERAPY 1/> REGIONS: CPT

## 2023-01-31 PROCEDURE — 97110 THERAPEUTIC EXERCISES: CPT

## 2023-02-03 ENCOUNTER — OFFICE VISIT (OUTPATIENT)
Dept: PHYSICAL THERAPY | Age: 73
End: 2023-02-03
Attending: ORTHOPAEDIC SURGERY
Payer: MEDICARE

## 2023-02-03 DIAGNOSIS — Z96.652 S/P TKR (TOTAL KNEE REPLACEMENT), LEFT: ICD-10-CM

## 2023-02-03 PROCEDURE — 97110 THERAPEUTIC EXERCISES: CPT

## 2023-02-03 PROCEDURE — 97140 MANUAL THERAPY 1/> REGIONS: CPT

## 2023-02-06 ENCOUNTER — PATIENT MESSAGE (OUTPATIENT)
Dept: INTERNAL MEDICINE CLINIC | Facility: CLINIC | Age: 73
End: 2023-02-06

## 2023-02-07 NOTE — TELEPHONE ENCOUNTER
From: Dary Sanchez  To: Chelsea Hampton MD  Sent: 2/6/2023 4:38 PM CST  Subject: Calcium blood test    Hi Dr Facundo Malcolm, I plan on going for my calcium blood test this Wednesday. Does this blood test require fasting?   Thank you,  Kory Rasheed

## 2023-02-08 ENCOUNTER — LAB ENCOUNTER (OUTPATIENT)
Dept: LAB | Age: 73
End: 2023-02-08
Attending: FAMILY MEDICINE
Payer: MEDICARE

## 2023-02-08 DIAGNOSIS — E83.52 SERUM CALCIUM ELEVATED: ICD-10-CM

## 2023-02-08 LAB — CALCIUM BLD-MCNC: 9.9 MG/DL (ref 8.5–10.1)

## 2023-02-08 PROCEDURE — 36415 COLL VENOUS BLD VENIPUNCTURE: CPT

## 2023-02-08 PROCEDURE — 82310 ASSAY OF CALCIUM: CPT

## 2023-02-13 ENCOUNTER — APPOINTMENT (OUTPATIENT)
Dept: PHYSICAL THERAPY | Age: 73
End: 2023-02-13
Attending: ORTHOPAEDIC SURGERY
Payer: MEDICARE

## 2023-02-14 ENCOUNTER — TELEMEDICINE (OUTPATIENT)
Dept: INTERNAL MEDICINE CLINIC | Facility: CLINIC | Age: 73
End: 2023-02-14

## 2023-02-14 DIAGNOSIS — R05.1 ACUTE COUGH: ICD-10-CM

## 2023-02-14 DIAGNOSIS — U07.1 COVID: Primary | ICD-10-CM

## 2023-02-14 PROCEDURE — 99213 OFFICE O/P EST LOW 20 MIN: CPT | Performed by: FAMILY MEDICINE

## 2023-02-14 RX ORDER — CODEINE PHOSPHATE AND GUAIFENESIN 10; 100 MG/5ML; MG/5ML
5 SOLUTION ORAL EVERY 6 HOURS PRN
Qty: 120 ML | Refills: 0 | Status: SHIPPED | OUTPATIENT
Start: 2023-02-14

## 2023-03-10 ENCOUNTER — LAB ENCOUNTER (OUTPATIENT)
Dept: LAB | Age: 73
End: 2023-03-10
Attending: INTERNAL MEDICINE
Payer: MEDICARE

## 2023-03-10 DIAGNOSIS — Z01.818 PRE-OP TESTING: ICD-10-CM

## 2023-03-11 ENCOUNTER — PATIENT MESSAGE (OUTPATIENT)
Dept: ORTHOPEDICS CLINIC | Facility: CLINIC | Age: 73
End: 2023-03-11

## 2023-03-11 LAB — SARS-COV-2 RNA RESP QL NAA+PROBE: DETECTED

## 2023-03-13 NOTE — TELEPHONE ENCOUNTER
From: Reji Phillips  To: Nancy Moreno MD  Sent: 3/11/2023 5:23 PM CST  Subject: Covid    Dr. Brissa Olivera, I need to cancel my appointment on Wednesday, March 15th. I tested positive for Covid. I will reschedule as soon as I can.     Zaynab Rojas

## 2023-03-23 RX ORDER — ATORVASTATIN CALCIUM 20 MG/1
TABLET, FILM COATED ORAL
Qty: 90 TABLET | Refills: 0 | Status: SHIPPED | OUTPATIENT
Start: 2023-03-23

## 2023-04-26 ENCOUNTER — TELEPHONE (OUTPATIENT)
Dept: INTERNAL MEDICINE CLINIC | Facility: CLINIC | Age: 73
End: 2023-04-26

## 2023-04-26 NOTE — TELEPHONE ENCOUNTER
Pt dropped off Physician statement form for completion. Pt has an upcoming MAS scheduled but is requesting this is done prior to visit. Pt requesting it is mailed once completed    Placed in [facility] fax folder.

## 2023-05-01 PROBLEM — R12 CHRONIC HEARTBURN: Status: ACTIVE | Noted: 2023-05-01

## 2023-05-01 PROBLEM — K44.9 DIAPHRAGMATIC HERNIA WITHOUT OBSTRUCTION OR GANGRENE: Status: ACTIVE | Noted: 2023-05-01

## 2023-05-01 PROCEDURE — 88305 TISSUE EXAM BY PATHOLOGIST: CPT | Performed by: INTERNAL MEDICINE

## 2023-05-17 ENCOUNTER — OFFICE VISIT (OUTPATIENT)
Dept: ORTHOPEDICS CLINIC | Facility: CLINIC | Age: 73
End: 2023-05-17
Payer: MEDICARE

## 2023-05-17 VITALS — OXYGEN SATURATION: 97 % | HEART RATE: 78 BPM

## 2023-05-17 DIAGNOSIS — Z96.652 STATUS POST LEFT KNEE REPLACEMENT: Primary | ICD-10-CM

## 2023-05-17 PROCEDURE — 99212 OFFICE O/P EST SF 10 MIN: CPT | Performed by: ORTHOPAEDIC SURGERY

## 2023-05-17 PROCEDURE — 1160F RVW MEDS BY RX/DR IN RCRD: CPT | Performed by: ORTHOPAEDIC SURGERY

## 2023-05-17 PROCEDURE — 1159F MED LIST DOCD IN RCRD: CPT | Performed by: ORTHOPAEDIC SURGERY

## 2023-05-17 NOTE — PROGRESS NOTES
[facility] Progress Note    Subjective: Patient is following up for her final postoperative appointment for the knee. She was scheduled follow-up at 3 months but had COVID. She states she is doing very well from the knee standpoint, has no pain or functional limitations. She is happy with how she is doing. Objective: Patient appears comfortable, very pleasant. She ambulates with a nonantalgic gait. Left knee incision is well-healed. She demonstrates full extension of the knee, flexion 110 in clinic. Assessment/Plan: 77-year-old female 5 months postop from left knee replacement. She is doing very well. Continue activities as tolerated without restriction. Advised follow-up at 1 year from date of surgery with repeat x-rays for the knee replacement. She will contact us sooner if needed or if any questions.     Allegra Adair MD, [address] [place] [facility]  Phone [phone]  Fax [phone]

## 2023-05-22 PROBLEM — R12 CHRONIC HEARTBURN: Status: RESOLVED | Noted: 2023-05-01 | Resolved: 2023-05-22

## 2023-05-24 ENCOUNTER — OFFICE VISIT (OUTPATIENT)
Dept: INTERNAL MEDICINE CLINIC | Facility: CLINIC | Age: 73
End: 2023-05-24
Payer: MEDICARE

## 2023-05-24 ENCOUNTER — HOSPITAL ENCOUNTER (OUTPATIENT)
Dept: GENERAL RADIOLOGY | Age: 73
Discharge: HOME | End: 2023-05-24
Attending: FAMILY MEDICINE
Payer: MEDICARE

## 2023-05-24 ENCOUNTER — LAB ENCOUNTER (OUTPATIENT)
Dept: LAB | Age: 73
End: 2023-05-24
Attending: FAMILY MEDICINE
Payer: MEDICARE

## 2023-05-24 VITALS
DIASTOLIC BLOOD PRESSURE: 70 MMHG | OXYGEN SATURATION: 98 % | HEART RATE: 87 BPM | SYSTOLIC BLOOD PRESSURE: 130 MMHG | HEIGHT: 64.5 IN | TEMPERATURE: 98 F | WEIGHT: 197 LBS | BODY MASS INDEX: 33.22 KG/M2

## 2023-05-24 DIAGNOSIS — K44.9 HIATAL HERNIA: ICD-10-CM

## 2023-05-24 DIAGNOSIS — Z98.1 HISTORY OF LUMBAR FUSION: ICD-10-CM

## 2023-05-24 DIAGNOSIS — M46.92 UNSPECIFIED INFLAMMATORY SPONDYLOPATHY, CERVICAL REGION (HCC): ICD-10-CM

## 2023-05-24 DIAGNOSIS — K29.30 CHRONIC SUPERFICIAL GASTRITIS WITHOUT BLEEDING: ICD-10-CM

## 2023-05-24 DIAGNOSIS — R29.898 WEAKNESS OF RIGHT LEG: ICD-10-CM

## 2023-05-24 DIAGNOSIS — D12.2 BENIGN NEOPLASM OF ASCENDING COLON: ICD-10-CM

## 2023-05-24 DIAGNOSIS — M47.812 FACET ARTHRITIS OF CERVICAL REGION: ICD-10-CM

## 2023-05-24 DIAGNOSIS — K31.7 POLYP OF STOMACH AND DUODENUM: ICD-10-CM

## 2023-05-24 DIAGNOSIS — K63.5 POLYP OF COLON, UNSPECIFIED PART OF COLON, UNSPECIFIED TYPE: ICD-10-CM

## 2023-05-24 DIAGNOSIS — D12.0 BENIGN NEOPLASM OF CECUM: ICD-10-CM

## 2023-05-24 DIAGNOSIS — M25.511 ACUTE PAIN OF RIGHT SHOULDER: ICD-10-CM

## 2023-05-24 DIAGNOSIS — R53.82 CHRONIC FATIGUE: ICD-10-CM

## 2023-05-24 DIAGNOSIS — M17.12 ARTHRITIS OF LEFT KNEE: ICD-10-CM

## 2023-05-24 DIAGNOSIS — Z00.00 ENCOUNTER FOR ANNUAL HEALTH EXAMINATION: Primary | ICD-10-CM

## 2023-05-24 DIAGNOSIS — K44.9 DIAPHRAGMATIC HERNIA WITHOUT OBSTRUCTION OR GANGRENE: ICD-10-CM

## 2023-05-24 DIAGNOSIS — E28.39 ESTROGEN DEFICIENCY: ICD-10-CM

## 2023-05-24 DIAGNOSIS — M47.816 FACET ARTHRITIS OF LUMBAR REGION: ICD-10-CM

## 2023-05-24 DIAGNOSIS — F41.9 ANXIETY: ICD-10-CM

## 2023-05-24 DIAGNOSIS — N95.1 MENOPAUSAL VAGINAL DRYNESS: ICD-10-CM

## 2023-05-24 DIAGNOSIS — Z12.31 ENCOUNTER FOR SCREENING MAMMOGRAM FOR BREAST CANCER: ICD-10-CM

## 2023-05-24 DIAGNOSIS — L92.0 GRANULOMA ANNULARE: ICD-10-CM

## 2023-05-24 DIAGNOSIS — I10 ESSENTIAL HYPERTENSION: ICD-10-CM

## 2023-05-24 DIAGNOSIS — R73.03 PREDIABETES: Chronic | ICD-10-CM

## 2023-05-24 DIAGNOSIS — E03.9 HYPOTHYROIDISM, UNSPECIFIED TYPE: ICD-10-CM

## 2023-05-24 DIAGNOSIS — Z85.828 PERSONAL HISTORY OF MALIGNANT NEOPLASM OF SKIN: ICD-10-CM

## 2023-05-24 DIAGNOSIS — K21.9 GASTROESOPHAGEAL REFLUX DISEASE WITHOUT ESOPHAGITIS: ICD-10-CM

## 2023-05-24 DIAGNOSIS — G25.81 RLS (RESTLESS LEGS SYNDROME): ICD-10-CM

## 2023-05-24 DIAGNOSIS — E78.5 HYPERLIPIDEMIA, UNSPECIFIED HYPERLIPIDEMIA TYPE: ICD-10-CM

## 2023-05-24 DIAGNOSIS — G35 MULTIPLE SCLEROSIS (HCC): ICD-10-CM

## 2023-05-24 DIAGNOSIS — F33.0 MILD RECURRENT MAJOR DEPRESSION (HCC): ICD-10-CM

## 2023-05-24 DIAGNOSIS — L82.1 SEBORRHEIC KERATOSES: ICD-10-CM

## 2023-05-24 DIAGNOSIS — K22.70 BARRETT'S ESOPHAGUS WITHOUT DYSPLASIA: ICD-10-CM

## 2023-05-24 LAB
ALBUMIN SERPL-MCNC: 4.1 G/DL (ref 3.4–5)
ALBUMIN/GLOB SERPL: 1 {RATIO} (ref 1–2)
ALP LIVER SERPL-CCNC: 67 U/L
ALT SERPL-CCNC: 29 U/L
ANION GAP SERPL CALC-SCNC: 8 MMOL/L (ref 0–18)
AST SERPL-CCNC: 24 U/L (ref 15–37)
BASOPHILS # BLD AUTO: 0.06 X10(3) UL (ref 0–0.2)
BASOPHILS NFR BLD AUTO: 0.7 %
BILIRUB SERPL-MCNC: 0.6 MG/DL (ref 0.1–2)
BUN BLD-MCNC: 15 MG/DL (ref 7–18)
CALCIUM BLD-MCNC: 9.7 MG/DL (ref 8.5–10.1)
CHLORIDE SERPL-SCNC: 106 MMOL/L (ref 98–112)
CHOLEST SERPL-MCNC: 193 MG/DL (ref ?–200)
CO2 SERPL-SCNC: 25 MMOL/L (ref 21–32)
CREAT BLD-MCNC: 0.65 MG/DL
EOSINOPHIL # BLD AUTO: 0.29 X10(3) UL (ref 0–0.7)
EOSINOPHIL NFR BLD AUTO: 3.3 %
ERYTHROCYTE [DISTWIDTH] IN BLOOD BY AUTOMATED COUNT: 13.3 %
EST. AVERAGE GLUCOSE BLD GHB EST-MCNC: 123 MG/DL (ref 68–126)
FASTING PATIENT LIPID ANSWER: YES
FASTING STATUS PATIENT QL REPORTED: YES
GFR SERPLBLD BASED ON 1.73 SQ M-ARVRAT: 93 ML/MIN/1.73M2 (ref 60–?)
GLOBULIN PLAS-MCNC: 4 G/DL (ref 2.8–4.4)
GLUCOSE BLD-MCNC: 131 MG/DL (ref 70–99)
HBA1C MFR BLD: 5.9 % (ref ?–5.7)
HCT VFR BLD AUTO: 43.1 %
HDLC SERPL-MCNC: 94 MG/DL (ref 40–59)
HGB BLD-MCNC: 14.3 G/DL
IMM GRANULOCYTES # BLD AUTO: 0.04 X10(3) UL (ref 0–1)
IMM GRANULOCYTES NFR BLD: 0.5 %
LDLC SERPL CALC-MCNC: 85 MG/DL (ref ?–100)
LYMPHOCYTES # BLD AUTO: 1.45 X10(3) UL (ref 1–4)
LYMPHOCYTES NFR BLD AUTO: 16.7 %
MCH RBC QN AUTO: 29.5 PG (ref 26–34)
MCHC RBC AUTO-ENTMCNC: 33.2 G/DL (ref 31–37)
MCV RBC AUTO: 89 FL
MONOCYTES # BLD AUTO: 0.66 X10(3) UL (ref 0.1–1)
MONOCYTES NFR BLD AUTO: 7.6 %
NEUTROPHILS # BLD AUTO: 6.2 X10 (3) UL (ref 1.5–7.7)
NEUTROPHILS # BLD AUTO: 6.2 X10(3) UL (ref 1.5–7.7)
NEUTROPHILS NFR BLD AUTO: 71.2 %
NONHDLC SERPL-MCNC: 99 MG/DL (ref ?–130)
OSMOLALITY SERPL CALC.SUM OF ELEC: 291 MOSM/KG (ref 275–295)
PLATELET # BLD AUTO: 328 10(3)UL (ref 150–450)
POTASSIUM SERPL-SCNC: 3.8 MMOL/L (ref 3.5–5.1)
PROT SERPL-MCNC: 8.1 G/DL (ref 6.4–8.2)
RBC # BLD AUTO: 4.84 X10(6)UL
SODIUM SERPL-SCNC: 139 MMOL/L (ref 136–145)
TRIGL SERPL-MCNC: 79 MG/DL (ref 30–149)
TSI SER-ACNC: 1.84 MIU/ML (ref 0.36–3.74)
VIT D+METAB SERPL-MCNC: 33.8 NG/ML (ref 30–100)
VLDLC SERPL CALC-MCNC: 12 MG/DL (ref 0–30)
WBC # BLD AUTO: 8.7 X10(3) UL (ref 4–11)

## 2023-05-24 PROCEDURE — 85025 COMPLETE CBC W/AUTO DIFF WBC: CPT

## 2023-05-24 PROCEDURE — 84443 ASSAY THYROID STIM HORMONE: CPT

## 2023-05-24 PROCEDURE — 80053 COMPREHEN METABOLIC PANEL: CPT

## 2023-05-24 PROCEDURE — 73030 X-RAY EXAM OF SHOULDER: CPT | Performed by: FAMILY MEDICINE

## 2023-05-24 PROCEDURE — 36415 COLL VENOUS BLD VENIPUNCTURE: CPT

## 2023-05-24 PROCEDURE — 80061 LIPID PANEL: CPT

## 2023-05-24 PROCEDURE — 83036 HEMOGLOBIN GLYCOSYLATED A1C: CPT

## 2023-05-24 PROCEDURE — 82306 VITAMIN D 25 HYDROXY: CPT

## 2023-05-24 RX ORDER — ESCITALOPRAM OXALATE 20 MG/1
TABLET ORAL
Qty: 90 TABLET | Refills: 0 | Status: SHIPPED | OUTPATIENT
Start: 2023-05-24

## 2023-05-24 RX ORDER — CELECOXIB 200 MG/1
CAPSULE ORAL
COMMUNITY
Start: 2023-02-21 | End: 2023-05-24

## 2023-05-24 RX ORDER — CELECOXIB 200 MG/1
CAPSULE ORAL
Qty: 90 CAPSULE | Refills: 0 | Status: SHIPPED | OUTPATIENT
Start: 2023-05-24

## 2023-05-30 PROBLEM — D12.2 BENIGN NEOPLASM OF ASCENDING COLON: Status: RESOLVED | Noted: 2019-11-05 | Resolved: 2023-05-30

## 2023-05-30 PROBLEM — D12.0 BENIGN NEOPLASM OF CECUM: Status: RESOLVED | Noted: 2019-11-05 | Resolved: 2023-05-30

## 2023-06-13 ENCOUNTER — TELEPHONE (OUTPATIENT)
Dept: PHYSICAL THERAPY | Facility: HOSPITAL | Age: 73
End: 2023-06-13

## 2023-06-21 ENCOUNTER — OFFICE VISIT (OUTPATIENT)
Dept: PHYSICAL THERAPY | Age: 73
End: 2023-06-21
Attending: FAMILY MEDICINE
Payer: MEDICARE

## 2023-06-21 ENCOUNTER — TELEPHONE (OUTPATIENT)
Dept: PHYSICAL THERAPY | Facility: HOSPITAL | Age: 73
End: 2023-06-21

## 2023-06-21 DIAGNOSIS — M25.511 ACUTE PAIN OF RIGHT SHOULDER: ICD-10-CM

## 2023-06-21 PROCEDURE — 97140 MANUAL THERAPY 1/> REGIONS: CPT

## 2023-06-21 PROCEDURE — 97161 PT EVAL LOW COMPLEX 20 MIN: CPT

## 2023-06-23 ENCOUNTER — OFFICE VISIT (OUTPATIENT)
Dept: PHYSICAL THERAPY | Age: 73
End: 2023-06-23
Attending: FAMILY MEDICINE
Payer: MEDICARE

## 2023-06-23 DIAGNOSIS — M25.511 ACUTE PAIN OF RIGHT SHOULDER: ICD-10-CM

## 2023-06-23 PROCEDURE — 97110 THERAPEUTIC EXERCISES: CPT

## 2023-06-23 PROCEDURE — 97140 MANUAL THERAPY 1/> REGIONS: CPT

## 2023-06-27 ENCOUNTER — OFFICE VISIT (OUTPATIENT)
Dept: PHYSICAL THERAPY | Age: 73
End: 2023-06-27
Attending: FAMILY MEDICINE
Payer: MEDICARE

## 2023-06-27 DIAGNOSIS — M25.511 ACUTE PAIN OF RIGHT SHOULDER: Primary | ICD-10-CM

## 2023-06-27 PROCEDURE — 97110 THERAPEUTIC EXERCISES: CPT

## 2023-06-27 PROCEDURE — 97140 MANUAL THERAPY 1/> REGIONS: CPT

## 2023-06-30 ENCOUNTER — TELEPHONE (OUTPATIENT)
Dept: ORTHOPEDICS CLINIC | Facility: CLINIC | Age: 73
End: 2023-06-30

## 2023-06-30 DIAGNOSIS — M25.562 LEFT KNEE PAIN, UNSPECIFIED CHRONICITY: Primary | ICD-10-CM

## 2023-06-30 NOTE — TELEPHONE ENCOUNTER
Patient scheduled via [facility] with Dr. Radha Martinez with appt note stating: \"I need an X ray on the knee replacement I had in December. I fell on my knee\". Does patient need to be seen sooner than 8/9 appt that was scheduled? Patient is Postop DOS 12/9/22. Please advise, thank you.      Future Appointments   Date Time Provider Keeley Barajas   8/9/2023 11:40 AM Pion Arcos MD [facility]

## 2023-06-30 NOTE — TELEPHONE ENCOUNTER
Scheduled. Patient also added to Wait List for the [facility] location only (per pt preference)  Future Appointments   Date Time Provider Keeley Newtoni   7/3/2023  9:30 [facility]   7/12/2023 11:15 AM Anamika Dun, PT [facility] PHYS TH [place]   7/14/2023  9:45 AM Anamika Brown, PT [facility] PHYS TH [place]   7/17/2023  2:15 PM Anamika Brown, PT [facility] PHYS TH [place]   7/19/2023  2:15 PM Anamika Dun, [facility]   7/19/2023  4:00 PM Samy Lester PA-C [facility]   7/24/2023 10:15 AM Anamika Dun, [facility] PHYS TH [place]   7/26/2023  2:15 PM Anamika Dun, PT [facility] PHYS TH [place]   8/9/2023 11:40 AM Nnamdi Ham MD [facility]   8/23/2023  9:40 AM PFS KEY RM1 PFS MAMMO S [place]   8/23/2023 11:00 AM PFS DEXA RM 1 PFS DEXA [facility] [place]   9/19/2023 10:15 AM MONROE RODNEY SGIEDW None   5/8/2024 10:00 AM Bryan Wall MD [facility] [place]     Please advise if imaging will be added. I asked the patient to come in 30 mins prior.

## 2023-06-30 NOTE — TELEPHONE ENCOUNTER
X-Ray ordered. Please schedule with the patient. Thank you    Fall 05/20/23  Left knee  \"Something is moving in the knee\".

## 2023-07-03 ENCOUNTER — OFFICE VISIT (OUTPATIENT)
Dept: PHYSICAL THERAPY | Age: 73
End: 2023-07-03
Attending: FAMILY MEDICINE
Payer: MEDICARE

## 2023-07-03 PROCEDURE — 97140 MANUAL THERAPY 1/> REGIONS: CPT

## 2023-07-03 PROCEDURE — 97110 THERAPEUTIC EXERCISES: CPT

## 2023-07-12 ENCOUNTER — APPOINTMENT (OUTPATIENT)
Dept: PHYSICAL THERAPY | Age: 73
End: 2023-07-12
Attending: FAMILY MEDICINE
Payer: MEDICARE

## 2023-07-14 ENCOUNTER — OFFICE VISIT (OUTPATIENT)
Dept: PHYSICAL THERAPY | Age: 73
End: 2023-07-14
Attending: FAMILY MEDICINE
Payer: MEDICARE

## 2023-07-14 DIAGNOSIS — M25.511 SHOULDER JOINT PAINFUL ON MOVEMENT, RIGHT: Primary | ICD-10-CM

## 2023-07-14 PROCEDURE — 97140 MANUAL THERAPY 1/> REGIONS: CPT

## 2023-07-14 PROCEDURE — 97110 THERAPEUTIC EXERCISES: CPT

## 2023-07-14 NOTE — PROGRESS NOTES
Diagnosis:   Acute pain on R shoulder    Referring Provider: Adama Vinson  Date of Evaluation:    06/21/2023    Precautions:   HTN,MS Next MD visit:   none scheduled  Date of Surgery: n/a   Insurance Primary/Secondary: [facility] / N/A     # Auth Visits: 10 till 8/21/23       Discharge Summary  Pt has attended 5 visits in Physical Therapy. Subjective: The patient feels so much better and able to participate in her exercise class with some light weights already. She wants to be done with therapy treatments today and she will continue to do her exercises. Pain: 0/10    Objective:   AROM: (* denotes performed with pain)  Shoulder  Elbow   Flexion: R WNL ,  L WNL  Abduction: R WNL   L WNL  ER: R WNL, L WNL  IRL R WNL, L WNL    WNL B         Strength/MMT: (* denotes performed with pain)  Shoulder Scapular Elbow   Flexion: R 5/5; L 5/5  Abduction: R 5/5; L 5/5  ER: R 5/5; L 5/5  IR: R 5/5; L 5/5 Rhomboids: R 4-/5, L 4/5  Mid trap: R 4-/5; L 4/5   Low trap: R 4-/5; L 4/5 Flex/Ext R 5/5, L 5/5      Palpation:  Miin TPP on deltoids ,upper traps and scapular muscle       Post QuickDASH Outcome Score  Post Score: 0 % (7/14/2023  9:52 AM)      Assessment: The patient has restored her function on Left shoulder no difficulty or pain and she is able to participate in her normal routine or exercise class without difficulty. She has met goals for therapy and wants to be done with treatments.       Goals: (to be met in 8 visits)   Pt will improve use of R shoulder pain to <2/10 and  able to do self care/ADLs specially donning /doffing clothes, MET  Pt will improve ability  to reach into overhead cabinets without pain /restriction or difficulty ,MET  Pt will improve abduction AROM to >150 degrees to improve ability to don deodorant, don/doff shirts, and wash hair ,MET  Pt will increase shoulder AROM ER to 60 deg to be able to reach the head for self care,MET  Pt will increase shoulder AROM IR to T8 spinous process to be able to reach in back pocket, tuck in shirt, and turn steering wheel without pain,MET  Pt will improve shoulder strength throughout to 5/5 to improve function with carrying or lifting light to med objects without difficulty. MET  Pt will demonstrate increased mid/low trap strength to 4/5 to promote improved shoulder mechanics and stabilization with lifting and reaching ,Improved  Pt will be independent and compliant with comprehensive HEP to maintain progress achieved in PT, MET        Plan: Discharge from skilled physical therapy treatments. Date: 6/23/2023  TX#: 2/8 planned Date: 6/27/2023                TX#: 3/8 planned Date: 07/03/2023           TX#: 4/8 Date: 7/14/2023         TX#: 5/8 Date: Tx#: 6/   Thera Ex ; 18' Thera Ex:25' Thera Ex:25' Thera Ex 29'    SciFit 3'/3'  SB roll up wall then lift up 3x10  Red ban 3 way scap rows 2x10 each SciFit 3'/3'  SB roll up wall then lift up 3x10  #3 OH/Flexion 2x10  Red band 3 way scap rows 2x15 each SciFit 3'/3'  SB roll up wall then lift up 3x10   Standing Flexion 2x10 2#   Free Motion Rows 14# x12  Free Motion Extensions 14# x12  Rhombid Stretch/Educaiton 5x15\" H  SciFit 3'/3'  SB roll up wall then lift up 3x10   Standing 3 way flexion/  Scaption /Abduction #2 3x10  OH /press up 2x10  SA punches 2x10        Manual Tx 28' Manual Tx 20' Manual Tx 20' Manual Tx 25'    Sliding to static supping to upper traps , scapular muscles. IASTM to scapular muscles and deltoids Sliding to static supping to upper traps scapular muscles.   IASTM to scapular muscles and deltoids MFR/Trigger Point Release: UT, Rhomboids, Mid trap  PROM: R Scapula 5' MFR/Trigger Point Release: UT, Rhomboids, Mid trap R    HEP: Wall wipes, 3 way scap rows with red band 2-3 x 10    Charges: 5IF2Gty Total Timed Treatment: 53min  Total Treatment Time: 53min

## 2023-07-17 ENCOUNTER — APPOINTMENT (OUTPATIENT)
Dept: PHYSICAL THERAPY | Age: 73
End: 2023-07-17
Attending: FAMILY MEDICINE
Payer: MEDICARE

## 2023-07-19 ENCOUNTER — OFFICE VISIT (OUTPATIENT)
Dept: ORTHOPEDICS CLINIC | Facility: CLINIC | Age: 73
End: 2023-07-19
Payer: MEDICARE

## 2023-07-19 ENCOUNTER — APPOINTMENT (OUTPATIENT)
Dept: PHYSICAL THERAPY | Age: 73
End: 2023-07-19
Attending: FAMILY MEDICINE
Payer: MEDICARE

## 2023-07-19 ENCOUNTER — HOSPITAL ENCOUNTER (OUTPATIENT)
Dept: GENERAL RADIOLOGY | Age: 73
Discharge: HOME | End: 2023-07-19
Attending: ORTHOPAEDIC SURGERY
Payer: MEDICARE

## 2023-07-19 VITALS — BODY MASS INDEX: 33.63 KG/M2 | HEIGHT: 64 IN | WEIGHT: 197 LBS

## 2023-07-19 DIAGNOSIS — Z96.652 STATUS POST LEFT KNEE REPLACEMENT: Primary | ICD-10-CM

## 2023-07-19 DIAGNOSIS — M25.562 LEFT KNEE PAIN, UNSPECIFIED CHRONICITY: ICD-10-CM

## 2023-07-19 PROCEDURE — 73562 X-RAY EXAM OF KNEE 3: CPT | Performed by: ORTHOPAEDIC SURGERY

## 2023-07-19 PROCEDURE — 1126F AMNT PAIN NOTED NONE PRSNT: CPT | Performed by: PHYSICIAN ASSISTANT

## 2023-07-19 PROCEDURE — 1160F RVW MEDS BY RX/DR IN RCRD: CPT | Performed by: PHYSICIAN ASSISTANT

## 2023-07-19 PROCEDURE — 3008F BODY MASS INDEX DOCD: CPT | Performed by: PHYSICIAN ASSISTANT

## 2023-07-19 PROCEDURE — 99213 OFFICE O/P EST LOW 20 MIN: CPT | Performed by: PHYSICIAN ASSISTANT

## 2023-07-19 PROCEDURE — 1159F MED LIST DOCD IN RCRD: CPT | Performed by: PHYSICIAN ASSISTANT

## 2023-07-19 NOTE — PROGRESS NOTES
[facility] Progress Note    Subjective: Patient returns to clinic after last being seen by Dr. Rigo Bird on 5/17/2023. She is status post left total knee arthroplasty performed on 12/9/2022. She reports that in late May after her visit with Dr. Rigo Bird, she unfortunately fell and landed onto the anterior aspect of both knees. Since then, she has not experienced any significant pain, however she has noticed a clicking sensation along the posterolateral aspect of her left knee. This clicking is not painful and can be reproduced when wiggling her knee from a valgus to a varus position, and back-and-forth. She denies any clicking or pain noticed when lying in bed at night having to kick bedsheets over. She wanted to follow-up to ensure the hardware remains stable on radiographs. Objective: Patient seated comfortably in the exam chair. Alert and oriented x3. Nonlabored breathing. Gross neurologically intact. Incision is clean, dry, and intact with no redness or drainage concerning for infection. Demonstrates active knee extension to 0 degrees and flexion approximately 120 degrees. Possible subtle click felt along the popliteus tendon, however palpation of the popliteus tendon fails to yield any significant discomfort or pain. Imaging: Radiographs of the left knee personally viewed, independently interpreted and radiology report read. Radiographs demonstrate stable appearance and alignment of surgical hardware without any abnormalities detected. No concerns. Assessment/Plan: I reassured the patient that from a hardware perspective, her knee replacement has maintained alignment. I discussed that potentially she could be feeling some snapping of the popliteus tendon, however as long she does not feel any significant pain around this region that we can simply continue to monitor. She felt very reassured by this.   She will plan on following up with Dr. Rigo Bird in December for routine 1 year postop visit. The patient's questions were sought and answered satisfactorily. She is happy with the plan and will follow as advised. X-rays needed at next visit: Postoperative radiographs left knee        Lencho Salgado PA-C  [address] Orthopedic Surgery    This note was dictated using Dragon software. While it was briefly proofread prior to completion, some grammatical, spelling, and word choice errors due to dictation may still occur.

## 2023-07-24 ENCOUNTER — APPOINTMENT (OUTPATIENT)
Dept: PHYSICAL THERAPY | Age: 73
End: 2023-07-24
Attending: FAMILY MEDICINE
Payer: MEDICARE

## 2023-07-26 ENCOUNTER — APPOINTMENT (OUTPATIENT)
Dept: PHYSICAL THERAPY | Age: 73
End: 2023-07-26
Attending: FAMILY MEDICINE
Payer: MEDICARE

## 2023-07-31 ENCOUNTER — APPOINTMENT (OUTPATIENT)
Dept: PHYSICAL THERAPY | Age: 73
End: 2023-07-31
Attending: FAMILY MEDICINE
Payer: MEDICARE

## 2023-08-23 ENCOUNTER — HOSPITAL ENCOUNTER (OUTPATIENT)
Dept: BONE DENSITY | Age: 73
Discharge: HOME | End: 2023-08-23
Attending: FAMILY MEDICINE
Payer: MEDICARE

## 2023-08-23 ENCOUNTER — HOSPITAL ENCOUNTER (OUTPATIENT)
Dept: MAMMOGRAPHY | Age: 73
Discharge: HOME | End: 2023-08-23
Attending: FAMILY MEDICINE
Payer: MEDICARE

## 2023-08-23 DIAGNOSIS — E28.39 ESTROGEN DEFICIENCY: ICD-10-CM

## 2023-08-23 DIAGNOSIS — Z12.31 ENCOUNTER FOR SCREENING MAMMOGRAM FOR BREAST CANCER: ICD-10-CM

## 2023-08-23 PROCEDURE — 77067 SCR MAMMO BI INCL CAD: CPT | Performed by: FAMILY MEDICINE

## 2023-08-23 PROCEDURE — 77080 DXA BONE DENSITY AXIAL: CPT | Performed by: FAMILY MEDICINE

## 2023-08-23 PROCEDURE — 77063 BREAST TOMOSYNTHESIS BI: CPT | Performed by: FAMILY MEDICINE

## 2023-08-23 RX ORDER — ATORVASTATIN CALCIUM 20 MG/1
TABLET, FILM COATED ORAL
Qty: 90 TABLET | Refills: 2 | Status: SHIPPED | OUTPATIENT
Start: 2023-08-23

## 2023-08-23 NOTE — TELEPHONE ENCOUNTER
Atorvastatin 20 mg  Filled 3-23-23  Qty 90  0 refills  No upcoming appt.   LOV 5-24-23 SM  Labs 5-24-23 Lipid

## 2023-09-18 ENCOUNTER — ANESTHESIA EVENT (OUTPATIENT)
Dept: ENDOSCOPY | Facility: HOSPITAL | Age: 73
End: 2023-09-18
Payer: MEDICARE

## 2023-09-19 ENCOUNTER — ANESTHESIA (OUTPATIENT)
Dept: ENDOSCOPY | Facility: HOSPITAL | Age: 73
End: 2023-09-19
Payer: MEDICARE

## 2023-09-19 ENCOUNTER — HOSPITAL ENCOUNTER (OUTPATIENT)
Facility: HOSPITAL | Age: 73
Setting detail: HOSPITAL OUTPATIENT SURGERY
Discharge: HOME | End: 2023-09-19
Attending: INTERNAL MEDICINE | Admitting: INTERNAL MEDICINE
Payer: MEDICARE

## 2023-09-19 VITALS
HEART RATE: 63 BPM | RESPIRATION RATE: 16 BRPM | DIASTOLIC BLOOD PRESSURE: 64 MMHG | BODY MASS INDEX: 33.63 KG/M2 | SYSTOLIC BLOOD PRESSURE: 141 MMHG | OXYGEN SATURATION: 98 % | HEIGHT: 64 IN | WEIGHT: 197 LBS | TEMPERATURE: 98 F

## 2023-09-19 PROCEDURE — 0D548ZZ DESTRUCTION OF ESOPHAGOGASTRIC JUNCTION, VIA NATURAL OR ARTIFICIAL OPENING ENDOSCOPIC: ICD-10-PCS | Performed by: INTERNAL MEDICINE

## 2023-09-19 RX ORDER — LIDOCAINE HYDROCHLORIDE 10 MG/ML
INJECTION, SOLUTION EPIDURAL; INFILTRATION; INTRACAUDAL; PERINEURAL AS NEEDED
Status: DISCONTINUED | OUTPATIENT
Start: 2023-09-19 | End: 2023-09-19 | Stop reason: SURG

## 2023-09-19 RX ORDER — SODIUM CHLORIDE, SODIUM LACTATE, POTASSIUM CHLORIDE, CALCIUM CHLORIDE 600; 310; 30; 20 MG/100ML; MG/100ML; MG/100ML; MG/100ML
INJECTION, SOLUTION INTRAVENOUS CONTINUOUS
Status: DISCONTINUED | OUTPATIENT
Start: 2023-09-19 | End: 2023-09-19

## 2023-09-19 RX ADMIN — LIDOCAINE HYDROCHLORIDE 100 MG: 10 INJECTION, SOLUTION EPIDURAL; INFILTRATION; INTRACAUDAL; PERINEURAL at 11:19:00

## 2023-09-19 NOTE — H&P
Clara [address]low [address] Patient Status:  Hospital Outpatient Surgery    3/21/1950 MRN [id number]   Location [address] [address] Attending Anson Sinha MD   Date 2023 PCP Brown Flores MD     CC:   Diagnosis   Alas's esophagus with dysplasia       History of Present Illness:  Lori Cockayne is a a(n) 68year old female.    Diagnosis   Alas's esophagus with dysplasia       History:  Past Medical History:   Diagnosis Date    Anesthesia complication     prolonged awakening    Arthritis     BACK PAIN     in morning    Back problem     Alas's esophagus     Basal cell carcinoma of ear 2008    excision of BCC lesion Left ear    Change in hair Hair is thinning/age related    Chronic heartburn 2023    Constipation     Depression     Disorder of thyroid     Easy bruising     Essential hypertension 2012    Fatigue     Heartburn 2019    Hemorrhoids After having a baby    High blood pressure     High cholesterol     Hx of motion sickness     Hyperglycemia     Hyperlipidemia     Increased weakness when ambulating 2016    Multiple sclerosis ([facility] [address] 2000    chronic side effects include fatique and \"brain fog\" and decreased vision of left eye, left sided weakness due to MS    Muscle spasms of both lower extremities 2018    Muscle weakness     has MS    OSTEOARTHRITIS     Osteoarthritis     Osteoarthrosis involving lower leg 2013    Log Date: 10/26/2012     OTHER DISEASES     MS dx 1981 Dr. Malcolm Fuchs    Pain in joints At times/left knee    Personal history of colonic polyps 2019    PONV (postoperative nausea and vomiting)     Problems with swallowing     Unspecified essential hypertension     Visual impairment     decreased vision left eye glasses    Wears glasses Glasses     Past Surgical History:   Procedure Laterality Date    APPENDECTOMY  1964    BACK SURGERY      cervical surgery/ACDF x 3 with Dr. Jennifer Shaw     x2    COLONOSCOPY  2003    COLONOSCOPY      EXCIS LUMBAR DISK,ONE LEVEL      FOOT/TOES SURGERY PROC UNLISTED  2005    hemmertoes operation Rt toe    HYSTERECTOMY      KNEE REPLACEMENT SURGERY  2022    LAMINECTOMY,LUMBAR  2010    L4 L5 fusion    OOPHORECTOMY Bilateral     REMOVAL OF OVARIAN CYST(S)      x2    THYROIDECTOMY      partial thyroidectomy Lt    TONSILLECTOMY      TOTAL ABDOM HYSTERECTOMY      w/ removal of both ovaries d/t DBT and fibroid     Family History   Problem Relation Age of Onset    Cancer Sister         cervical cancer    Hypertension Mother          2019    Breast Cancer Daughter         BRCA 1 GENE    BRCA gene + Daughter     Hypertension Brother          2014    Hypertension Sister     Hypertension Sister     Hypertension Brother       reports that she has never smoked. She has never used smokeless tobacco. She reports current alcohol use of about 5.0 standard drinks of alcohol per week. She reports that she does not use drugs. Allergies:  No Known Allergies    Medications:  No current facility-administered medications for this encounter. Physical Exam:    Height 5' 4\" (1.626 m), weight 197 lb (89.4 kg), not currently breastfeeding. General: Appears alert, oriented x3 and in no acute distress. CV: Normal rate   Lungs: Normal effort   Skin: Warm and dry.   Laboratory Data:       Imaging:      Assessment/Plan/Procedure:  Patient Active Problem List:     Lumbar spinal stenosis     Multiple sclerosis (HCC)     Hyperlipidemia     Hypothyroidism     Anxiety     Granuloma annulare     History of lumbar fusion     RLS (restless legs syndrome)     Muscle spasms of both lower extremities     Chronic fatigue     Facet arthritis of lumbar region     Facet arthritis of cervical region     Menopausal vaginal dryness     Mild recurrent major depression (HCC)     Prediabetes     Chronic superficial gastritis without bleeding     Hiatal hernia Gastroesophageal reflux disease without esophagitis     Polyp of stomach and duodenum     Alas's esophagus without dysplasia     Polyp of colon     Weakness of right leg     Seborrheic keratoses     Lentigo     Personal history of malignant neoplasm of skin     Unspecified inflammatory spondylopathy, cervical region ([address]     Arthritis of left knee     Primary hypertension     Diaphragmatic hernia without obstruction or gangrene      Diagnosis   Alas's esophagus with dysplasia       Plan:  EGD    Stephon Lucio MD  9/19/2023  10:06 AM

## 2023-09-19 NOTE — DISCHARGE INSTRUCTIONS
Home Care Instructions for Gastroscopy with Sedation    Diet:  - Resume your regular diet as tolerated unless otherwise instructed. - Start with light meals to minimize bloating.  - Do not drink alcohol today. Medication:  - If you have questions about resuming your normal medications, please contact your Primary Care Physician. Activities:  - Take it easy today. Do not return to work today. - Do not drive today. - Do not operate any machinery today (including kitchen equipment). Gastroscopy:  - You may have a sore throat for 2-3 days following the exam. This is normal. Gargling with warm salt water (1/2 tsp salt to 1 glass warm water) or using throat lozenges will help. - If you experience any sharp pain in your neck, abdomen or chest, vomiting of blood, oral temperature over 100 degrees Fahrenheit, light-headedness or dizziness, or any other problems, contact your doctor. **If unable to reach your doctor, please go to the [facility] Emergency Room**    - Your referring physician will receive a full report of your examination.  - If you do not hear from your doctor's office within two weeks of your biopsy, please call them for your results. You may be able to see your laboratory results in [facility] between 4 and 7 business days. In some cases, your physician may not have viewed the results before they are released to [address] If you have questions regarding your results contact the physician who ordered the test/exam by phone or via [address] by choosing \"Ask a Medical Question. \"

## 2023-10-05 NOTE — TELEPHONE ENCOUNTER
CELECOXIB 200 MG Capsule  No Protocol     LOV: 6/25/2020 with Erika   RTC: 6 months   Upcoming OV: none scheduled   Filled: 4/20/2020 #90, 0 refills
Home

## 2023-10-23 DIAGNOSIS — M17.12 ARTHRITIS OF LEFT KNEE: ICD-10-CM

## 2023-10-23 DIAGNOSIS — M47.812 FACET ARTHRITIS OF CERVICAL REGION: ICD-10-CM

## 2023-10-23 DIAGNOSIS — M46.92 UNSPECIFIED INFLAMMATORY SPONDYLOPATHY, CERVICAL REGION (HCC): ICD-10-CM

## 2023-10-23 DIAGNOSIS — M47.816 FACET ARTHRITIS OF LUMBAR REGION: ICD-10-CM

## 2023-10-23 DIAGNOSIS — M25.511 ACUTE PAIN OF RIGHT SHOULDER: ICD-10-CM

## 2023-10-23 RX ORDER — ESCITALOPRAM OXALATE 20 MG/1
20 TABLET ORAL DAILY
Qty: 90 TABLET | Refills: 0 | Status: SHIPPED | OUTPATIENT
Start: 2023-10-23

## 2023-10-23 RX ORDER — CELECOXIB 200 MG/1
200 CAPSULE ORAL DAILY
Qty: 90 CAPSULE | Refills: 0 | Status: SHIPPED | OUTPATIENT
Start: 2023-10-23

## 2023-10-23 NOTE — TELEPHONE ENCOUNTER
Celecoxib 200 mg  Filled 5-24-23  Qty 90  0 refills  Future Appointments   Date Time Provider [facility]   11/27/2023 10:20 AM Roxanne Branch MD [facility]   12/12/2023 10:10 AM RASHAUN, PROCEDURE [facility] None   5/8/2024 10:00 AM Kurt Klinefelter, MD [facility] [place]   LOV 5-24-23 SM    Escitalopram 20 mg  Filled 5-24-23  Qty 90  0 refills  Future Appointments   Date Time Provider [facility]   11/27/2023 10:20 AM Roxanne Branch MD [facility]   12/12/2023 10:10 AM RASHAUN, PROCEDURE [facility] None   5/8/2024 10:00 AM Kurt Klinefelter, MD [facility] [place]   LOV 5-24-23 SM

## 2023-10-30 RX ORDER — LEVOTHYROXINE SODIUM 0.03 MG/1
25 TABLET ORAL
Qty: 90 TABLET | Refills: 1 | Status: SHIPPED | OUTPATIENT
Start: 2023-10-30

## 2023-10-30 NOTE — TELEPHONE ENCOUNTER
Levothyroxine 25 mcg  Filled 12-14-22  Qty 90  0 refills  Future Appointments   Date Time Provider Keeley Newtoni   11/27/2023 10:20 AM Eric Dillard MD [facility]   12/12/2023 10:10 AM RASHAUN, PROCEDURE [facility] None   5/8/2024 10:00 AM Hubert Guido MD [facility] [place]   LOV 5-24-23 SM  Labs 5-24-23 TSH W RELEX

## 2023-11-22 ENCOUNTER — TELEPHONE (OUTPATIENT)
Dept: ORTHOPEDICS CLINIC | Facility: CLINIC | Age: 73
End: 2023-11-22

## 2023-11-22 DIAGNOSIS — Z96.652 STATUS POST LEFT KNEE REPLACEMENT: Primary | ICD-10-CM

## 2023-11-22 NOTE — TELEPHONE ENCOUNTER
Xray's ordered for upcoming appointment     Patient aware to arrive 15-20 minutes earlier to complete images as my-chart message has been sent

## 2023-11-27 ENCOUNTER — OFFICE VISIT (OUTPATIENT)
Dept: ORTHOPEDICS CLINIC | Facility: CLINIC | Age: 73
End: 2023-11-27
Payer: MEDICARE

## 2023-11-27 ENCOUNTER — HOSPITAL ENCOUNTER (OUTPATIENT)
Dept: GENERAL RADIOLOGY | Age: 73
Discharge: HOME | End: 2023-11-27
Attending: ORTHOPAEDIC SURGERY
Payer: MEDICARE

## 2023-11-27 DIAGNOSIS — Z96.652 STATUS POST LEFT KNEE REPLACEMENT: ICD-10-CM

## 2023-11-27 DIAGNOSIS — M17.11 PRIMARY OSTEOARTHRITIS OF RIGHT KNEE: ICD-10-CM

## 2023-11-27 DIAGNOSIS — Z96.652 STATUS POST LEFT KNEE REPLACEMENT: Primary | ICD-10-CM

## 2023-11-27 PROCEDURE — 73562 X-RAY EXAM OF KNEE 3: CPT | Performed by: ORTHOPAEDIC SURGERY

## 2023-11-27 RX ORDER — TRIAMCINOLONE ACETONIDE 40 MG/ML
40 INJECTION, SUSPENSION INTRA-ARTICULAR; INTRAMUSCULAR ONCE
Status: COMPLETED | OUTPATIENT
Start: 2023-11-27 | End: 2023-11-27

## 2023-11-27 RX ADMIN — TRIAMCINOLONE ACETONIDE 40 MG: 40 INJECTION, SUSPENSION INTRA-ARTICULAR; INTRAMUSCULAR at 10:55:00

## 2023-11-27 NOTE — PROGRESS NOTES
SURGERY SCHEDULING SHEET    Reji Phillips  3/21/1950  [id number]    Procedure: Right total knee arthroplasty    CPT: 94802    Diagnosis: Right knee osteoarthritis    Anesthesia: Choice    Length of Surgery: 2 hours    Disposition: Inpatient    Instruments: Luis Monterroso TKA    Assist: If case scheduled on Thurs/Fri, then Harvey Mckeon first assist. If case scheduled on Tues, then Gonsalo Evans ([phone]) first assist. If Constantine Garcia unavailable, then please communicate to Elisha Zheng/Mark/Peyton to cancel [facility] clinic for that day and have Elisha Cain first assist. If Eilsha Cain unavailable, contact Iwona Caban for first assist. If Constantine Garcia and Juan José Olsen unavailable, then contact [facility] for first assist.    Pre-op Testing: CBC, CMP, PT/INR, PTT, TYPE AND SCREEN, and MRSA/MSSA THROUGH EDW PAT    Clearance: MEDICAL/PCP    Post op: 2 weeks postop appointment with Harvey Mckeon    Surgery date specifics: After Easter 2024 per patient request    Nancy Moreno MD, [facility]opedic Surgery  Phone: [phone]  Fax: [phone]

## 2023-11-27 NOTE — PROCEDURES
Risks and benefits of knee injection discussed with the patient, with risks including but not limited to pain and swelling at the injection site and/or within the knee joint, infection, elevation in blood pressure and/or glucose levels, facial flushing. After informed consent, the patient's right knee was marked, locally anesthetized with skin refrigerant, prepped with topical antiseptic, and injected with a mixture of 1mL 40mg/mL Kenalog, 2mL 1% lidocaine and 2mL 0.5% marcaine through the inferolateral portal.  A band-aid was applied. The patient tolerated the procedure well.     Terral Frankel, MD, [address] [place] [facility]  Phone [phone]  Fax [phone]

## 2023-11-27 NOTE — PROGRESS NOTES
[facility] Progress Note    Subjective: Patient returns 1 year postop from left knee replacement, also here for right knee pain. From the left knee standpoint she states she is doing very well, has no pain or functional limitations. She is very happy and surprised with her recovery after knee replacement. The right knee has become more of a limiting factor, pain more about the medial than lateral aspect. She states symptoms are worse with weightbearing activities. It feels similar to her left knee prior to it being replaced. Objective: Patient is very pleasant, appears comfortable. Left knee incision is well-healed, no effusion to the knee. She demonstrates full extension, flexion to 110. Knee is appropriately stable to varus and valgus stress throughout range of motion. Imaging: X-rays of the left knee personally viewed, independently interpreted and radiology report read. Demonstrates cemented left total knee arthroplasty appears in appropriate position without signs of loosening or wear. No eccentric narrowing of medial or lateral joint lines. No lucencies about bone cement implant interface. Patella is tracking centrally. Assessment/Plan: 49-year-old female 1 year postop status post left knee replacement, also with symptomatic moderate to severe right knee osteoarthritis. From the left knee standpoint advised continued activities as tolerated without restriction. Recommended follow-up with another set of x-rays in another 5 to 10 years. She will contact us sooner if needed. From the right knee standpoint, she has attempted nonsurgical treatments and would like to repeat injection today, but notes she is more limited due to the knee pain.  I discussed risks and benefits of surgery, with risks including but not limited to infection, blood clots, fracture, bleeding/need for blood transfusion, injury to blood vessels and nerves, loosening or failure of components, stiffness, continued pain, need for further intervention or revision surgery. Additionally I discussed expectations with regards to the postoperative recovery timeframe, the possibility of mechanical clicking with the knee, numbness on the lateral side of the knee/leg from sacrifice of the infrapatellar branch of the saphenous nerve, and potential for difficulty/pain with kneeling and performing deep flexion activities. The patient understands this discussion and elects to proceed with knee replacement surgery. Thomas Morrison was provided today. X-rays have been ordered to be updated but advised the patient to get these done closer to the date of surgery. She would like to do this after [place] next year. She will need primary care evaluation and clearance within 30 days of surgery.     Melissa Bajwa MD, [address] [facility]  Phone [phone]  Fax [phone]

## 2023-12-01 ENCOUNTER — MED REC SCAN ONLY (OUTPATIENT)
Dept: ORTHOPEDICS CLINIC | Facility: CLINIC | Age: 73
End: 2023-12-01

## 2023-12-13 RX ORDER — TRIAMTERENE AND HYDROCHLOROTHIAZIDE 37.5; 25 MG/1; MG/1
1 TABLET ORAL DAILY
Qty: 90 TABLET | Refills: 0 | OUTPATIENT
Start: 2023-12-13

## 2024-01-02 ENCOUNTER — PATIENT MESSAGE (OUTPATIENT)
Dept: INTERNAL MEDICINE CLINIC | Facility: CLINIC | Age: 74
End: 2024-01-02

## 2024-01-02 DIAGNOSIS — I10 PRIMARY HYPERTENSION: Primary | ICD-10-CM

## 2024-01-03 ENCOUNTER — HOSPITAL ENCOUNTER (OUTPATIENT)
Dept: GENERAL RADIOLOGY | Age: 74
Discharge: HOME | End: 2024-01-03
Attending: ORTHOPAEDIC SURGERY
Payer: MEDICARE

## 2024-01-03 DIAGNOSIS — M17.11 PRIMARY OSTEOARTHRITIS OF RIGHT KNEE: ICD-10-CM

## 2024-01-03 PROCEDURE — 77073 BONE LENGTH STUDIES: CPT | Performed by: ORTHOPAEDIC SURGERY

## 2024-01-03 PROCEDURE — 73562 X-RAY EXAM OF KNEE 3: CPT | Performed by: ORTHOPAEDIC SURGERY

## 2024-01-03 RX ORDER — TRIAMTERENE AND HYDROCHLOROTHIAZIDE 37.5; 25 MG/1; MG/1
1 TABLET ORAL DAILY
Qty: 90 TABLET | Refills: 0 | Status: SHIPPED | OUTPATIENT
Start: 2024-01-03

## 2024-01-03 RX ORDER — TRIAMTERENE AND HYDROCHLOROTHIAZIDE 37.5; 25 MG/1; MG/1
1 TABLET ORAL DAILY
Qty: 7 TABLET | Refills: 0 | Status: SHIPPED | OUTPATIENT
Start: 2024-01-03

## 2024-01-03 NOTE — TELEPHONE ENCOUNTER
From: Makenzie Reyes  To: Albania Knox  Sent: 1/2/2024 6:02 PM CST  Subject: Triamterene    Ronni James, I requested a refill of Triamterene HCTZ. On the 13th of December, I received a message saying the request has been sent. I haven't received the medication so, I called [facility] today and, was told they have not received the request to renew the medication from your office. I only have five days left of the medication. Please send the request as soon as you can.   Thank you and Happy New Year!  Makenzie Reyes

## 2024-01-03 NOTE — TELEPHONE ENCOUNTER
SM - pt has scheduled appt    Future Appointments   Date Time Provider Department Center   1/3/2024  1:00 PM PF XR RM1 PF XRAY [place]   1/3/2024  2:10 PM PF XR RM1 PF XRAY [place]   1/9/2024 11:45 AM RASHAUN, PROCEDURE [facility] None   1/22/2024 10:00 AM Albania Knox APRN [facility] [place]   5/8/2024 10:00 AM Alejo Wu MD [facility]

## 2024-01-03 NOTE — TELEPHONE ENCOUNTER
SM - CPX/LOV was 5/24/23. Pended 90-day refill to [facility], if appropriate. If pt would like 7 day supply sent to the [facility], okay to send that? Please advise, ty!

## 2024-01-08 DIAGNOSIS — M25.511 ACUTE PAIN OF RIGHT SHOULDER: ICD-10-CM

## 2024-01-08 DIAGNOSIS — M17.12 ARTHRITIS OF LEFT KNEE: ICD-10-CM

## 2024-01-08 DIAGNOSIS — M46.92 UNSPECIFIED INFLAMMATORY SPONDYLOPATHY, CERVICAL REGION (HCC): ICD-10-CM

## 2024-01-08 DIAGNOSIS — M47.816 FACET ARTHRITIS OF LUMBAR REGION: ICD-10-CM

## 2024-01-08 DIAGNOSIS — M47.812 FACET ARTHRITIS OF CERVICAL REGION: ICD-10-CM

## 2024-01-08 RX ORDER — CELECOXIB 200 MG/1
200 CAPSULE ORAL DAILY
Qty: 90 CAPSULE | Refills: 0 | Status: SHIPPED | OUTPATIENT
Start: 2024-01-08

## 2024-01-08 RX ORDER — ESCITALOPRAM OXALATE 20 MG/1
20 TABLET ORAL NIGHTLY
Qty: 90 TABLET | Refills: 0 | Status: SHIPPED | OUTPATIENT
Start: 2024-01-08

## 2024-01-08 NOTE — TELEPHONE ENCOUNTER
Celecoxib 200 mg  Filled 10-23-23  Qty 90  0 refills  Future Appointments   Date Time Provider Department Center   1/22/2024 10:00 AM Albania Knox APRN [facility] 8 [facility] [place]   1/30/2024  2:15 PM RASHAUN, PROCEDURE [facility] None   5/8/2024 10:00 AM Alejo Wu MD [facility] [place]   LOV 5-24-23 SM    Escitalopram 20 mg  Filled 10-23-23  Qty 90  0 refills  Future Appointments   Date Time Provider Department Center   1/22/2024 10:00 AM Albania Knox APRN [facility] 8 [facility] [place]   1/30/2024  2:15 PM RASHAUN, PROCEDURE [facility] None   5/8/2024 10:00 AM Alejo Wu MD [facility] [place]   LOV 5-24-23 SM

## 2024-01-24 ENCOUNTER — OFFICE VISIT (OUTPATIENT)
Dept: INTERNAL MEDICINE CLINIC | Facility: CLINIC | Age: 74
End: 2024-01-24
Payer: MEDICARE

## 2024-01-24 VITALS
HEART RATE: 78 BPM | OXYGEN SATURATION: 96 % | HEIGHT: 64 IN | SYSTOLIC BLOOD PRESSURE: 138 MMHG | RESPIRATION RATE: 16 BRPM | TEMPERATURE: 98 F | BODY MASS INDEX: 34.04 KG/M2 | DIASTOLIC BLOOD PRESSURE: 78 MMHG | WEIGHT: 199.38 LBS

## 2024-01-24 DIAGNOSIS — G35 MULTIPLE SCLEROSIS (HCC): Primary | ICD-10-CM

## 2024-01-24 DIAGNOSIS — I10 PRIMARY HYPERTENSION: ICD-10-CM

## 2024-01-24 DIAGNOSIS — E03.9 HYPOTHYROIDISM, UNSPECIFIED TYPE: ICD-10-CM

## 2024-01-24 DIAGNOSIS — M17.11 ARTHRITIS OF RIGHT KNEE: ICD-10-CM

## 2024-01-24 DIAGNOSIS — G25.81 RLS (RESTLESS LEGS SYNDROME): ICD-10-CM

## 2024-01-24 DIAGNOSIS — R73.03 PREDIABETES: ICD-10-CM

## 2024-01-24 DIAGNOSIS — F41.9 ANXIETY: ICD-10-CM

## 2024-01-24 DIAGNOSIS — E78.5 HYPERLIPIDEMIA, UNSPECIFIED HYPERLIPIDEMIA TYPE: ICD-10-CM

## 2024-01-24 PROCEDURE — 3075F SYST BP GE 130 - 139MM HG: CPT | Performed by: FAMILY MEDICINE

## 2024-01-24 PROCEDURE — 99214 OFFICE O/P EST MOD 30 MIN: CPT | Performed by: FAMILY MEDICINE

## 2024-01-24 PROCEDURE — 3078F DIAST BP <80 MM HG: CPT | Performed by: FAMILY MEDICINE

## 2024-01-24 PROCEDURE — 1159F MED LIST DOCD IN RCRD: CPT | Performed by: FAMILY MEDICINE

## 2024-01-24 PROCEDURE — 1160F RVW MEDS BY RX/DR IN RCRD: CPT | Performed by: FAMILY MEDICINE

## 2024-01-24 PROCEDURE — 3008F BODY MASS INDEX DOCD: CPT | Performed by: FAMILY MEDICINE

## 2024-01-24 RX ORDER — BACLOFEN 20 MG/1
20 TABLET ORAL DAILY
Qty: 90 TABLET | Refills: 0 | Status: SHIPPED | OUTPATIENT
Start: 2024-01-24

## 2024-01-24 NOTE — PROGRESS NOTES
CHIEF COMPLAINT:     Chief Complaint   Patient presents with    Blood Pressure    Medication Follow-Up       HPI:   Makenzie Reyes is a 73 year old female   Patient presents for recheck of  Hypertension and hyperlipdemia. Pt has been taking medications as instructed, no medication side effects, home BP monitoring has not been done.  Currently asymptomatic, no chest pains, jaw pains, arm pains. No headaches, dizziness or edema.  No SOB on exertion or rest.  Patient denies any myalgias or arthralgias on the cholesterol  medication. Pt has been following a low fat diet and has exercising/walking 7 days a week.  Current lipid treatment: Lipitor    Pt had a history of hypothyroidism and here to recheck. Has been tolerating the medication well. Due for TSH in May. Denies any shakiness, palpitations, increased BM's or wgt change. Denies any fatigue, skin or hair issues.    Pt is here for a recheck of anxiety/depression. Has been tolerating the meds well. Denies any side effects from the meds. Mood has been good. Patient's appetite is good.Pt denies headaches,tics,or insomnia.No depressive symptoms or suicidal ideations. Would like to continue the same medication.      Patient also following up on her arthritis of the back,and right knee. Pt takes celebrex and that helps. Pt will be having her righy knee replaced later this year.  Pt also needs a refill on the baclofen for her muscles and restless leg.    Current Outpatient Medications   Medication Sig Dispense Refill    baclofen 20 MG Oral Tab Take 1 tablet (20 mg total) by mouth daily. 90 tablet 0    escitalopram 20 MG Oral Tab Take 1 tablet (20 mg total) by mouth nightly. APPT DUE NOVEMBER 90 tablet 0    celecoxib 200 MG Oral Cap Take 1 capsule (200 mg total) by mouth daily. 90 capsule 0    Triamterene-HCTZ 37.5-25 MG Oral Tab Take 1 tablet by mouth daily. 90 tablet 0    levothyroxine 25 MCG Oral Tab Take 1 tablet (25 mcg total) by mouth before breakfast. 90 tablet 1     atorvastatin 20 MG Oral Tab TAKE 1 TABLET EVERY NIGHT 90 tablet 2    Omeprazole 40 MG Oral Capsule Delayed Release Take one tablet (40 mg total) by mouth once daily, 30 minutes prior to breakfast. 90 capsule 2    AMLODIPINE 5 MG Oral Tab TAKE 2 TABLETS EVERY  tablet 0    CALCIUM OR Take 1,000 mg by mouth daily.      Multiple Vitamin (DAILY VALUE MULTIVITAMIN) Oral Tab Take 1 tablet by mouth daily.      Cholecalciferol (VITAMIN D) 2000 UNITS Oral Cap Take 1 capsule (2,000 Units total) by mouth daily.      Triamterene-HCTZ 37.5-25 MG Oral Tab Take 1 tablet by mouth daily. 7 tablet 0      Past Medical History:   Diagnosis Date    Arthritis     BACK PAIN     in morning    Back problem     Alas's esophagus     Basal cell carcinoma of ear 04/2008    excision of BCC lesion Left ear    Change in hair Hair is thinning/age related    Chronic heartburn 05/01/2023    Constipation     Depression     Disorder of thyroid     Easy bruising     Esophageal reflux     Essential hypertension 06/29/2012    Fatigue     Heartburn 11/05/2019    Hemorrhoids After having a baby    High blood pressure     High cholesterol     Hx of motion sickness     Hyperglycemia     Hyperlipidemia     Increased weakness when ambulating 04/05/2016    Multiple sclerosis (HCC) 2000    chronic side effects include fatique and \"brain fog\" and decreased vision of left eye, left sided weakness due to MS    Muscle spasms of both lower extremities 02/27/2018    Muscle weakness     has MS    OSTEOARTHRITIS     Osteoarthritis     Osteoarthrosis involving lower leg 06/20/2013    Log Date: 10/26/2012     OTHER DISEASES     MS dx 1981 Dr. Ramachandran    Pain in joints At times/left knee    Personal history of colonic polyps 11/05/2019    PONV (postoperative nausea and vomiting)     Problems with swallowing     Unspecified essential hypertension     Visual impairment     decreased vision left eye glasses    Wears glasses Glasses      Social History:  Social  History     Socioeconomic History    Marital status:    Tobacco Use    Smoking status: Never    Smokeless tobacco: Never   Vaping Use    Vaping Use: Never used   Substance and Sexual Activity    Alcohol use: Yes     Alcohol/week: 5.0 standard drinks of alcohol     Types: 5 Glasses of wine per week    Drug use: Never   Other Topics Concern    Caffeine Concern Yes     Comment: 2 cups of coffee daily.    Exercise Yes     Comment: 4x/week.         REVIEW OF SYSTEMS:   GENERAL:  Denies fever,weight change +9 #, denies decreased appetite and weakness.  SKIN: Denies rashes, skin wounds or ulcers.  EYES: Denies blurred vision or double vision  HENT: Denies congestion, sore throat or ear pain.    CHEST: Denies chest pain, or palpitations  LUNGS: Denies shortness of breath,wheezing or cough  GI: Denies abdominal pain, N/V/C/D.   MUSCULOSKELETAL:see HPI  LYMPH:  Denies lymphadenopathy  NEURO:  Denies headaches and lightheadedness.  No seizures, no confusion, no weakness, no abnormal sensation, no vertigo.  PSYCH: see HPI      EXAM:   /78 (BP Location: Left arm, Patient Position: Sitting, Cuff Size: adult)   Pulse 78   Temp 97.5 °F (36.4 °C) (Temporal)   Resp 16   Ht 5' 4\" (1.626 m)   Wt 199 lb 6.4 oz (90.4 kg)   SpO2 96%   BMI 34.23 kg/m²   GENERAL: well developed, well nourished,in no apparent distress  SKIN: no rashes,no suspicious lesions  HEAD: atraumatic, normocephalic  EYES: conjunctiva clear, EOM intact, PERRLA  HEENT: ears,nose and throat clear  NECK: supple, non-tender, no thyromegaly  LUNGS: clear to auscultation bilaterally, no wheezes or rhonchi. Breathing is non labored.  CARDIO: RRR without murmur  GI: Soft. Non tender, no masses  EXTREMITIES: no cyanosis, clubbing or edema  PSYCH:  Speech, mood and affect are appropriate      ASSESSMENT AND PLAN:     Encounter Diagnoses   Name Primary?    Multiple sclerosis (HCC) Yes    RLS (restless legs syndrome)     Primary hypertension     Hyperlipidemia,  unspecified hyperlipidemia type     Anxiety     Hypothyroidism, unspecified type     Arthritis of right knee     Prediabetes        Orders Placed This Encounter   Procedures    CBC With Differential With Platelet    Comp Metabolic Panel (14)    Lipid Panel    Hemoglobin A1C    TSH W Reflex To Free T4       Meds & Refills for this Visit:  Requested Prescriptions     Signed Prescriptions Disp Refills    baclofen 20 MG Oral Tab 90 tablet 0     Sig: Take 1 tablet (20 mg total) by mouth daily.       Imaging & Consults:  None    1. Multiple sclerosis (HCC)  -stable, continue medication as needed  - baclofen 20 MG Oral Tab; Take 1 tablet (20 mg total) by mouth daily.  Dispense: 90 tablet; Refill: 0  - CBC With Differential With Platelet; Future    2. RLS (restless legs syndrome)  - stable, continue medication as needed  - baclofen 20 MG Oral Tab; Take 1 tablet (20 mg total) by mouth daily.  Dispense: 90 tablet; Refill: 0    3. Primary hypertension  Conservative measures dicussed. Continue medication.  Diet and exercise explained and encouraged.  Fasting labs due in May.  Home blood pressure monitoring. Pt should measure BP’s two to three times per week. Goal blood pressure at home - < 135/85.     - Comp Metabolic Panel (14); Future  - Lipid Panel; Future    4. Hyperlipidemia, unspecified hyperlipidemia type  Pt to continue their medication, increase exercise,  choose better fats,  increase fiber and avoid processed foods.  - Comp Metabolic Panel (14); Future  - Lipid Panel; Future    5. Anxiety  - stable, continue medication    6. Hypothyroidism, unspecified type    Patient at goal, no symptoms of over or under-replaced.  Recheck TSH in May.  Continue current dose of medication.    - TSH W Reflex To Free T4; Future    7. Arthritis of right knee  - continue Celebrex as needed for the pain.    8. Prediabetes  Pt to watch starchy/carb foods as they can add to sugar load and try to increase activity especially walking as you  will burn up the sugars better.  - Hemoglobin A1C; Future      The patient indicates understanding of these issues and agrees to the plan.  The patient is asked to return in May for annual MA.

## 2024-01-25 ENCOUNTER — PATIENT MESSAGE (OUTPATIENT)
Dept: INTERNAL MEDICINE CLINIC | Facility: CLINIC | Age: 74
End: 2024-01-25

## 2024-01-25 RX ORDER — SEMAGLUTIDE 0.68 MG/ML
0.25 INJECTION, SOLUTION SUBCUTANEOUS WEEKLY
Qty: 3 ML | Refills: 0 | Status: SHIPPED | OUTPATIENT
Start: 2024-01-25

## 2024-01-25 NOTE — TELEPHONE ENCOUNTER
From: Makenzie Reyes  To: Albania Knox  Sent: 1/25/2024 9:40 AM CST  Subject: Ozempic    Hi Erika, I called my insurance [employer] and asked about being approved for Ozempic. From my understanding (?) if a person has a BMI over 30 (which i do) and has a related health condition such as hypertension, the drug may be approved. Will you please issue the authorization and see what happens.   I realize I may not be approved but, let's give it a try.  Thanks,  Makenzie

## 2024-01-26 ENCOUNTER — PATIENT MESSAGE (OUTPATIENT)
Dept: ORTHOPEDICS CLINIC | Facility: CLINIC | Age: 74
End: 2024-01-26

## 2024-01-26 ENCOUNTER — TELEPHONE (OUTPATIENT)
Dept: ORTHOPEDICS CLINIC | Facility: CLINIC | Age: 74
End: 2024-01-26

## 2024-01-26 DIAGNOSIS — M17.11 PRIMARY OSTEOARTHRITIS OF RIGHT KNEE: Primary | ICD-10-CM

## 2024-01-26 NOTE — TELEPHONE ENCOUNTER
From: Makenzie Reyes  To: Pratik Curry  Sent: 1/26/2024 10:32 AM CST  Subject: nee replacement    Hi Dr. Curry,  Do I need to come in the office to schedule my knee replacement? I would like to have it done in April.  Thanks,  Makenzie

## 2024-01-26 NOTE — TELEPHONE ENCOUNTER
See note and advise~    SURGERY SCHEDULING SHEET     Makenzie Reyes  3/21/1950  [id number]     Procedure: Right total knee arthroplasty     CPT: 96072     Diagnosis: Right knee osteoarthritis     Anesthesia: Choice     Length of Surgery: 2 hours     Disposition: Inpatient     Instruments: Medacta TKA     Assist: If case scheduled on Thurs/Fri, then Gregory Zheng first assist. If case scheduled on Tues, then Feng Ismaelno ([phone]) first assist. If Feng unavailable, then please communicate to Gregory Zheng/Mark/Peyton to cancel Gregory's clinic for that day and have Gregory first assist. If Gregory unavailable, contact Jed Smith for first assist. If Feng and Jed unavailable, then contact [facility] for first assist.     Pre-op Testing: CBC, CMP, PT/INR, PTT, TYPE AND SCREEN, and MRSA/MSSA THROUGH EDW PAT     Clearance: MEDICAL/PCP     Post op: 2 weeks postop appointment with Gregory Zheng     Surgery date specifics: After Easter 2024 per patient request     Pratik Curry MD, [facility]  Phone: [phone]  Fax: [phone]

## 2024-01-26 NOTE — TELEPHONE ENCOUNTER
Date of Surgery: 4/4/24       Post Op Appt: 4/17/24 @ 920AM      Case ID: [id number]     Notes:             SURGERY SCHEDULING SHEET     Makenzie Reyes  3/21/1950  [id number]     Procedure: Right total knee arthroplasty     CPT: 83459     Diagnosis: Right knee osteoarthritis     Anesthesia: Choice     Length of Surgery: 2 hours     Disposition: Inpatient     Instruments: Medacta TKA     Assist: If case scheduled on Thurs/Fri, then Gregory Zheng first assist. If case scheduled on Tues, then Feng Oleary ([phone]) first assist. If Feng unavailable, then please communicate to Gregory Zheng/Mark/Peyton to cancel Gregory's clinic for that day and have Gregory first assist. If Gregory unavailable, contact Jed Smith for first assist. If Feng and Jed unavailable, then contact [facility] for first assist.     Pre-op Testing: CBC, CMP, PT/INR, PTT, TYPE AND SCREEN, and MRSA/MSSA THROUGH EDW PAT     Clearance: MEDICAL/PCP     Post op: 2 weeks postop appointment with Gregory Zheng     Surgery date specifics: After Easter 2024 per patient request     Pratik Curry MD, [facility]  [facility]  Phone: [phone]  Fax: [phone]

## 2024-01-29 ENCOUNTER — TELEPHONE (OUTPATIENT)
Dept: INTERNAL MEDICINE CLINIC | Facility: CLINIC | Age: 74
End: 2024-01-29

## 2024-01-30 ENCOUNTER — ANESTHESIA EVENT (OUTPATIENT)
Dept: ENDOSCOPY | Facility: HOSPITAL | Age: 74
End: 2024-01-30
Payer: MEDICARE

## 2024-01-30 ENCOUNTER — HOSPITAL ENCOUNTER (OUTPATIENT)
Facility: HOSPITAL | Age: 74
Setting detail: HOSPITAL OUTPATIENT SURGERY
Discharge: HOME | End: 2024-01-30
Attending: INTERNAL MEDICINE | Admitting: INTERNAL MEDICINE
Payer: MEDICARE

## 2024-01-30 ENCOUNTER — ANESTHESIA (OUTPATIENT)
Dept: ENDOSCOPY | Facility: HOSPITAL | Age: 74
End: 2024-01-30
Payer: MEDICARE

## 2024-01-30 VITALS
TEMPERATURE: 97 F | SYSTOLIC BLOOD PRESSURE: 133 MMHG | OXYGEN SATURATION: 97 % | HEIGHT: 64 IN | HEART RATE: 73 BPM | BODY MASS INDEX: 32.44 KG/M2 | RESPIRATION RATE: 20 BRPM | WEIGHT: 190 LBS | DIASTOLIC BLOOD PRESSURE: 73 MMHG

## 2024-01-30 PROCEDURE — 0D558ZZ DESTRUCTION OF ESOPHAGUS, VIA NATURAL OR ARTIFICIAL OPENING ENDOSCOPIC: ICD-10-PCS | Performed by: INTERNAL MEDICINE

## 2024-01-30 RX ORDER — LIDOCAINE HYDROCHLORIDE 10 MG/ML
INJECTION, SOLUTION EPIDURAL; INFILTRATION; INTRACAUDAL; PERINEURAL AS NEEDED
Status: DISCONTINUED | OUTPATIENT
Start: 2024-01-30 | End: 2024-01-30 | Stop reason: SURG

## 2024-01-30 RX ORDER — SODIUM CHLORIDE, SODIUM LACTATE, POTASSIUM CHLORIDE, CALCIUM CHLORIDE 600; 310; 30; 20 MG/100ML; MG/100ML; MG/100ML; MG/100ML
INJECTION, SOLUTION INTRAVENOUS CONTINUOUS
Status: DISCONTINUED | OUTPATIENT
Start: 2024-01-30 | End: 2024-01-30

## 2024-01-30 RX ADMIN — LIDOCAINE HYDROCHLORIDE 100 MG: 10 INJECTION, SOLUTION EPIDURAL; INFILTRATION; INTRACAUDAL; PERINEURAL at 14:49:00

## 2024-01-30 RX ADMIN — SODIUM CHLORIDE, SODIUM LACTATE, POTASSIUM CHLORIDE, CALCIUM CHLORIDE: 600; 310; 30; 20 INJECTION, SOLUTION INTRAVENOUS at 14:46:00

## 2024-01-30 NOTE — DISCHARGE INSTRUCTIONS
Home Care Instructions Gastroscopy with Sedation    Diet:  - Resume your regular diet as tolerated unless otherwise instructed.  - Start with light meals to minimize bloating.  - Do not drink alcohol today.    Medication:  - If you have questions about resuming your normal medications, please contact your Primary Care Physician.    Activities:  - Take it easy today. Do not return to work today.  - Do not drive today.  - Do not operate any machinery today (including kitchen equipment).    Gastroscopy:  - You may have a sore throat for 2-3 days following the exam. This is normal. Gargling with warm salt water (1/2 tsp salt to 1 glass warm water) or using throat lozenges will help.  - If you experience any sharp pain in your neck, abdomen or chest, vomiting of blood, oral temperature over 100 degrees Fahrenheit, light-headedness or dizziness, or any other problems, contact your doctor.    **If unable to reach your doctor, please go to the [facility] Emergency Room**    - Your referring physician will receive a full report of your examination.  - If you do not hear from your doctor's office within two weeks of your biopsy, please call them for your results.

## 2024-01-30 NOTE — OPERATIVE REPORT
Makenzie Reyes Patient Status:  Hospital Outpatient Surgery    3/21/1950 MRN [id number]   [facility] Attending Marco Antonio Guzman MD   Date 2024 PCP Alyssa Garcia MD     PREOPERATIVE DIAGNOSIS/INDICATION: Alas's indefinite for dysplasia post ablation 2023  POSTOPERTATIVE DIAGNOSIS: No visible residual Alas's, irregular z line, hiatal hernia  PROCEDURE PERFORMED: EGD ablation  TIME OUT WAS PERFORMED     SEDATION: MAC sedation provided by General Anesthesia     INFORMED CONSENT: Risks, benefits and alternatives to the procedure were explained to the patient including but not limited to bleeding, infection, perforation, adverse drug reactions, pancreatitis and the need for hospitalization and surgery if this occurs, the patient understands and agrees to procedure.  PROCEDURE DESCRIPTION: A regular upper endoscope was introduced into the patient’s mouth, hypo pharynx, esophagus, stomach and the first and second portion of the duodenum, retroflexion of the endoscope was performed in the stomach. Careful examination of the above described areas was performed on withdrawal of the endoscope. The patient tolerated the procedure well, there were no immediate complication immediately following the procedure, and the patient was transferred to recovery in stable condition.  FINDINGS:  ESOPHAGUS: No residual Alas's  EGJ: 1 cm hiatal hernia, irregular z line  STOMACH:Normal  DUODENUM: Normal  THERAPEUTICS: Z line  mucosa Barrx ablation with through the scope catheter, using standard technique, post procedure inspection showed no bleeding or perforation  RECOMMENDATIONS:   Post EGD precautions, watch for bleeding, infection, perforation, adverse drug reactions   Clear liquid diet,x 24 hrs then full liquid diet x 24 hrs, the soft diet as tolerated  Continue PPI's  EGD 6 months     Marco Antonio Guzman MD  2024  3:04 PM

## 2024-01-30 NOTE — H&P
[facility]  Pre-op H and P    Makenzie Reyes Patient Status:  Hospital Outpatient Surgery    3/21/1950 MRN [id number]   Location [facility] ENDOSCOPY [facility]IN [facility] Attending Marco Antonio Guzman MD   Date 2024 PCP Alyssa Garcia MD     CC: Alas's indefinite for dysplasia post ablation    History of Present Illness:  Makenzie Reyes is a a(n) 73 year old female. Alas's indefinite for dysplasia post ablation    History:  Past Medical History:   Diagnosis Date    Arthritis     BACK PAIN     in morning    Back problem     Alas's esophagus     Basal cell carcinoma of ear 2008    excision of BCC lesion Left ear    Change in hair Hair is thinning/age related    Chronic heartburn 2023    Constipation     Depression     Disorder of thyroid     Easy bruising     Esophageal reflux     Essential hypertension 2012    Fatigue     Heartburn 2019    Hemorrhoids After having a baby    High blood pressure     High cholesterol     Hx of motion sickness     Hyperglycemia     Hyperlipidemia     Increased weakness when ambulating 2016    Multiple sclerosis (HCC)     chronic side effects include fatique and \"brain fog\" and decreased vision of left eye, left sided weakness due to MS    Muscle spasms of both lower extremities 2018    Muscle weakness     has MS    OSTEOARTHRITIS     Osteoarthritis     Osteoarthrosis involving lower leg 2013    Log Date: 10/26/2012     OTHER DISEASES     MS dx 1981 Dr. Ramachandran    Pain in joints At times/left knee    Personal history of colonic polyps 2019    PONV (postoperative nausea and vomiting)     Problems with swallowing     Unspecified essential hypertension     Visual impairment     decreased vision left eye glasses    Wears glasses Glasses     Past Surgical History:   Procedure Laterality Date    APPENDECTOMY  1964    BACK SURGERY      cervical surgery/ACDF x 3 with Dr. Webster          x2    COLONOSCOPY  2003     COLONOSCOPY      EXCIS LUMBAR DISK,ONE LEVEL      FOOT/TOES SURGERY PROC UNLISTED  2005    hemmertoes operation Rt toe    HYSTERECTOMY      KNEE REPLACEMENT SURGERY  2022    LAMINECTOMY,LUMBAR  2010    L4 L5 fusion    OOPHORECTOMY Bilateral     REMOVAL OF OVARIAN CYST(S)      x2    THYROIDECTOMY      partial thyroidectomy Lt    TONSILLECTOMY      TOTAL ABDOM HYSTERECTOMY      w/ removal of both ovaries d/t DBT and fibroid    UPPER GI ENDOSCOPY,EXAM       Family History   Problem Relation Age of Onset    Cancer Sister         cervical cancer    Hypertension Mother          2019    Breast Cancer Daughter 39        BRCA 1 GENE    BRCA gene + Daughter     Hypertension Brother          2014    Hypertension Sister     Hypertension Sister     Hypertension Brother       reports that she has never smoked. She has never used smokeless tobacco. She reports current alcohol use of about 5.0 standard drinks of alcohol per week. She reports that she does not use drugs.    Allergies:  No Known Allergies    Medications:  No current facility-administered medications for this encounter.    Physical Exam:    Height 5' 4\" (1.626 m), weight 190 lb (86.2 kg), not currently breastfeeding.    General: Appears alert, oriented x3 and in no acute distress.  CV: Normal rate   Lungs: Normal effort   Skin: Warm and dry.  Laboratory Data:           Assessment/Plan/Procedure:  Patient Active Problem List   Diagnosis    Lumbar spinal stenosis    Multiple sclerosis (HCC)    Hyperlipidemia    Hypothyroidism    Anxiety    Granuloma annulare    History of lumbar fusion    RLS (restless legs syndrome)    Muscle spasms of both lower extremities    Chronic fatigue    Facet arthritis of lumbar region    Facet arthritis of cervical region    Menopausal vaginal dryness    Mild recurrent major depression (HCC)    Prediabetes    Chronic superficial gastritis without bleeding    Hiatal hernia    Gastroesophageal reflux  disease without esophagitis    Polyp of stomach and duodenum    Alas's esophagus without dysplasia    Polyp of colon    Weakness of right leg    Seborrheic keratoses    Lentigo    Personal history of malignant neoplasm of skin    Unspecified inflammatory spondylopathy, cervical region (HCC)    Arthritis of left knee    Primary hypertension    Diaphragmatic hernia without obstruction or gangrene       Alas's indefinite for dysplasia post ablation    Plan:  EGD    Marco Antonio Guzman MD  1/30/2024  2:04 PM

## 2024-01-30 NOTE — ANESTHESIA PREPROCEDURE EVALUATION
PRE-OP EVALUATION    Patient Name: Makenzie Reyes    Admit Diagnosis: Alas's esophagus with dysplasia [K22.719]    Pre-op Diagnosis: Alas's esophagus with dysplasia [K22.719]    ESOPHAGOGASTRODUODENOSCOPY (EGD) with possible ablation    Anesthesia Procedure: ESOPHAGOGASTRODUODENOSCOPY (EGD) with possible ablation    Surgeon(s) and Role:     * Marco Antonio Guzman MD - Primary    Pre-op vitals reviewed.  Temp: 97.4 °F (36.3 °C)  Pulse: 77  Resp: 20  BP: 133/65  SpO2: 96 %  Body mass index is 32.61 kg/m².    Current medications reviewed.  Hospital Medications:   lactated ringers infusion   Intravenous Continuous       Outpatient Medications:     Facility-Administered Medications Prior to Admission   Medication Dose Route Frequency Provider Last Rate Last Admin    [COMPLETED] triamcinolone acetonide (Kenalog-40) 40 MG/ML injection 40 mg  40 mg Intra-articular Once Pratik Curry MD   40 mg at 11/27/23 1055     Medications Prior to Admission   Medication Sig Dispense Refill Last Dose    baclofen 20 MG Oral Tab Take 1 tablet (20 mg total) by mouth daily. 90 tablet 0 1/29/2024    escitalopram 20 MG Oral Tab Take 1 tablet (20 mg total) by mouth nightly. APPT DUE NOVEMBER 90 tablet 0 1/29/2024    celecoxib 200 MG Oral Cap Take 1 capsule (200 mg total) by mouth daily. 90 capsule 0 1/29/2024    Triamterene-HCTZ 37.5-25 MG Oral Tab Take 1 tablet by mouth daily. 90 tablet 0 1/29/2024    Triamterene-HCTZ 37.5-25 MG Oral Tab Take 1 tablet by mouth daily. 7 tablet 0 1/29/2024    levothyroxine 25 MCG Oral Tab Take 1 tablet (25 mcg total) by mouth before breakfast. 90 tablet 1 1/30/2024    atorvastatin 20 MG Oral Tab TAKE 1 TABLET EVERY NIGHT 90 tablet 2 1/29/2024    Omeprazole 40 MG Oral Capsule Delayed Release Take one tablet (40 mg total) by mouth once daily, 30 minutes prior to breakfast. 90 capsule 2 1/29/2024    AMLODIPINE 5 MG Oral Tab TAKE 2 TABLETS EVERY  tablet 0 1/30/2024    CALCIUM OR Take 1,000 mg by  mouth daily.   1/29/2024    Multiple Vitamin (DAILY VALUE MULTIVITAMIN) Oral Tab Take 1 tablet by mouth daily.   1/29/2024    Cholecalciferol (VITAMIN D) 2000 UNITS Oral Cap Take 1 capsule (2,000 Units total) by mouth daily.   1/29/2024    semaglutide (OZEMPIC, 0.25 OR 0.5 MG/DOSE,) 2 MG/3ML Subcutaneous Solution Pen-injector Inject 0.25 mg into the skin once a week. 3 mL 0 More than a month       Allergies: Patient has no known allergies.      Anesthesia Evaluation    Patient summary reviewed.    Anesthetic Complications  (+) history of anesthetic complications  History of: PONV       GI/Hepatic/Renal    Negative GI/hepatic/renal ROS.  (+) GERD and well controlled  (+) hiatal hernia                        Cardiovascular    Negative cardiovascular ROS.  ECG reviewed.  Exercise tolerance: good     MET: >4      (+) hypertension   (+) hyperlipidemia                                  Endo/Other    Negative endo/other ROS.       (+) hypothyroidism                (+) arthritis       Pulmonary    Negative pulmonary ROS.                       Neuro/Psych    Negative neuro/psych ROS.  (+) depression           (+) neuromuscular disease (MS)             Patient Active Problem List:     Lumbar spinal stenosis     Multiple sclerosis (HCC)     Hyperlipidemia     Hypothyroidism     Anxiety     Granuloma annulare     History of lumbar fusion     RLS (restless legs syndrome)     Muscle spasms of both lower extremities     Chronic fatigue     Facet arthritis of lumbar region     Facet arthritis of cervical region     Menopausal vaginal dryness     Mild recurrent major depression (HCC)     Prediabetes     Chronic superficial gastritis without bleeding     Hiatal hernia     Gastroesophageal reflux disease without esophagitis     Polyp of stomach and duodenum     Alas's esophagus without dysplasia     Polyp of colon     Weakness of right leg     Seborrheic keratoses     Lentigo     Personal history of malignant neoplasm of skin      Unspecified inflammatory spondylopathy, cervical region (HCC)     Arthritis of left knee     Primary hypertension     Diaphragmatic hernia without obstruction or gangrene            Past Surgical History:   Procedure Laterality Date    APPENDECTOMY  1964    BACK SURGERY      cervical surgery/ACDF x 3 with Dr. Webster          x2    COLONOSCOPY  2003    COLONOSCOPY      EXCIS LUMBAR DISK,ONE LEVEL      FOOT/TOES SURGERY PROC UNLISTED  2005    hemmertoes operation Rt toe    HYSTERECTOMY      KNEE REPLACEMENT SURGERY  2022    LAMINECTOMY,LUMBAR  2010    L4 L5 fusion    OOPHORECTOMY Bilateral     REMOVAL OF OVARIAN CYST(S)      x2    THYROIDECTOMY      partial thyroidectomy Lt    TONSILLECTOMY      TOTAL ABDOM HYSTERECTOMY      w/ removal of both ovaries d/t DBT and fibroid    UPPER GI ENDOSCOPY,EXAM       Social History     Socioeconomic History    Marital status:    Tobacco Use    Smoking status: Never    Smokeless tobacco: Never   Vaping Use    Vaping Use: Never used   Substance and Sexual Activity    Alcohol use: Yes     Alcohol/week: 5.0 standard drinks of alcohol     Types: 5 Glasses of wine per week    Drug use: Never   Other Topics Concern    Caffeine Concern Yes     Comment: 2 cups of coffee daily.    Exercise Yes     Comment: 4x/week.      History   Drug Use Unknown     Available pre-op labs reviewed.               Airway      Mallampati: II  Mouth opening: >3 FB  TM distance: 4 - 6 cm   Cardiovascular    Cardiovascular exam normal.         Dental    Dentition appears grossly intact         Pulmonary    Pulmonary exam normal.                 Other findings              ASA: 2   Plan: MAC  NPO status verified and patient meets guidelines.    Post-procedure pain management plan discussed with surgeon and patient.    Comment: Discussed MAC anesthesia with patient.  Discussed risks including but not limited to perioperative awareness, conversion to general anesthesia and risks  associated with GA.  PT understands and agrees to proceed.    Plan/risks discussed with: patient                Present on Admission:  **None**

## 2024-01-30 NOTE — ANESTHESIA POSTPROCEDURE EVALUATION
[facility]    Makenzie Reyes Patient Status:  Hospital Outpatient Surgery   Age/Gender 73 year old female MRN [id number]   Location [facility] Attending Marco Antonio Guzman MD   Hosp Day # 0 PCP Alyssa Garcia MD       Anesthesia Post-op Note    ESOPHAGOGASTRODUODENOSCOPY (EGD) WITH RADIO FREQUENCY ABLATION    Procedure Summary       Date: 01/30/24 Room / Location:  ENDOSCOPY 02 /  ENDOSCOPY    Anesthesia Start: 1446 Anesthesia Stop: 1507    Procedure: ESOPHAGOGASTRODUODENOSCOPY (EGD) WITH RADIO FREQUENCY ABLATION Diagnosis:       Hiatal hernia      Alas's esophagus with dysplasia      (Barretts, hiatal hernia)    Surgeons: Marco Antonio Guzman MD Anesthesiologist: Ruslan Morley MD    Anesthesia Type: MAC ASA Status: 2            Anesthesia Type: MAC    Vitals Value Taken Time   /78 01/30/24 1509   Temp 98 01/30/24 1509   Pulse 77 01/30/24 1509   Resp 19 01/30/24 1509   SpO2 99 01/30/24 1509       Patient Location: Endoscopy    Anesthesia Type: MAC    Airway Patency: patent and extubated    Postop Pain Control: adequate    Mental Status: mildly sedated but able to meaningfully participate in the post-anesthesia evaluation    Nausea/Vomiting: none    Cardiopulmonary/Hydration status: stable euvolemic    Complications: no apparent anesthesia related complications    Postop vital signs: stable    Dental Exam: Unchanged from Preop    Patient to be discharged from PACU when criteria met.

## 2024-01-31 ENCOUNTER — TELEPHONE (OUTPATIENT)
Dept: INTERNAL MEDICINE CLINIC | Facility: CLINIC | Age: 74
End: 2024-01-31

## 2024-01-31 NOTE — TELEPHONE ENCOUNTER
Incoming fax from      Patient is having Right Total Knee Arthroplasty on 4/4/2024     Needs the following:    H&P  Medical Clearance  CBC  CMP  PT/INR  PTT  Type and Screen     Fax results to [phone] and [phone]    Placed in Upcoming appointments folder in Pod 3    Front staff please assist in scheduling Pre-op

## 2024-02-01 NOTE — TELEPHONE ENCOUNTER
Future Appointments   Date Time Provider Department Center   3/11/2024 10:00 AM Albania Knox APRN [facility] [place]     Pre-Op for Right total knee athroplasty 4/4/24 - Dr. Curry is scheduled.

## 2024-02-19 ENCOUNTER — PATIENT MESSAGE (OUTPATIENT)
Dept: INTERNAL MEDICINE CLINIC | Facility: CLINIC | Age: 74
End: 2024-02-19

## 2024-02-19 DIAGNOSIS — Z01.818 PREOPERATIVE TESTING: Primary | ICD-10-CM

## 2024-02-20 NOTE — TELEPHONE ENCOUNTER
From: Makenzie Reyes  To: Albania nKox  Sent: 2/19/2024 6:51 PM CST  Subject: Physical for surgery    Ronni James,  I have an appointment on March 11th, at 10 o'clock for physical for surgery.  Thanks,  Makenzie

## 2024-02-25 ENCOUNTER — PATIENT MESSAGE (OUTPATIENT)
Dept: INTERNAL MEDICINE CLINIC | Facility: CLINIC | Age: 74
End: 2024-02-25

## 2024-02-25 NOTE — TELEPHONE ENCOUNTER
From: Makenzie Reyes  To: Albania Knox  Sent: 2/25/2024 12:03 PM CST  Subject: Physical for surgery    Ronni James, I have an appointment on March 11th for a physical for surgery. Do I need to make a separate appointment for my wellness checkup?  I would like to get them both done on the same day if possible I'm driving from [place].   Thanks,  Makenzie

## 2024-02-25 NOTE — TELEPHONE ENCOUNTER
Unfortunately this two appts have to be done separately. She can due her MA supervisit later when she is feeling better from surgery.   Include Location In Plan?: No Detail Level: Generalized

## 2024-03-05 ENCOUNTER — TELEPHONE (OUTPATIENT)
Dept: INTERNAL MEDICINE CLINIC | Facility: CLINIC | Age: 74
End: 2024-03-05

## 2024-03-05 NOTE — TELEPHONE ENCOUNTER
Future Appointments   Date Time Provider Department Center   3/11/2024 10:00 AM Albania Knox APRN [facility] [place]   4/17/2024  9:20 AM Gregory Zheng PA-C [facility]   5/8/2024 10:00 AM Alejo Wu MD [facility]   7/18/2024  9:30 AM Marco Antonio Guzman MD [facility] GI     Placed Pre op requirements in Pre op folder on my desk

## 2024-03-07 ENCOUNTER — PATIENT MESSAGE (OUTPATIENT)
Dept: INTERNAL MEDICINE CLINIC | Facility: CLINIC | Age: 74
End: 2024-03-07

## 2024-03-07 DIAGNOSIS — D49.2 ABNORMAL SKIN GROWTH: Primary | ICD-10-CM

## 2024-03-07 NOTE — TELEPHONE ENCOUNTER
From: Makenzie Reyes  To: Albania Knox  Sent: 3/7/2024 11:04 AM CST  Subject: referral for Dr. Bryce James,  I'm requesting a referral to see Dr Wu for a spot that has decided to make an appearance on my nose. I am hoping I can get in to see him before my knee surgery. What luck!  Also, I scheduled my physical for May 15th. I chose that date because, I will be done with PT for my knee.   Thanks,  Makenzie

## 2024-03-11 ENCOUNTER — OFFICE VISIT (OUTPATIENT)
Dept: INTERNAL MEDICINE CLINIC | Facility: CLINIC | Age: 74
End: 2024-03-11
Payer: MEDICARE

## 2024-03-11 ENCOUNTER — EKG ENCOUNTER (OUTPATIENT)
Dept: LAB | Age: 74
End: 2024-03-11
Attending: FAMILY MEDICINE
Payer: MEDICARE

## 2024-03-11 ENCOUNTER — LAB ENCOUNTER (OUTPATIENT)
Dept: LAB | Age: 74
End: 2024-03-11
Attending: FAMILY MEDICINE
Payer: MEDICARE

## 2024-03-11 VITALS
TEMPERATURE: 98 F | RESPIRATION RATE: 16 BRPM | WEIGHT: 199.81 LBS | DIASTOLIC BLOOD PRESSURE: 78 MMHG | HEART RATE: 78 BPM | OXYGEN SATURATION: 95 % | BODY MASS INDEX: 34.11 KG/M2 | HEIGHT: 64 IN | SYSTOLIC BLOOD PRESSURE: 136 MMHG

## 2024-03-11 DIAGNOSIS — Z01.818 PREOPERATIVE TESTING: ICD-10-CM

## 2024-03-11 DIAGNOSIS — E78.5 HYPERLIPIDEMIA, UNSPECIFIED HYPERLIPIDEMIA TYPE: ICD-10-CM

## 2024-03-11 DIAGNOSIS — R73.03 PREDIABETES: ICD-10-CM

## 2024-03-11 DIAGNOSIS — I10 PRIMARY HYPERTENSION: ICD-10-CM

## 2024-03-11 DIAGNOSIS — S61.211A LACERATION OF LEFT INDEX FINGER WITHOUT FOREIGN BODY WITHOUT DAMAGE TO NAIL, INITIAL ENCOUNTER: ICD-10-CM

## 2024-03-11 DIAGNOSIS — G35 MULTIPLE SCLEROSIS (HCC): ICD-10-CM

## 2024-03-11 DIAGNOSIS — E03.9 HYPOTHYROIDISM, UNSPECIFIED TYPE: ICD-10-CM

## 2024-03-11 DIAGNOSIS — Z01.818 PRE-OP EXAMINATION: ICD-10-CM

## 2024-03-11 DIAGNOSIS — Z01.818 PRE-OP EXAMINATION: Primary | ICD-10-CM

## 2024-03-11 DIAGNOSIS — M17.11 ARTHRITIS OF RIGHT KNEE: ICD-10-CM

## 2024-03-11 LAB
ALBUMIN SERPL-MCNC: 3.9 G/DL (ref 3.4–5)
ALBUMIN/GLOB SERPL: 1.1 {RATIO} (ref 1–2)
ALP LIVER SERPL-CCNC: 65 U/L
ALT SERPL-CCNC: 27 U/L
ANION GAP SERPL CALC-SCNC: 7 MMOL/L (ref 0–18)
ANTIBODY SCREEN: NEGATIVE
APTT PPP: 27.6 SECONDS (ref 23.3–35.6)
AST SERPL-CCNC: 25 U/L (ref 15–37)
BASOPHILS # BLD AUTO: 0.05 X10(3) UL (ref 0–0.2)
BASOPHILS NFR BLD AUTO: 0.6 %
BILIRUB SERPL-MCNC: 0.5 MG/DL (ref 0.1–2)
BUN BLD-MCNC: 18 MG/DL (ref 9–23)
CALCIUM BLD-MCNC: 9.8 MG/DL (ref 8.5–10.1)
CHLORIDE SERPL-SCNC: 107 MMOL/L (ref 98–112)
CHOLEST SERPL-MCNC: 199 MG/DL (ref ?–200)
CO2 SERPL-SCNC: 27 MMOL/L (ref 21–32)
CREAT BLD-MCNC: 0.66 MG/DL
EGFRCR SERPLBLD CKD-EPI 2021: 93 ML/MIN/1.73M2 (ref 60–?)
EOSINOPHIL # BLD AUTO: 0.41 X10(3) UL (ref 0–0.7)
EOSINOPHIL NFR BLD AUTO: 4.7 %
ERYTHROCYTE [DISTWIDTH] IN BLOOD BY AUTOMATED COUNT: 13.1 %
EST. AVERAGE GLUCOSE BLD GHB EST-MCNC: 131 MG/DL (ref 68–126)
FASTING PATIENT LIPID ANSWER: NO
FASTING STATUS PATIENT QL REPORTED: NO
GLOBULIN PLAS-MCNC: 3.5 G/DL (ref 2.8–4.4)
GLUCOSE BLD-MCNC: 111 MG/DL (ref 70–99)
HBA1C MFR BLD: 6.2 % (ref ?–5.7)
HCT VFR BLD AUTO: 42.8 %
HDLC SERPL-MCNC: 90 MG/DL (ref 40–59)
HGB BLD-MCNC: 14.2 G/DL
IMM GRANULOCYTES # BLD AUTO: 0.03 X10(3) UL (ref 0–1)
IMM GRANULOCYTES NFR BLD: 0.3 %
INR BLD: 0.93 (ref 0.8–1.2)
LDLC SERPL CALC-MCNC: 97 MG/DL (ref ?–100)
LYMPHOCYTES # BLD AUTO: 1.49 X10(3) UL (ref 1–4)
LYMPHOCYTES NFR BLD AUTO: 17.2 %
MCH RBC QN AUTO: 30.5 PG (ref 26–34)
MCHC RBC AUTO-ENTMCNC: 33.2 G/DL (ref 31–37)
MCV RBC AUTO: 92 FL
MONOCYTES # BLD AUTO: 0.75 X10(3) UL (ref 0.1–1)
MONOCYTES NFR BLD AUTO: 8.7 %
NEUTROPHILS # BLD AUTO: 5.92 X10 (3) UL (ref 1.5–7.7)
NEUTROPHILS # BLD AUTO: 5.92 X10(3) UL (ref 1.5–7.7)
NEUTROPHILS NFR BLD AUTO: 68.5 %
NONHDLC SERPL-MCNC: 109 MG/DL (ref ?–130)
OSMOLALITY SERPL CALC.SUM OF ELEC: 295 MOSM/KG (ref 275–295)
PLATELET # BLD AUTO: 286 10(3)UL (ref 150–450)
POTASSIUM SERPL-SCNC: 4.5 MMOL/L (ref 3.5–5.1)
PROT SERPL-MCNC: 7.4 G/DL (ref 6.4–8.2)
PROTHROMBIN TIME: 12.5 SECONDS (ref 11.6–14.8)
RBC # BLD AUTO: 4.65 X10(6)UL
RH BLOOD TYPE: POSITIVE
SODIUM SERPL-SCNC: 141 MMOL/L (ref 136–145)
TRIGL SERPL-MCNC: 63 MG/DL (ref 30–149)
TSI SER-ACNC: 2.45 MIU/ML (ref 0.36–3.74)
VLDLC SERPL CALC-MCNC: 10 MG/DL (ref 0–30)
WBC # BLD AUTO: 8.7 X10(3) UL (ref 4–11)

## 2024-03-11 PROCEDURE — 1160F RVW MEDS BY RX/DR IN RCRD: CPT | Performed by: FAMILY MEDICINE

## 2024-03-11 PROCEDURE — 36415 COLL VENOUS BLD VENIPUNCTURE: CPT

## 2024-03-11 PROCEDURE — 80053 COMPREHEN METABOLIC PANEL: CPT

## 2024-03-11 PROCEDURE — 86900 BLOOD TYPING SEROLOGIC ABO: CPT

## 2024-03-11 PROCEDURE — 3075F SYST BP GE 130 - 139MM HG: CPT | Performed by: FAMILY MEDICINE

## 2024-03-11 PROCEDURE — 85025 COMPLETE CBC W/AUTO DIFF WBC: CPT

## 2024-03-11 PROCEDURE — 3078F DIAST BP <80 MM HG: CPT | Performed by: FAMILY MEDICINE

## 2024-03-11 PROCEDURE — 84443 ASSAY THYROID STIM HORMONE: CPT

## 2024-03-11 PROCEDURE — 87081 CULTURE SCREEN ONLY: CPT

## 2024-03-11 PROCEDURE — 99214 OFFICE O/P EST MOD 30 MIN: CPT | Performed by: FAMILY MEDICINE

## 2024-03-11 PROCEDURE — 80061 LIPID PANEL: CPT

## 2024-03-11 PROCEDURE — 85730 THROMBOPLASTIN TIME PARTIAL: CPT

## 2024-03-11 PROCEDURE — 83036 HEMOGLOBIN GLYCOSYLATED A1C: CPT

## 2024-03-11 PROCEDURE — 3008F BODY MASS INDEX DOCD: CPT | Performed by: FAMILY MEDICINE

## 2024-03-11 PROCEDURE — 86850 RBC ANTIBODY SCREEN: CPT

## 2024-03-11 PROCEDURE — 86901 BLOOD TYPING SEROLOGIC RH(D): CPT

## 2024-03-11 PROCEDURE — 85610 PROTHROMBIN TIME: CPT

## 2024-03-11 PROCEDURE — 1159F MED LIST DOCD IN RCRD: CPT | Performed by: FAMILY MEDICINE

## 2024-03-11 RX ORDER — AMLODIPINE BESYLATE 5 MG/1
10 TABLET ORAL DAILY
Qty: 180 TABLET | Refills: 0 | Status: SHIPPED | OUTPATIENT
Start: 2024-03-11

## 2024-03-11 NOTE — H&P (VIEW-ONLY)
CC: Preop exam.    Makenzie Reyes is a 73 year old female who presents for a pre-operative physical exam  By Dr. Patricio camilo. Patient is to have Right total knee arthroplasty,secondary to right knee pain to be on 4/4/24 at [facility].   Prior anaesthesia problem Pt gets sick from general . Pt is sto have a spinal     Patient complains of right knee pain for years. Patient has done conservative measures like medication, PT and injections. Patient states pain is getting worse with weight bearing activities and feels similar to the left knee prior to it being replaced. Patient has opted for surgery .    Patient cut left index finger yesterday. Patient was taking cap off the shaver and it was stuck and when she did get it off it cut her finger. The bleeding has stopped. Patient would like the finger checked.    Patient presents for recheck of their hypertension. Pt has been taking medications as instructed, no medication side effects.  Currently asymptomatic, no chest pains, jaw pains, arm pains. No headaches, dizziness or edema.  No SOB on exertion or rest.  Pt needs a refill on her medication.     BP Readings from Last 3 Encounters:   03/11/24 136/78   01/30/24 133/73   01/24/24 138/78       Current Outpatient Medications   Medication Sig Dispense Refill    baclofen 20 MG Oral Tab Take 1 tablet (20 mg total) by mouth daily. 90 tablet 0    escitalopram 20 MG Oral Tab Take 1 tablet (20 mg total) by mouth nightly. APPT DUE NOVEMBER 90 tablet 0    celecoxib 200 MG Oral Cap Take 1 capsule (200 mg total) by mouth daily. 90 capsule 0    Triamterene-HCTZ 37.5-25 MG Oral Tab Take 1 tablet by mouth daily. 90 tablet 0    levothyroxine 25 MCG Oral Tab Take 1 tablet (25 mcg total) by mouth before breakfast. 90 tablet 1    atorvastatin 20 MG Oral Tab TAKE 1 TABLET EVERY NIGHT 90 tablet 2    Omeprazole 40 MG Oral Capsule Delayed Release Take one tablet (40 mg total) by mouth once daily, 30 minutes prior to breakfast.  90 capsule 2    AMLODIPINE 5 MG Oral Tab TAKE 2 TABLETS EVERY  tablet 0    CALCIUM OR Take 1,000 mg by mouth daily.      Multiple Vitamin (DAILY VALUE MULTIVITAMIN) Oral Tab Take 1 tablet by mouth daily.      Cholecalciferol (VITAMIN D) 2000 UNITS Oral Cap Take 1 capsule (2,000 Units total) by mouth daily.        Allergies: No Known Allergies   Past Medical History:   Diagnosis Date    Arthritis     BACK PAIN     in morning    Back problem     Alas's esophagus     Basal cell carcinoma of ear 2008    excision of BCC lesion Left ear    Change in hair Hair is thinning/age related    Chronic heartburn 2023    Constipation     Depression     Disorder of thyroid     Easy bruising     Esophageal reflux     Essential hypertension 2012    Fatigue     Heartburn 2019    Hemorrhoids After having a baby    High blood pressure     High cholesterol     Hx of motion sickness     Hyperglycemia     Hyperlipidemia     Increased weakness when ambulating 2016    Multiple sclerosis (HCC)     chronic side effects include fatique and \"brain fog\" and decreased vision of left eye, left sided weakness due to MS    Muscle spasms of both lower extremities 2018    Muscle weakness     has MS    OSTEOARTHRITIS     Osteoarthritis     Osteoarthrosis involving lower leg 2013    Log Date: 10/26/2012     OTHER DISEASES     MS dx 1981 Dr. Ramachandran    Pain in joints At times/left knee    Personal history of colonic polyps 2019    PONV (postoperative nausea and vomiting)     Unspecified essential hypertension     Visual impairment     decreased vision left eye glasses    Wears glasses Glasses      Past Surgical History:   Procedure Laterality Date    APPENDECTOMY  1964    BACK SURGERY      cervical surgery/ACDF x 3 with Dr. Webster          x2    COLONOSCOPY  2003    COLONOSCOPY      EXCIS LUMBAR DISK,ONE LEVEL      FOOT/TOES SURGERY PROC UNLISTED  2005    hemmertoes operation Rt toe     HYSTERECTOMY      KNEE REPLACEMENT SURGERY  2022    LAMINECTOMY,LUMBAR  2010    L4 L5 fusion    OOPHORECTOMY Bilateral     REMOVAL OF OVARIAN CYST(S)      x2    THYROIDECTOMY      partial thyroidectomy Lt    TONSILLECTOMY      TOTAL ABDOM HYSTERECTOMY      w/ removal of both ovaries d/t DBT and fibroid    UPPER GI ENDOSCOPY,EXAM        Family History   Problem Relation Age of Onset    Cancer Sister         cervical cancer    Hypertension Mother          2019    Breast Cancer Daughter 39        BRCA 1 GENE    BRCA gene + Daughter     Hypertension Brother          2014    Hypertension Sister     Hypertension Sister     Hypertension Brother       Social History:   Social History     Socioeconomic History    Marital status:    Tobacco Use    Smoking status: Never    Smokeless tobacco: Never   Vaping Use    Vaping Use: Never used   Substance and Sexual Activity    Alcohol use: Yes     Alcohol/week: 2.0 standard drinks of alcohol     Types: 2 Glasses of wine per week     Comment: 2 glasses of wine on a weekend    Drug use: Never   Other Topics Concern    Caffeine Concern Yes     Comment: 2 cups of coffee daily.    Exercise Yes     Comment: 4x/week.       Occ: retired   Exercise: three times per week.  Diet: Pt tries to eat low carb     REVIEW OF SYSTEMS:   GENERAL: feels well otherwise  SKIN: denies any unusual skin lesions  EYES:denies blurred vision or double vision  HEENT: denies nasal congestion, sinus pain or ST  LUNGS: denies shortness of breath with exertion  CARDIOVASCULAR: denies chest pain on exertion  GI: denies abdominal pain,+GERD  : denies dysuria, vaginal discharge   MUSCULOSKELETAL: hx of back pain, MS, see HPI  NEURO: denies headaches  PSYCHE: + depression and anxiety  HEMATOLOGIC: denies hx of anemia  ENDOCRINE: + thyroid history,+ prediabetic.  ALL/ASTHMA: denies hx of allergy or asthma    EXAM:   /78 (BP Location: Left arm, Patient Position:  Sitting, Cuff Size: adult)   Pulse 78   Temp 98 °F (36.7 °C) (Oral)   Resp 16   Ht 5' 4\" (1.626 m)   Wt 199 lb 12.8 oz (90.6 kg)   SpO2 95%   BMI 34.30 kg/m²   GENERAL: well developed, well nourished,in no apparent distress  SKIN: no rashes,no suspicious lesions  HEENT: atraumatic, normocephalic,ears and throat are clear  EYES:PERRLA, EOMI, normal optic disk,conjunctiva are clear  NECK: supple,no adenopathy,no bruits  CHEST: no chest tenderness  LUNGS: clear to auscultation  CARDIO: RRR without murmur  GI: good BS's,no masses, HSM or tenderness  : deferred  MUSCULOSKELETAL: back is not tender,FROM of the back  EXTREMITIES: no edema  KNEE/Right: + medial pain, less lateral pain. No swelling, FROM with some pain. Gait steady.  NEURO: Oriented times three,cranial nerves are intact,motor and sensory are grossly intact    ASSESSMENT AND PLAN:   Makenzie Reyes is a 73 year old female who presents for a pre-operative physical exam. Labs stable, EKG are pending, patient is approved for surgery pending results.      Patient appears stable. Pre-op testing is acceptable. Patient is cleared for their procedure and may proceed at an acceptable risk for complications.  HUSSAIN Mujica, 03/12/24, 6:16 PM      1. Pre-op examination 2. Arthritis of right knee    - CBC With Differential With Platelet; Future  - Comp Metabolic Panel (14); Future  - Prothrombin Time (PT) [E]; Future  - PTT, Activated [E]; Future  - MSSA and MRSA Culture Screen; Future  - EKG 12 Lead; Future    3. Laceration of left index finger without foreign body without damage to nail, initial encounter  - keep the area clean. Pt may apply neosporin as needed.  - f/u if the area has increased redness, drg or fever.    4. Primary hypertension  Conservative measures dicussed. Continue medication.  Diet and exercise explained and encouraged.  Home blood pressure monitoring. Pt should measure BP’s two to three times per week. Goal blood pressure at home -  < 135/85.   - amLODIPine 5 MG Oral Tab; Take 2 tablets (10 mg total) by mouth daily.  Dispense: 180 tablet; Refill: 0    Encounter Diagnoses   Name Primary?    Pre-op examination Yes    Arthritis of right knee     Laceration of left index finger without foreign body without damage to nail, initial encounter     Primary hypertension        Orders Placed This Encounter   Procedures    CBC With Differential With Platelet    Comp Metabolic Panel (14)    Prothrombin Time (PT) [E]    PTT, Activated [E]    MSSA and MRSA Culture Screen       Meds & Refills for this Visit:  Requested Prescriptions     Signed Prescriptions Disp Refills    amLODIPine 5 MG Oral Tab 180 tablet 0     Sig: Take 2 tablets (10 mg total) by mouth daily.       Imaging & Consults:  None

## 2024-03-11 NOTE — PROGRESS NOTES
CC: Preop exam.    Makenzie Reyes is a 73 year old female who presents for a pre-operative physical exam  By Dr. Patricio camilo. Patient is to have Right total knee arthroplasty,secondary to right knee pain to be on 4/4/24 at [facility].   Prior anaesthesia problem Pt gets sick from general . Pt is sto have a spinal     Patient complains of right knee pain for years. Patient has done conservative measures like medication, PT and injections. Patient states pain is getting worse with weight bearing activities and feels similar to the left knee prior to it being replaced. Patient has opted for surgery .    Patient cut left index finger yesterday. Patient was taking cap off the shaver and it was stuck and when she did get it off it cut her finger. The bleeding has stopped. Patient would like the finger checked.    Patient presents for recheck of their hypertension. Pt has been taking medications as instructed, no medication side effects.  Currently asymptomatic, no chest pains, jaw pains, arm pains. No headaches, dizziness or edema.  No SOB on exertion or rest.  Pt needs a refill on her medication.     BP Readings from Last 3 Encounters:   03/11/24 136/78   01/30/24 133/73   01/24/24 138/78       Current Outpatient Medications   Medication Sig Dispense Refill    baclofen 20 MG Oral Tab Take 1 tablet (20 mg total) by mouth daily. 90 tablet 0    escitalopram 20 MG Oral Tab Take 1 tablet (20 mg total) by mouth nightly. APPT DUE NOVEMBER 90 tablet 0    celecoxib 200 MG Oral Cap Take 1 capsule (200 mg total) by mouth daily. 90 capsule 0    Triamterene-HCTZ 37.5-25 MG Oral Tab Take 1 tablet by mouth daily. 90 tablet 0    levothyroxine 25 MCG Oral Tab Take 1 tablet (25 mcg total) by mouth before breakfast. 90 tablet 1    atorvastatin 20 MG Oral Tab TAKE 1 TABLET EVERY NIGHT 90 tablet 2    Omeprazole 40 MG Oral Capsule Delayed Release Take one tablet (40 mg total) by mouth once daily, 30 minutes prior to breakfast.  90 capsule 2    AMLODIPINE 5 MG Oral Tab TAKE 2 TABLETS EVERY  tablet 0    CALCIUM OR Take 1,000 mg by mouth daily.      Multiple Vitamin (DAILY VALUE MULTIVITAMIN) Oral Tab Take 1 tablet by mouth daily.      Cholecalciferol (VITAMIN D) 2000 UNITS Oral Cap Take 1 capsule (2,000 Units total) by mouth daily.        Allergies: No Known Allergies   Past Medical History:   Diagnosis Date    Arthritis     BACK PAIN     in morning    Back problem     Alas's esophagus     Basal cell carcinoma of ear 2008    excision of BCC lesion Left ear    Change in hair Hair is thinning/age related    Chronic heartburn 2023    Constipation     Depression     Disorder of thyroid     Easy bruising     Esophageal reflux     Essential hypertension 2012    Fatigue     Heartburn 2019    Hemorrhoids After having a baby    High blood pressure     High cholesterol     Hx of motion sickness     Hyperglycemia     Hyperlipidemia     Increased weakness when ambulating 2016    Multiple sclerosis (HCC)     chronic side effects include fatique and \"brain fog\" and decreased vision of left eye, left sided weakness due to MS    Muscle spasms of both lower extremities 2018    Muscle weakness     has MS    OSTEOARTHRITIS     Osteoarthritis     Osteoarthrosis involving lower leg 2013    Log Date: 10/26/2012     OTHER DISEASES     MS dx 1981 Dr. Ramachandran    Pain in joints At times/left knee    Personal history of colonic polyps 2019    PONV (postoperative nausea and vomiting)     Unspecified essential hypertension     Visual impairment     decreased vision left eye glasses    Wears glasses Glasses      Past Surgical History:   Procedure Laterality Date    APPENDECTOMY  1964    BACK SURGERY      cervical surgery/ACDF x 3 with Dr. Webster          x2    COLONOSCOPY  2003    COLONOSCOPY      EXCIS LUMBAR DISK,ONE LEVEL      FOOT/TOES SURGERY PROC UNLISTED  2005    hemmertoes operation Rt toe     HYSTERECTOMY      KNEE REPLACEMENT SURGERY  2022    LAMINECTOMY,LUMBAR  2010    L4 L5 fusion    OOPHORECTOMY Bilateral     REMOVAL OF OVARIAN CYST(S)      x2    THYROIDECTOMY      partial thyroidectomy Lt    TONSILLECTOMY      TOTAL ABDOM HYSTERECTOMY      w/ removal of both ovaries d/t DBT and fibroid    UPPER GI ENDOSCOPY,EXAM        Family History   Problem Relation Age of Onset    Cancer Sister         cervical cancer    Hypertension Mother          2019    Breast Cancer Daughter 39        BRCA 1 GENE    BRCA gene + Daughter     Hypertension Brother          2014    Hypertension Sister     Hypertension Sister     Hypertension Brother       Social History:   Social History     Socioeconomic History    Marital status:    Tobacco Use    Smoking status: Never    Smokeless tobacco: Never   Vaping Use    Vaping Use: Never used   Substance and Sexual Activity    Alcohol use: Yes     Alcohol/week: 2.0 standard drinks of alcohol     Types: 2 Glasses of wine per week     Comment: 2 glasses of wine on a weekend    Drug use: Never   Other Topics Concern    Caffeine Concern Yes     Comment: 2 cups of coffee daily.    Exercise Yes     Comment: 4x/week.       Occ: retired   Exercise: three times per week.  Diet: Pt tries to eat low carb     REVIEW OF SYSTEMS:   GENERAL: feels well otherwise  SKIN: denies any unusual skin lesions  EYES:denies blurred vision or double vision  HEENT: denies nasal congestion, sinus pain or ST  LUNGS: denies shortness of breath with exertion  CARDIOVASCULAR: denies chest pain on exertion  GI: denies abdominal pain,+GERD  : denies dysuria, vaginal discharge   MUSCULOSKELETAL: hx of back pain, MS, see HPI  NEURO: denies headaches  PSYCHE: + depression and anxiety  HEMATOLOGIC: denies hx of anemia  ENDOCRINE: + thyroid history,+ prediabetic.  ALL/ASTHMA: denies hx of allergy or asthma    EXAM:   /78 (BP Location: Left arm, Patient Position:  Sitting, Cuff Size: adult)   Pulse 78   Temp 98 °F (36.7 °C) (Oral)   Resp 16   Ht 5' 4\" (1.626 m)   Wt 199 lb 12.8 oz (90.6 kg)   SpO2 95%   BMI 34.30 kg/m²   GENERAL: well developed, well nourished,in no apparent distress  SKIN: no rashes,no suspicious lesions  HEENT: atraumatic, normocephalic,ears and throat are clear  EYES:PERRLA, EOMI, normal optic disk,conjunctiva are clear  NECK: supple,no adenopathy,no bruits  CHEST: no chest tenderness  LUNGS: clear to auscultation  CARDIO: RRR without murmur  GI: good BS's,no masses, HSM or tenderness  : deferred  MUSCULOSKELETAL: back is not tender,FROM of the back  EXTREMITIES: no edema  KNEE/Right: + medial pain, less lateral pain. No swelling, FROM with some pain. Gait steady.  NEURO: Oriented times three,cranial nerves are intact,motor and sensory are grossly intact    ASSESSMENT AND PLAN:   Makenzie Reyes is a 73 year old female who presents for a pre-operative physical exam. Labs stable, EKG are pending, patient is approved for surgery pending results.    1. Pre-op examination 2. Arthritis of right knee    - CBC With Differential With Platelet; Future  - Comp Metabolic Panel (14); Future  - Prothrombin Time (PT) [E]; Future  - PTT, Activated [E]; Future  - MSSA and MRSA Culture Screen; Future  - EKG 12 Lead; Future    3. Laceration of left index finger without foreign body without damage to nail, initial encounter  - keep the area clean. Pt may apply neosporin as needed.  - f/u if the area has increased redness, drg or fever.    4. Primary hypertension  Conservative measures dicussed. Continue medication.  Diet and exercise explained and encouraged.  Home blood pressure monitoring. Pt should measure BP’s two to three times per week. Goal blood pressure at home - < 135/85.   - amLODIPine 5 MG Oral Tab; Take 2 tablets (10 mg total) by mouth daily.  Dispense: 180 tablet; Refill: 0    Encounter Diagnoses   Name Primary?    Pre-op examination Yes    Arthritis  of right knee     Laceration of left index finger without foreign body without damage to nail, initial encounter     Primary hypertension        Orders Placed This Encounter   Procedures    CBC With Differential With Platelet    Comp Metabolic Panel (14)    Prothrombin Time (PT) [E]    PTT, Activated [E]    MSSA and MRSA Culture Screen       Meds & Refills for this Visit:  Requested Prescriptions     Signed Prescriptions Disp Refills    amLODIPine 5 MG Oral Tab 180 tablet 0     Sig: Take 2 tablets (10 mg total) by mouth daily.       Imaging & Consults:  None

## 2024-03-12 LAB
ATRIAL RATE: 64 BPM
P AXIS: 52 DEGREES
P-R INTERVAL: 196 MS
Q-T INTERVAL: 436 MS
QRS DURATION: 88 MS
QTC CALCULATION (BEZET): 449 MS
R AXIS: 7 DEGREES
T AXIS: 43 DEGREES
VENTRICULAR RATE: 64 BPM

## 2024-03-14 ENCOUNTER — ORDER TRANSCRIPTION (OUTPATIENT)
Dept: PHYSICAL THERAPY | Facility: HOSPITAL | Age: 74
End: 2024-03-14

## 2024-03-14 ENCOUNTER — TELEPHONE (OUTPATIENT)
Dept: PHYSICAL THERAPY | Facility: HOSPITAL | Age: 74
End: 2024-03-14

## 2024-03-14 ENCOUNTER — TELEPHONE (OUTPATIENT)
Dept: INTERNAL MEDICINE CLINIC | Facility: CLINIC | Age: 74
End: 2024-03-14

## 2024-03-14 DIAGNOSIS — M17.11 PRIMARY OSTEOARTHRITIS OF RIGHT KNEE: Primary | ICD-10-CM

## 2024-03-14 DIAGNOSIS — R73.03 PREDIABETES: Primary | ICD-10-CM

## 2024-03-14 NOTE — TELEPHONE ENCOUNTER
----- Message from HUSSAIN Mujica sent at 3/12/2024  9:49 AM CDT -----  Glucose 111 and Hgba1c up to 6.2. Pt is still prediabetic. Pt to work on eating less carbs, exercise and work on losing weight. Recheck Hgba1c in 6 months.  Rest of labs and pre op labs look good.

## 2024-03-22 NOTE — TELEPHONE ENCOUNTER
PATIENT IS CLEARED BY PCP FOR SURGERY. OFFICE VISIT 3/11/2024 IN [facility]

## 2024-03-25 DIAGNOSIS — G25.81 RLS (RESTLESS LEGS SYNDROME): ICD-10-CM

## 2024-03-25 DIAGNOSIS — I10 PRIMARY HYPERTENSION: ICD-10-CM

## 2024-03-25 DIAGNOSIS — M25.511 ACUTE PAIN OF RIGHT SHOULDER: ICD-10-CM

## 2024-03-25 DIAGNOSIS — M46.92 UNSPECIFIED INFLAMMATORY SPONDYLOPATHY, CERVICAL REGION (HCC): ICD-10-CM

## 2024-03-25 DIAGNOSIS — M47.812 FACET ARTHRITIS OF CERVICAL REGION: ICD-10-CM

## 2024-03-25 DIAGNOSIS — M47.816 FACET ARTHRITIS OF LUMBAR REGION: ICD-10-CM

## 2024-03-25 DIAGNOSIS — M17.12 ARTHRITIS OF LEFT KNEE: ICD-10-CM

## 2024-03-25 DIAGNOSIS — G35 MULTIPLE SCLEROSIS (HCC): ICD-10-CM

## 2024-03-26 RX ORDER — BACLOFEN 20 MG/1
20 TABLET ORAL DAILY
Qty: 90 TABLET | Refills: 0 | Status: SHIPPED | OUTPATIENT
Start: 2024-03-26

## 2024-03-26 RX ORDER — CELECOXIB 200 MG/1
200 CAPSULE ORAL DAILY
Qty: 90 CAPSULE | Refills: 0 | Status: SHIPPED | OUTPATIENT
Start: 2024-03-26

## 2024-03-26 RX ORDER — ESCITALOPRAM OXALATE 20 MG/1
20 TABLET ORAL DAILY
Qty: 90 TABLET | Refills: 0 | Status: SHIPPED | OUTPATIENT
Start: 2024-03-26

## 2024-03-26 RX ORDER — LEVOTHYROXINE SODIUM 0.03 MG/1
25 TABLET ORAL
Qty: 90 TABLET | Refills: 0 | Status: SHIPPED | OUTPATIENT
Start: 2024-03-26

## 2024-03-26 RX ORDER — TRIAMTERENE AND HYDROCHLOROTHIAZIDE 37.5; 25 MG/1; MG/1
1 TABLET ORAL DAILY
Qty: 90 TABLET | Refills: 0 | Status: SHIPPED | OUTPATIENT
Start: 2024-03-26

## 2024-03-26 NOTE — TELEPHONE ENCOUNTER
Baclofen 20 mg  Filled 1-24-24  Qty 90  0 refills  Future Appointments   Date Time Provider Department Center   4/17/2024  9:20 AM Gregory Zheng PA-C [facility]   4/18/2024  9:45 AM Pace, Desiree, PT [facility] [place]   4/22/2024  9:45 AM Pace, Darrynale, PT [facility] [place]   4/25/2024 10:30 AM Pace, Jinale, PT [facility] [place]   4/29/2024  9:45 AM Pace, Jinale, PT [facility] [place]   5/2/2024 10:30 AM Pace, Jinale, PT [facility] [place]   5/6/2024  9:45 AM Pace, Jinale, PT [facility] [place]   5/8/2024 10:00 AM Alejo Wu MD [facility] [place]   5/9/2024 10:30 AM Desiree Pace, PT [facility] [place]   5/13/2024  9:45 AM Pace, Jinale, PT [facility] [place]   5/15/2024 10:30 AM Albania Knox APRN [facility]   5/16/2024 10:30 AM Desiree Pace, PT [facility] [place]   5/20/2024  9:45 AM Desiree Pace, PT [facility] [place]   5/23/2024 10:30 AM Desiree Pace, PT [facility] [place]   5/30/2024 10:30 AM PaceDesiree collins, PT [facility] [place]   7/18/2024  9:30 AM Marco Antonio Guzman MD [facility] E[facility]   LOV 1-24-24 SM    Celecoxib 200 mg  Filled 1-8-24  Qty 90  0 refills  Future Appointments   Date Time Provider Department Center   4/17/2024  9:20 AM Gregory Zheng PA-C [facility]   4/18/2024  9:45 AM PaceDesiree collins, PT [facility] [place]   4/22/2024  9:45 AM PaceDarryn collinsale, PT [facility] [place]   4/25/2024 10:30 AM Desiree Pace, PT [facility] [place]   4/29/2024  9:45 AM Desiree Pace, PT [facility] [place]   5/2/2024 10:30 AM Desiree Pace, PT [facility] [place]   5/6/2024  9:45 AM Desiree Pace, PT [facility] [place]   5/8/2024 10:00 AM Alejo Wu MD [facility] [place]   5/9/2024 10:30 AM Desiree Pace, PT [facility] [place]   5/13/2024  9:45 AM Desiree Pace, PT [facility] [place]   5/15/2024 10:30 AM Albania Knox APRN [facility] [place]   5/16/2024 10:30 AM Desiree Pace, PT [facility] [place]   5/20/2024  9:45 AM  Desiree Pace, PT [facility] [place]   5/23/2024 10:30 AM PaceDesiree collins, PT [facility] [place]   5/30/2024 10:30 AM Desiree Pace, PT [facility] [place]   7/18/2024  9:30 AM Marco Antonio Guzman MD [facility] ECC SUB GI   LOV 1-24-24 SM    Escitalopram 20 mg  Filled 1-8-24  Qty 90  0 refills  Future Appointments   Date Time Provider Department Center   4/17/2024  9:20 AM Gregory Zheng PA-C [facility]   4/18/2024  9:45 AM Desiree Pace, PT [facility] [place]   4/22/2024  9:45 AM Desiree Pace, PT [facility] [place]   4/25/2024 10:30 AM Desiree Pace, PT [facility] [place]   4/29/2024  9:45 AM Desiree Pace, PT [facility] [place]   5/2/2024 10:30 AM Desiree Pace, PT [facility] [place]   5/6/2024  9:45 AM Desiree Pace, PT [facility] [place]   5/8/2024 10:00 AM Alejo Wu MD [facility]   5/9/2024 10:30 AM Desiree Pace, PT [facility] [place]   5/13/2024  9:45 AM Desiree Pace, PT [facility] [place]   5/15/2024 10:30 AM Albania Knox, HUSSAIN EM[facility] 8 [facility] [place]   5/16/2024 10:30 AM Desiree Pace, PT [facility] [place]   5/20/2024  9:45 AM Desiree Pace, PT [facility] [place]   5/23/2024 10:30 AM Desiree Pace, PT [facility] [place]   5/30/2024 10:30 AM Desiree Pace, PT [facility] [place]   7/18/2024  9:30 AM Marco Antonio Guzman MD [facility] ECC SUB GI   LOV 1-24-24 SM    Levothyroxine 25 mcg  Filled 10-30-23  Qty 90  1 refill  Future Appointments   Date Time Provider Department Center   4/17/2024  9:20 AM Gregory Zheng PA-C [facility]   4/18/2024  9:45 AM Desiree Pace, PT [facility] [place]   4/22/2024  9:45 AM Desiree Pace, PT [facility] [place]   4/25/2024 10:30 AM Desiree Pace, PT [facility] [place]   4/29/2024  9:45 AM Desiree Pace, PT [facility] [place]   5/2/2024 10:30 AM Desiree Pace, PT [facility] [place]   5/6/2024  9:45 AM Desiree Pace, PT [facility] [place]   5/8/2024 10:00 AM Alejo Wu MD [facility]   5/9/2024 10:30 AM  PaceDarryn collinsale, PT [facility] [place]   5/13/2024  9:45 AM Pace, Jinale, PT [facility] [place]   5/15/2024 10:30 AM Albania Knox APRN [facility] 8 [facility] [place]   5/16/2024 10:30 AM Pace, Jinale, PT [facility] [place]   5/20/2024  9:45 AM Pace, Darrynale, PT [facility] [place]   5/23/2024 10:30 AM Pace Jinale, PT [facility] [place]   5/30/2024 10:30 AM Pace, Jinale, PT [facility] [place]   7/18/2024  9:30 AM Marco Antonio Guzman MD [facility] ECC SUB GI   LOV 1-24-24 SM  Labs 3-11-24 TSH W REFLEX    Triamterene-hydrochlorothiazide 37.5- 25 mg  Filled 1-3-24  Qty 90  0 refills  Future Appointments   Date Time Provider Department Center   4/17/2024  9:20 AM Gregory Zheng PA-C [facility]   4/18/2024  9:45 AM PaceDarryn collinsale, PT [facility] [place]   4/22/2024  9:45 AM Pace, Jinale, PT [facility] [place]   4/25/2024 10:30 AM Pace, Jinale, PT [facility] [place]   4/29/2024  9:45 AM Pace, Jinale, PT [facility] [place]   5/2/2024 10:30 AM Pace, Jinale, PT [facility] [place]   5/6/2024  9:45 AM Pace, Jinale, PT [facility] [place]   5/8/2024 10:00 AM Alejo Wu MD [facility] [place]   5/9/2024 10:30 AM Pace, Jinale, PT [facility] [place]   5/13/2024  9:45 AM Pace, Jinale, PT [facility] [place]   5/15/2024 10:30 AM Albania Knox APRN [facility] 8 [facility] [place]   5/16/2024 10:30 AM Pace, Jinale, PT [facility] [place]   5/20/2024  9:45 AM Desiree Pace, PT [facility] [place]   5/23/2024 10:30 AM Desiree Pace, PT [facility] [place]   5/30/2024 10:30 AM Desiree Pace, PT [facility] [place]   7/18/2024  9:30 AM Marco Antonio Guzman MD [facility] SUB GI   LOV 03-11-24 SM

## 2024-04-04 ENCOUNTER — ANESTHESIA (OUTPATIENT)
Dept: SURGERY | Facility: HOSPITAL | Age: 74
End: 2024-04-04
Payer: MEDICARE

## 2024-04-04 ENCOUNTER — HOSPITAL ENCOUNTER (INPATIENT)
Facility: HOSPITAL | Age: 74
LOS: 1 days | Discharge: HOME HEALTH | End: 2024-04-05
Attending: ORTHOPAEDIC SURGERY | Admitting: ORTHOPAEDIC SURGERY
Payer: MEDICARE

## 2024-04-04 ENCOUNTER — ANESTHESIA EVENT (OUTPATIENT)
Dept: SURGERY | Facility: HOSPITAL | Age: 74
End: 2024-04-04
Payer: MEDICARE

## 2024-04-04 ENCOUNTER — APPOINTMENT (OUTPATIENT)
Dept: GENERAL RADIOLOGY | Facility: HOSPITAL | Age: 74
End: 2024-04-04
Attending: PHYSICIAN ASSISTANT
Payer: MEDICARE

## 2024-04-04 DIAGNOSIS — M46.92 UNSPECIFIED INFLAMMATORY SPONDYLOPATHY, CERVICAL REGION (HCC): ICD-10-CM

## 2024-04-04 DIAGNOSIS — M47.812 FACET ARTHRITIS OF CERVICAL REGION: ICD-10-CM

## 2024-04-04 DIAGNOSIS — M17.11 PRIMARY OSTEOARTHRITIS OF RIGHT KNEE: Primary | ICD-10-CM

## 2024-04-04 DIAGNOSIS — M17.12 ARTHRITIS OF LEFT KNEE: ICD-10-CM

## 2024-04-04 DIAGNOSIS — M47.816 FACET ARTHRITIS OF LUMBAR REGION: ICD-10-CM

## 2024-04-04 DIAGNOSIS — M25.511 ACUTE PAIN OF RIGHT SHOULDER: ICD-10-CM

## 2024-04-04 PROCEDURE — 0SRC0J9 REPLACEMENT OF RIGHT KNEE JOINT WITH SYNTHETIC SUBSTITUTE, CEMENTED, OPEN APPROACH: ICD-10-PCS | Performed by: ORTHOPAEDIC SURGERY

## 2024-04-04 PROCEDURE — 73560 X-RAY EXAM OF KNEE 1 OR 2: CPT | Performed by: PHYSICIAN ASSISTANT

## 2024-04-04 PROCEDURE — 76942 ECHO GUIDE FOR BIOPSY: CPT | Performed by: STUDENT IN AN ORGANIZED HEALTH CARE EDUCATION/TRAINING PROGRAM

## 2024-04-04 PROCEDURE — 27447 TOTAL KNEE ARTHROPLASTY: CPT | Performed by: ORTHOPAEDIC SURGERY

## 2024-04-04 PROCEDURE — 27447 TOTAL KNEE ARTHROPLASTY: CPT | Performed by: PHYSICIAN ASSISTANT

## 2024-04-04 PROCEDURE — 99223 1ST HOSP IP/OBS HIGH 75: CPT | Performed by: INTERNAL MEDICINE

## 2024-04-04 DEVICE — IMPLANTABLE DEVICE
Type: IMPLANTABLE DEVICE | Site: KNEE | Status: FUNCTIONAL
Brand: BIOMET® BONE CEMENT R

## 2024-04-04 DEVICE — FEMUR SPHERE CEMENTED RIGHT, SIZE 3
Type: IMPLANTABLE DEVICE | Site: KNEE | Status: FUNCTIONAL
Brand: GMK SPHERE TOTAL KNEE SYSTEM

## 2024-04-04 DEVICE — GMK SPHERE KNEE: Type: IMPLANTABLE DEVICE | Site: KNEE

## 2024-04-04 DEVICE — TIBIAL INSERT FIXED SPHERE FLEX   #2/12 MM R E-CROSS
Type: IMPLANTABLE DEVICE | Site: KNEE | Status: FUNCTIONAL
Brand: GMK SPHERE TOTAL KNEE SYSTEM

## 2024-04-04 DEVICE — FIXED TIBIAL TRAY CEMENTED RIGHT, SIZE 2
Type: IMPLANTABLE DEVICE | Site: KNEE | Status: FUNCTIONAL
Brand: GMK PRIMARY TOTAL KNEE SYSTEM

## 2024-04-04 RX ORDER — HYDROCODONE BITARTRATE AND ACETAMINOPHEN 5; 325 MG/1; MG/1
2 TABLET ORAL ONCE AS NEEDED
Status: DISCONTINUED | OUTPATIENT
Start: 2024-04-04 | End: 2024-04-04 | Stop reason: HOSPADM

## 2024-04-04 RX ORDER — AMLODIPINE BESYLATE 5 MG/1
10 TABLET ORAL DAILY
Status: DISCONTINUED | OUTPATIENT
Start: 2024-04-05 | End: 2024-04-05

## 2024-04-04 RX ORDER — LEVOTHYROXINE SODIUM 0.03 MG/1
25 TABLET ORAL
Status: DISCONTINUED | OUTPATIENT
Start: 2024-04-05 | End: 2024-04-05

## 2024-04-04 RX ORDER — PANTOPRAZOLE SODIUM 40 MG/1
40 TABLET, DELAYED RELEASE ORAL
Status: DISCONTINUED | OUTPATIENT
Start: 2024-04-05 | End: 2024-04-05

## 2024-04-04 RX ORDER — EPHEDRINE SULFATE 50 MG/ML
INJECTION INTRAVENOUS AS NEEDED
Status: DISCONTINUED | OUTPATIENT
Start: 2024-04-04 | End: 2024-04-04 | Stop reason: SURG

## 2024-04-04 RX ORDER — BUPIVACAINE HYDROCHLORIDE 7.5 MG/ML
INJECTION, SOLUTION INTRASPINAL AS NEEDED
Status: DISCONTINUED | OUTPATIENT
Start: 2024-04-04 | End: 2024-04-04 | Stop reason: SURG

## 2024-04-04 RX ORDER — SODIUM CHLORIDE, SODIUM LACTATE, POTASSIUM CHLORIDE, CALCIUM CHLORIDE 600; 310; 30; 20 MG/100ML; MG/100ML; MG/100ML; MG/100ML
INJECTION, SOLUTION INTRAVENOUS CONTINUOUS
Status: DISCONTINUED | OUTPATIENT
Start: 2024-04-04 | End: 2024-04-04 | Stop reason: HOSPADM

## 2024-04-04 RX ORDER — KETOROLAC TROMETHAMINE 30 MG/ML
15 INJECTION, SOLUTION INTRAMUSCULAR; INTRAVENOUS EVERY 6 HOURS
Status: DISCONTINUED | OUTPATIENT
Start: 2024-04-04 | End: 2024-04-05

## 2024-04-04 RX ORDER — ASPIRIN 81 MG/1
81 TABLET ORAL 2 TIMES DAILY
Qty: 80 TABLET | Refills: 0 | Status: SHIPPED | OUTPATIENT
Start: 2024-04-04 | End: 2024-05-14

## 2024-04-04 RX ORDER — HYDROCODONE BITARTRATE AND ACETAMINOPHEN 5; 325 MG/1; MG/1
1 TABLET ORAL ONCE AS NEEDED
Status: DISCONTINUED | OUTPATIENT
Start: 2024-04-04 | End: 2024-04-04 | Stop reason: HOSPADM

## 2024-04-04 RX ORDER — ROPIVACAINE HYDROCHLORIDE 5 MG/ML
INJECTION, SOLUTION EPIDURAL; INFILTRATION; PERINEURAL AS NEEDED
Status: DISCONTINUED | OUTPATIENT
Start: 2024-04-04 | End: 2024-04-04 | Stop reason: SURG

## 2024-04-04 RX ORDER — METOCLOPRAMIDE HYDROCHLORIDE 5 MG/ML
10 INJECTION INTRAMUSCULAR; INTRAVENOUS EVERY 8 HOURS PRN
Status: DISCONTINUED | OUTPATIENT
Start: 2024-04-04 | End: 2024-04-04 | Stop reason: HOSPADM

## 2024-04-04 RX ORDER — OXYCODONE HYDROCHLORIDE 5 MG/1
2.5 TABLET ORAL EVERY 4 HOURS PRN
Status: DISCONTINUED | OUTPATIENT
Start: 2024-04-04 | End: 2024-04-05

## 2024-04-04 RX ORDER — ACETAMINOPHEN 325 MG/1
650 TABLET ORAL
Status: DISCONTINUED | OUTPATIENT
Start: 2024-04-04 | End: 2024-04-05

## 2024-04-04 RX ORDER — OXYCODONE HYDROCHLORIDE 5 MG/1
5 TABLET ORAL EVERY 4 HOURS PRN
Status: DISCONTINUED | OUTPATIENT
Start: 2024-04-04 | End: 2024-04-05

## 2024-04-04 RX ORDER — SENNOSIDES 8.6 MG
17.2 TABLET ORAL NIGHTLY
Status: DISCONTINUED | OUTPATIENT
Start: 2024-04-04 | End: 2024-04-05

## 2024-04-04 RX ORDER — ASPIRIN 81 MG/1
81 TABLET ORAL 2 TIMES DAILY
Status: DISCONTINUED | OUTPATIENT
Start: 2024-04-04 | End: 2024-04-05

## 2024-04-04 RX ORDER — ACETAMINOPHEN 325 MG/1
650 TABLET ORAL ONCE
Status: COMPLETED | OUTPATIENT
Start: 2024-04-04 | End: 2024-04-04

## 2024-04-04 RX ORDER — TRAMADOL HYDROCHLORIDE 50 MG/1
50 TABLET ORAL EVERY 8 HOURS
Qty: 45 TABLET | Refills: 0 | Status: SHIPPED | OUTPATIENT
Start: 2024-04-04 | End: 2024-04-19

## 2024-04-04 RX ORDER — LABETALOL HYDROCHLORIDE 5 MG/ML
5 INJECTION, SOLUTION INTRAVENOUS EVERY 5 MIN PRN
Status: DISCONTINUED | OUTPATIENT
Start: 2024-04-04 | End: 2024-04-04 | Stop reason: HOSPADM

## 2024-04-04 RX ORDER — NALOXONE HYDROCHLORIDE 0.4 MG/ML
0.08 INJECTION, SOLUTION INTRAMUSCULAR; INTRAVENOUS; SUBCUTANEOUS AS NEEDED
Status: DISCONTINUED | OUTPATIENT
Start: 2024-04-04 | End: 2024-04-04 | Stop reason: HOSPADM

## 2024-04-04 RX ORDER — MEPERIDINE HYDROCHLORIDE 25 MG/ML
12.5 INJECTION INTRAMUSCULAR; INTRAVENOUS; SUBCUTANEOUS AS NEEDED
Status: DISCONTINUED | OUTPATIENT
Start: 2024-04-04 | End: 2024-04-04 | Stop reason: HOSPADM

## 2024-04-04 RX ORDER — ACETAMINOPHEN 500 MG
1000 TABLET ORAL EVERY 8 HOURS
Qty: 90 TABLET | Refills: 0 | Status: SHIPPED | OUTPATIENT
Start: 2024-04-04 | End: 2024-04-05

## 2024-04-04 RX ORDER — CEFAZOLIN SODIUM/WATER 2 G/20 ML
2 SYRINGE (ML) INTRAVENOUS ONCE
Status: COMPLETED | OUTPATIENT
Start: 2024-04-04 | End: 2024-04-04

## 2024-04-04 RX ORDER — DIPHENHYDRAMINE HYDROCHLORIDE 50 MG/ML
25 INJECTION INTRAMUSCULAR; INTRAVENOUS ONCE AS NEEDED
Status: ACTIVE | OUTPATIENT
Start: 2024-04-04 | End: 2024-04-04

## 2024-04-04 RX ORDER — HYDROMORPHONE HYDROCHLORIDE 1 MG/ML
0.6 INJECTION, SOLUTION INTRAMUSCULAR; INTRAVENOUS; SUBCUTANEOUS EVERY 5 MIN PRN
Status: DISCONTINUED | OUTPATIENT
Start: 2024-04-04 | End: 2024-04-04 | Stop reason: HOSPADM

## 2024-04-04 RX ORDER — DIPHENHYDRAMINE HYDROCHLORIDE 50 MG/ML
12.5 INJECTION INTRAMUSCULAR; INTRAVENOUS EVERY 4 HOURS PRN
Status: DISCONTINUED | OUTPATIENT
Start: 2024-04-04 | End: 2024-04-05

## 2024-04-04 RX ORDER — HYDROMORPHONE HYDROCHLORIDE 1 MG/ML
0.2 INJECTION, SOLUTION INTRAMUSCULAR; INTRAVENOUS; SUBCUTANEOUS EVERY 5 MIN PRN
Status: DISCONTINUED | OUTPATIENT
Start: 2024-04-04 | End: 2024-04-04 | Stop reason: HOSPADM

## 2024-04-04 RX ORDER — DEXAMETHASONE SODIUM PHOSPHATE 10 MG/ML
8 INJECTION, SOLUTION INTRAMUSCULAR; INTRAVENOUS ONCE
Status: COMPLETED | OUTPATIENT
Start: 2024-04-05 | End: 2024-04-05

## 2024-04-04 RX ORDER — HYDROMORPHONE HYDROCHLORIDE 1 MG/ML
0.4 INJECTION, SOLUTION INTRAMUSCULAR; INTRAVENOUS; SUBCUTANEOUS EVERY 5 MIN PRN
Status: DISCONTINUED | OUTPATIENT
Start: 2024-04-04 | End: 2024-04-04 | Stop reason: HOSPADM

## 2024-04-04 RX ORDER — CEFAZOLIN SODIUM/WATER 2 G/20 ML
2 SYRINGE (ML) INTRAVENOUS EVERY 8 HOURS
Status: COMPLETED | OUTPATIENT
Start: 2024-04-04 | End: 2024-04-05

## 2024-04-04 RX ORDER — DEXAMETHASONE SODIUM PHOSPHATE 4 MG/ML
VIAL (ML) INJECTION AS NEEDED
Status: DISCONTINUED | OUTPATIENT
Start: 2024-04-04 | End: 2024-04-04 | Stop reason: SURG

## 2024-04-04 RX ORDER — ONDANSETRON 2 MG/ML
4 INJECTION INTRAMUSCULAR; INTRAVENOUS EVERY 6 HOURS PRN
Status: DISCONTINUED | OUTPATIENT
Start: 2024-04-04 | End: 2024-04-05

## 2024-04-04 RX ORDER — OXYCODONE HYDROCHLORIDE 5 MG/1
TABLET ORAL EVERY 4 HOURS PRN
Qty: 40 TABLET | Refills: 0 | Status: SHIPPED | OUTPATIENT
Start: 2024-04-04 | End: 2024-04-05

## 2024-04-04 RX ORDER — MAGNESIUM SULFATE HEPTAHYDRATE 500 MG/ML
INJECTION, SOLUTION INTRAMUSCULAR; INTRAVENOUS AS NEEDED
Status: DISCONTINUED | OUTPATIENT
Start: 2024-04-04 | End: 2024-04-04 | Stop reason: SURG

## 2024-04-04 RX ORDER — BACLOFEN 10 MG/1
20 TABLET ORAL DAILY
Status: DISCONTINUED | OUTPATIENT
Start: 2024-04-04 | End: 2024-04-05

## 2024-04-04 RX ORDER — SODIUM CHLORIDE, SODIUM LACTATE, POTASSIUM CHLORIDE, CALCIUM CHLORIDE 600; 310; 30; 20 MG/100ML; MG/100ML; MG/100ML; MG/100ML
INJECTION, SOLUTION INTRAVENOUS CONTINUOUS
Status: DISCONTINUED | OUTPATIENT
Start: 2024-04-04 | End: 2024-04-05

## 2024-04-04 RX ORDER — TRANEXAMIC ACID 10 MG/ML
1000 INJECTION, SOLUTION INTRAVENOUS ONCE
Status: COMPLETED | OUTPATIENT
Start: 2024-04-04 | End: 2024-04-04

## 2024-04-04 RX ORDER — ATORVASTATIN CALCIUM 20 MG/1
20 TABLET, FILM COATED ORAL NIGHTLY
Status: DISCONTINUED | OUTPATIENT
Start: 2024-04-04 | End: 2024-04-05

## 2024-04-04 RX ORDER — DIPHENHYDRAMINE HCL 25 MG
25 CAPSULE ORAL EVERY 4 HOURS PRN
Status: DISCONTINUED | OUTPATIENT
Start: 2024-04-04 | End: 2024-04-05

## 2024-04-04 RX ORDER — ONDANSETRON 2 MG/ML
4 INJECTION INTRAMUSCULAR; INTRAVENOUS EVERY 6 HOURS PRN
Status: DISCONTINUED | OUTPATIENT
Start: 2024-04-04 | End: 2024-04-04 | Stop reason: HOSPADM

## 2024-04-04 RX ORDER — DOCUSATE SODIUM 100 MG/1
100 CAPSULE, LIQUID FILLED ORAL 2 TIMES DAILY
Status: DISCONTINUED | OUTPATIENT
Start: 2024-04-04 | End: 2024-04-05

## 2024-04-04 RX ORDER — SENNA AND DOCUSATE SODIUM 50; 8.6 MG/1; MG/1
1 TABLET, FILM COATED ORAL 2 TIMES DAILY PRN
Qty: 30 TABLET | Refills: 0 | Status: SHIPPED | OUTPATIENT
Start: 2024-04-04

## 2024-04-04 RX ORDER — METOCLOPRAMIDE HYDROCHLORIDE 5 MG/ML
10 INJECTION INTRAMUSCULAR; INTRAVENOUS EVERY 8 HOURS PRN
Status: DISCONTINUED | OUTPATIENT
Start: 2024-04-04 | End: 2024-04-05

## 2024-04-04 RX ORDER — CELECOXIB 200 MG/1
200 CAPSULE ORAL DAILY
Qty: 30 CAPSULE | Refills: 0 | Status: SHIPPED | OUTPATIENT
Start: 2024-04-04 | End: 2024-05-04

## 2024-04-04 RX ORDER — MELATONIN
325
Status: DISCONTINUED | OUTPATIENT
Start: 2024-04-05 | End: 2024-04-05

## 2024-04-04 RX ORDER — BISACODYL 10 MG
10 SUPPOSITORY, RECTAL RECTAL
Status: DISCONTINUED | OUTPATIENT
Start: 2024-04-04 | End: 2024-04-05

## 2024-04-04 RX ORDER — KETOROLAC TROMETHAMINE 30 MG/ML
INJECTION, SOLUTION INTRAMUSCULAR; INTRAVENOUS AS NEEDED
Status: DISCONTINUED | OUTPATIENT
Start: 2024-04-04 | End: 2024-04-04 | Stop reason: SURG

## 2024-04-04 RX ORDER — HYDROMORPHONE HYDROCHLORIDE 1 MG/ML
0.2 INJECTION, SOLUTION INTRAMUSCULAR; INTRAVENOUS; SUBCUTANEOUS EVERY 2 HOUR PRN
Status: DISCONTINUED | OUTPATIENT
Start: 2024-04-04 | End: 2024-04-05

## 2024-04-04 RX ORDER — LIDOCAINE HYDROCHLORIDE 10 MG/ML
INJECTION, SOLUTION EPIDURAL; INFILTRATION; INTRACAUDAL; PERINEURAL AS NEEDED
Status: DISCONTINUED | OUTPATIENT
Start: 2024-04-04 | End: 2024-04-04 | Stop reason: SURG

## 2024-04-04 RX ORDER — MIDAZOLAM HYDROCHLORIDE 1 MG/ML
INJECTION INTRAMUSCULAR; INTRAVENOUS AS NEEDED
Status: DISCONTINUED | OUTPATIENT
Start: 2024-04-04 | End: 2024-04-04 | Stop reason: SURG

## 2024-04-04 RX ORDER — ENEMA 19; 7 G/133ML; G/133ML
1 ENEMA RECTAL ONCE AS NEEDED
Status: DISCONTINUED | OUTPATIENT
Start: 2024-04-04 | End: 2024-04-05

## 2024-04-04 RX ORDER — POLYETHYLENE GLYCOL 3350 17 G/17G
17 POWDER, FOR SOLUTION ORAL DAILY PRN
Status: DISCONTINUED | OUTPATIENT
Start: 2024-04-04 | End: 2024-04-05

## 2024-04-04 RX ORDER — KETAMINE HYDROCHLORIDE 50 MG/ML
INJECTION, SOLUTION INTRAMUSCULAR; INTRAVENOUS AS NEEDED
Status: DISCONTINUED | OUTPATIENT
Start: 2024-04-04 | End: 2024-04-04 | Stop reason: SURG

## 2024-04-04 RX ORDER — HYDROMORPHONE HYDROCHLORIDE 1 MG/ML
0.4 INJECTION, SOLUTION INTRAMUSCULAR; INTRAVENOUS; SUBCUTANEOUS EVERY 2 HOUR PRN
Status: DISCONTINUED | OUTPATIENT
Start: 2024-04-04 | End: 2024-04-05

## 2024-04-04 RX ORDER — ACETAMINOPHEN 325 MG/1
TABLET ORAL
Status: COMPLETED
Start: 2024-04-04 | End: 2024-04-04

## 2024-04-04 RX ORDER — ONDANSETRON 2 MG/ML
INJECTION INTRAMUSCULAR; INTRAVENOUS AS NEEDED
Status: DISCONTINUED | OUTPATIENT
Start: 2024-04-04 | End: 2024-04-04 | Stop reason: SURG

## 2024-04-04 RX ORDER — ACETAMINOPHEN 500 MG
1000 TABLET ORAL ONCE
Status: DISCONTINUED | OUTPATIENT
Start: 2024-04-04 | End: 2024-04-04 | Stop reason: HOSPADM

## 2024-04-04 RX ORDER — PANTOPRAZOLE SODIUM 20 MG/1
20 TABLET, DELAYED RELEASE ORAL DAILY
Qty: 40 TABLET | Refills: 0 | Status: SHIPPED | OUTPATIENT
Start: 2024-04-04 | End: 2024-05-14

## 2024-04-04 RX ORDER — ACETAMINOPHEN 500 MG
1000 TABLET ORAL ONCE AS NEEDED
Status: DISCONTINUED | OUTPATIENT
Start: 2024-04-04 | End: 2024-04-04 | Stop reason: HOSPADM

## 2024-04-04 RX ORDER — ESCITALOPRAM OXALATE 20 MG/1
20 TABLET ORAL DAILY
Status: DISCONTINUED | OUTPATIENT
Start: 2024-04-04 | End: 2024-04-05

## 2024-04-04 RX ADMIN — MAGNESIUM SULFATE HEPTAHYDRATE 2 G: 500 INJECTION, SOLUTION INTRAMUSCULAR; INTRAVENOUS at 10:39:00

## 2024-04-04 RX ADMIN — KETAMINE HYDROCHLORIDE 25 MG: 50 INJECTION, SOLUTION INTRAMUSCULAR; INTRAVENOUS at 10:49:00

## 2024-04-04 RX ADMIN — SODIUM CHLORIDE, SODIUM LACTATE, POTASSIUM CHLORIDE, CALCIUM CHLORIDE: 600; 310; 30; 20 INJECTION, SOLUTION INTRAVENOUS at 12:03:00

## 2024-04-04 RX ADMIN — EPHEDRINE SULFATE 5 MG: 50 INJECTION INTRAVENOUS at 11:06:00

## 2024-04-04 RX ADMIN — MIDAZOLAM HYDROCHLORIDE 3 MG: 1 INJECTION INTRAMUSCULAR; INTRAVENOUS at 10:17:00

## 2024-04-04 RX ADMIN — ROPIVACAINE HYDROCHLORIDE 30 ML: 5 INJECTION, SOLUTION EPIDURAL; INFILTRATION; PERINEURAL at 10:30:00

## 2024-04-04 RX ADMIN — KETOROLAC TROMETHAMINE 30 MG: 30 INJECTION, SOLUTION INTRAMUSCULAR; INTRAVENOUS at 12:17:00

## 2024-04-04 RX ADMIN — SODIUM CHLORIDE, SODIUM LACTATE, POTASSIUM CHLORIDE, CALCIUM CHLORIDE: 600; 310; 30; 20 INJECTION, SOLUTION INTRAVENOUS at 10:15:00

## 2024-04-04 RX ADMIN — CEFAZOLIN SODIUM/WATER 2 G: 2 G/20 ML SYRINGE (ML) INTRAVENOUS at 10:39:00

## 2024-04-04 RX ADMIN — TRANEXAMIC ACID 1000 MG: 10 INJECTION, SOLUTION INTRAVENOUS at 10:37:00

## 2024-04-04 RX ADMIN — LIDOCAINE HYDROCHLORIDE 20 MG: 10 INJECTION, SOLUTION EPIDURAL; INFILTRATION; INTRACAUDAL; PERINEURAL at 10:35:00

## 2024-04-04 RX ADMIN — BUPIVACAINE HYDROCHLORIDE 1.4 ML: 7.5 INJECTION, SOLUTION INTRASPINAL at 10:25:00

## 2024-04-04 RX ADMIN — SODIUM CHLORIDE, SODIUM LACTATE, POTASSIUM CHLORIDE, CALCIUM CHLORIDE: 600; 310; 30; 20 INJECTION, SOLUTION INTRAVENOUS at 12:19:00

## 2024-04-04 RX ADMIN — SODIUM CHLORIDE, SODIUM LACTATE, POTASSIUM CHLORIDE, CALCIUM CHLORIDE: 600; 310; 30; 20 INJECTION, SOLUTION INTRAVENOUS at 11:16:00

## 2024-04-04 RX ADMIN — DEXAMETHASONE SODIUM PHOSPHATE 4 MG: 4 MG/ML VIAL (ML) INJECTION at 10:37:00

## 2024-04-04 RX ADMIN — ONDANSETRON 4 MG: 2 INJECTION INTRAMUSCULAR; INTRAVENOUS at 12:17:00

## 2024-04-04 RX ADMIN — MIDAZOLAM HYDROCHLORIDE 1 MG: 1 INJECTION INTRAMUSCULAR; INTRAVENOUS at 10:21:00

## 2024-04-04 NOTE — ANESTHESIA PROCEDURE NOTES
Spinal Block    Performed by: Man Raymond MD  Authorized by: Man Raymond MD      General Information and Staff    Start Time:   End Time:  4/4/2024 10:26 AM  Anesthesiologist:  Man Raymond MD  Performed by:  Anesthesiologist  Patient Location:  OR  Site identification: surface landmarks    Reason for Block: surgical anesthesia    Preanesthetic Checklist: patient identified, IV checked, risks and benefits discussed, monitors and equipment checked, pre-op evaluation, timeout performed, anesthesia consent and sterile technique used      Procedure Details    Patient Position:  Sitting  Prep: ChloraPrep    Monitoring:  Cardiac monitor, heart rate and continuous pulse ox  Approach:  Midline  Location:  L3-4  Injection Technique:  Single-shot    Needle    Needle Type:  Sprotte  Needle Gauge:  24 G  Needle Length:  3.5 in    Assessment    Sensory Level:   Events: clear CSF, CSF aspirated, well tolerated and blood negative      Additional Comments     1.4cc heavy bupivacaine injected

## 2024-04-04 NOTE — OPERATIVE REPORT
Operative Note    Patient Name/: Makenzie Reyes 3/21/1950  Date: 2024  Location: [facility]  Preoperative Diagnosis: Right knee osteoarthritis  Postoperative Diagnosis: Same  Operation: Right total knee arthroplasty mechanical alignment  Primary Surgeon: Pratik Curry  Assistant:  LORELEI Matute first assist.  Feng RICARDO also assisted in this case. Please note a skilled surgical assistant was necessary for this case in order to assist with patient positioning, prepping, draping, incision, exposure, implant placement, wound closure.  Indications: 74-year-old female with end-stage right knee osteoarthritis failed nonsurgical treatment  Surgical Findings: End-stage right knee OA  Operative Report:  After informed consent, the right lower extremity was marked.  Patient was brought to the operating room where spinal anesthesia was induced.  Preoperative exam demonstrated range of motion from full extension, gravity flexion around 120. The right lower extremity was prepped and draped in sterile fashion.  Timeout was performed to verify correct surgical site and procedure.  2 g of Ancef was given within 1 hour of incision.  Additionally, 1 g tranexamic acid was given intravenously.  Next the limb was gravity exsanguinated and tourniquet inflated.  Incision was made anteriorly from 2 fingerbreadths proximal to the patella down along the medial aspect of the tibial tubercle.  This was carried down to the extensor mechanism.  Medial and lateral flaps were developed.  The VMO muscle belly was identified.  Full-thickness medial parapatellar arthrotomy was made from 2 fingerbreadths proximal to the patella along the VMO muscle belly, around the medial patella and patellar tendon.  The fat pad was dissected free from the patellar tendon and reflected medially.  Subperiosteal dissection was carried out posteriorly about the proximal medial aspect of the tibia.  Osteophytes were removed from the proximal medial  tibia.  The lateral patellofemoral plica was incised, and the patella was everted and denervated.  The synovium over the anterior distal femur was teed open.  Next the knee was flexed up, and remnant of the ACL and lateral meniscus were excised.  Whitesides line was marked on the trochlear groove.  I used an intramedullary reamer to open up the femur for our intramedullary alignment guide.  The distal femoral cutting block was placed on the distal femur to take off 11.5 mm of bone from 2 mm of intact medial femoral condylar cartilage.  It sat down on the distal medial femur, measuring to take off more medial bone than lateral.  This was pinned in place.  A burke's wing was used to check the cut, and the cut was performed.  The distal medial femur fragment measured 10.5 mm.  Next the knee was flexed and the tibial crest was marked.  An extra medullary tibial cutting guide was placed above the ankle, measuring to take off a few millimeters off the medial side, pinned in line with the tibial crest in the coronal plane and with a few degrees of posterior slope in the sagittal plane.  The proximal tibia was cut, using a Z retractor to protect MCL, patellar tendon and lateral soft tissues.  Cut measured 3 medial, 8 lateral.  After this portion of bone was removed, the knee was brought into extension and a lamina  was used to open up the extension space.  What remained of the medial and lateral menisci were removed.  Spacer block was placed with a T-handle drop-down madelyn to verify appropriate cut angle.  Next the knee was flexed up, and the femoral sizing block with stylus was used.  This sized for a size 3+ femur.  Pins were placed, and the sizing block removed.  Pins were noted to be parallel to the epicondylar axis and perpendicular to Whitesides line.  The 4-in-1 cutting block was placed and screwed down.  Anterior distal femur was cut, demonstrating a positive grand piano sign.  Posterior condyles were cut,  measuring 7.5 on the posterior medial cut.  Chamfers were then cut.  The cutting block was removed, and the knee was flexed with a lamina  to open up the posterior knee joint.  Notch contents and PCL were removed.  Posterior condylar recesses were opened, and posterior osteophytes chiseled out.  The knee was brought into extension, and sized for a tibial baseplate.  Trials were then placed.  With the 3+ femur, the 3 tibia, and a 10 polyethylene liner, the knee felt stable in full extension but a touch snug in flexion.  Varus valgus balancing was appropriate throughout range of motion.  It was stable to a touch snug and mid flexion.  I therefore determined that we would downsize the femur from a 3+ to a 3 (note that she is a size 3 on the other side).  Following downsizing and removing 2 mm, I trialed again.  Now I noted that with the 3 femur, 3 tibia and 11 poly, the knee came to full extension with excellent varus valgus stability.  In mid flexion, there was no valgus laxity, 2 to 3 mm varus laxity, Lachman's rotating around the medial side.  At 90 degrees, anterior drawer was less than 5 mm rotating around the medial femoral condyle.  Gravity flexion was 120 degrees with the patella tracking centrally.  The tibial component rotation was marked, and the tibial trial removed.  The femoral lugs were drilled, and the anterior chamfer finishing guide was placed to cut the finishing anterior chamfer cut.  The knee was then flexed, and the tibia brought forward to place the tibial baseplate trial.  It was noted that the size 3 was overhanging, therefore I did go down to the size 2 tibia (size 2 tibia on her contralateral knee also).  This was pinned in place, and the drill and keel punch were used to prepare the proximal tibia.  The knee was then brought into extension, the patella everted, and it measured only 19 mm with a sclerotic ridged divot medially, therefore concern about cutting too thin in order to get  down to bone for the patellar button to articulate with were warranted.  The patella was not resurfaced, but rather circumferentially denervated with removal of osteophytes.  Next final components were opened, bony surfaces were washed and dried, and periarticular pain cocktail was injected with 30 mL in the posterior capsule, 10 mL into lateral femoral and tibial periosteum, 10 mL into medial femoral and tibial periosteum, and 10 mL into extensor mechanism.  Cement was then mixed.  The tibial component was cemented along with the proximal tibial bone surface, the component was impacted into place followed by removal of excess cement.  The femur was reduced onto the tibia, and cement was placed on the cut surface of the femur.  The cemented femoral component was then impacted down, with removal of excess cement.  The knee was brought into full extension and an axial load was held across the joint.  The patella was cemented along with the button, and this was compressed and held in place.  Once cement had hardened, the knee was trialed again.  Optimal stability was achieved with a 12 mm polyethylene liner, with exam demonstrating full extension with excellent varus valgus stability.  In mid flexion, no valgus laxity, 2 to 3 mm varus laxity, Lachman's rotating around the medial side.  At 90 degrees, anterior drawer less than 5 mm rotating around the medial femoral condyle.  Gravity flexion to 120 with the patella tracking centrally.  The trial poly was removed, the knee was washed out, any loose bodies removed, and final poly was placed.  Betadine lavage was performed.  Closure was performed with 5 Ethibond for the arthrotomy, 2-0 Vicryl for skin, followed by staples.  A sterile dressing was applied, and the patient was brought to PACU in good condition.  Anesthesia: Spinal  Complications: None  Specimens: None  Blood loss: 25 mL  Fluids: See anesthesia report  Implants: Medacta GM K sphere 3 femur, 2 tibia, 12  poly  Drains: None  Condition: Good  Disposition: Floor    -Weightbearing as tolerated, PT OT  -DVT prophylaxis mechanical plus aspirin  -Pain control with oral meds  -Perioperative Ancef    Pratik Curry MD, FAAOS  [facility]  Phone [phone]  Fax [phone]

## 2024-04-04 NOTE — INTERVAL H&P NOTE
Pre-op Diagnosis: Primary osteoarthritis of right knee [M17.11]    The above referenced H&P was reviewed by Pratik Curry MD on 4/4/2024, the patient was examined and no significant changes have occurred in the patient's condition since the H&P was performed.  I discussed with the patient and/or legal representative the potential benefits, risks and side effects of this procedure; the likelihood of the patient achieving goals; and potential problems that might occur during recuperation.  I discussed reasonable alternatives to the procedure, including risks, benefits and side effects related to the alternatives and risks related to not receiving this procedure.  We will proceed with procedure as planned.

## 2024-04-04 NOTE — ANESTHESIA POSTPROCEDURE EVALUATION
[facility]    Makenzie Reyes Patient Status:  Inpatient   Age/Gender 74 year old female MRN [id number]   Location [facility] POST ANESTHESIA CARE UNIT Attending Pratik Curry MD   Hosp Day # 0 PCP Alyssa Garcia MD       Anesthesia Post-op Note    RIGHT TOTAL KNEE ARTHROPLASTY.    Procedure Summary       Date: 04/04/24 Room / Location:  MAIN OR  /  MAIN OR    Anesthesia Start: 1015 Anesthesia Stop: 1225    Procedure: RIGHT TOTAL KNEE ARTHROPLASTY. (Right: Knee) Diagnosis:       Primary osteoarthritis of right knee      (Primary osteoarthritis of right knee [M17.11])    Surgeons: Pratik Curry MD Anesthesiologist: Man Raymond MD    Anesthesia Type: spinal ASA Status: 3            Anesthesia Type: spinal    Vitals Value Taken Time   /72 04/04/24 1225   Temp 99.1 04/04/24 1225   Pulse 73 04/04/24 1225   Resp 12 04/04/24 1225   SpO2 100 04/04/24 1225       Patient Location: PACU    Anesthesia Type: general    Airway Patency: patent    Postop Pain Control: adequate    Mental Status: preanesthetic baseline    Nausea/Vomiting: none    Cardiopulmonary/Hydration status: stable euvolemic    Complications: no apparent anesthesia related complications    Postop vital signs: stable    Dental Exam: Unchanged from Preop    Patient to be discharged from PACU when criteria met.

## 2024-04-04 NOTE — PHYSICAL THERAPY NOTE
PHYSICAL THERAPY JOINT EVALUATION - INPATIENT     Room Number: 363/363-A  Evaluation Date: 4/4/2024  Type of Evaluation: Initial  Physician Order: PT Eval and Treat    Presenting Problem: s/p R TKA  Co-Morbidities : HTN, MS, L TKA 12/2022, hx spine surgery  Reason for Therapy: Mobility Dysfunction and Discharge Planning    Operation 4/4/24 Dr Curry: Right total knee arthroplasty mechanical alignment    PHYSICAL THERAPY ASSESSMENT   Patient is currently functioning near baseline with bed mobility, transfers, gait, stair negotiation, maintaining seated position, standing prolonged periods, and performing household tasks. Prior to admission, patient's baseline is INDEP.   Patient is requiring contact guard assist as a result of the following impairments: decreased functional strength, decreased endurance/aerobic capacity, impaired static and dynamic standing balance, impaired coordination, impaired motor planning, decreased muscular endurance, and limited RLE ROM.  Physical Therapy will continue to follow for duration of hospitalization.    Patient will benefit from continued skilled PT Services at discharge to promote functional independence and safety with additional support and return home with home health PT.    PLAN  PT Treatment Plan: Bed mobility;Body mechanics;Coordination;Endurance;Energy conservation;Patient education;Family education;Gait training;Neuromuscular re-educate;Range of motion;Strengthening;Stoop training;Stair training;Transfer training;Balance training  Rehab Potential : Good  Frequency (Obs): Daily  Number of Visits to Meet Established Goals: 2    CURRENT GOALS  Goal #1  Patient is able to demonstrate supine - sit EOB @ level: supervision     Goal #2  Patient is able to demonstrate transfers Sit to/from Stand at assistance level: supervision     Goal #3    Patient is able to ambulate 150 feet with assistive device at assistance level: supervision   Goal #4    Patient will negotiate 2  stairs/one curb w/ assistive device and supervision   Goal #5    Patient verbalizes and/or demonstrates all precautions and safety concerns independently    Goal #6      Goal Comments: Goals established on 2024    PHYSICAL THERAPY MEDICAL/SOCIAL HISTORY  History related to current admission: Patient is a 74 year old female admitted on 2024 from home for R TKA.    HOME SITUATION  Type of Home: House   Home Layout: One level;Other (Comment) (w/ basement)  Stairs to Enter : 1  Railing: No          Lives With: Spouse  Drives: Yes  Patient Owned Equipment: Rolling walker  Patient Regularly Uses: Glasses    Prior Level of [place]: Per pt lives in one level home with a basement (doesn't need to go downstairs). Lives with spouse who is able to assist. No use of device at baseline, but owns RW. Enjoys going to [employer] 3x weekly for exercise class and 2x weekly for playing a chinese tile game. No hx of falls.    SUBJECTIVE  \"This is great. I am so happy to be bending my knee!\"    OBJECTIVE  Precautions: Bed/chair alarm  Fall Risk: Standard fall risk    WEIGHT BEARING RESTRICTION  Weight Bearing Restriction: R lower extremity        R Lower Extremity: Weight Bearing as Tolerated       PAIN ASSESSMENT  Ratin  Location: denies pain at this time       COGNITION  Overall Cognitive Status:  WFL - within functional limits    RANGE OF MOTION AND STRENGTH ASSESSMENT  Upper extremity ROM and strength are within functional limits   Lower extremity ROM is within functional limits   Lower extremity strength is within functional limits     BALANCE  Static Sitting: Good  Dynamic Sitting: Fair +  Static Standing: Fair  Dynamic Standing: Fair -    ADDITIONAL TESTS                                    ACTIVITY TOLERANCE                         O2 WALK       NEUROLOGICAL FINDINGS                        AM-PAC '6-Clicks' INPATIENT SHORT FORM - BASIC MOBILITY  How much difficulty does the patient currently have...  Patient  Difficulty: Turning over in bed (including adjusting bedclothes, sheets and blankets)?: A Little   Patient Difficulty: Sitting down on and standing up from a chair with arms (e.g., wheelchair, bedside commode, etc.): A Little   Patient Difficulty: Moving from lying on back to sitting on the side of the bed?: A Little   How much help from another person does the patient currently need...   Help from Another: Moving to and from a bed to a chair (including a wheelchair)?: A Little   Help from Another: Need to walk in hospital room?: A Little   Help from Another: Climbing 3-5 steps with a railing?: A Little       AM-PAC Score:  Raw Score: 18   Approx Degree of Impairment: 46.58%   Standardized Score (AM-PAC Scale): 43.63   CMS Modifier (G-Code): CK    FUNCTIONAL ABILITY STATUS  Gait Assessment   Functional Mobility/Gait Assessment  Gait Assistance: Contact guard assist  Distance (ft): 50  Assistive Device: Rolling walker  Pattern: R Decreased stance time    Skilled Therapy Provided    Bed Mobility: NT- pt already up in bedside chair    Transfer Mobility:  Sit to stand: CGA to RW   Stand to sit: [facility]  Gait = CGA w/ RW x 50 feet, decr RLE stance time. Step through pattern.    Therapist's Comments:   Patient presents sitting up in bedside chair- already gotten up with nursing staff to use bedside commode. Discussed role and goal of physical therapy POD0 TKA. Pt in agreement to session. Denies pain at this time. Educated on WBAT to RLE- pt verbalizes understanding.  Sit to stand to RW at CGA. Ambulated 50 feet w/ RW at CGA; see above for specifics. Up in chair at end of session. Discussed importance of continued out of bed mobility. Encouraged continued ambulation with nursing staff and use of RW. Encouraged continued use of ice, elevation, and compression. Pt verbalizes understanding.     Exercise/Education Provided:  Body mechanics  Energy conservation  Functional activity tolerated  Gait training  Posture  Transfer  training    Patient End of Session: Up in chair;Needs met;Call light within reach;RN aware of session/findings;All patient questions and concerns addressed;SCDs in place;Ice applied;Alarm set;Discussed recommendations with /    Patient Evaluation Complexity Level:  History Moderate - 1 or 2 personal factors and/or co-morbidities   Examination of body systems Low - addressing 1-2 elements   Clinical Presentation Low - Stable   Clinical Decision Making Low Complexity     PT Session Time: 25 minutes  Gait Training: 10 minutes

## 2024-04-04 NOTE — ANESTHESIA PROCEDURE NOTES
[facility]    Performed by: Man Raymond MD  Authorized by: Man Raymond MD      General Information and Staff    Start Time:   End Time:  4/4/2024 10:31 AM  Anesthesiologist:  Man Raymond MD  Performed by:  Anesthesiologist  Patient Location:  OR      Site Identification: real time ultrasound guided and image stored and retrievable    Block site/laterality marked before start: site marked  Reason for Block: at surgeon's request and post-op pain management    Preanesthetic Checklist: 2 patient identifers, IV checked, risks and benefits discussed, monitors and equipment checked, pre-op evaluation, timeout performed, anesthesia consent, sterile technique used, no prohibitive neurological deficits and no local skin infection at insertion site      Procedure Details    Patient Position:  Supine  Prep: ChloraPrep    Monitoring:  Cardiac monitor, continuous pulse ox and blood pressure cuff  Block Type:  Saphenous  Laterality:  Right  Injection Technique:  Single-shot    Needle    Needle Type:  Short-bevel and echogenic  Needle Gauge:  21 G  Needle Length:  110 mm  Needle Localization:  Ultrasound guidance  Reason for Ultrasound Use: appropriate spread of the medication was noted in real time and no ultrasound evidence of intravascular and/or intraneural injection            Assessment    Injection Assessment:  Good spread noted, negative resistance, negative aspiration for heme, incremental injection and low pressure  Heart Rate Change: No    - Patient tolerated block procedure well without evidence of immediate block related complications.     Medications      Additional Comments    30cc of 0.5% ropivacaine + 4 mg decadron injected.

## 2024-04-04 NOTE — CONSULTS
[facility]  Report of Consultation    Makenzie Reyes Patient Status:  Inpatient    3/21/1950 MRN [id number]   Location [facility] 3SW-A Attending Pratik Curry MD   Hosp Day # 0 PCP Alyssa Garcia MD     Reason for Consultation:  Medical management    REFERRING PHYSICIAN: Pratik Barrett MD      Chief Complaint: Status post right knee total arthroplasty by Ortho Dr. Curry    History of Present Illness:  Makenzie Reyes is a  74 year old female admitted status post right knee total knee arthroplasty by Ortho Dr. Curry.  We are consulted for medical management of chronic medical problems including hypertension, hyperlipidemia, hypothyroidism, multiple sclerosis, GERD which are currently controlled.  Expected incisional pain well-controlled at this time with current pain medications.  Denies any nausea vomiting.  Tolerating diet.  Denies any chest pain shortness of breath.  Denies any abdominal pain.      History:  Past Medical History:   Diagnosis Date    Arthritis     BACK PAIN     in morning    Back problem     Alas's esophagus     Basal cell carcinoma of ear 2008    excision of BCC lesion Left ear    Change in hair Hair is thinning/age related    Chronic heartburn 2023    Constipation     Depression     Disorder of thyroid     Easy bruising     Esophageal reflux     Essential hypertension 2012    Fatigue     Heartburn 2019    Hemorrhoids After having a baby    High blood pressure     High cholesterol     Hx of motion sickness     Hyperglycemia     Hyperlipidemia     Increased weakness when ambulating 2016    Multiple sclerosis (HCC)     chronic side effects include fatique and \"brain fog\" and decreased vision of left eye, left sided weakness due to MS    Muscle spasms of both lower extremities 2018    Muscle weakness     has MS    OSTEOARTHRITIS     Osteoarthritis     Osteoarthrosis involving lower leg 2013    Log Date: 10/26/2012     OTHER  DISEASES     MS dx 1981 Dr. Ramachandran    Pain in joints At times/left knee    Personal history of colonic polyps 2019    PONV (postoperative nausea and vomiting)     Unspecified essential hypertension     Visual impairment     decreased vision left eye glasses    Wears glasses Glasses     Past Surgical History:   Procedure Laterality Date    APPENDECTOMY  1964    BACK SURGERY      cervical surgery/ACDF x 3 with Dr. Webster          x2    COLONOSCOPY  2003    COLONOSCOPY      EXCIS LUMBAR DISK,ONE LEVEL      FOOT/TOES SURGERY PROC UNLISTED  2005    hemmertoes operation Rt toe    HYSTERECTOMY      KNEE REPLACEMENT SURGERY  2022    LAMINECTOMY,LUMBAR  2010    L4 L5 fusion    OOPHORECTOMY Bilateral     REMOVAL OF OVARIAN CYST(S)      x2    THYROIDECTOMY      partial thyroidectomy Lt    TONSILLECTOMY      TOTAL ABDOM HYSTERECTOMY      w/ removal of both ovaries d/t DBT and fibroid    UPPER GI ENDOSCOPY,EXAM       Family History   Problem Relation Age of Onset    Cancer Sister         cervical cancer    Hypertension Mother          2019    Breast Cancer Daughter 39        BRCA 1 GENE    BRCA gene + Daughter     Hypertension Brother          2014    Hypertension Sister     Hypertension Sister     Hypertension Brother      Social History:   reports that she has never smoked. She has never used smokeless tobacco. She reports current alcohol use of about 2.0 standard drinks of alcohol per week. She reports that she does not use drugs.    Allergies:  No Known Allergies    Medications:    Current Facility-Administered Medications:     lactated ringers infusion, , Intravenous, Continuous    tiZANidine (Zanaflex) partial tab 1 mg, 1 mg, Oral, Q6H PRN    [START ON 2024] dexAMETHasone PF (Decadron) 10 MG/ML injection 8 mg, 8 mg, Intravenous, Once    acetaminophen (Tylenol) tab 650 mg, 650 mg, Oral, Q4H While awake    oxyCODONE immediate release tab 2.5 mg, 2.5 mg, Oral,  Q4H PRN **OR** oxyCODONE immediate release tab 5 mg, 5 mg, Oral, Q4H PRN    HYDROmorphone (Dilaudid) 1 MG/ML injection 0.2 mg, 0.2 mg, Intravenous, Q2H PRN **OR** HYDROmorphone (Dilaudid) 1 MG/ML injection 0.4 mg, 0.4 mg, Intravenous, Q2H PRN    ketorolac (Toradol) 30 MG/ML injection 15 mg, 15 mg, Intravenous, Q6H    atorvastatin (Lipitor) tab 20 mg, 20 mg, Oral, Nightly    baclofen (Lioresal) tab 20 mg, 20 mg, Oral, Daily    escitalopram (Lexapro) tab 20 mg, 20 mg, Oral, Daily    [START ON 4/5/2024] levothyroxine (Synthroid) tab 25 mcg, 25 mcg, Oral, Before breakfast    [START ON 4/5/2024] pantoprazole (Protonix) DR tab 40 mg, 40 mg, Oral, QAM AC    sodium chloride 0.9 % IV bolus 500 mL, 500 mL, Intravenous, Once PRN    sennosides (Senokot) tab 17.2 mg, 17.2 mg, Oral, Nightly    docusate sodium (Colace) cap 100 mg, 100 mg, Oral, BID    polyethylene glycol (PEG 3350) (Miralax) 17 g oral packet 17 g, 17 g, Oral, Daily PRN    magnesium hydroxide (Milk of Magnesia) 400 MG/5ML oral suspension 30 mL, 30 mL, Oral, Daily PRN    bisacodyl (Dulcolax) 10 MG rectal suppository 10 mg, 10 mg, Rectal, Daily PRN    fleet enema (Fleet) 7-19 GM/118ML rectal enema 133 mL, 1 enema, Rectal, Once PRN    ondansetron (Zofran) 4 MG/2ML injection 4 mg, 4 mg, Intravenous, Q6H PRN    metoclopramide (Reglan) 5 mg/mL injection 10 mg, 10 mg, Intravenous, Q8H PRN    diphenhydrAMINE (Benadryl) 50 mg/mL  injection 25 mg, 25 mg, Intravenous, Once PRN    diphenhydrAMINE (Benadryl) cap/tab 25 mg, 25 mg, Oral, Q4H PRN **OR** diphenhydrAMINE (Benadryl) 50 mg/mL  injection 12.5 mg, 12.5 mg, Intravenous, Q4H PRN    [START ON 4/5/2024] ferrous sulfate DR tab 325 mg, 325 mg, Oral, Daily with breakfast    aspirin DR tab 81 mg, 81 mg, Oral, BID    ceFAZolin (Ancef) 2 g in 20mL IV syringe premix, 2 g, Intravenous, Q8H    Review of Systems:  A comprehensive 14 point review of systems was completed.  Pertinent positives and negatives noted in the the  HPI.    Physical Exam:  Vital signs: Blood pressure 104/61, pulse 71, temperature 98.1 °F (36.7 °C), temperature source Oral, resp. rate 16, height 5' 4\" (1.626 m), weight 197 lb 8.5 oz (89.6 kg), SpO2 91%, not currently breastfeeding.  General: No acute distress. Alert and oriented x 3.  HEENT: Moist mucous membranes.   Respiratory: Clear to auscultation bilaterally.  No wheezes. No rhonchi.  Cardiovascular: S1, S2.  Regular rate and rhythm.   Abdomen: Soft, nontender, nondistended.  Positive bowel sounds.   Neurologic: Awake alert, no focal neurological deficits.  Musculoskeletal: Right knee incision dressing  Psychiatric: Appropriate mood and affect.    Laboratory Data:           ASSESSMENT / PLAN:   Right knee osteoarthritis status post right knee total knee arthroplasty by Ortho Dr. Curry-managed by Ortho    Hypertension  Continue home medication including Norvasc with holding parameters to hold if systolic blood pressure less than 120.  Hold home triamterene hydrochlorothiazide at this time  Hyperlipidemia  Continue home statin  Hypothyroidism  Continue home levothyroxine  GERD  Continue home PPI  Multiple sclerosis  Stable at this time  Depression, anxiety  Continue home Lexapro      Quality:  DVT Prophylaxis: SCD, aspirin 81 mg p.o. twice daily for DVT prophylaxis per Ortho  CODE status: Full  Cho: No    Plan of care discussed with patient, RN        Thank you for allowing me to participate in the care of your patient. Will continue to follow while in hospital.     Sharon Conway MD  4/4/2024  4:52 PM

## 2024-04-04 NOTE — PROGRESS NOTES
POD 0 R TKA.  Medications given as ordered.  Patient states pain well controlled with PRN medications given.  DTV by 2000.  Plan for PT/OT to see.

## 2024-04-04 NOTE — DISCHARGE INSTRUCTIONS
Sometimes managing your health at home requires assistance.  The [facility] team has recognized your preference to use [facility].  They can be reached by phone at [phone].  The fax number for your reference is [phone].  A representative from the home health agency will contact you or your family to schedule your first visit.         Joint Replacement Surgery: Patient Discharge Instructions   The best way to contact your surgeon or their team is by phone [phone] (preferred for urgent/acute matters) or through [employer].    What to Expect:  A certain amount of pain, swelling, and bruising is expected for several weeks after surgery.    Pain Medications:   Below is a list of commonly prescribed pain medications. You may have received prescriptions for some, all, or even additional pain medications not listed.  If you are taking the following medications for pain, these are some general guidelines for using and discontinuing them.  You may also want to preemptively take your pain medication before physical therapy (at least 30 minutes prior to the session).  If you are concerned you are suffering side effects from any of these medications please contact your surgeon’s office:    Ultram (Tramadol): Take this as prescribed 3 times per day with meals until your first postoperative appointment. If your pain levels are low, you can stop taking this on a scheduled basis and start taking it as needed.Celebrex (Celecoxib): this medication will treat swelling and pain and should be taken as directed until your first postoperative appointment.Norco (Hydrocodone/acetaminophen): This is your “rescue” drug. Take this only as needed for pain that is not controlled (rated more than 4 out of 10) by other medications.You may wean yourself off prescription pain medication to a non-prescription pain reliever by substituting two 500mg extra-strength Tylenol (Acetaminophen) tablets in place of  your prescription pain medications up to four times per day. Do NOT exceed 4g (4000mg) of acetaminophen daily.*We will discuss pain management and weaning off pain medicine at your 1st postoperative appointment. To avoid delays in getting your narcotic pain medicine refilled, please contact the office prior to running out of medication either by phone [phone] or through [employer]. Please allow 24-72 hours for prescriptions to be filled. Your doctor may need to physically sign a hand-written prescription -- some medicines cannot be re-ordered electronically/or via phone. If you need to  a hand-written prescription, you can do so at the office located at [address] [place] [address], [address].  Pain Medications can be constipating. Below is a guideline for preventing or managing postoperative constipation:  Drink plenty of fluids. Aim to drink at least of 8oz of water 8 times per day (total of 64oz).  Eat a high-fiber diet (whatever helps you stay regular).  Make sure you are taking Senekot S (Docusate Sodium + Sennosides) or Colace (Docusate Sodium), 1-2 tablets twice daily, while on narcotics. You may decrease to once daily if you develop diarrhea. You will be sent home with a prescription.  If you have not had normal bowel movements the following over the counter medications can be helpful:  Add daily Miralax™ or Metamucil™ as directed on bottle.  If still no results, add a dose of Milk of Magnesia™ as directed on bottle.  If still no results, take one Dulcolax™ (Bisacodyl) suppository as directed on package.  If you still have no results and it has been more than 3 days since you had a bowel movement, be sure to contact your surgeon's office.    Swelling:  You may apply a cloth covered ice pack for up to 20 minutes each hour, or as needed, to your incision to help control pain and swelling. Do not apply directly to your skin.  For the first 7 days after you leave the hospital, it is critical that  you elevate your leg above the level of your heart 4 times per day for 1 hour each time. After a week, elevate your leg as needed if swelling is noted.   Call your Surgeons office if you have:  A temperature greater than 100.4 F.  Increasing pain or numbness at the incision or on your operated leg.  Increasing redness, drainage, or odor from the wound.  You WILL be swollen the first week or two after surgery; however, swelling that continues to get worse to your surgical leg or the opposite leg and is accompanied by discomfort or redness should be reported.  **Go to the Emergency Room if you have chest pain or shortness of breath**  Activities:  Your activity order is:   Weight bearing as tolerated  If you have home health care, a physical therapist (PT) will come to your home 2-3 times per week. The number of PT visits will depend on your needs and your insurance coverage.   At your first postoperative appointment with your surgeon, you will be given a prescription for outpatient physical therapy if you have not already started outpatient therapy.  Rules of the road: No driving until it is approved by your care team, and you are no longer taking narcotic pain medicine.    Wound Care/ Bathing:   Aquacel (waterproof brown rubberized dressing) should remain in place for 7 days and may then be removed. While this waterproof dressing is in place, you may shower and let water run over the dressing (ensure first that the dressing seal is intact and none of the edges have rolled up exposing the wound - if the dressing has become open to the environment, do not allow water to enter into this area)  Once the original postoperative dressing is removed, you do not need to keep your incision covered. If you would like to keep it covered for comfort you may - use a clean new dressing each day, or more frequently if needed (if the dressing is soiled or it is not staying fixed to your skin). Use the dressings suggested by the  nurse at discharge.  After the original postoperative dressing is removed, you should continue to keep the incision covered when showering to prevent it from getting wet prior to your first postoperative appointment. Before showering, be sure to cover the incision with saran wrap or a plastic bag to keep dry. After showering, pat the incision with a clean towel to ensure it is dry. Do this until you are cleared to get the incision wet (usually after the first postoperative appointment).  Once cleared to get the incision wet, you may gently allow soap and water to rinse over the area. Be sure to rinse the area well and gently pat dry.  Do not apply soap, lotion, cream, ointments, or powders to your incision. Keep the incision clean and dry.  Do not soak your incision in water. No tub baths, pools or hot tubs for at least 6 weeks after surgery and not until the wound is completely healed.  If you have stitches or staples, they will be removed at your first postoperative appointment or the week after. Do not remove steri-strips, if you have any. These will come off on their own and do not need to be replaced.  If you have a home care nurse they should:  Examine the incision during visits and call your surgeon if there are any signs of infection or other cause for concern.  Change your dressing and may remove staples (when indicated).  Take your vitals and draw your labs.    Remember, good hand washing with soap at all times helps prevent infections. Wash your hands with soap and water prior to performing any dressing changes or wound evaluation.    Medications/Special Instructions:  Do not restart your blood pressure medication until cleared by your physician, or until your systolic blood pressure (top number) is above 130 on 2 consecutive checks and then continue your medication as prescribed.    Blood Thinners (you will be on blood thinners for a total of six weeks):  Aspirin: some patients will be prescribed Aspirin  to prevent blood clots after surgery. If you are prescribed aspirin, take one 81mg tablet by mouth twice per day for six weeks.  DO NOT restart thbui9i/fish oils, glucosamine, nonsteroidal antiinflammatories [NSAIDs, such as such as Ibuprofen (Advil, Motrin), Naproxen (Naprosyn, Aleve), Diclofenac (Voltaren), Meloxicam (Mobic)], tumeric, cinnamon, progesterone, estrogen, or Aspirin (unless told differently) until you have completed your blood thinner. These will increase your risk of bleeding.   Protonix (Omeprazole/Nexium, etc.) may be ordered if Aspirin is to be continued while on your blood thinner and should be taken daily during that time. This will help to protect your stomach.    X-Ray  X-rays needed: none      If you do not have a follow-up appointment with your surgeon, or you need to change your follow-up appointment date/time, please call today to schedule or change this appointment: [phone]. Our phones are open to schedule appointments from 8:30am to 4:30pm, Monday through Friday.  If you have any questions or concerns during the postoperative period (for example: wound issues, pain, swelling, redness or drainage) call our office FIRST at [phone] so that we may appropriately direct you or set up a clinic appointment.Knee Replacement Discharge Instructions    Activity    Bathing  No tub baths, pools, or saunas until cleared by surgeon (about 4-6 weeks because it takes that long for the incision on the skin to heal and be a barrier to prevent infection).  When allowed to shower:    IF AQUACEL - dressing is waterproof and does not require being covered to keep it dry.  Pat dressing and surrounding skin dry after shower              AQUACEL    Gauze dressings are NOT waterproof and REQUIRE being covered with a waterproof barrier to keep the dressing and the incision dry.  SARAN WRAP, GLAD WRAP, and PRESS N SEAL WORK  REALLY WELL BUT ANY PLASTIC WRAP WILL DO.   Do not wash incision.   Remove  entire wrapping and old dressing (if Gauze) after showering. Pat dry if necessary WITH A CLEAN TOWEL and cover incision with dry sterile gauze and paper tape. For other types of dressings, follow surgeon’s orders.                           GAUZE          Driving  Do not drive until cleared by surgeon. This is usually in four to six weeks after surgery. Discuss at follow-up office visit.   Not allowed while taking narcotic pain medication or muscle relaxants.    Sex  Usually allowed after four to six weeks - check with surgeon at your office visit.    Return to work  Usually allowed after four to six weeks. Discuss specific work activities with your surgeon.    Restrictions  For knee replacement surgery, follow instructions provided by physical therapy.  Do NOT put a pillow under your knee as it may be more difficult to straighten afterwards.    No smoking  Avoid smoking. It is known to cause breathing problems and can decrease the rate of healing.    Incision care/Dressing changes  Wash hands before and after dressing changes.  FOR DRY GAUZE DRESSING:  Change dressing daily using dry sterile gauze and paper tape once Aquacel (waterproof) dressing changed (which is about 7 days after surgery). Continue this until you follow up in the office with your surgeon. Have knee bent when changing dressing for more ease of bending afterwards.  There could be a small amount of redness around the staples or incision; this is normal.  Watch for increased redness, warmth, any odor, increased drainage or opening of the incision. A little clear yellow or blood tinged drainage is normal up to 2 weeks after surgery but it should be less every day until it stops.  Call physician if you notice any concerning changes.  Sutures/staples will be removed at first office visit (10 days- 3 weeks).                       GAUZE                                               Medication  Anticoagulants = blood thinners (Xarelto, Eliquis, Lovenox,  Coumadin, or Aspirin)  Pill or shot form depending on what your physician orders.   IF placed on Coumadin, you may also need lab work done for monitoring.  You will bleed easier and bruise easier while on these medications.     Usually you will be on a blood thinner for about 2-5 weeks depending what physician orders.  Contact your physician if you have signs of bruising, nose bleeds or blood in your urine. You may consider using electric razors and soft bristle toothbrushes.  Do not take aspirin while taking blood thinners unless ordered by your physician.  Review anticoagulant education information sheet provided.    Discomfort  Surgical discomfort is normal for one to two months.  Have realistic goals and keep a positive outlook.  You may need pain medication regularly (every 4-6 hours) the first 2 weeks and then begin to decrease how often you are taking it.  Take pain medication as prescribed with food, especially before therapy, allowing 30-60 minutes to take effect.  Do not drink alcohol while on pain medication.  Keep pain manageable; pain should not disrupt your sleep or activities like getting out of bed or walking.  As you have less discomfort, decrease the amount of pain medication you take. Use plain Tylenol (acetaminophen) for less severe pain.  Some pain medications have Tylenol (acetaminophen) in them such as Norco and Percocet. Do NOT take Tylenol (acetaminophen) within 4 hours of a dose of these medications.  Apply ice or cold therapy to surgical site for 20 minutes at least four times a day, especially after therapy. Be sure there is a thin cloth barrier between skin and ice or cold therapy.  Change position at least every 45 minutes while awake to avoid stiffness or increased discomfort.  For knee replacements- may elevate your leg by placing a pillow under entire leg. Do not place pillow only under the knee.  Have realistic goals and keep a positive outlook.  Deep breathing and relaxation  techniques and distractions can help!  If you focus on something else, you do not experience the pain the same. Take advantage of everything available to your to help control you discomfort.  Contact physician if discomfort does not respond to pain medication.    Body changes  Constipation is common with the use of narcotics.  Eat fiber rich foods and drink plenty of fluids.  Use stool softeners such as Colace or Senakot while on narcotics, and laxatives such as Miralax or Milk of Magnesia if needed.   An enema or suppository may be needed if above measures do not work.    Prevention of infection and promotion of healing  Good hand washing is important. Everyone should wash their hands or use hand  as soon as they walk in your house-whether they live there or are visiting.  Keep bed linen/clothing freshly laundered.  Do not allow others or pets to touch your incision.  Avoid people that have colds or the flu.  Your surgeon may recommend that you take antibiotics before you undergo any dental or other invasive surgical procedures after your joint replacement. Speak with your physician about this at your post-op office visit.  Eat a balanced diet high in fiber and drink plenty of fluids.   Continue using incentive spirometry because narcotics make you sleepy so you may not take good deep breaths. We do not want you to get pneumonia.     Post op Office visits  Schedule 10 days to 3 weeks after surgery WITH SURGEON’s office.  Additional visits may need to be scheduled. Your physician will discuss this at first post-op office visit.  Schedule outpatient physical therapy per your surgeon’s orders.  Schedule one week follow up after surgery WITH PRIMARY CARE PHYSICIAN; review your medications over last 6 months. Your body gets stressed by surgery and that stress can affect all your other health issues (such as high blood pressure, diabetes, CHF, afib, and asthma just to name a few).  We don’t want those other  health issues to cause you to get readmitted to the hospital; much better for you to catch developing problems and prevent them from becoming larger ones.  CASI HOSE - IF ordered by your surgeon, wear these during the day and off at night.      Notify your surgeon if you notice any  of the following signs  Separation of incision line.  Increased redness, swelling, or warmth of skin around incision.  Increased or foul smelling drainage from incision  Red streaks on skin near incision.  Temperature >101 F.  Increased pain at incision not relieved by pain medication.      Signs of blood clot  Pain, excessive tenderness, redness, or swelling in leg or calf (other than incision site).  (CALL SURGEON)    Go directly to the ER or CALL 911 if  you:  become short of breath  have chest pain  cough up blood  have unexplained anxiety with breathing  Traveling and Handicapped parking  Check with you surgeon when allowed so you don’t set yourself up for greater chance of complications.  If traveling by car, get out to stretch every 2 hours.  This helps prevent stiffness.  You may need to do this any time you travel for the first year after surgery.  If traveling by plane, BEFORE you get into a security line, let them know that you had your hip replaced, as you will most likely set off the metal detector. The doctors no longer provide an identification card for this as they are easily copied. ALSO request a wheelchair the first year to board and get off a plane…this aids in priority seating and you should sit on the aisle or at the bulkhead where you can easily stretch your legs and get up to walk up and down the aisles…this helps prevent blood clots and stiffness.  TEMPORARY HANDICAP PARKING APPLICATION  (good for 3-6 months)  - At Surgeon or PCP visit, request they fill out the form, then go to DM (only time you do not wait in a long line there). Some [facility] offices provide the same service. ([facility]  have this service; if you live in another [facility], you may check with them as well). You need space to open car doors to position yourself properly with walker to get in and out of your car safely; some parking spaces are  practically on top of each other and do not give you enough room.

## 2024-04-04 NOTE — ANESTHESIA PREPROCEDURE EVALUATION
PRE-OP EVALUATION    Patient Name: Makenzie Reyes    Admit Diagnosis: Primary osteoarthritis of right knee [M17.11]    Pre-op Diagnosis: Primary osteoarthritis of right knee [M17.11]    RIGHT TOTAL KNEE ARTHROPLASTY.    Anesthesia Procedure: RIGHT TOTAL KNEE ARTHROPLASTY. (Right)    Surgeon(s) and Role:     * Pratik Curry MD - Primary    Pre-op vitals reviewed.        Body mass index is 32.61 kg/m².    Current medications reviewed.  Hospital Medications:   clonidine/epinephrine/ropivacaine/ketorolac in 0.9% NaCl 60 mL pain cocktail syringe for knee arthroplasty   Infiltration Once (Intra-Op)    povidone-iodine (Betadine) 10 % 17.5 mL in sodium chloride 0.9 % 500 mL irrigation   Irrigation Once (Intra-Op)       Outpatient Medications:     Medications Prior to Admission   Medication Sig Dispense Refill Last Dose    atorvastatin 20 MG Oral Tab TAKE 1 TABLET EVERY NIGHT 90 tablet 2     Omeprazole 40 MG Oral Capsule Delayed Release Take one tablet (40 mg total) by mouth once daily, 30 minutes prior to breakfast. 90 capsule 2     CALCIUM OR Take 1,000 mg by mouth daily.       Multiple Vitamin (DAILY VALUE MULTIVITAMIN) Oral Tab Take 1 tablet by mouth daily.       Cholecalciferol (VITAMIN D) 2000 UNITS Oral Cap Take 1 capsule (2,000 Units total) by mouth daily.       baclofen 20 MG Oral Tab Take 1 tablet (20 mg total) by mouth daily. 90 tablet 0     Triamterene-HCTZ 37.5-25 MG Oral Tab Take 1 tablet by mouth daily. 90 tablet 0     celecoxib 200 MG Oral Cap Take 1 capsule (200 mg total) by mouth daily. 90 capsule 0     levothyroxine 25 MCG Oral Tab Take 1 tablet (25 mcg total) by mouth before breakfast. 90 tablet 0     escitalopram 20 MG Oral Tab Take 1 tablet (20 mg total) by mouth daily. 90 tablet 0     amLODIPine 5 MG Oral Tab Take 2 tablets (10 mg total) by mouth daily. 180 tablet 0        Allergies: Patient has no known allergies.      Anesthesia Evaluation    Patient Active Problem List   Diagnosis    Lumbar  spinal stenosis    Multiple sclerosis (HCC)    Hyperlipidemia    Hypothyroidism    Anxiety    Granuloma annulare    History of lumbar fusion    RLS (restless legs syndrome)    Muscle spasms of both lower extremities    Chronic fatigue    Facet arthritis of lumbar region    Facet arthritis of cervical region    Menopausal vaginal dryness    Mild recurrent major depression (HCC)    Prediabetes    Chronic superficial gastritis without bleeding    Hiatal hernia    Gastroesophageal reflux disease without esophagitis    Polyp of stomach and duodenum    Alas's esophagus without dysplasia    Polyp of colon    Weakness of right leg    Seborrheic keratoses    Lentigo    Personal history of malignant neoplasm of skin    Unspecified inflammatory spondylopathy, cervical region (HCC)    Arthritis of left knee    Primary hypertension    Diaphragmatic hernia without obstruction or gangrene        Past Medical History:   Diagnosis Date    Arthritis     BACK PAIN     in morning    Back problem     Alas's esophagus     Basal cell carcinoma of ear 04/2008    excision of BCC lesion Left ear    Change in hair Hair is thinning/age related    Chronic heartburn 05/01/2023    Constipation     Depression     Disorder of thyroid     Easy bruising     Esophageal reflux     Essential hypertension 06/29/2012    Fatigue     Heartburn 11/05/2019    Hemorrhoids After having a baby    High blood pressure     High cholesterol     Hx of motion sickness     Hyperglycemia     Hyperlipidemia     Increased weakness when ambulating 04/05/2016    Multiple sclerosis (HCC) 2000    chronic side effects include fatique and \"brain fog\" and decreased vision of left eye, left sided weakness due to MS    Muscle spasms of both lower extremities 02/27/2018    Muscle weakness     has MS    OSTEOARTHRITIS     Osteoarthritis     Osteoarthrosis involving lower leg 06/20/2013    Log Date: 10/26/2012     OTHER DISEASES     MS dx 1981 Dr. Ramachandran    Pain in joints At  times/left knee    Personal history of colonic polyps 2019    PONV (postoperative nausea and vomiting)     Unspecified essential hypertension     Visual impairment     decreased vision left eye glasses    Wears glasses Glasses        Past Surgical History:   Procedure Laterality Date    APPENDECTOMY  1964    BACK SURGERY      cervical surgery/ACDF x 3 with Dr. Webster          x2    COLONOSCOPY  2003    COLONOSCOPY      EXCIS LUMBAR DISK,ONE LEVEL      FOOT/TOES SURGERY PROC UNLISTED  2005    hemmertoes operation Rt toe    HYSTERECTOMY      KNEE REPLACEMENT SURGERY  2022    LAMINECTOMY,LUMBAR  2010    L4 L5 fusion    OOPHORECTOMY Bilateral     REMOVAL OF OVARIAN CYST(S)      x2    THYROIDECTOMY      partial thyroidectomy Lt    TONSILLECTOMY      TOTAL ABDOM HYSTERECTOMY      w/ removal of both ovaries d/t DBT and fibroid    UPPER GI ENDOSCOPY,EXAM       Social History     Socioeconomic History    Marital status:    Tobacco Use    Smoking status: Never    Smokeless tobacco: Never   Vaping Use    Vaping Use: Never used   Substance and Sexual Activity    Alcohol use: Yes     Alcohol/week: 2.0 standard drinks of alcohol     Types: 2 Glasses of wine per week     Comment: 2 glasses of wine on a weekend    Drug use: Never   Other Topics Concern    Caffeine Concern Yes     Comment: 2 cups of coffee daily.    Exercise Yes     Comment: 4x/week.      History   Drug Use Unknown     Available pre-op labs reviewed.  Lab Results   Component Value Date    WBC 8.7 2024    RBC 4.65 2024    HGB 14.2 2024    HCT 42.8 2024    MCV 92.0 2024    MCH 30.5 2024    MCHC 33.2 2024    RDW 13.1 2024    .0 2024     Lab Results   Component Value Date     2024    K 4.5 2024     2024    CO2 27.0 2024    BUN 18 2024    CREATSERUM 0.66 2024     (H) 2024    CA 9.8 2024             Airway      Mallampati: II  Mouth opening: >3 FB  TM distance: 4 - 6 cm  Neck ROM: full Cardiovascular      Rhythm: regular  Rate: normal     Dental    Dentition appears grossly intact         Pulmonary    Pulmonary exam normal.  Breath sounds clear to auscultation bilaterally.               Other findings              ASA: 3   Plan: general  NPO status verified and     Post-procedure pain management plan discussed with surgeon and patient.  Surgeon requests: regional block  Comment:  I explained intrinsic risks of general anesthesia, including nausea, dental damage, sore throat, mouth injury,and hoarseness from airway management.  All questions were answered and understanding was demonstrated of risks.  Informed permission was obtained to proceed as documented in the signed consent form.      Plan/risks discussed with: patient  Use of blood product(s) discussed with: patient    Consented to blood products.          Present on Admission:  **None**

## 2024-04-05 VITALS
HEART RATE: 61 BPM | SYSTOLIC BLOOD PRESSURE: 132 MMHG | HEIGHT: 64 IN | BODY MASS INDEX: 33.73 KG/M2 | WEIGHT: 197.56 LBS | RESPIRATION RATE: 20 BRPM | DIASTOLIC BLOOD PRESSURE: 60 MMHG | OXYGEN SATURATION: 93 % | TEMPERATURE: 98 F

## 2024-04-05 DIAGNOSIS — Z96.651 STATUS POST RIGHT KNEE REPLACEMENT: Primary | ICD-10-CM

## 2024-04-05 PROCEDURE — 99232 SBSQ HOSP IP/OBS MODERATE 35: CPT | Performed by: INTERNAL MEDICINE

## 2024-04-05 PROCEDURE — 99024 POSTOP FOLLOW-UP VISIT: CPT | Performed by: PHYSICIAN ASSISTANT

## 2024-04-05 RX ORDER — HYDROCODONE BITARTRATE AND ACETAMINOPHEN 5; 325 MG/1; MG/1
1-2 TABLET ORAL EVERY 4 HOURS PRN
Qty: 40 TABLET | Refills: 0 | Status: SHIPPED | OUTPATIENT
Start: 2024-04-05

## 2024-04-05 RX ORDER — HYDROCODONE BITARTRATE AND ACETAMINOPHEN 5; 325 MG/1; MG/1
1 TABLET ORAL EVERY 4 HOURS PRN
Status: DISCONTINUED | OUTPATIENT
Start: 2024-04-05 | End: 2024-04-05

## 2024-04-05 RX ORDER — HYDROCODONE BITARTRATE AND ACETAMINOPHEN 5; 325 MG/1; MG/1
2 TABLET ORAL EVERY 4 HOURS PRN
Status: DISCONTINUED | OUTPATIENT
Start: 2024-04-05 | End: 2024-04-05

## 2024-04-05 NOTE — OCCUPATIONAL THERAPY NOTE
OCCUPATIONAL THERAPY EVALUATION - INPATIENT    Room Number: 363/363-A  Evaluation Date: 4/5/2024     Type of Evaluation: Initial  Presenting Problem: s/p R TKA    Physician Order: IP Consult to Occupational Therapy  Reason for Therapy:  ADL/IADL Dysfunction and Discharge Planning      OCCUPATIONAL THERAPY ASSESSMENT   Patient is a 74 year old female admitted on 4/4/2024 from home for elective R TKA. Co-Morbidities : HTN, MS, L TKA 12/2022, hx spine surgery  Patient met all OT goals at Supervision to MOD I level.  Patient reports no further questions/concerns at this time.     WEIGHT BEARING RESTRICTION  Weight Bearing Restriction: R lower extremity        R Lower Extremity: Weight Bearing as Tolerated       Recommendations for nursing staff:   Transfers: SBA with RW  Toileting location: Toilet    EVALUATION SESSION:  Patient at start of session: seated in chair   FUNCTIONAL TRANSFER ASSESSMENT  Sit to Stand: Chair  Chair: Modified Independent  Toilet Transfer: Stand-by Assist    BED MOBILITY     BALANCE ASSESSMENT  Static Sitting: Independent  Sitting Bilateral: Independent  Static Standing: Modified Independent  Standing Bilateral: Stand-by Assist    FUNCTIONAL ADL ASSESSMENT  Grooming Standing: Modified Independent  UB Dressing Seated: Supervision  LB Dressing Seated: Minimal Assist (assist only for tying right shoe)  LB Dressing Standing: Supervision  Toileting Seated: Modified Independent  Toileting Standing: Stand-by Assist      ACTIVITY TOLERANCE: Pt on room air and denies SOB, dizziness or lightheadedness throughout session. No significant change in vitals noted.                          O2 SATURATIONS       COGNITION  Overall Cognitive Status:  WFL - within functional limits  COGNITION ASSESSMENTS       EDUCATION PROVIDED  Patient: Role of Occupational Therapy; Plan of Care; Adaptive Equipment Recommendations; DME Recommendations; Functional Transfer Techniques; Fall Prevention; Weight Bear Status;  Posture/Positioning; Energy Conservation; Compensatory ADL Techniques; Proper Body Mechanics  Patient's Response to Education: Returned Demonstration; Verbalized Understanding    Equipment used: RW  Demonstrates functional use    Therapist comments: Pt is pleasant and motivated to participate in therapy. Pt familiar with adaptive techniques and equipment d/t Hx of previous surgeries. Pt did need assist for donning compression socks, however uses stool at home to complete this task and also reports spouse can assist as needed at home. Tolerates household distance mobility with RW at A.     Patient End of Session: Up in chair;Needs met;Call light within reach;RN aware of session/findings;All patient questions and concerns addressed;Ice applied    OCCUPATIONAL PROFILE    HOME SITUATION  Type of Home: House  Home Layout: One level;Other (Comment) (w/ basement)  Lives With: Spouse    Toilet and Equipment: Comfort height toilet  Shower/Tub and Equipment: Tub-shower combo;Grab bar;Shower chair  Other Equipment: Reacher;Sock aid;Long-handled shoehorn (RW)    Occupation/Status: retired  for mental health therapists     Drives: Yes  Patient Regularly Uses: Glasses    Prior Level of Function: Pt lives with her spouse in one level home and is typically independent with ADL/IADLs and mobility without device. Enjoys going to [employer] 3x weekly for exercise class and 2x weekly for playing a chinese tile game. No hx of falls.     SUBJECTIVE  \"Everyone here has been so nice!\"    PAIN ASSESSMENT  Rating: 3  Location: RLE  Management Techniques: Activity promotion;Body mechanics;Ice    OBJECTIVE  Precautions: Bed/chair alarm  Fall Risk: Standard fall risk    WEIGHT BEARING RESTRICTION  Weight Bearing Restriction: R lower extremity        R Lower Extremity: Weight Bearing as Tolerated       AM-PAC ‘6-Clicks’ Inpatient Daily Activity Short Form  -   Putting on and taking off regular lower body clothing?: A Little  -    Bathing (including washing, rinsing, drying)?: A Little  -   Toileting, which includes using toilet, bedpan or urinal? : None  -   Putting on and taking off regular upper body clothing?: None  -   Taking care of personal grooming such as brushing teeth?: None  -   Eating meals?: None    AM-PAC Score:  Score: 22  Approx Degree of Impairment: 25.8%  Standardized Score (AM-PAC Scale): 47.1      ADDITIONAL TESTS     NEUROLOGICAL FINDINGS        PLAN   Patient has been evaluated and presents with no skilled Occupational Therapy needs at this time.  Patient discharged from Occupational Therapy services.  Please re-order if a new functional limitation presents during this admission.      Patient Evaluation Complexity Level:   Occupational Profile/Medical History LOW - Brief history including review of medical or therapy records    Specific performance deficits impacting engagement in ADL/IADL LOW  1 - 3 performance deficits    Client Assessment/Performance Deficits MODERATE - Comorbidities and min to mod modifications of tasks    Clinical Decision Making LOW - Analysis of occupational profile, problem-focused assessments, limited treatment options    Overall Complexity LOW     OT Session Time: 24 minutes  Self-Care Home Management: 10 minutes  Therapeutic Activity: 5 minutes

## 2024-04-05 NOTE — PROGRESS NOTES
[facility]   part of [facility]     Hospitalist Progress Note     Makenzie Reyes Patient Status:  Inpatient    3/21/1950 MRN [id number]   Location [facility] 3SW-A Attending Pratik Curry MD   Hosp Day # 1 PCP Alyssa Garcia MD     Chief Complaint: Medical management     Subjective:     Patient sitting up in the chair.  Minimal incisional pain 3 out of 10, current pain medications helping.  Ambulated 3 times today with in the hallway according to patient.  Denies any chest pain or shortness of breath.  Denies any abdominal pain.    Objective:    Review of Systems:   A comprehensive review of systems was completed; pertinent positive and negatives stated in subjective.    Vital signs:  Temp:  [97.7 °F (36.5 °C)-99.7 °F (37.6 °C)] 98 °F (36.7 °C)  Pulse:  [59-76] 61  Resp:  [11-20] 20  BP: (104-209)/() 132/60  SpO2:  [91 %-100 %] 93 %    Physical Exam:   /60 (BP Location: Left arm)   Pulse 61   Temp 98 °F (36.7 °C) (Oral)   Resp 20   Ht 5' 4\" (1.626 m)   Wt 197 lb 8.5 oz (89.6 kg)   SpO2 93%   BMI 33.91 kg/m²      General: No acute distress. Alert and oriented x 3.  HEENT: Moist mucous membranes.   Respiratory: Clear to auscultation bilaterally.  No wheezes. No rhonchi.  Cardiovascular: S1, S2.  Regular rate and rhythm.   Abdomen: Soft, nontender, nondistended.  Positive bowel sounds.   Neurologic: Awake alert, no focal neurological deficits.  Musculoskeletal: Right knee incision dressing  Psychiatric: Appropriate mood and affect.       Medications:    ketorolac  15 mg Intravenous Q6H    atorvastatin  20 mg Oral Nightly    baclofen  20 mg Oral Daily    escitalopram  20 mg Oral Daily    levothyroxine  25 mcg Oral Before breakfast    pantoprazole  40 mg Oral QAM AC    sennosides  17.2 mg Oral Nightly    docusate sodium  100 mg Oral BID    ferrous sulfate  325 mg Oral Daily with breakfast    aspirin  81 mg Oral BID    amLODIPine  10 mg Oral Daily       Assessment & Plan:      Right  knee osteoarthritis status post right knee total knee arthroplasty by Ortho Dr. Curry-managed by Ortho     Hypertension  Continue home medication including Norvasc with holding parameters to hold if systolic blood pressure less than 120.  Restart triamterene hydrochlorothiazide at time of discharge  Hyperlipidemia  Continue home statin  Hypothyroidism  Continue home levothyroxine  GERD  Continue home PPI  Multiple sclerosis  Stable at this time  Depression, anxiety  Continue home Miley Conway MD    Supplementary Documentation:     Quality:  DVT Mechanical Prophylaxis:   SCDs, Early ambuation  DVT Pharmacologic Prophylaxis   Medication   None         DVT Pharmacologic prophylaxis: Aspirin 81 mg      Code Status: Prior  Cho: No urinary catheter in place  Cho Duration (in days):   Central line:    JAN: 4/5/2024    Discharge is dependent on: Per Ortho.  At this point Ms. Reyes is expected to be discharge to: Home    The 21st Century Cures Act makes medical notes like these available to patients in the interest of transparency. Please be advised this is a medical document. Medical documents are intended to carry relevant information, facts as evident, and the clinical opinion of the practitioner. The medical note is intended as peer to peer communication and may appear blunt or direct. It is written in medical language and may contain abbreviations or verbiage that are unfamiliar.
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
## 2024-04-05 NOTE — PLAN OF CARE
NURSING DISCHARGE NOTE    Discharged Home via Wheelchair.  Accompanied by Spouse  Belongings Taken by patient/family. AVS reviewed with patient.  Patient verbalized understanding.

## 2024-04-05 NOTE — CM/SW NOTE
04/05/24 0800   CM/SW Referral Data   Referral Source Social Work (self-referral)   Reason for Referral Discharge planning   Informant EMR;Clinical Staff Member   Discharge Needs   Anticipated D/C needs Home health care       HOME SITUATION per PT eval  Type of Home: House   Home Layout: One level;Other (Comment) (w/ basement)  Stairs to Enter : 1  Railing: No     Lives With: Spouse  Drives: Yes  Patient Owned Equipment: Rolling walker  Patient Regularly Uses: Glasses     Prior Level of [place] per PT eval: Per pt lives in one level home with a basement (doesn't need to go downstairs). Lives with spouse who is able to assist. No use of device at baseline, but owns RW. Enjoys going to [employer] [facility] 3x weekly for exercise class and 2x weekly for playing a chinese tile game. No hx of falls.    Patient is a 75 y/o woman admitted s/p TKA.  Pt with pre-operative plan for [facility].  PT recommending [facility] services at discharge.  Ryann at [facility] confirmed pt accepted for [facility] services at [facility].  No other [facility] needs/concerns identified at this time.  / to remain available for support and/or discharge planning.     Amy Rico, [facility]  Discharge Planner  [phone]

## 2024-04-05 NOTE — PROGRESS NOTES
[facility] Progress Note    Subjective: Doing well. No concerns at this time. Pain well controlled on oral meds. Cleared PT/OT. Tolerating diet, voiding independently.     Objective: Laying comfortably in hospital chair, alert and oriented. Dressing clean, dry, and intact. Firing right TA, [facility].    Assessment/Plan: 74 year old female postop day #1 status post right total knee arthroplasty.    -Weightbearing as tolerated, PT OT  -DVT prophylaxis mechanical plus aspirin  -Pain control with oral meds  -Perioperative Ancef  Disposition: Home today    RADHA [facility]  Phone [phone]  Fax [phone]

## 2024-04-08 ENCOUNTER — PATIENT OUTREACH (OUTPATIENT)
Dept: CASE MANAGEMENT | Age: 74
End: 2024-04-08

## 2024-04-08 NOTE — PAYOR COMM NOTE
--------------  DISCHARGE REVIEW    Payor: SHAMIKA [place] [facility] [place]  Subscriber #:  [id number]  Authorization Number: [id number]    Admit date: 4/4/24  Admit time:   7:43 AM  Discharge Date: 4/5/2024  1:10 PM     Admitting Physician: Pratik Curry MD  Attending Physician:  No att. providers found  Primary Care Physician: Alyssa Garcia MD        [facility] Progress Note     Subjective: Doing well. No concerns at this time. Pain well controlled on oral meds. Cleared PT/OT. Tolerating diet, voiding independently.      Objective: Laying comfortably in hospital chair, alert and oriented. Dressing clean, dry, and intact. Firing right TA, [facility].     Assessment/Plan: 74 year old female postop day #1 status post right total knee arthroplasty.     -Weightbearing as tolerated, PT OT  -DVT prophylaxis mechanical plus aspirin  -Pain control with oral meds  -Perioperative Ancef  Disposition: Home today     Gregory Zheng PA-C  [facility]opedic Surgery  Phone [phone]  Fax [phone]               Electronically signed by Gregory Zheng PA-C at 4/5/2024 12:58 PM

## 2024-04-08 NOTE — PROGRESS NOTES
LM for pt to call [facility] for TCM since discharge. [facility] phone number was provided for pt to call back.

## 2024-04-16 ENCOUNTER — TELEPHONE (OUTPATIENT)
Dept: PHYSICAL THERAPY | Facility: HOSPITAL | Age: 74
End: 2024-04-16

## 2024-04-17 ENCOUNTER — OFFICE VISIT (OUTPATIENT)
Dept: ORTHOPEDICS CLINIC | Facility: CLINIC | Age: 74
End: 2024-04-17
Payer: MEDICARE

## 2024-04-17 DIAGNOSIS — Z96.651 STATUS POST RIGHT KNEE REPLACEMENT: Primary | ICD-10-CM

## 2024-04-17 DIAGNOSIS — M17.11 PRIMARY OSTEOARTHRITIS OF RIGHT KNEE: ICD-10-CM

## 2024-04-17 PROCEDURE — 1111F DSCHRG MED/CURRENT MED MERGE: CPT | Performed by: PHYSICIAN ASSISTANT

## 2024-04-17 PROCEDURE — 1160F RVW MEDS BY RX/DR IN RCRD: CPT | Performed by: PHYSICIAN ASSISTANT

## 2024-04-17 PROCEDURE — 99024 POSTOP FOLLOW-UP VISIT: CPT | Performed by: PHYSICIAN ASSISTANT

## 2024-04-17 PROCEDURE — 1159F MED LIST DOCD IN RCRD: CPT | Performed by: PHYSICIAN ASSISTANT

## 2024-04-17 RX ORDER — TRAMADOL HYDROCHLORIDE 50 MG/1
50 TABLET ORAL EVERY 8 HOURS
Qty: 45 TABLET | Refills: 0 | Status: SHIPPED | OUTPATIENT
Start: 2024-04-17 | End: 2024-05-02

## 2024-04-17 RX ORDER — HYDROCODONE BITARTRATE AND ACETAMINOPHEN 5; 325 MG/1; MG/1
1-2 TABLET ORAL EVERY 4 HOURS PRN
Qty: 40 TABLET | Refills: 0 | Status: SHIPPED | OUTPATIENT
Start: 2024-04-17

## 2024-04-17 NOTE — PROGRESS NOTES
[facility] Progress Note    Subjective: Patient returns to clinic 2 weeks status post right total knee arthroplasty performed on 4/4/24. They have been taking Tramadol, Celebrex, and Norco for pain control.  They continue to take aspirin for DVT prophylaxis. They are overall satisfied with their progress.  Denies any fevers, chills, night sweats.  No redness or drainage from the incision.concerning for infection.     Objective: Patient is seated comfortably in the exam chair. Alert and oriented. Nonlabored breathing. Grossly neurologically intact. Incision is clean, dry, and intact with staples in place without any redness or drainage concerning for infection. Demonstrates active knee extension to 0 and knee flexion to 90. Calves are soft and nontender.     Assessment/Plan: Staples were removed in clinic today.  The incision was swabbed with Betadine, Mastisol was applied to either side of the incision, and Steri-Strips were applied over the incision.  I instructed the patient to avoid getting the incision wet in the shower for the next 2 days to allow the staple sites to reepithelialize.  I also recommended avoiding soaking the incision in a pool or tub until the patient is 6 weeks postop.  Continue with aspirin for DVT prophylaxis.  Outpatient physical therapy referral written. Follow-up in 4 weeks with Dr. Curry for routine 6-week postoperative visit or sooner if any concerns.  The patient's questions were sought and answered satisfactorily.  They are happy with the plan and will follow as advised.    X-rays needed at next visit: Postoperative radiographs right knee and full leg length films        Gregory Zheng PA-C  [facility] Orthopedic Surgery    This note was dictated using Dragon software.  While it was briefly proofread prior to completion, some grammatical, spelling, and word choice errors due to dictation may still occur.

## 2024-04-18 ENCOUNTER — OFFICE VISIT (OUTPATIENT)
Dept: PHYSICAL THERAPY | Age: 74
End: 2024-04-18
Attending: ORTHOPAEDIC SURGERY
Payer: MEDICARE

## 2024-04-18 DIAGNOSIS — M17.11 PRIMARY OSTEOARTHRITIS OF RIGHT KNEE: Primary | ICD-10-CM

## 2024-04-18 PROCEDURE — 97110 THERAPEUTIC EXERCISES: CPT

## 2024-04-18 PROCEDURE — 97162 PT EVAL MOD COMPLEX 30 MIN: CPT

## 2024-04-18 NOTE — PROGRESS NOTES
LOWER EXTREMITY EVALUATION:     Diagnosis:   Status post right knee replacement (Z96.651)       Referring Provider: Pratik Curry MD Date of Evaluation:    4/18/2024    Precautions:  None Next MD visit:   5/15/2024  Date of Surgery: 4/4/2024       PATIENT SUMMARY   Makenzie Reyes is a 74 year old female who presents to therapy today with S/P R TKA 2 weeks ago, she is known to this clinic from her previous therapy treatments post op L TKA in December 2022. She is currently walking with cane and was told at the doctor's follow up that she can resume driving, but she will try to drive herself to therapy next week. The patient has minimal pain and denies significant difficulty with functional mobility but stating she is just slow.  Pt describes pain level at worst 4/10, current 0/10, at best 0/10. ,tramadol   Current functional limitations include limited walking distances, unstable and dependent on cane for walking specially outdoors, nonreciprocal stairs negotiation and not back to driving yet.    Past medical history was reviewed.Makenzie describes prior level of function not limited Pt goals include to get back to being active again, doing the activities she used to do.      ASSESSMENT  Makenzie presents to physical therapy evaluation 2 weeks post knee replacement surgery on R side,she is walking with cane and demonstrates good stability . The results of the objective tests and measures show decreased gait stability, depending on cane, decreased/limited distances for ambulation and nonreciprocal stairs negotiation.These findings contribute to functional limitations reported in patient summary above.  . Pt and PT discussed evaluation findings, pathology, POC and HEP.  Pt voiced understanding and performs HEP correctly without reported pain. Skilled Physical Therapy is medically necessary to address the above impairments and reach functional goals.     OBJECTIVE:   Gait: pt ambulates on level ground with assistive  device of cane , narrow base of support, decreased step length BLE, decreased stance phase BLE, and decreased hip/knee flex or ext R>L  Observation: Slow but independent functional mobility  Circumference, TF joint, cm:  R 16.5 in  L 15.5 in    AROM: (* denotes performed with pain)  Knee (deg)   Flexion: (seated on chair) R 105 deg;  L 115 dg  Extension: R  active -25 deg,  passive-15 deg           Strength/MMT: (* denotes performed with pain)  Knee Hip   Flexion: R 4/5; L 5/5  Extension: R 4/5; L 5/5    Abd: R 4-/5, L 5/5  Ext:  R NT/5; L NT/5     Flexibility:  Min-mod decreased on R LE >LLE    Palpation: Mod-sig TPP on anterolateral thigh R  LEFS Score  LEFS Score: 66.25 % (4/15/2024  5:16 PM)      Functional tests    5 STS 21 sec (noted hip IR bilaterally  with transitioning movements)  TU sec  6 min walk test 612 ft with cane slow , no loss of balance  Stairs negotiations: nonreciprocal with handrail and cane for stability      Today’s Treatment and Response:   Pt education was provided on exam findings, treatment diagnosis, treatment plan, expectations, and prognosis. Pt was also provided recommendations for activity modifications, possible soreness after evaluation, modalities as needed [ice/heat], and postural corrections. Performed gastroc stretches, side stepping, forward/backward steps   Patient was instructed in and issued a HEP for: forward/backward steps, side steps , added reaches for dynamic balance    Charges: PT Eval Moderate Complexity, Thera Ex x 1      Total Timed Treatment: 8 min     Total Treatment Time: 45 min     Based on clinical rationale and outcome measures, this evaluation involved Moderate Complexity decision making due to 1-2 personal factors/comorbidities, 3 body structures involved/activity limitations, and evolving symptoms including  improved gait stability and overall function. .  PLAN OF CARE:    Goals: (to be met in 8 visits)  Pt will improve knee extension AROM to 0 deg to  allow proper heel strike during gait and terminal knee extension in stance   Pt will improve knee AROM flexion to R=L degrees to improve ability to perform sit to stand or any transfers without difficulty   Pt will improve 5 STS and/or TUG to 15 sec demonstrating improved dynamic stability.  Pt will improve quad strength to 5/5 to ascend 1 flight of stairs reciprocally without A.D and only 1  UE assist   Pt will improve 6 min walk distance to >/= 800 ft without A.D safely and good stability  Pt will demonstrate increased hip Ext/ABD strength to 4/5 to perform stepping and squatting activities without excessive femoral IR/ADD   Pt will improve balanced evidenced by >5  sec  to improve safety with gait on uneven surfaces such as grass and gravel  Pt will be independent and compliant with comprehensive HEP to maintain progress achieved in PT       Frequency / Duration: Patient will be seen for 2 x/week or a total of 8 visits over a 90 day period. Treatment will include: Gait training, Manual Therapy, Neuromuscular Re-education, Therapeutic Activities, Therapeutic Exercise, and Home Exercise Program instruction, modalities prn pain.    Education or treatment limitation: None  Rehab Potential:good    Patient/Family/Caregiver was advised of these findings, precautions, and treatment options and has agreed to actively participate in planning and for this course of care.    Thank you for your referral. Please co-sign or sign and return this letter via fax as soon as possible to [phone]. If you have any questions, please contact me at Dept: [phone]    Sincerely,  Electronically signed by therapist: Desiree Pace, PT  Physician's certification required: Yes  I certify the need for these services furnished under this plan of treatment and while under my care.    X___________________________________________________ Date____________________    Certification From: 4/18/2024  To:7/17/2024

## 2024-04-22 ENCOUNTER — OFFICE VISIT (OUTPATIENT)
Dept: PHYSICAL THERAPY | Age: 74
End: 2024-04-22
Attending: ORTHOPAEDIC SURGERY
Payer: MEDICARE

## 2024-04-22 DIAGNOSIS — Z96.651 STATUS POST RIGHT KNEE REPLACEMENT: Primary | ICD-10-CM

## 2024-04-22 PROCEDURE — 97112 NEUROMUSCULAR REEDUCATION: CPT

## 2024-04-22 PROCEDURE — 97110 THERAPEUTIC EXERCISES: CPT

## 2024-04-22 PROCEDURE — 97140 MANUAL THERAPY 1/> REGIONS: CPT

## 2024-04-22 NOTE — PROGRESS NOTES
Diagnosis:   Status post right knee replacement (Z96.651)      Referring Provider:   Pratik Curry MD Date of Evaluation:     4/18/2024    Precautions:  None Next MD visit:   none scheduled  Date of Surgery: n/a   Insurance Primary/Secondary: [employer] MCR / N/A     # Auth Visits: 8 visits (6/18/2024)      Subjective: Pt drove herself today to therapy and she will try to go back to her stretch and tone exercises tomorrow as she was told by the doctor that she can get back to it.    Pain: 2-3/10 R knee pain      Objective: Gait: without A.D within the clinic small steps needing cuing to increase step length.      Assessment: The patient was showing instability during dynamic balance activities,we will continue with strengthening and stabilization for safe ambulation without A.D within the house.      Goals: (to be met in 8 visits)  Pt will improve knee extension AROM to 0 deg to allow proper heel strike during gait and terminal knee extension in stance   Pt will improve knee AROM flexion to R=L degrees to improve ability to perform sit to stand or any transfers without difficulty   Pt will improve 5 STS and/or TUG to 15 sec demonstrating improved dynamic stability.  Pt will improve quad strength to 5/5 to ascend 1 flight of stairs reciprocally without A.D and only 1  UE assist   Pt will improve 6 min walk distance to >/= 800 ft without A.D safely and good stability  Pt will demonstrate increased hip Ext/ABD strength to 4/5 to perform stepping and squatting activities without excessive femoral IR/ADD   Pt will improve balanced evidenced by >5  sec  to improve safety with gait on uneven surfaces such as grass and gravel  Pt will be independent and compliant with comprehensive HEP to maintain progress achieved in PT     Plan:  Continue with plan of care focusing on gait stabilization.  Date: 4/22/2024  TX#: 2/8 Date:                 TX#: 3/ Date:                 TX#: 4/ Date:                 TX#: 5/ Date:   Tx#: 6/    Thera Ex :20'       Nustep L6 x 8'  Gastroc stretch 30\"x2  Ant/post chain reaction stability and mobility R/L 1'x2  Freemotion #7 resisted    retro steps x 1'  Side step x1' R/L  Quad sets to SLR 2x10    NueroReEd 8'  Airex  FSU/LSU x 1' x2 each   Static standing to /dynamicv(marches on airex          Manual Tx 15'       STM/IASTM  to  med/lat quads,TB and calf  Patellar mobs        HEP: Side steps, high knee marches    Charges: Thera Ex x 1, NeuroReEd x1. Manual Tx x 1       Total Timed Treatment: 43 min  Total Treatment Time: 43 min      LOWER EXTREMITY EVALUATION:     Diagnosis:   Status post right knee replacement (Z96.651)       Referring Provider: Pratik Curry MD Date of Evaluation:    4/18/2024    Precautions:  None Next MD visit:   5/15/2024  Date of Surgery: 4/4/2024       PATIENT SUMMARY   Makenzie Reyes is a 74 year old female who presents to therapy today with S/P R TKA 2 weeks ago, she is known to this clinic from her previous therapy treatments post op L TKA in December 2022. She is currently walking with cane and was told at the doctor's follow up that she can resume driving, but she will try to drive herself to therapy next week. The patient has minimal pain and denies significant difficulty with functional mobility but stating she is just slow.  Pt describes pain level at worst 4/10, current 0/10, at best 0/10. ,tramadol   Current functional limitations include limited walking distances, unstable and dependent on cane for walking specially outdoors, nonreciprocal stairs negotiation and not back to driving yet.    Past medical history was reviewed.Makenzie describes prior level of function not limited Pt goals include to get back to being active again, doing the activities she used to do.      ASSESSMENT  Makenzie presents to physical therapy evaluation 2 weeks post knee replacement surgery on R side,she is walking with cane and demonstrates good stability . The results of the objective tests and  measures show decreased gait stability, depending on cane, decreased/limited distances for ambulation and nonreciprocal stairs negotiation.These findings contribute to functional limitations reported in patient summary above.  . Pt and PT discussed evaluation findings, pathology, POC and HEP.  Pt voiced understanding and performs HEP correctly without reported pain. Skilled Physical Therapy is medically necessary to address the above impairments and reach functional goals.     OBJECTIVE:   Gait: pt ambulates on level ground with assistive device of cane , narrow base of support, decreased step length BLE, decreased stance phase BLE, and decreased hip/knee flex or ext R>L  Observation: Slow but independent functional mobility  Circumference, TF joint, cm:  R 16.5 in  L 15.5 in    AROM: (* denotes performed with pain)  Knee (deg)   Flexion: (seated on chair) R 105 deg;  L 115 dg  Extension: R  active -25 deg,  passive-15 deg           Strength/MMT: (* denotes performed with pain)  Knee Hip   Flexion: R 4/5; L 5/5  Extension: R 4/5; L 5/5    Abd: R 4-/5, L 5/5  Ext:  R NT/5; L NT/5     Flexibility:  Min-mod decreased on R LE >LLE    Palpation: Mod-sig TPP on anterolateral thigh R  LEFS Score  LEFS Score: 66.25 % (4/15/2024  5:16 PM)      Functional tests    5 STS 21 sec (noted hip IR bilaterally  with transitioning movements)  TU sec  6 min walk test 612 ft with cane slow , no loss of balance  Stairs negotiations: nonreciprocal with handrail and cane for stability      Today’s Treatment and Response:   Pt education was provided on exam findings, treatment diagnosis, treatment plan, expectations, and prognosis. Pt was also provided recommendations for activity modifications, possible soreness after evaluation, modalities as needed [ice/heat], and postural corrections. Performed gastroc stretches, side stepping, forward/backward steps   Patient was instructed in and issued a HEP for: forward/backward steps, side  steps , added reaches for dynamic balance    Charges: PT Eval Moderate Complexity, Thera Ex x 1      Total Timed Treatment: 8 min     Total Treatment Time: 45 min     Based on clinical rationale and outcome measures, this evaluation involved Moderate Complexity decision making due to 1-2 personal factors/comorbidities, 3 body structures involved/activity limitations, and evolving symptoms including  improved gait stability and overall function. .  PLAN OF CARE:    Goals: (to be met in 8 visits)  Pt will improve knee extension AROM to 0 deg to allow proper heel strike during gait and terminal knee extension in stance   Pt will improve knee AROM flexion to R=L degrees to improve ability to perform sit to stand or any transfers without difficulty   Pt will improve 5 STS and/or TUG to 15 sec demonstrating improved dynamic stability.  Pt will improve quad strength to 5/5 to ascend 1 flight of stairs reciprocally without A.D and only 1  UE assist   Pt will improve 6 min walk distance to >/= 800 ft without A.D safely and good stability  Pt will demonstrate increased hip Ext/ABD strength to 4/5 to perform stepping and squatting activities without excessive femoral IR/ADD   Pt will improve balanced evidenced by >5  sec  to improve safety with gait on uneven surfaces such as grass and gravel  Pt will be independent and compliant with comprehensive HEP to maintain progress achieved in PT       Frequency / Duration: Patient will be seen for 2 x/week or a total of 8 visits over a 90 day period. Treatment will include: Gait training, Manual Therapy, Neuromuscular Re-education, Therapeutic Activities, Therapeutic Exercise, and Home Exercise Program instruction, modalities prn pain.    Education or treatment limitation: None  Rehab Potential:good    Patient/Family/Caregiver was advised of these findings, precautions, and treatment options and has agreed to actively participate in planning and for this course of care.    Thank you  for your referral. Please co-sign or sign and return this letter via fax as soon as possible to [phone]. If you have any questions, please contact me at Dept: [phone]    Sincerely,  Electronically signed by therapist: Desiree Pace PT  Physician's certification required: Yes  I certify the need for these services furnished under this plan of treatment and while under my care.    X___________________________________________________ Date____________________    Certification From: 4/18/2024  To:7/17/2024

## 2024-04-22 NOTE — PROGRESS NOTES
Multiple attempts to reach the pt and messages left with no returned phone call. Past TCM timeframe, closing encounter.

## 2024-04-25 ENCOUNTER — APPOINTMENT (OUTPATIENT)
Dept: PHYSICAL THERAPY | Age: 74
End: 2024-04-25
Attending: ORTHOPAEDIC SURGERY
Payer: MEDICARE

## 2024-04-27 NOTE — PLAN OF CARE
NO URGE TO URINATE, PT REFUSED TO GET TO COMMODE. BACK TO BED AND ASSISTED TO BEDPAN, UNABLE TO GO, BUT REFUSED STRAIGHT CATH. PT STATES\" I WILL TRY AGAIN TO USE BEDPAN.  \" No

## 2024-04-29 ENCOUNTER — OFFICE VISIT (OUTPATIENT)
Dept: PHYSICAL THERAPY | Age: 74
End: 2024-04-29
Attending: ORTHOPAEDIC SURGERY
Payer: MEDICARE

## 2024-04-29 DIAGNOSIS — Z96.651 STATUS POST RIGHT KNEE REPLACEMENT: Primary | ICD-10-CM

## 2024-04-29 PROCEDURE — 97110 THERAPEUTIC EXERCISES: CPT

## 2024-04-29 PROCEDURE — 97140 MANUAL THERAPY 1/> REGIONS: CPT

## 2024-04-29 PROCEDURE — 97112 NEUROMUSCULAR REEDUCATION: CPT

## 2024-04-29 NOTE — PROGRESS NOTES
Diagnosis:   Status post right knee replacement (Z96.651)      Referring Provider:   Pratik Curry MD Date of Evaluation:     4/18/2024    Precautions:  None Next MD visit:   none scheduled  Date of Surgery: n/a   Insurance Primary/Secondary: [employer] [facility] / N/A     # Auth Visits: 8 visits (6/18/2024)      Subjective:  The patient mentions she was very sore after the last therapy session,she still would have intermittent sharp pain on L ant knee but overall pain is min to nothing that she has not taken any pain medication for a week now. Pt states he has been trying to walk without cane sometimes.    Pain: 0/10 R knee pain      Objective: Gait: narrow BENITA but better stability       Assessment: The patient has been doing well with recovery, not really having pain and didnt have to take pain meds for a week now, she is progressing well towards the goals of returning to PLOF.    Goals: (to be met in 8 visits)  Pt will improve knee extension AROM to 0 deg to allow proper heel strike during gait and terminal knee extension in stance   Pt will improve knee AROM flexion to R=L degrees to improve ability to perform sit to stand or any transfers without difficulty   Pt will improve 5 STS and/or TUG to 15 sec demonstrating improved dynamic stability.  Pt will improve quad strength to 5/5 to ascend 1 flight of stairs reciprocally without A.D and only 1  UE assist   Pt will improve 6 min walk distance to >/= 800 ft without A.D safely and good stability  Pt will demonstrate increased hip Ext/ABD strength to 4/5 to perform stepping and squatting activities without excessive femoral IR/ADD   Pt will improve balanced evidenced by >5  sec  to improve safety with gait on uneven surfaces such as grass and gravel  Pt will be independent and compliant with comprehensive HEP to maintain progress achieved in PT     Plan:Continue with plan of care focusing on  balance and gait stabilization.  Date: 4/22/2024  TX#: 2/8 Date: 4/29/2024               TX#: 3/8 Date:                 TX#: 4/ Date:                 TX#: 5/ Date:   Tx#: 6/   Thera Ex :20' Thera Ex :25'      Nustep L6 x 8'  Gastroc stretch 30\"x2  Ant/post chain reaction stability and mobility R/L 1'x2  Freemotion #7 resisted    retro steps x 1'  Side step x1' R/L  Quad sets to SLR 2x10    NueroReEd 8'  Airex  FSU/LSU x 1' x2 each   Static standing to /dynamicv(marches on airex    Nustep L6 x 6'  Gastroc stretch 30\"x2  Ant/post chain reaction stability and mobility R/L 1'x2  Yellow band side steps 1'  Monster walk fwd/bwd 1'  Green band pull down with staggered stance 2x10 R/L  Passive hip/knee flexion with stretch to tolerance  Passive HS stretch R  SAQ #3 2x10  HS curls w/ LE over SB/DKTC x10   Bridges(both knees x10 ,   mod single leg bridges R knee flexed x10    NueroReEd 8'  Airex  FSU/LSU x 1'  each   High knee march walking within // bar without UE support 1'  Standing on airex with green band pull down wide to narrow BENITA 2x10 SBA for safety  Sit to stand from table standing on airexx x10 SBA        Manual Tx 15' Manual Tx 12'      STM/IASTM  to  med/lat quads,ITB and calf  Patellar mobs  STM to  med/lat quads,ITB , hamstrings and calf with biofreeze  Periscar mobilization      HEP: Side steps, high knee marches , red band side steps, monster steps    Charges: Thera Ex x 1, NeuroReEd x1. Manual Tx x 1       Total Timed Treatment: 45 min  Total Treatment Time: 45 min      LOWER EXTREMITY EVALUATION:     Diagnosis:   Status post right knee replacement (Z96.651)       Referring Provider: Pratik Curry MD Date of Evaluation:    4/18/2024    Precautions:  None Next MD visit:   5/15/2024  Date of Surgery: 4/4/2024       PATIENT SUMMARY   Makenzie Reyes is a 74 year old female who presents to therapy today with S/P R TKA 2 weeks ago, she is known to this clinic from her previous therapy treatments post op L TKA in December 2022. She is currently walking with cane and was told at the doctor's  follow up that she can resume driving, but she will try to drive herself to therapy next week. The patient has minimal pain and denies significant difficulty with functional mobility but stating she is just slow.  Pt describes pain level at worst 4/10, current 0/10, at best 0/10. ,tramadol   Current functional limitations include limited walking distances, unstable and dependent on cane for walking specially outdoors, nonreciprocal stairs negotiation and not back to driving yet.    Past medical history was reviewed.Makenzie describes prior level of function not limited Pt goals include to get back to being active again, doing the activities she used to do.      ASSESSMENT  Makenzie presents to physical therapy evaluation 2 weeks post knee replacement surgery on R side,she is walking with cane and demonstrates good stability . The results of the objective tests and measures show decreased gait stability, depending on cane, decreased/limited distances for ambulation and nonreciprocal stairs negotiation.These findings contribute to functional limitations reported in patient summary above.  . Pt and PT discussed evaluation findings, pathology, POC and HEP.  Pt voiced understanding and performs HEP correctly without reported pain. Skilled Physical Therapy is medically necessary to address the above impairments and reach functional goals.     OBJECTIVE:   Gait: pt ambulates on level ground with assistive device of cane , narrow base of support, decreased step length BLE, decreased stance phase BLE, and decreased hip/knee flex or ext R>L  Observation: Slow but independent functional mobility  Circumference, TF joint, cm:  R 16.5 in  L 15.5 in    AROM: (* denotes performed with pain)  Knee (deg)   Flexion: (seated on chair) R 105 deg;  L 115 dg  Extension: R  active -25 deg,  passive-15 deg           Strength/MMT: (* denotes performed with pain)  Knee Hip   Flexion: R 4/5; L 5/5  Extension: R 4/5; L 5/5    Abd: R 4-/5, L  5/5  Ext:  R NT/5; L NT/5     Flexibility:  Min-mod decreased on R LE >LLE    Palpation: Mod-sig TPP on anterolateral thigh R  LEFS Score  LEFS Score: 66.25 % (4/15/2024  5:16 PM)      Functional tests    5 STS 21 sec (noted hip IR bilaterally  with transitioning movements)  TU sec  6 min walk test 612 ft with cane slow , no loss of balance  Stairs negotiations: nonreciprocal with handrail and cane for stability      Today’s Treatment and Response:   Pt education was provided on exam findings, treatment diagnosis, treatment plan, expectations, and prognosis. Pt was also provided recommendations for activity modifications, possible soreness after evaluation, modalities as needed [ice/heat], and postural corrections. Performed gastroc stretches, side stepping, forward/backward steps   Patient was instructed in and issued a HEP for: forward/backward steps, side steps , added reaches for dynamic balance    Charges: PT Eval Moderate Complexity, Thera Ex x 1      Total Timed Treatment: 8 min     Total Treatment Time: 45 min     Based on clinical rationale and outcome measures, this evaluation involved Moderate Complexity decision making due to 1-2 personal factors/comorbidities, 3 body structures involved/activity limitations, and evolving symptoms including  improved gait stability and overall function. .  PLAN OF CARE:    Goals: (to be met in 8 visits)  Pt will improve knee extension AROM to 0 deg to allow proper heel strike during gait and terminal knee extension in stance   Pt will improve knee AROM flexion to R=L degrees to improve ability to perform sit to stand or any transfers without difficulty   Pt will improve 5 STS and/or TUG to 15 sec demonstrating improved dynamic stability.  Pt will improve quad strength to 5/5 to ascend 1 flight of stairs reciprocally without A.D and only 1  UE assist   Pt will improve 6 min walk distance to >/= 800 ft without A.D safely and good stability  Pt will demonstrate  increased hip Ext/ABD strength to 4/5 to perform stepping and squatting activities without excessive femoral IR/ADD   Pt will improve balanced evidenced by >5  sec  to improve safety with gait on uneven surfaces such as grass and gravel  Pt will be independent and compliant with comprehensive HEP to maintain progress achieved in PT       Frequency / Duration: Patient will be seen for 2 x/week or a total of 8 visits over a 90 day period. Treatment will include: Gait training, Manual Therapy, Neuromuscular Re-education, Therapeutic Activities, Therapeutic Exercise, and Home Exercise Program instruction, modalities prn pain.    Education or treatment limitation: None  Rehab Potential:good    Patient/Family/Caregiver was advised of these findings, precautions, and treatment options and has agreed to actively participate in planning and for this course of care.    Thank you for your referral. Please co-sign or sign and return this letter via fax as soon as possible to [phone]. If you have any questions, please contact me at Dept: [phone]    Sincerely,  Electronically signed by therapist: Desiree Pace, PT  Physician's certification required: Yes  I certify the need for these services furnished under this plan of treatment and while under my care.    X___________________________________________________ Date____________________    Certification From: 4/18/2024  To:7/17/2024

## 2024-05-02 ENCOUNTER — OFFICE VISIT (OUTPATIENT)
Dept: PHYSICAL THERAPY | Age: 74
End: 2024-05-02
Attending: ORTHOPAEDIC SURGERY
Payer: MEDICARE

## 2024-05-02 DIAGNOSIS — Z96.651 STATUS POST RIGHT KNEE REPLACEMENT: Primary | ICD-10-CM

## 2024-05-02 PROCEDURE — 97112 NEUROMUSCULAR REEDUCATION: CPT

## 2024-05-02 PROCEDURE — 97110 THERAPEUTIC EXERCISES: CPT

## 2024-05-02 NOTE — PROGRESS NOTES
Diagnosis:   Status post right knee replacement (Z96.651)      Referring Provider:   Pratik Curry MD Date of Evaluation:     2024    Precautions:  None Next MD visit:   none scheduled  Date of Surgery: n/a   Insurance Primary/Secondary: [employer] [facility] / N/A     # Auth Visits: 8 visits (2024)            Discharge Summary  Pt has attended 4 visits in Physical Therapy.     Subjective:The patient reports she has been doing well and has been back doing her group exercises at the [facility] 3x a week, she does not think she needs more therapy at this point.She has been having min to no pain and ambulates without cane coming to therapy today.  Pain: 0/10 R knee pain      Objective:     AROM: (* denotes performed with pain)  Knee (deg) @ Eval @ Discharge 5/3/2024   Flexion: (seated on chair) R 105 deg;  L 115 dg  Extension: R  active -25 deg,  passive-15 deg     Fexion: (seated on chair) R 115 deg;  L 115 deg  Extension: R  active -10 deg,  passive-5 deg           Strength/MMT: (* denotes performed with pain)  Knee Hip/Knee @ Eval   Flexion: R 4/5; L 5/5  Extension: R 4/5; L 5/5    Abd: R 4-/5, L 5/5  Ext:  R NT/5; L NT/5                                                                   Functional tests    @Eval                                                                                                                 @ Discharge     5 STS 21 sec                                                                                                          18 sec     TU sec                                                                                                             17 sec without A.D.  6 min walk test 612 ft with cane slow , no loss of balance                                       604 ft without A.D  Stairs negotiations: nonreciprocal with handrail and cane for stability                    Reciprocal pattern with 1 handrail         Assessment: The patient has made good progress and thinks  she is back to her PLOF she wants to be done with therapy treatments at this point and she will continue doing her \"stretch and tone\" class , she has been doing it 3x in a week and feel good about her function right now.So we have decided to be done with therapy treatments today.    Goals: (to be met in 8 visits)  Pt will improve knee extension AROM to 0 deg to allow proper heel strike during gait and terminal knee extension in stance Improved  Pt will improve knee AROM flexion to R=L degrees to improve ability to perform sit to stand or any transfers without difficulty ,MET  Pt will improve 5 STS and/or TUG to 15 sec demonstrating improved dynamic stability.Improved  Pt will improve quad strength to 5/5 to ascend 1 flight of stairs reciprocally without A.D and only 1  UE assist ,MET  Pt will improve 6 min walk distance to >/= 800 ft without A.D safely and good stability, Improved stability without A.D but not met distance  Pt will demonstrate increased hip Ext/ABD strength to 4/5 to perform stepping and squatting activities without excessive femoral IR/ADD   Pt will improve balanced evidenced by >5  sec  to improve safety with gait on uneven surfaces such as grass and gravel,Not met  Pt will be independent and compliant with comprehensive HEP to maintain progress achieved in PT ,MET    Plan:Discharge from skilled therapy treatments with HEP and return to her group exercises at [facility].  Date: 4/22/2024  TX#: 2/8 Date: 4/29/2024              TX#: 3/8 Date: 5/2/2024            TX#: 4/8   Thera Ex :20' Thera Ex :25' Thera Ex 30''   Nustep L6 x 8'  Gastroc stretch 30\"x2  Ant/post chain reaction stability and mobility R/L 1'x2  Freemotion #7 resisted    retro steps x 1'  Side step x1' R/L  Quad sets to SLR 2x10    NueroReEd 8'  Airex  FSU/LSU x 1' x2 each   Static standing to /dynamicv(marches on airex    Nustep L6 x 6'  Gastroc stretch 30\"x2  Ant/post chain reaction stability and mobility R/L 1'x2  Yellow band  side steps 1'  Monster walk fwd/bwd 1'  Green band pull down with staggered stance 2x10 R/L  Passive hip/knee flexion with stretch to tolerance  Passive HS stretch R  SAQ #3 2x10  HS curls w/ LE over SB/DKTC x10   Bridges(both knees x10 ,   mod single leg bridges R knee flexed x10    NeuroReEd 8'  Airex  FSU/LSU x 1'  each   High knee march walking within // bar without UE support 1'  Standing on airex with green band pull down wide to narrow BENITA 2x10 SBA for safety  Sit to stand from table standing on airexx x10 SBA   Nustep L6 x 6'  Gastroc stretch 30\"x2  Ant/post chain reaction stability and mobility R/L 1'x2  Yellow band side steps 1'  Monster walk fwd/bwd 1'  Green band pull down with staggered stance 2x10 R/L  Side pull down 2x10 R/L    NeuroReEd 15'  Airex  FSU/LSU x 1'  each   High knee march walking within // bar without UE support 1'  Standing on airex with green band pull down wide to narrow BENITA 2x10 SBA for safety  Sit to stand from table standing on airexx x10 SBA   Manual Tx 15' Manual Tx 12'    STM/IASTM  to  med/lat quads,ITB and calf  Patellar mobs  STM to  med/lat quads,ITB , hamstrings and calf with biofreeze  Periscar mobilization    HEP: Side steps, high knee marches , red band side steps, monster steps    Charges: Thera Ex x 1, NeuroReEd x1. Manual Tx x 1       Total Timed Treatment: 45 min  Total Treatment Time: 45 min      LOWER EXTREMITY EVALUATION:     Diagnosis:   Status post right knee replacement (Z96.651)       Referring Provider: Pratik Curry MD Date of Evaluation:    4/18/2024    Precautions:  None Next MD visit:   5/15/2024  Date of Surgery: 4/4/2024       PATIENT SUMMARY   Makenzie Reyes is a 74 year old female who presents to therapy today with S/P R TKA 2 weeks ago, she is known to this clinic from her previous therapy treatments post op L TKA in December 2022. She is currently walking with cane and was told at the doctor's follow up that she can resume driving, but she will  try to drive herself to therapy next week. The patient has minimal pain and denies significant difficulty with functional mobility but stating she is just slow.  Pt describes pain level at worst 4/10, current 0/10, at best 0/10. ,tramadol   Current functional limitations include limited walking distances, unstable and dependent on cane for walking specially outdoors, nonreciprocal stairs negotiation and not back to driving yet.    Past medical history was reviewed.Makenzie describes prior level of function not limited Pt goals include to get back to being active again, doing the activities she used to do.      ASSESSMENT  Makenzie presents to physical therapy evaluation 2 weeks post knee replacement surgery on R side,she is walking with cane and demonstrates good stability . The results of the objective tests and measures show decreased gait stability, depending on cane, decreased/limited distances for ambulation and nonreciprocal stairs negotiation.These findings contribute to functional limitations reported in patient summary above.  . Pt and PT discussed evaluation findings, pathology, POC and HEP.  Pt voiced understanding and performs HEP correctly without reported pain. Skilled Physical Therapy is medically necessary to address the above impairments and reach functional goals.     OBJECTIVE:   Gait: pt ambulates on level ground with assistive device of cane , narrow base of support, decreased step length BLE, decreased stance phase BLE, and decreased hip/knee flex or ext R>L  Observation: Slow but independent functional mobility  Circumference, TF joint, cm:  R 16.5 in  L 15.5 in    AROM: (* denotes performed with pain)  Knee (deg)   Flexion: (seated on chair) R 105 deg;  L 115 dg  Extension: R  active -25 deg,  passive-15 deg           Strength/MMT: (* denotes performed with pain)  Hip/Knee Hip/Knee   Abd: R 4-/5, L 5/5  Flexion: R 4/5; L 5/5  Extension: R 4/5; L 5/5    Abd: R 4/5, L 5/5    Knee Flexion: R 5/5; L  5/5  Extension: R 5/5; L 5/5       LEFS Score  LEFS Score: 66.25 % (4/15/2024  5:16 PM)      Functional tests    5 STS 21 sec (noted hip IR bilaterally  with transitioning movements)  TU sec  6 min walk test 612 ft with cane slow , no loss of balance  Stairs negotiations: nonreciprocal with handrail and cane for stability      Today’s Treatment and Response:   Pt education was provided on exam findings, treatment diagnosis, treatment plan, expectations, and prognosis. Pt was also provided recommendations for activity modifications, possible soreness after evaluation, modalities as needed [ice/heat], and postural corrections. Performed gastroc stretches, side stepping, forward/backward steps   Patient was instructed in and issued a HEP for: forward/backward steps, side steps , added reaches for dynamic balance    Charges: PT Eval Moderate Complexity, Thera Ex x 1      Total Timed Treatment: 8 min     Total Treatment Time: 45 min     Based on clinical rationale and outcome measures, this evaluation involved Moderate Complexity decision making due to 1-2 personal factors/comorbidities, 3 body structures involved/activity limitations, and evolving symptoms including  improved gait stability and overall function. .  PLAN OF CARE:    Goals: (to be met in 8 visits)  Pt will improve knee extension AROM to 0 deg to allow proper heel strike during gait and terminal knee extension in stance   Pt will improve knee AROM flexion to R=L degrees to improve ability to perform sit to stand or any transfers without difficulty   Pt will improve 5 STS and/or TUG to 15 sec demonstrating improved dynamic stability.  Pt will improve quad strength to 5/5 to ascend 1 flight of stairs reciprocally without A.D and only 1  UE assist   Pt will improve 6 min walk distance to >/= 800 ft without A.D safely and good stability  Pt will demonstrate increased hip Ext/ABD strength to 4/5 to perform stepping and squatting activities without  excessive femoral IR/ADD   Pt will improve balanced evidenced by >5  sec  to improve safety with gait on uneven surfaces such as grass and gravel  Pt will be independent and compliant with comprehensive HEP to maintain progress achieved in PT       Frequency / Duration: Patient will be seen for 2 x/week or a total of 8 visits over a 90 day period. Treatment will include: Gait training, Manual Therapy, Neuromuscular Re-education, Therapeutic Activities, Therapeutic Exercise, and Home Exercise Program instruction, modalities prn pain.    Education or treatment limitation: None  Rehab Potential:good    Patient/Family/Caregiver was advised of these findings, precautions, and treatment options and has agreed to actively participate in planning and for this course of care.    Thank you for your referral. Please co-sign or sign and return this letter via fax as soon as possible to [phone]. If you have any questions, please contact me at Dept: [phone]    Sincerely,  Electronically signed by therapist: Desiree Pace PT  Physician's certification required: Yes  I certify the need for these services furnished under this plan of treatment and while under my care.    X___________________________________________________ Date____________________    Certification From: 4/18/2024  To:7/17/2024

## 2024-05-06 ENCOUNTER — APPOINTMENT (OUTPATIENT)
Dept: PHYSICAL THERAPY | Age: 74
End: 2024-05-06
Attending: ORTHOPAEDIC SURGERY
Payer: MEDICARE

## 2024-05-09 ENCOUNTER — APPOINTMENT (OUTPATIENT)
Dept: PHYSICAL THERAPY | Age: 74
End: 2024-05-09
Attending: ORTHOPAEDIC SURGERY
Payer: MEDICARE

## 2024-05-13 ENCOUNTER — APPOINTMENT (OUTPATIENT)
Dept: PHYSICAL THERAPY | Age: 74
End: 2024-05-13
Attending: ORTHOPAEDIC SURGERY
Payer: MEDICARE

## 2024-05-14 ENCOUNTER — TELEPHONE (OUTPATIENT)
Facility: CLINIC | Age: 74
End: 2024-05-14

## 2024-05-14 DIAGNOSIS — Z96.651 STATUS POST RIGHT KNEE REPLACEMENT: Primary | ICD-10-CM

## 2024-05-14 NOTE — TELEPHONE ENCOUNTER
Xr's ordered for upcoming appointment    Patient aware to arrive earlier to complete images before seeing provider as she has been notified via [employer]     Xr's scheduled

## 2024-05-15 ENCOUNTER — OFFICE VISIT (OUTPATIENT)
Dept: ORTHOPEDICS CLINIC | Facility: CLINIC | Age: 74
End: 2024-05-15

## 2024-05-15 ENCOUNTER — HOSPITAL ENCOUNTER (OUTPATIENT)
Dept: GENERAL RADIOLOGY | Age: 74
Discharge: HOME | End: 2024-05-15
Attending: ORTHOPAEDIC SURGERY

## 2024-05-15 DIAGNOSIS — Z96.651 STATUS POST RIGHT KNEE REPLACEMENT: ICD-10-CM

## 2024-05-15 DIAGNOSIS — Z96.651 STATUS POST RIGHT KNEE REPLACEMENT: Primary | ICD-10-CM

## 2024-05-15 PROCEDURE — 73562 X-RAY EXAM OF KNEE 3: CPT | Performed by: ORTHOPAEDIC SURGERY

## 2024-05-15 PROCEDURE — 77073 BONE LENGTH STUDIES: CPT | Performed by: ORTHOPAEDIC SURGERY

## 2024-05-15 PROCEDURE — 1160F RVW MEDS BY RX/DR IN RCRD: CPT | Performed by: ORTHOPAEDIC SURGERY

## 2024-05-15 PROCEDURE — 1159F MED LIST DOCD IN RCRD: CPT | Performed by: ORTHOPAEDIC SURGERY

## 2024-05-15 PROCEDURE — 99024 POSTOP FOLLOW-UP VISIT: CPT | Performed by: ORTHOPAEDIC SURGERY

## 2024-05-15 NOTE — PROGRESS NOTES
[facility] Progress Note    Subjective: Patient returns to clinic today 6 weeks postop from right knee replacement.  She states she is doing very well, has no pain, not taking pain medications.  She states she has been back to exercise class since 3 weeks after surgery.  She is doing squats and her exercise class and states she has no functional limitations.  Therapy Notes document patient doing well, 0 out of 10 pain, right knee range of motion 5 to 10 degrees short of full extension, flexion 115.  She is ambulating without assist device.    Objective: Patient appears comfortable, very pleasant.  Right knee incision is well-healed.  She demonstrates extension within 5 degrees of full, flexion to 115.  Knee is appropriate stable to varus and valgus stress throughout range of motion.      Imaging: X-rays of the right knee and full-length films personally viewed, independently interpreted and radiology report read.  Full-length x-rays demonstrate mechanical axis falling through the center of the knee joint, center of the trochlear groove.  Dedicated knee films with tibial component within 1 degree of neutral AP mechanical axis, 5 degrees posterior slope.  Patella tracking centrally.      Assessment/Plan: 74-year-old female 6 weeks postop status post right knee replacement.  Patient is doing very well.  Continue activities as tolerated.  No restrictions on the incision.  She is done with physical therapy, states she completed this after 4 visits as she was doing well and doing exercises on her own.  She is done with aspirin DVT prophylaxis.  She is not taking pain medications.  Would have her follow-up at 1 year from date of surgery with repeat x-rays.    Pratik Curry MD, FAAOS  [facility]  Phone [phone]  Fax [phone]

## 2024-05-16 ENCOUNTER — APPOINTMENT (OUTPATIENT)
Dept: PHYSICAL THERAPY | Age: 74
End: 2024-05-16
Attending: ORTHOPAEDIC SURGERY
Payer: MEDICARE

## 2024-05-20 ENCOUNTER — APPOINTMENT (OUTPATIENT)
Dept: PHYSICAL THERAPY | Age: 74
End: 2024-05-20
Attending: ORTHOPAEDIC SURGERY
Payer: MEDICARE

## 2024-05-21 ENCOUNTER — TELEPHONE (OUTPATIENT)
Dept: INTERNAL MEDICINE CLINIC | Facility: CLINIC | Age: 74
End: 2024-05-21

## 2024-05-21 NOTE — TELEPHONE ENCOUNTER
FYI - patient fell at home suffered right ankle fx  Is at [facility].  To be transferred to [facility] rehab possibly as soon as today

## 2024-05-23 ENCOUNTER — APPOINTMENT (OUTPATIENT)
Dept: PHYSICAL THERAPY | Age: 74
End: 2024-05-23
Attending: ORTHOPAEDIC SURGERY
Payer: MEDICARE

## 2024-05-23 DIAGNOSIS — I10 PRIMARY HYPERTENSION: ICD-10-CM

## 2024-05-23 RX ORDER — AMLODIPINE BESYLATE 5 MG/1
10 TABLET ORAL DAILY
Qty: 180 TABLET | Refills: 0 | Status: SHIPPED | OUTPATIENT
Start: 2024-05-23

## 2024-05-28 ENCOUNTER — PATIENT MESSAGE (OUTPATIENT)
Dept: INTERNAL MEDICINE CLINIC | Facility: CLINIC | Age: 74
End: 2024-05-28

## 2024-05-28 NOTE — TELEPHONE ENCOUNTER
From: Makenzie Reyes  To: [place] Knox  Sent: 5/28/2024 12:07 PM CDT  Subject: Sukhjinder    Ronni Mcculloughymarlene fell and broke my ankle. I was taken to the closest hospital, [facility] in [place]. I will be in rehab for 3 weeks. I have a disability form that needs your signature. I was planning on bringing it for my annual.  I will mail the form with a self addressed return envelope. Thank you for your help   I appreciate it   Hope to see you before fall. Valentina   Thanks, Makenzie

## 2024-05-30 ENCOUNTER — APPOINTMENT (OUTPATIENT)
Dept: PHYSICAL THERAPY | Age: 74
End: 2024-05-30
Attending: ORTHOPAEDIC SURGERY
Payer: MEDICARE

## 2024-06-07 DIAGNOSIS — I10 PRIMARY HYPERTENSION: ICD-10-CM

## 2024-06-07 RX ORDER — TRIAMTERENE AND HYDROCHLOROTHIAZIDE 37.5; 25 MG/1; MG/1
1 TABLET ORAL DAILY
Qty: 90 TABLET | Refills: 1 | Status: SHIPPED | OUTPATIENT
Start: 2024-06-07

## 2024-06-07 RX ORDER — LEVOTHYROXINE SODIUM 0.03 MG/1
25 TABLET ORAL
Qty: 90 TABLET | Refills: 3 | Status: SHIPPED | OUTPATIENT
Start: 2024-06-07

## 2024-06-07 RX ORDER — ATORVASTATIN CALCIUM 20 MG/1
TABLET, FILM COATED ORAL
Qty: 90 TABLET | Refills: 3 | Status: SHIPPED | OUTPATIENT
Start: 2024-06-07

## 2024-06-07 NOTE — TELEPHONE ENCOUNTER
Atorvastatin 20 mg  Filled 8-23-23  Qty 90  2 refills  Future Appointments   Date Time Provider Department Center   6/12/2024 10:15 AM [facility]   7/11/2024  7:30 AM Marco Antonio Guzman MD [facility] ECC SUB GI   LOV 1-24-24 SM    Escitalopram 20 mg  Filled 3-26-24  Qty 90  0 refills    Levothyroxine 25 mcg  Filled 3-26-24  Qty 90  0 refills  Future Appointments   Date Time Provider Department Center   6/12/2024 10:15 AM [facility]   7/11/2024  7:30 AM Macro Antonio Guzman MD [facility] ECC SUB GI   LOV 1-24-24 SM    Triamterene-hydrochlorothiazide 37.5-25 mg  Filled 3-26-24  Qty 90  0 refills  Future Appointments   Date Time Provider Department Center   6/12/2024 10:15 AM [facility]   7/11/2024  7:30 AM Marco Antonio Guzman MD [facility] ECC SUB GI   LOV 3-11-24 SM

## 2024-06-08 RX ORDER — ESCITALOPRAM OXALATE 20 MG/1
20 TABLET ORAL DAILY
Qty: 90 TABLET | Refills: 0 | Status: SHIPPED | OUTPATIENT
Start: 2024-06-08

## 2024-06-10 NOTE — TELEPHONE ENCOUNTER
- Call receive from patient requesting antibiotics for before dentist appointment. She told me her pharmacy was [facility] in [place] on [address].     - Patient's history includes:  DX:   Right calf high grade MFH.   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    Copies made and placed to scan and in accordion above the sink.    Sent form out in the mail

## 2024-06-13 DIAGNOSIS — G35 MULTIPLE SCLEROSIS (HCC): ICD-10-CM

## 2024-06-13 DIAGNOSIS — G25.81 RLS (RESTLESS LEGS SYNDROME): ICD-10-CM

## 2024-06-13 RX ORDER — BACLOFEN 20 MG/1
20 TABLET ORAL DAILY
Qty: 90 TABLET | Refills: 0 | Status: SHIPPED | OUTPATIENT
Start: 2024-06-13

## 2024-06-13 NOTE — TELEPHONE ENCOUNTER
Baclofen 20 mg  Filled 3-26-24  Qty 90  0 refills  Future Appointments   Date Time Provider Department Center   7/11/2024  7:30 AM Marco Antonio Guzman MD [facility] ECC SUB GI   LOV 1-24-24

## 2024-06-14 ENCOUNTER — TELEPHONE (OUTPATIENT)
Dept: INTERNAL MEDICINE CLINIC | Facility: CLINIC | Age: 74
End: 2024-06-14

## 2024-06-14 DIAGNOSIS — Z96.652 STATUS POST LEFT KNEE REPLACEMENT: ICD-10-CM

## 2024-06-14 DIAGNOSIS — M17.12 ARTHRITIS OF LEFT KNEE: Primary | ICD-10-CM

## 2024-06-14 DIAGNOSIS — M17.11 ARTHRITIS OF RIGHT KNEE: ICD-10-CM

## 2024-06-14 DIAGNOSIS — G35 MULTIPLE SCLEROSIS (HCC): ICD-10-CM

## 2024-06-14 NOTE — TELEPHONE ENCOUNTER
Referral placed for HH.  In OPEN status 6/14,   Will hold till authorized.     Once authorized, will fax to [facility] at   Fax # [phone]. Will also fax over OV notes from 3/11/2024    Ph # [phone]    PPW in MARYSE Arambula's triage bin.

## 2024-06-14 NOTE — TELEPHONE ENCOUNTER
Incoming fax from [facility]     Request for Home Health Services    Placed in  in basket for review

## 2024-06-21 ENCOUNTER — PATIENT MESSAGE (OUTPATIENT)
Dept: INTERNAL MEDICINE CLINIC | Facility: CLINIC | Age: 74
End: 2024-06-21

## 2024-06-21 DIAGNOSIS — T81.31XA POSTOPERATIVE DEHISCENCE OF SKIN WOUND, INITIAL ENCOUNTER: Primary | ICD-10-CM

## 2024-06-24 RX ORDER — CELECOXIB 200 MG/1
200 CAPSULE ORAL DAILY
Qty: 30 CAPSULE | Refills: 0 | Status: SHIPPED | OUTPATIENT
Start: 2024-06-24

## 2024-06-24 NOTE — TELEPHONE ENCOUNTER
Celecoxib  DOS: 04/04/24  Last OV: 05/15/24  Last refill date: 04/4/24     #/refills: 30/0  Upcoming appt:   Future Appointments   Date Time Provider Department Center   6/24/2024  9:00 AM Albania Knox APRN [facility] 8 [facility] [place]   7/11/2024  7:30 AM Marco Antonio Guzman MD [facility] ECC SUB GI     03/11/24  BUN  9 - 23 mg/dL 18   Creatinine  0.55 - 1.02 mg/dL 0.66     eGFR-Cr  >=60 mL/min/1.73m2 93

## 2024-06-24 NOTE — TELEPHONE ENCOUNTER
From: Makenzie Reyes  To: Alyssa Garcia  Sent: 6/21/2024 4:08 PM CDT  Subject: referral for wound care    Hi Dr. Garcia, My Home Health nurse came out today and didn't like the way the wound looked. I called 's office (he did my ankle surgery on May 18th.) After seeing a picture that I had sent him, he referred me to a Wound   Clinic, in [place]. I have an appointment on July 1st. However, I was told I need a referral from my primary doctor. Please send a referral to [facility]  The fax number is: .  [address]  [place], [place]  Thank you,   Makenzie Reyes

## 2024-06-24 NOTE — TELEPHONE ENCOUNTER
This RN called [facility] [phone] 2x, no answer, busy. Will attempt to call again later.

## 2024-06-24 NOTE — TELEPHONE ENCOUNTER
Spoke with Pt this AM .Pt may need to go thru our HMO. But you can put it thru and see what happens.   Pt also needs supplies for her wound. Pt has home health [facility] coming out to do her wound right now and they need more supplies. Pt was told to call us for them. Please call [facility] at [phone] to figure this out for Pt. Thank you.

## 2024-06-24 NOTE — TELEPHONE ENCOUNTER
Signed order for external referral to wound clinic. Sent in-basket message to referral dept to state if pt can go to external wound clinic or if they have to go to [facility] wound care clinic.

## 2024-06-24 NOTE — TELEPHONE ENCOUNTER
[facility] needs orders for wound care supplies. Nurses can order or we can order ourselves if we have contact with the supplier. MARYSE Bella working directly with pt at [phone]. [facility] 6/24/24

## 2024-06-25 NOTE — TELEPHONE ENCOUNTER
[facility], [phone]    S/w Karey RN from [facility] to discuss referrals for Wound Care. This RN asked Karey what kind of orders they needed in regards to supplies as MARYSE Bella mentioned that she just needed an order so she can order supplies. RN unsure as pt hasn't been seen in wound care clinic and there are no orders to follow in regards to how and when to change dressing. Notified  RN that pt has upcoming appt with wound clinic on July 1st. Referral faxed to [phone].

## 2024-06-25 NOTE — TELEPHONE ENCOUNTER
RN at hospital that discharged pt gave her supplies for two days. Pt wasn't given any instructions on how to change the wound. CHRISTIANO Bella RN was given the supplies given to pt from [place]. CHRISTIANO RN asked office if they can order supplies, but there's no order for supplies. Pt has upcoming appt with surgeon on July 1st.    SM: Here is the list from CHRISTIANO Bella RN    Supply List to order:  Xetuvit silicone border 5x5  Border gauze size 4x10  Xeroform petroleum dressing  Tubigrip, ankle is 9cm  Wound cleanser  4x4 Gauze    Fax#: [phone]

## 2024-06-25 NOTE — TELEPHONE ENCOUNTER
This RN called MARYSE Bella from [facility]  to clarify orders.  RN is requesting a script for supplies and an order for wound care instructions as this was not mentioned to pt or on discharge papers for HH RN. Discharging hospital only gave pt enough supplies to change dressing two times at home.  RN states that there is a wound on the R lateral lower leg and R inner ankle. R inner ankle has yellow slough. Next appt for pt to see Wound Care is July 1st.     SM:  RN is requesting a script for supplies and wound care instructions until pt goes to wound care clinic July 1st, please advise. TY!    Supply List to order:  Zetuvit silicone border 5x5  Border gauze size 4x10  Xeroform petroleum dressing  Tubigrip, ankle is 9cm  Wound cleanser  4x4 Gauze

## 2024-06-25 NOTE — TELEPHONE ENCOUNTER
So what durable medical place does Pt's insurance use so we can get a form from them and order them?

## 2024-06-25 NOTE — TELEPHONE ENCOUNTER
This RN obtained script order from HUSSAIN Fabian. Attempted to fax order (as well as referral for wound care clinic) to [facility] at [phone].

## 2024-06-27 ENCOUNTER — HOSPITAL ENCOUNTER (EMERGENCY)
Facility: HOSPITAL | Age: 74
Discharge: HOME | End: 2024-06-27
Attending: EMERGENCY MEDICINE
Payer: MEDICARE

## 2024-06-27 VITALS
DIASTOLIC BLOOD PRESSURE: 69 MMHG | OXYGEN SATURATION: 97 % | HEART RATE: 65 BPM | TEMPERATURE: 98 F | RESPIRATION RATE: 18 BRPM | WEIGHT: 180 LBS | HEIGHT: 64 IN | SYSTOLIC BLOOD PRESSURE: 127 MMHG | BODY MASS INDEX: 30.73 KG/M2

## 2024-06-27 DIAGNOSIS — T14.8XXD DELAYED WOUND HEALING: Primary | ICD-10-CM

## 2024-06-27 PROCEDURE — 99282 EMERGENCY DEPT VISIT SF MDM: CPT

## 2024-06-27 RX ORDER — CLINDAMYCIN HYDROCHLORIDE 300 MG/1
300 CAPSULE ORAL 3 TIMES DAILY
COMMUNITY

## 2024-06-27 NOTE — CM/SW NOTE
ER RNCM asked to see patient to help set up wound care outpatient with [facility]. [facility] states they were 4 weeks out but was able to squeeze in an appointment on July 18th. Spoke with patient who states she has a July 1st appointment at [facility] and would rather keep that one since it is sooner.     Left message with [facility] asking to disregard.

## 2024-06-27 NOTE — ED PROVIDER NOTES
Patient Seen in: [facility] Emergency Department      History     Chief Complaint   Patient presents with    Cellulitis     Stated Complaint: cellulitis    Subjective:   HPI    This is a 74-year-old female past medical history of hypothyroidism, MS, hypertension, Alas's esophagus, hyperlipidemia, osteoarthritis who presents for postop wound evaluation.  Patient is status post right total knee arthroplasty on 4/4/2024 by Dr. Pratik Curry, trimalleolar fracture right lower leg with delayed wound healing.  Patient was evaluated by orthopedic surgeon yesterday in the office by Dr. Stallworth and on the medial side of her leg that is not healing.  Patient states that the wound has been present for over 3 weeks and she thinks that clinically it does look better.  The wound size is getting smaller.  No drainage.  No fever.  She has been on clindamycin.  She finishes it tomorrow.  He told her she needs to go to wound clinic.  She cannot get in till Monday so he told her to come to the emergency room today.  She came to our hospital for evaluation.  Presents with her  who is at bedside.    Objective:   Past Medical History:    Arthritis    BACK PAIN    in morning    Back problem    Alas's esophagus    Basal cell carcinoma of ear    excision of BCC lesion Left ear    Change in hair    Chronic heartburn    Constipation    Depression    Disorder of thyroid    Easy bruising    Esophageal reflux    Essential hypertension    Fatigue    Heartburn    Hemorrhoids    High blood pressure    High cholesterol    Hx of motion sickness    Hyperglycemia    Hyperlipidemia    Increased weakness when ambulating    Multiple sclerosis (HCC)    chronic side effects include fatique and \"brain fog\" and decreased vision of left eye, left sided weakness due to MS    Muscle spasms of both lower extremities    Muscle weakness    has MS    OSTEOARTHRITIS    Osteoarthritis    Osteoarthrosis involving lower leg    Log Date: 10/26/2012      OTHER DISEASES    MS dx  Dr. Ramachandran    Pain in joints    Personal history of colonic polyps    PONV (postoperative nausea and vomiting)    Unspecified essential hypertension    Visual impairment    decreased vision left eye glasses    Wears glasses              Past Surgical History:   Procedure Laterality Date    Appendectomy  1964    Back surgery      cervical surgery/ACDF x 3 with Dr. Webster          x2    Colonoscopy  2003    Colonoscopy      Excis lumbar disk,one level      Foot/toes surgery proc unlisted  2005    hemmertoes operation Rt toe    Hysterectomy      Knee replacement surgery  2022    Knee replacement surgery Right 2024    Dr.Ryan Curry    Laminectomy,lumbar  2010    L4 L5 fusion    Oophorectomy Bilateral     Removal of ovarian cyst(s)      x2    Thyroidectomy      partial thyroidectomy Lt    Tonsillectomy      Total abdom hysterectomy      w/ removal of both ovaries d/t DBT and fibroid    Upper gi endoscopy,exam                  Social History     Socioeconomic History    Marital status:    Tobacco Use    Smoking status: Never    Smokeless tobacco: Never    Tobacco comments:     Updated 24   Vaping Use    Vaping status: Never Used   Substance and Sexual Activity    Alcohol use: Yes     Alcohol/week: 2.0 standard drinks of alcohol     Types: 2 Glasses of wine per week     Comment: 2 glasses of wine on a weekend    Drug use: Never   Other Topics Concern    Caffeine Concern Yes     Comment: 2 cups of coffee daily.    Exercise Yes     Comment: 4x/week.      Social Determinants of Health     Food Insecurity: No Food Insecurity (2024)    Food Insecurity     Food Insecurity: Never true   Transportation Needs: No Transportation Needs (2024)    Transportation Needs     Lack of Transportation: No   Housing Stability: Low Risk  (2024)    Housing Stability     Housing Instability: No              Review of Systems    Positive for stated  Chief Complaint: Cellulitis    Other systems are as noted in HPI.  Constitutional and vital signs reviewed.      All other systems reviewed and negative except as noted above.    Physical Exam     ED Triage Vitals [06/27/24 1022]   /69   Pulse 65   Resp 18   Temp 98.1 °F (36.7 °C)   Temp src Temporal   SpO2 97 %   O2 Device None (Room air)       Current Vitals:   Vital Signs  BP: 127/69  Pulse: 65  Resp: 18  Temp: 98.1 °F (36.7 °C)  Temp src: Temporal    Oxygen Therapy  SpO2: 97 %  O2 Device: None (Room air)            Physical Exam      GENERAL: Awake, alert oriented x3, nontoxic appearing.   SKIN: Normal, warm, and dry.  HEENT:  Pupils equally round and reactive to light. Conjuctiva clear.  Oropharynx is clear and moist.   Lungs: Clear to auscultation bilaterally with no rales, no retractions, and no wheezing.  HEART:  Regular rate and rhythm. S1 and S2. No murmurs, no rubs or gallops.   ABDOMEN: Soft, nontender and nondistended. Normoactive bowel sounds. No rebound. No guarding.   EXTREMITIES: Wound to the medial side of the ankle.  There is no drainage.  No erythema.  Linear wound from surgical incision on right leg appears to be healing well.  Almost completely closed.                                  ED Course   Labs Reviewed - No data to display                   MDM        This is a 74-year-old female past medical history of hypothyroidism, MS, hypertension, Alas's esophagus, hyperlipidemia, osteoarthritis who presents for postop wound evaluation.  Differential includes cellulitis, delayed wound healing.        Patient is nontoxic-appearing.  This wound has been present for 3 weeks.  Patient reports that is getting smaller.  There is no obvious signs of infection.  She can follow-up at wound clinic.  Attempted to get in her sooner but unable to do so she will follow-up at the [facility] wound clinic on Monday as scheduled.      New dressing was placed.  She is to leave it in place until she sees  wound clinic on Monday.  Complete course of clindamycin.  Return if worse.  Patient discharged home in good condition.      Disposition and Plan     Clinical Impression:  1. Delayed wound healing         Disposition:  Discharge  6/27/2024 12:36 pm    Follow-up:  wounc clinic    Follow up on 7/1/2024            Medications Prescribed:  Current Discharge Medication List

## 2024-06-27 NOTE — DISCHARGE INSTRUCTIONS
Wound covered until evaluated by wound clinic  Complete clindamycin as prescribed  Return for any problems

## 2024-06-27 NOTE — ED INITIAL ASSESSMENT (HPI)
Pt ambulatory with walker to ED s/p ankle surgery x6 weeks ago. Seen by surgeon yesterday, surgical site not closing and redness around site. Denies pain, denies fevers.     Pt has been on clindamycin x1 week.

## 2024-06-28 ENCOUNTER — PATIENT OUTREACH (OUTPATIENT)
Dept: CASE MANAGEMENT | Age: 74
End: 2024-06-28

## 2024-06-28 ENCOUNTER — TELEPHONE (OUTPATIENT)
Dept: INTERNAL MEDICINE CLINIC | Facility: CLINIC | Age: 74
End: 2024-06-28

## 2024-06-28 DIAGNOSIS — Z02.9 ENCOUNTERS FOR UNSPECIFIED ADMINISTRATIVE PURPOSE: Primary | ICD-10-CM

## 2024-06-28 PROCEDURE — 1111F DSCHRG MED/CURRENT MED MERGE: CPT

## 2024-06-28 NOTE — TELEPHONE ENCOUNTER
Contacted patient to schedule hospital follow up appointment, she states that she would like to delay appointment at this time. Patient states that she is going to wound care as well as physical therapy.

## 2024-06-28 NOTE — TELEPHONE ENCOUNTER
Spoke to patient for Transitions of Care call today.  Patient does not have an appointment scheduled at this time.  ER Follow-up appointment needed by 7/4/24.  Please advise.    BOOK BY DATE: 7/4/24    Clinical staff:  Please follow-up with patient and try to get them to schedule as patient would greatly benefit from ER Follow-up.  Thank you!     NCM attempted to schedule, pt declined states has several other appointments at this time. STEPHANIE

## 2024-06-28 NOTE — PROGRESS NOTES
Transitions of Care Navigation  Discharge Date: 24  Contact Date: 2024    Transitions of Care Assessment:  ROSE Initial Assessment    General:  Assessment completed with: Patient  Patient Subjective: NCM spoke with pt states is feeling fine. pt is relieved to know her right lower extremity is healing. pt denies any pain, fevers, chills, nausea, vomiting, shortness of breath, chest pain or any others. pt states has upcoming appointment with wound clinic on 24.  Chief Complaint: Cellulitis  Verify patient name and  with patient/ caregiver: Yes    Hospital Stay/Discharge:  Tell me what you understand of why you were in the hospital or emergency department: N/A  Prior to leaving the hospital were your Discharge Instructions reviewed with you?: Yes  Did you receive a copy of your written Discharge Instructions?: Yes  What questions do you have about your Discharge Instructions?: No questions at this time.  Do you feel better or worse since you left the hospital or emergency department?: Better    Follow - Up Appointment:  Do you have a follow-up appointment?: No  Are there any barriers to getting to your follow-up appointment?: No    Home Health/DME:  Prior to leaving the hospital was Home Health (HH) arranged for you?: N/A  Are HH needs identified by staff during the assessment?: No     Prior to leaving the hospital or emergency department was Durable Medical Equipment (DME), medical supplies, or infusions arranged for you?: N/A  Are DME/medical supply/infusions needs identified by staff during this assessment?: No     Medications/Diet:  Did any of your medications change, during or after your hospital stay or ED visit?: No  Do you understand what your medications are for and possible side effects?: Yes  Are there any reasons that keep you from taking your medication as prescribed?: No  Any concerns about medication refills?: No    Were you given a different diet per your Discharge Instructions?:  No  Reason: N/A     Questions/Concerns:  Do you have any questions or concerns that have not been discussed?: No     ROSE Follow-up Assessment    General:  Assessment completed with: Patient  Patient Subjective: NCM spoke with pt states is feeling fine. pt is relieved to know her right lower extremity is healing. pt denies any pain, fevers, chills, nausea, vomiting, shortness of breath, chest pain or any others. pt states has upcoming appointment with wound clinic on Monday 7/1/24.  Chief Complaint: Cellulitis  Community Resources: Other (None)              Nursing Interventions:  All discharge instructions reviewed with the patient. Reviewed when to call MD vs when to call 911 or go the ED. Educated patient on the importance of taking all meds as prescribed as well as close f/u with PCP/specialists. Pt verbalized understanding and will contact the office with any further questions or concerns. Patient denies fevers, chills, nausea, vomiting, shortness of breath, chest pain, or any other symptoms at this time.  NCM attempted to schedule HFU, patient declined will call PCP/TCC office directly. NC sent TE to PCP office re: assistance in scheduling HFU appt. NC provided contact information for any further questions/concerns. Patient verbalized understanding and agreeable.       Medications:Reviewed medication list.  Medications are up to date.  Current Outpatient Medications   Medication Sig Dispense Refill    clindamycin 300 MG Oral Cap Take 1 capsule (300 mg total) by mouth 3 (three) times daily.      celecoxib 200 MG Oral Cap Take 1 capsule (200 mg total) by mouth daily. 30 capsule 0    buPROPion  MG Oral Tablet 24 Hr Take 1 tablet (150 mg total) by mouth daily. 30 tablet 1    baclofen 20 MG Oral Tab Take 1 tablet (20 mg total) by mouth daily. 90 tablet 0    escitalopram 20 MG Oral Tab Take 1 tablet (20 mg total) by mouth daily. 90 tablet 0    ATORVASTATIN 20 MG Oral Tab TAKE 1 TABLET EVERY NIGHT 90 tablet 3     Triamterene-HCTZ 37.5-25 MG Oral Tab TAKE 1 TABLET EVERY DAY 90 tablet 1    LEVOTHYROXINE 25 MCG Oral Tab TAKE 1 TABLET (25 MCG TOTAL) BY MOUTH BEFORE BREAKFAST. 90 tablet 3    amLODIPine 5 MG Oral Tab TAKE 2 TABLETS (10 MG TOTAL) BY MOUTH DAILY. 180 tablet 0    HYDROcodone-acetaminophen 5-325 MG Oral Tab Take 1-2 tablets by mouth every 4 (four) hours as needed. 40 tablet 0    senna-docusate 8.6-50 MG Oral Tab Take 1 tablet by mouth 2 (two) times daily as needed. 30 tablet 0    Omeprazole 40 MG Oral Capsule Delayed Release Take one tablet (40 mg total) by mouth once daily, 30 minutes prior to breakfast. 90 capsule 2    CALCIUM OR Take 1,000 mg by mouth daily.      Multiple Vitamin (DAILY VALUE MULTIVITAMIN) Oral Tab Take 1 tablet by mouth daily.      Cholecalciferol (VITAMIN D) 2000 UNITS Oral Cap Take 1 capsule (2,000 Units total) by mouth daily.           Follow-up Appointments:      Transitional Care Clinic  Was TCC Ordered: No      Primary Care Provider (If no TCC appointment)  Does patient already have a PCP appointment scheduled? No  Nurse Care Manager Attempted to schedule PCP office ER Follow-up appointment with patient   -If no appointment scheduled: Explain : pt declined prefers to follow up with specialist.     Specialist  Does the patient have any other follow-up appointment(s) need to be scheduled? No     Book By Date: 7/4/24

## 2024-07-02 ENCOUNTER — ORDER TRANSCRIPTION (OUTPATIENT)
Dept: PHYSICAL THERAPY | Facility: HOSPITAL | Age: 74
End: 2024-07-02

## 2024-07-02 DIAGNOSIS — Z48.89 AFTERCARE FOLLOWING SURGERY: Primary | ICD-10-CM

## 2024-07-03 ENCOUNTER — TELEPHONE (OUTPATIENT)
Dept: PHYSICAL THERAPY | Facility: HOSPITAL | Age: 74
End: 2024-07-03

## 2024-07-05 ENCOUNTER — OFFICE VISIT (OUTPATIENT)
Dept: PHYSICAL THERAPY | Age: 74
End: 2024-07-05
Attending: PHYSICIAN ASSISTANT
Payer: MEDICARE

## 2024-07-05 DIAGNOSIS — Z48.89 AFTERCARE FOLLOWING SURGERY: Primary | ICD-10-CM

## 2024-07-05 PROCEDURE — 97162 PT EVAL MOD COMPLEX 30 MIN: CPT

## 2024-07-05 PROCEDURE — 97140 MANUAL THERAPY 1/> REGIONS: CPT

## 2024-07-05 NOTE — PROGRESS NOTES
LOWER EXTREMITY EVALUATION:     Diagnosis:   Aftercare following surgery (Z48.89)     Aftercare following surgery of the musculoskeletal system   Displaced trimalleolar fracture of right lower leg, subsequent encounter for closed fracture with routine healing   ,  S/P right ankle bimalleolar ORIF   Referring Provider:   Gregory Zheng PA-C Date of Evaluation:   7/5/2024    Precautions:   WBAT with CAM walker boot with A.D except when in therapy , Fall risk, MS  Next MD visit:   Wound Dr 7/8/24  Ortho  8/14/24  Date of Surgery: 5/18/24  R ankle bimalleolar ORIF       PATIENT SUMMARY   Makenzie Reyes is a 74 year old female who presents to therapy today S/P R ankle bimalleolar ORIF on 5/18/2024.The patient has lost her balance and suffered displaced trimalleolar fracture from that fall.She is known to this clinic from previous therapy treatments from knee replacements last year on Left and earlier this year on the Right knee. The patient expresses her frustration and feeling down about her physical condition since this happened.She also has delayed healing of surgical wound and underwent debridement and under wound care treatment with follow up in this coming week.Currently ambulates WBAT with CAM boot and walker , she can only  take the boot off for therapy treatments/activities.        Pt describes pain level at worst <4/10, current 0-2/10, at best 0/10.   Current functional limitations include: not going outdoors, limited walking distances and difficulty with uneven surfaces.   Past medical history was reviewed with Makenzie.     Makenzie describes prior level of function not limited. Pt goals include To ambulate normally again without CAM boot or A.D..      ASSESSMENT  Makenzie presents to physical therapy evaluation S/P Right ankle bimalleolar ORIF on 5/18/24. The results of the objective tests and measures show decrease ankle AROM and strength, mild pitting edema, WBAT on CAM boot, only off during  therapy per Ortho,gait abnormality, needing RW for stability.  These findings contribute to functional limitations reported in patient summary above. Pt and PT discussed evaluation findings, pathology, POC and HEP.  Pt voiced understanding and performs HEP correctly without reported pain. Skilled Physical Therapy is medically necessary to address the above impairments and reach functional goals.     OBJECTIVE:   Gait: pt ambulates on level ground with assistive device of RW wearing CAM boot on RLE, decreased step length R, and decreased stance phase RLE  Observation: Pitting edema on dorsum of R foot 2+    AROM: (* denotes performed with pain)  Foot/Ankle (deg)   DF: R 5 deg; L WFL  PF: R 12; L WFL  INV: R 10; L  WFL  EV: R 4 deg  L WFL  PROM Great toe ext: R WFL; L WFL       Strength/MMT: (* denotes performed with pain)  Foot/Ankle   DF: R 3-/5; L 5/5  PF: R 2-/5; L 5/5  INV: R 2-/5; L 5/5  EV: R 2-/5; L 5/5     Palpation: no significant TPP on dorsum of foot  Accessory motion: Mod-sig decrease on talocrural & subtalar joint on RLE    Flexibility:  Gastroc-soleus: Mod decreased on R    Balance: NT.    LEFS Score  LEFS Score: 41.25 % (7/4/2024  2:43 PM)      Today’s Treatment and Response:   Pt education was provided on exam findings, treatment diagnosis, treatment plan, expectations, and prognosis. Pt was also provided recommendations for activity modifications, possible soreness after evaluation, and modalities as needed [ice/heat].  Manual Therapy:  STM to gastrocsoleus and dorsum of foot, PROM on all planes, for 10 min.  Patient was instructed in and issued a HEP for: seated stretches with strap/towed, AROM on all planes    Charges: PT Eval Moderate Complexity, Manual Tx 1      Total Timed Treatment: 10 min     Total Treatment Time: 45 min     Based on clinical rationale and outcome measures, this evaluation involved Moderate Complexity decision making due to 1-2 personal factors/comorbidities, 3 body structures  involved/activity limitations, and evolving symptoms including  improved ability to ambulate without A.D .  PLAN OF CARE:    Goals: (to be met in 12 visits)  Pt will demonstrate improved DF AROM to >= 10 degrees to promote proper foot clearance during gait and greater ease descending stairs without compensation  Pt will have increased ankle strength to 5/5 throughout for improved ankle control with ADLs such as prolonged gait and stair negotiation (  Pt will have improved SLS to >5s for increased ankle stability with ambulation on uneven surfaces such as gravel and grass   Pt will transition to walking without CAM boot with least to no A.D with good stability to be able to go to stores for functional distances  Pt will report able to get back to her low impact group exercises without significant ankle pain  Pt will be independent and compliant with comprehensive HEP to maintain progress achieved in PT      Frequency / Duration: Patient will be seen for 2 x/week or a total of 12 visits over a 90 day period. Treatment will include: Gait training, Manual Therapy, Neuromuscular Re-education, Therapeutic Activities, Therapeutic Exercise, and Home Exercise Program instruction, modalities as needed for pain.    Education or treatment limitation: None  Rehab Potential:good    Patient/Family/Caregiver was advised of these findings, precautions, and treatment options and has agreed to actively participate in planning and for this course of care.    Thank you for your referral. Please co-sign or sign and return this letter via fax as soon as possible to [phone]. If you have any questions, please contact me at Dept: [phone]    Sincerely,  Electronically signed by therapist: Desiree Pace PT  Physician's certification required: Yes  I certify the need for these services furnished under this plan of treatment and while under my care.    X___________________________________________________  Date____________________    Certification From: 7/5/2024  To:10/3/2024

## 2024-07-09 ENCOUNTER — OFFICE VISIT (OUTPATIENT)
Dept: PHYSICAL THERAPY | Age: 74
End: 2024-07-09
Attending: PHYSICIAN ASSISTANT
Payer: MEDICARE

## 2024-07-09 DIAGNOSIS — Z48.89 AFTERCARE FOLLOWING SURGERY: Primary | ICD-10-CM

## 2024-07-09 PROCEDURE — 97140 MANUAL THERAPY 1/> REGIONS: CPT

## 2024-07-09 PROCEDURE — 97116 GAIT TRAINING THERAPY: CPT

## 2024-07-09 PROCEDURE — 97110 THERAPEUTIC EXERCISES: CPT

## 2024-07-09 NOTE — PROGRESS NOTES
Diagnosis:   Aftercare following surgery (Z48.89)     Aftercare following surgery of the musculoskeletal system   Displaced trimalleolar fracture of right lower leg, subsequent encounter for closed fracture with routine healing   ,  S/P right ankle bimalleolar ORIF      Referring Provider: Gregory Zheng  Date of Evaluation:    7/5/2024    Precautions:    WBAT with CAM walker boot with A.D except when in therapy, Fall risk, MS  Next MD visit:   none scheduled  Date of Surgery: n/a   Insurance Primary/Secondary: [employer] [facility] / N/A     # Auth Visits: N/A            Subjective: Pt reports good compliance with home exercises :\" I have been moving it more\"    Pain: 0/10 pt denies pain just feeling the pressure from swelling       Objective:   DF: R 8 deg;   PF: R 25 deg  INV: R 20; L    EV: R 15 deg     Gait training with cane  Cam boot on     Assessment: Pt is gaining more AROM today and we are able to add light resistance OKC exercises.Patient brought her cane today and wants to practice with it , she has a harder time using the cane on the left/opposite side of injured ankle but will keep on practicing .      Goals: (to be met in 12 visits)  Pt will demonstrate improved DF AROM to >= 10 degrees to promote proper foot clearance during gait and greater ease descending stairs without compensation  Pt will have increased ankle strength to 5/5 throughout for improved ankle control with ADLs such as prolonged gait and stair negotiation (  Pt will have improved SLS to >5s for increased ankle stability with ambulation on uneven surfaces such as gravel and grass   Pt will transition to walking without CAM boot with least to no A.D with good stability to be able to go to stores for functional distances  Pt will report able to get back to her low impact group exercises without significant ankle pain  Pt will be independent and compliant with comprehensive HEP to maintain progress achieved in PT    Plan: Continue with  plan of care to work on OKC ankle strengthening  Date: 7/9/2024  TX#: 2/12 Date:                 TX#: 3/ Date:                 TX#: 4/ Date:                 TX#: 5/ Date:   Tx#: 6/   Thera Ext 15'       After manual tx:  Big toe ext/flexion 1'x2  Towel scrunches x1'x2  yellow band resisted OKC 3 way ankle strengthening 2 x10  Gait training 12'   With cane needs to get used to laterality as she is more comfortable to use it on R side  VC on sequencing and wt shifting  Recommending even up on opposite side for move level hip         Manual tx :28'       STM to R dorsal and plantar surface, passive ROM/stretch to tolerance metatarsals Gr I-III mobs       HEP: passive ROM with strap on all planes, AROM on all planes   Added: yellow band resisted OKC on all planes, towel srunches, big toe extension/curling /toe separation.    Charges: Thera Ex x1,Manual Tx x2, Gait x 1       Total Timed Treatment: 55 min  Total Treatment Time: 55 min  LOWER EXTREMITY EVALUATION:     Diagnosis:   Aftercare following surgery (Z48.89)     Aftercare following surgery of the musculoskeletal system   Displaced trimalleolar fracture of right lower leg, subsequent encounter for closed fracture with routine healing   ,  S/P right ankle bimalleolar ORIF   Referring Provider:   Gregory Zheng PA-C Date of Evaluation:   7/5/2024    Precautions:   WBAT with CAM walker boot with A.D except when in therapy , Fall risk, MS  Next MD visit:   Wound  7/8/24  Ortho  8/14/24  Date of Surgery: 5/18/24  R ankle bimalleolar ORIF       PATIENT SUMMARY   Makenzie Reyes is a 74 year old female who presents to therapy today S/P R ankle bimalleolar ORIF on 5/18/2024.The patient has lost her balance and suffered displaced trimalleolar fracture from that fall.She is known to this clinic from previous therapy treatments from knee replacements last year on Left and earlier this year on the Right knee. The patient expresses her frustration and feeling down  about her physical condition since this happened.She also has delayed healing of surgical wound and underwent debridement and under wound care treatment with follow up in this coming week.Currently ambulates WBAT with CAM boot and walker , she can only  take the boot off for therapy treatments/activities.        Pt describes pain level at worst <4/10, current 0-2/10, at best 0/10.   Current functional limitations include: not going outdoors, limited walking distances and difficulty with uneven surfaces.   Past medical history was reviewed with Makenzie.     Makenzie describes prior level of function not limited. Pt goals include To ambulate normally again without CAM boot or A.D..      ASSESSMENT  Makenzie presents to physical therapy evaluation S/P Right ankle bimalleolar ORIF on 5/18/24. The results of the objective tests and measures show decrease ankle AROM and strength, mild pitting edema, WBAT on CAM boot, only off during therapy per Ortho,gait abnormality, needing RW for stability.  These findings contribute to functional limitations reported in patient summary above. Pt and PT discussed evaluation findings, pathology, POC and HEP.  Pt voiced understanding and performs HEP correctly without reported pain. Skilled Physical Therapy is medically necessary to address the above impairments and reach functional goals.     OBJECTIVE:   Gait: pt ambulates on level ground with assistive device of RW wearing CAM boot on RLE, decreased step length R, and decreased stance phase RLE  Observation: Pitting edema on dorsum of R foot 2+    AROM: (* denotes performed with pain)  Foot/Ankle (deg)   DF: R 5 deg; L WFL  PF: R 12; L WFL  INV: R 10; L  WFL  EV: R 4 deg  L WFL  PROM Great toe ext: R WFL; L WFL       Strength/MMT: (* denotes performed with pain)  Foot/Ankle   DF: R 3-/5; L 5/5  PF: R 2-/5; L 5/5  INV: R 2-/5; L 5/5  EV: R 2-/5; L 5/5     Palpation: no significant TPP on dorsum of foot  Accessory motion: Mod-sig decrease on  talocrural & subtalar joint on RLE    Flexibility:  Gastroc-soleus: Mod decreased on R    Balance: NT.    LEFS Score  LEFS Score: 41.25 % (7/4/2024  2:43 PM)      Today’s Treatment and Response:   Pt education was provided on exam findings, treatment diagnosis, treatment plan, expectations, and prognosis. Pt was also provided recommendations for activity modifications, possible soreness after evaluation, and modalities as needed [ice/heat].  Manual Therapy:  STM to gastrocsoleus and dorsum of foot, PROM on all planes, for 10 min.  Patient was instructed in and issued a HEP for: seated stretches with strap/towed, AROM on all planes    Charges: PT Eval Moderate Complexity, Manual Tx 1      Total Timed Treatment: 10 min     Total Treatment Time: 45 min     Based on clinical rationale and outcome measures, this evaluation involved Moderate Complexity decision making due to 1-2 personal factors/comorbidities, 3 body structures involved/activity limitations, and evolving symptoms including  improved ability to ambulate without A.D .  PLAN OF CARE:    Goals: (to be met in 12 visits)  Pt will demonstrate improved DF AROM to >= 10 degrees to promote proper foot clearance during gait and greater ease descending stairs without compensation  Pt will have increased ankle strength to 5/5 throughout for improved ankle control with ADLs such as prolonged gait and stair negotiation (  Pt will have improved SLS to >5s for increased ankle stability with ambulation on uneven surfaces such as gravel and grass   Pt will transition to walking without CAM boot with least to no A.D with good stability to be able to go to stores for functional distances  Pt will report able to get back to her low impact group exercises without significant ankle pain  Pt will be independent and compliant with comprehensive HEP to maintain progress achieved in PT      Frequency / Duration: Patient will be seen for 2 x/week or a total of 12 visits over a 90 day  period. Treatment will include: Gait training, Manual Therapy, Neuromuscular Re-education, Therapeutic Activities, Therapeutic Exercise, and Home Exercise Program instruction, modalities as needed for pain.    Education or treatment limitation: None  Rehab Potential:good    Patient/Family/Caregiver was advised of these findings, precautions, and treatment options and has agreed to actively participate in planning and for this course of care.    Thank you for your referral. Please co-sign or sign and return this letter via fax as soon as possible to [phone]. If you have any questions, please contact me at Dept: [phone]    Sincerely,  Electronically signed by therapist: Desiree Pace PT  Physician's certification required: Yes  I certify the need for these services furnished under this plan of treatment and while under my care.    X___________________________________________________ Date____________________    Certification From: 7/5/2024  To:10/3/2024

## 2024-07-12 ENCOUNTER — OFFICE VISIT (OUTPATIENT)
Dept: PHYSICAL THERAPY | Age: 74
End: 2024-07-12
Attending: PHYSICIAN ASSISTANT
Payer: MEDICARE

## 2024-07-12 DIAGNOSIS — Z48.89 AFTERCARE FOLLOWING SURGERY: Primary | ICD-10-CM

## 2024-07-12 PROCEDURE — 97110 THERAPEUTIC EXERCISES: CPT

## 2024-07-12 PROCEDURE — 97112 NEUROMUSCULAR REEDUCATION: CPT

## 2024-07-12 PROCEDURE — 97140 MANUAL THERAPY 1/> REGIONS: CPT

## 2024-07-12 NOTE — PROGRESS NOTES
Diagnosis:   Aftercare following surgery (Z48.89)     Aftercare following surgery of the musculoskeletal system   Displaced trimalleolar fracture of right lower leg, subsequent encounter for closed fracture with routine healing   ,  S/P right ankle bimalleolar ORIF      Referring Provider: Gregory Zheng  Date of Evaluation:    7/5/2024    Precautions:    WBAT with CAM walker boot with A.D except when in therapy, Fall risk, MS  Next MD visit:   none scheduled  Date of Surgery: n/a   Insurance Primary/Secondary: [facility] / N/A     # Auth Visits: N/A            Subjective: Pt reports good compliance with home exercises :\" I have been moving it more and swelling is down\", she also mentions that from her latest wound care appointment it is getting better.    Pain: 0/10 pt denies pain just feeling the pressure from swelling       Objective:   DF: R 8 deg;   PF: R 25 deg  INV: R 20; L    EV: R 15 deg     Gait training with cane  Cam boot on     Assessment:Pt demonstrating progress with AROM and still does not have c/o significant pain.We were able to do more out of the boot exercises , seated rocker board for AROM and CKC ankle/ foot activities  and with the Cam boot on doing standing activities.    Goals: (to be met in 12 visits)  Pt will demonstrate improved DF AROM to >= 10 degrees to promote proper foot clearance during gait and greater ease descending stairs without compensation  Pt will have increased ankle strength to 5/5 throughout for improved ankle control with ADLs such as prolonged gait and stair negotiation (  Pt will have improved SLS to >5s for increased ankle stability with ambulation on uneven surfaces such as gravel and grass   Pt will transition to walking without CAM boot with least to no A.D with good stability to be able to go to stores for functional distances  Pt will report able to get back to her low impact group exercises without significant ankle pain  Pt will be independent and  compliant with comprehensive HEP to maintain progress achieved in PT    Plan: Continue with plan of care to work on OKC ankle strengthening  Date: 7/9/2024  TX#: 2/12 Date: 7/12/2024                TX#: 3/12 Date:                 TX#: 4/ Date:                 TX#: 5/ Date:   Tx#: 6/   Thera Ext 15' Thera Ext 15'      After manual tx:  Big toe ext/flexion 1'x2  Towel scrunches x1'x2  yellow band resisted OKC 3 way ankle strengthening 2 x10  Gait training 12'   With cane needs to get used to laterality as she is more comfortable to use it on R side  VC on sequencing and wt shifting  Recommending even up on opposite side for move level hip   After manual tx:  Ball rolling on plantar surface 1'  Big toe ext/flexion 1'x2  AAROM to AROM on all planes x20 each  Seated AROM on round board CW/CCW x 20 each  Pink bal disc heels/toes presses 1'x2  NeuroReEd 10'  On // bar weight shifting with fwd/lat/bwd direction  Fwd stepping then   Fwd/bwd stepping with min to no hold  Airex steps up/down 1'x2      Manual tx :28' Manual tx :25'      STM to R dorsal and plantar surface, passive ROM/stretch to tolerance metatarsals Gr I-III mobs STM to R dorsal and plantar surface, passive ROM/stretch to tolerance metatarsals Gr I-III       HEP: passive ROM with strap on all planes, AROM on all planes   Added: yellow band resisted OKC on all planes, towel srunches, big toe extension/curling /toe separation.    Charges: Thera Ex x1,Manual Tx x2, Neuromuscular ReEdx 1       Total Timed Treatment: 55 min  Total Treatment Time: 55 min      LOWER EXTREMITY EVALUATION:     Diagnosis:   Aftercare following surgery (Z48.89)     Aftercare following surgery of the musculoskeletal system   Displaced trimalleolar fracture of right lower leg, subsequent encounter for closed fracture with routine healing   ,  S/P right ankle bimalleolar ORIF   Referring Provider:   Gregory Zheng PA-C Date of Evaluation:   7/5/2024    Precautions:   WBAT with CAM  walker boot with A.D except when in therapy , Fall risk, MS  Next MD visit:   Wound  7/8/24  Ortho  8/14/24  Date of Surgery: 5/18/24  R ankle bimalleolar ORIF       PATIENT SUMMARY   Makenzie Reyes is a 74 year old female who presents to therapy today S/P R ankle bimalleolar ORIF on 5/18/2024.The patient has lost her balance and suffered displaced trimalleolar fracture from that fall.She is known to this clinic from previous therapy treatments from knee replacements last year on Left and earlier this year on the Right knee. The patient expresses her frustration and feeling down about her physical condition since this happened.She also has delayed healing of surgical wound and underwent debridement and under wound care treatment with follow up in this coming week.Currently ambulates WBAT with CAM boot and walker , she can only  take the boot off for therapy treatments/activities.        Pt describes pain level at worst <4/10, current 0-2/10, at best 0/10.   Current functional limitations include: not going outdoors, limited walking distances and difficulty with uneven surfaces.   Past medical history was reviewed with Makenzie.     Makenzie describes prior level of function not limited. Pt goals include To ambulate normally again without CAM boot or A.D..      ASSESSMENT  Makenzie presents to physical therapy evaluation S/P Right ankle bimalleolar ORIF on 5/18/24. The results of the objective tests and measures show decrease ankle AROM and strength, mild pitting edema, WBAT on CAM boot, only off during therapy per Ortho,gait abnormality, needing RW for stability.  These findings contribute to functional limitations reported in patient summary above. Pt and PT discussed evaluation findings, pathology, POC and HEP.  Pt voiced understanding and performs HEP correctly without reported pain. Skilled Physical Therapy is medically necessary to address the above impairments and reach functional goals.     OBJECTIVE:    Gait: pt ambulates on level ground with assistive device of RW wearing CAM boot on RLE, decreased step length R, and decreased stance phase RLE  Observation: Pitting edema on dorsum of R foot 2+    AROM: (* denotes performed with pain)  Foot/Ankle (deg)   DF: R 5 deg; L WFL  PF: R 12; L WFL  INV: R 10; L  WFL  EV: R 4 deg  L WFL  PROM Great toe ext: R WFL; L WFL       Strength/MMT: (* denotes performed with pain)  Foot/Ankle   DF: R 3-/5; L 5/5  PF: R 2-/5; L 5/5  INV: R 2-/5; L 5/5  EV: R 2-/5; L 5/5     Palpation: no significant TPP on dorsum of foot  Accessory motion: Mod-sig decrease on talocrural & subtalar joint on RLE    Flexibility:  Gastroc-soleus: Mod decreased on R    Balance: NT.    LEFS Score  LEFS Score: 41.25 % (7/4/2024  2:43 PM)      Today’s Treatment and Response:   Pt education was provided on exam findings, treatment diagnosis, treatment plan, expectations, and prognosis. Pt was also provided recommendations for activity modifications, possible soreness after evaluation, and modalities as needed [ice/heat].  Manual Therapy:  STM to gastrocsoleus and dorsum of foot, PROM on all planes, for 10 min.  Patient was instructed in and issued a HEP for: seated stretches with strap/towed, AROM on all planes    Charges: PT Eval Moderate Complexity, Manual Tx 1      Total Timed Treatment: 10 min     Total Treatment Time: 45 min     Based on clinical rationale and outcome measures, this evaluation involved Moderate Complexity decision making due to 1-2 personal factors/comorbidities, 3 body structures involved/activity limitations, and evolving symptoms including  improved ability to ambulate without A.D .  PLAN OF CARE:    Goals: (to be met in 12 visits)  Pt will demonstrate improved DF AROM to >= 10 degrees to promote proper foot clearance during gait and greater ease descending stairs without compensation  Pt will have increased ankle strength to 5/5 throughout for improved ankle control with ADLs such  as prolonged gait and stair negotiation (  Pt will have improved SLS to >5s for increased ankle stability with ambulation on uneven surfaces such as gravel and grass   Pt will transition to walking without CAM boot with least to no A.D with good stability to be able to go to stores for functional distances  Pt will report able to get back to her low impact group exercises without significant ankle pain  Pt will be independent and compliant with comprehensive HEP to maintain progress achieved in PT      Frequency / Duration: Patient will be seen for 2 x/week or a total of 12 visits over a 90 day period. Treatment will include: Gait training, Manual Therapy, Neuromuscular Re-education, Therapeutic Activities, Therapeutic Exercise, and Home Exercise Program instruction, modalities as needed for pain.    Education or treatment limitation: None  Rehab Potential:good    Patient/Family/Caregiver was advised of these findings, precautions, and treatment options and has agreed to actively participate in planning and for this course of care.    Thank you for your referral. Please co-sign or sign and return this letter via fax as soon as possible to [phone]. If you have any questions, please contact me at Dept: [phone]    Sincerely,  Electronically signed by therapist: Desiree Pace, PT  Physician's certification required: Yes  I certify the need for these services furnished under this plan of treatment and while under my care.    X___________________________________________________ Date____________________    Certification From: 7/5/2024  To:10/3/2024

## 2024-07-15 ENCOUNTER — APPOINTMENT (OUTPATIENT)
Dept: PHYSICAL THERAPY | Age: 74
End: 2024-07-15
Attending: PHYSICIAN ASSISTANT
Payer: MEDICARE

## 2024-07-15 ENCOUNTER — PATIENT OUTREACH (OUTPATIENT)
Dept: INTERNAL MEDICINE CLINIC | Facility: CLINIC | Age: 74
End: 2024-07-15

## 2024-07-15 NOTE — PROGRESS NOTES
Future Appointments   Date Time Provider Department Center   7/18/2024 11:15 AM Desiree Pace PT [facility] [place]   7/30/2024 10:30 AM Delisa Norman, PT [facility] [place]   8/1/2024  2:30 PM Delisa Norman, PT [facility] [place]   8/5/2024 10:30 AM Delisa Norman, PT [facility] [place]   8/12/2024  9:30 AM Desiree Pace PT [facility] [place]   8/15/2024  9:45 AM Desiree Pace PT [facility] [place]   8/20/2024  9:45 AM Desiree Pace PT [facility] [place]   8/22/2024  9:45 AM Desiree Pace PT [facility] [place]   9/18/2024 10:30 AM Albania Knox APRN [facility] [place]

## 2024-07-16 ENCOUNTER — TELEPHONE (OUTPATIENT)
Dept: INTERNAL MEDICINE CLINIC | Facility: CLINIC | Age: 74
End: 2024-07-16

## 2024-07-16 RX ORDER — CELECOXIB 200 MG/1
200 CAPSULE ORAL DAILY
Qty: 30 CAPSULE | Refills: 11 | Status: SHIPPED | OUTPATIENT
Start: 2024-07-16

## 2024-07-16 NOTE — TELEPHONE ENCOUNTER
Celecoxib    DOS: 4/04/24 Right total knee  Last OV: 5/15/24  Last refill date: 6/25/24 #/refills: 30/0  Upcoming appt: none      Component      Latest Ref Rng 3/11/2024   Glucose      70 - 99 mg/dL 111 (H)    Sodium      136 - 145 mmol/L 141    Potassium      3.5 - 5.1 mmol/L 4.5    Chloride      98 - 112 mmol/L 107    Carbon Dioxide, Total      21.0 - 32.0 mmol/L 27.0    ANION GAP      0 - 18 mmol/L 7    BUN      9 - 23 mg/dL 18    CREATININE      0.55 - 1.02 mg/dL 0.66    CALCIUM      8.5 - 10.1 mg/dL 9.8    CALCULATED OSMOLALITY      275 - 295 mOsm/kg 295    EGFR      >=60 mL/min/1.73m2 93    AST (SGOT)      15 - 37 U/L 25    ALT (SGPT)      13 - 56 U/L 27    ALKALINE PHOSPHATASE      55 - 142 U/L 65    Total Bilirubin      0.1 - 2.0 mg/dL 0.5    PROTEIN, TOTAL      6.4 - 8.2 g/dL 7.4    Albumin      3.4 - 5.0 g/dL 3.9    Globulin      2.8 - 4.4 g/dL 3.5    A/G Ratio      1.0 - 2.0  1.1    Patient Fasting for CMP? No       Legend:  (H) High

## 2024-07-17 ENCOUNTER — APPOINTMENT (OUTPATIENT)
Dept: PHYSICAL THERAPY | Age: 74
End: 2024-07-17
Attending: PHYSICIAN ASSISTANT
Payer: MEDICARE

## 2024-07-18 ENCOUNTER — APPOINTMENT (OUTPATIENT)
Dept: WOUND CARE | Facility: HOSPITAL | Age: 74
End: 2024-07-18
Attending: NURSE PRACTITIONER
Payer: MEDICARE

## 2024-07-18 ENCOUNTER — TELEPHONE (OUTPATIENT)
Dept: INTERNAL MEDICINE CLINIC | Facility: CLINIC | Age: 74
End: 2024-07-18

## 2024-07-18 ENCOUNTER — OFFICE VISIT (OUTPATIENT)
Dept: PHYSICAL THERAPY | Age: 74
End: 2024-07-18
Attending: PHYSICIAN ASSISTANT
Payer: MEDICARE

## 2024-07-18 DIAGNOSIS — Z48.89 AFTERCARE FOLLOWING SURGERY: Primary | ICD-10-CM

## 2024-07-18 PROCEDURE — 97110 THERAPEUTIC EXERCISES: CPT

## 2024-07-18 NOTE — PROGRESS NOTES
Diagnosis:   Aftercare following surgery (Z48.89)     Aftercare following surgery of the musculoskeletal system   Displaced trimalleolar fracture of right lower leg, subsequent encounter for closed fracture with routine healing   ,  S/P right ankle bimalleolar ORIF      Referring Provider: Gregory Zheng  Date of Evaluation:    7/5/2024    Precautions:    WBAT with CAM walker boot with A.D except when in therapy, Fall risk, MS  Next MD visit:   none scheduled  Date of Surgery: n/a   [facility]A     # Auth Visits: N/A            Subjective: Pt states the wound on the medial part is not as healed as the Dr expect it to be so the are putting compression to lessen the swelling, she reportes there is 2 layers of compression on her leg right now and to leave it till her next appointment next week.    Pain: 0/10 pt denies pain just feeling the pressure from swelling       Objective:     7/12/2024  DF: R 8 deg;   PF: R 25 deg  INV: R 20; L    EV: R 15 deg     Gait training with cane  Cam boot on     Assessment:Pt was given more seated exercises today without the cam boot.She is demonstrating improved ankle mobility and still denies significant pain but just pressure from swelling or a stretching discomfort.      Goals: (to be met in 12 visits)  Pt will demonstrate improved DF AROM to >= 10 degrees to promote proper foot clearance during gait and greater ease descending stairs without compensation  Pt will have increased ankle strength to 5/5 throughout for improved ankle control with ADLs such as prolonged gait and stair negotiation (  Pt will have improved SLS to >5s for increased ankle stability with ambulation on uneven surfaces such as gravel and grass   Pt will transition to walking without CAM boot with least to no A.D with good stability to be able to go to stores for functional distances  Pt will report able to get back to her low impact group exercises without significant  ankle pain  Pt will be independent and compliant with comprehensive HEP to maintain progress achieved in PT    Plan: Continue with plan of care to work on OKC ankle strengthening  Date: 7/9/2024  TX#: 2/12 Date: 7/12/2024                TX#: 3/12 Date:  7/18/2024               TX#: 4/13 Date:                 TX#: 5/ Date:   Tx#: 6/   Thera Ext 15' Thera Ext 15' Thera Ext 40'     After manual tx:  Big toe ext/flexion 1'x2  Towel scrunches x1'x2  yellow band resisted OKC 3 way ankle strengthening 2 x10  Gait training 12'   With cane needs to get used to laterality as she is more comfortable to use it on R side  VC on sequencing and wt shifting  Recommending even up on opposite side for move level hip   After manual tx:  Ball rolling on plantar surface 1'  Big toe ext/flexion 1'x2  AAROM to AROM on all planes x20 each  Seated AROM on round board CW/CCW x 20 each  Pink bal disc heels/toes presses 1'x2  NeuroReEd 10'  On // bar weight shifting with fwd/lat/bwd direction  Fwd stepping then   Fwd/bwd stepping with min to no hold  Airex steps up/down 1'x2 Nustep L 6 x 8'  Without boot seated Exs\"  Rocker board A/P rocking 2'  Med/lat rocking 2'  Pink bal disc A/P shifting pressure 2'  Round board CW/CCW 2'  Intrinsic muscles exs  Toe curling and extension 1'x2  Red band resisted ankle PF 1'x2  Closed kinetic chain toe extension 1'  Standing TKE with red bang 5\" 1'x2  OKC 3 way hip R 2x10 each direction     Manual tx :28' Manual tx :25'      STM to R dorsal and plantar surface, passive ROM/stretch to tolerance metatarsals Gr I-III mobs STM to R dorsal and plantar surface, passive ROM/stretch to tolerance metatarsals Gr I-III       HEP: passive ROM with strap on all planes, AROM on all planes   Added: yellow band resisted OKC on all planes, towel srunches, big toe extension/curling /toe separation  Charges: Thera Ex x3       Total Timed Treatment: 40 min  Total Treatment Time: 40 min      LOWER EXTREMITY EVALUATION:      Diagnosis:   Aftercare following surgery (Z48.89)     Aftercare following surgery of the musculoskeletal system   Displaced trimalleolar fracture of right lower leg, subsequent encounter for closed fracture with routine healing   ,  S/P right ankle bimalleolar ORIF   Referring Provider:   Gregory Zheng PA-C Date of Evaluation:   7/5/2024    Precautions:   WBAT with CAM walker boot with A.D except when in therapy , Fall risk, MS  Next MD visit:   Wound  7/8/24  Ortho  8/14/24  Date of Surgery: 5/18/24  R ankle bimalleolar ORIF       PATIENT SUMMARY   Makenzie Reyes is a 74 year old female who presents to therapy today S/P R ankle bimalleolar ORIF on 5/18/2024.The patient has lost her balance and suffered displaced trimalleolar fracture from that fall.She is known to this clinic from previous therapy treatments from knee replacements last year on Left and earlier this year on the Right knee. The patient expresses her frustration and feeling down about her physical condition since this happened.She also has delayed healing of surgical wound and underwent debridement and under wound care treatment with follow up in this coming week.Currently ambulates WBAT with CAM boot and walker , she can only  take the boot off for therapy treatments/activities.        Pt describes pain level at worst <4/10, current 0-2/10, at best 0/10.   Current functional limitations include: not going outdoors, limited walking distances and difficulty with uneven surfaces.   Past medical history was reviewed with Makenzie.     Makenzie describes prior level of function not limited. Pt goals include To ambulate normally again without CAM boot or A.D..      ASSESSMENT  Makenzie presents to physical therapy evaluation S/P Right ankle bimalleolar ORIF on 5/18/24. The results of the objective tests and measures show decrease ankle AROM and strength, mild pitting edema, WBAT on CAM boot, only off during therapy per Ortho,gait abnormality,  needing RW for stability.  These findings contribute to functional limitations reported in patient summary above. Pt and PT discussed evaluation findings, pathology, POC and HEP.  Pt voiced understanding and performs HEP correctly without reported pain. Skilled Physical Therapy is medically necessary to address the above impairments and reach functional goals.     OBJECTIVE:   Gait: pt ambulates on level ground with assistive device of RW wearing CAM boot on RLE, decreased step length R, and decreased stance phase RLE  Observation: Pitting edema on dorsum of R foot 2+    AROM: (* denotes performed with pain)  Foot/Ankle (deg)   DF: R 5 deg; L WFL  PF: R 12; L WFL  INV: R 10; L  WFL  EV: R 4 deg  L WFL  PROM Great toe ext: R WFL; L WFL       Strength/MMT: (* denotes performed with pain)  Foot/Ankle   DF: R 3-/5; L 5/5  PF: R 2-/5; L 5/5  INV: R 2-/5; L 5/5  EV: R 2-/5; L 5/5     Palpation: no significant TPP on dorsum of foot  Accessory motion: Mod-sig decrease on talocrural & subtalar joint on RLE    Flexibility:  Gastroc-soleus: Mod decreased on R    Balance: NT.    LEFS Score  LEFS Score: 41.25 % (7/4/2024  2:43 PM)      Today’s Treatment and Response:   Pt education was provided on exam findings, treatment diagnosis, treatment plan, expectations, and prognosis. Pt was also provided recommendations for activity modifications, possible soreness after evaluation, and modalities as needed [ice/heat].  Manual Therapy:  STM to gastrocsoleus and dorsum of foot, PROM on all planes, for 10 min.  Patient was instructed in and issued a HEP for: seated stretches with strap/towed, AROM on all planes    Charges: PT Eval Moderate Complexity, Manual Tx 1      Total Timed Treatment: 10 min     Total Treatment Time: 45 min     Based on clinical rationale and outcome measures, this evaluation involved Moderate Complexity decision making due to 1-2 personal factors/comorbidities, 3 body structures involved/activity limitations, and  evolving symptoms including  improved ability to ambulate without A.D .  PLAN OF CARE:    Goals: (to be met in 12 visits)  Pt will demonstrate improved DF AROM to >= 10 degrees to promote proper foot clearance during gait and greater ease descending stairs without compensation  Pt will have increased ankle strength to 5/5 throughout for improved ankle control with ADLs such as prolonged gait and stair negotiation (  Pt will have improved SLS to >5s for increased ankle stability with ambulation on uneven surfaces such as gravel and grass   Pt will transition to walking without CAM boot with least to no A.D with good stability to be able to go to stores for functional distances  Pt will report able to get back to her low impact group exercises without significant ankle pain  Pt will be independent and compliant with comprehensive HEP to maintain progress achieved in PT      Frequency / Duration: Patient will be seen for 2 x/week or a total of 12 visits over a 90 day period. Treatment will include: Gait training, Manual Therapy, Neuromuscular Re-education, Therapeutic Activities, Therapeutic Exercise, and Home Exercise Program instruction, modalities as needed for pain.    Education or treatment limitation: None  Rehab Potential:good    Patient/Family/Caregiver was advised of these findings, precautions, and treatment options and has agreed to actively participate in planning and for this course of care.    Thank you for your referral. Please co-sign or sign and return this letter via fax as soon as possible to [phone]. If you have any questions, please contact me at Dept: [phone]    Sincerely,  Electronically signed by therapist: Desiree Pace, PT  Physician's certification required: Yes  I certify the need for these services furnished under this plan of treatment and while under my care.    X___________________________________________________ Date____________________    Certification From: 7/5/2024   To:10/3/2024

## 2024-07-18 NOTE — TELEPHONE ENCOUNTER
Incoming fax from [facility]     Order # [id number]    Placed in TO in basket for review

## 2024-07-19 ENCOUNTER — OFFICE VISIT (OUTPATIENT)
Dept: PHYSICAL THERAPY | Age: 74
End: 2024-07-19
Attending: PHYSICIAN ASSISTANT
Payer: MEDICARE

## 2024-07-19 DIAGNOSIS — Z48.89 AFTERCARE FOLLOWING SURGERY: Primary | ICD-10-CM

## 2024-07-19 PROCEDURE — 97110 THERAPEUTIC EXERCISES: CPT

## 2024-07-19 NOTE — PROGRESS NOTES
Diagnosis:   Aftercare following surgery (Z48.89)     Aftercare following surgery of the musculoskeletal system   Displaced trimalleolar fracture of right lower leg, subsequent encounter for closed fracture with routine healing   ,  S/P right ankle bimalleolar ORIF      Referring Provider: Gregory Zheng  Date of Evaluation:    7/5/2024    Precautions:    WBAT with CAM walker boot with A.D except when in therapy, Fall risk, MS  Next MD visit:   none scheduled  Date of Surgery: n/a   Insurance Primary/Secondary: [facility] / N/A     # Auth Visits: N/A            Subjective: Pt expresses she cant wait for next week hoping that they would take off the 2 layers of compression stocking as she gets to uncomfortable with it.  Pain: 0/10 pt denies pain just feeling the pressure from swelling       Objective:   7/19/2024  DF: R 10 deg;   PF: R 28 deg  INV: R 20; L    EV: R 15 deg       7/12/2024  DF: R 8 deg;   PF: R 25 deg  INV: R 20; L    EV: R 15 deg     Gait training with cane  Cam boot on     Assessment:Pt is demonstrating improved ankle mobility since therapy and overall functional mobility, she has been able to go out and walk more distances, still not having significant pain.      Goals: (to be met in 12 visits)  Pt will demonstrate improved DF AROM to >= 10 degrees to promote proper foot clearance during gait and greater ease descending stairs without compensation  Pt will have increased ankle strength to 5/5 throughout for improved ankle control with ADLs such as prolonged gait and stair negotiation (  Pt will have improved SLS to >5s for increased ankle stability with ambulation on uneven surfaces such as gravel and grass   Pt will transition to walking without CAM boot with least to no A.D with good stability to be able to go to stores for functional distances  Pt will report able to get back to her low impact group exercises without significant ankle pain  Pt will be independent and compliant with  comprehensive HEP to maintain progress achieved in PT    Plan: Continue with plan of care to work on OKC ankle strengthening, CKC activities in standing with cam boot.  Date: 7/9/2024  TX#: 2/12 Date: 7/12/2024                TX#: 3/12 Date:  7/18/2024               TX#: 4/12 Date: 7/19/2024               TX#: 5/12 Date:   Tx#: 6/   Thera Ext 15' Thera Ext 15' Thera Ext 40' Thera ex x40'    After manual tx:  Big toe ext/flexion 1'x2  Towel scrunches x1'x2  yellow band resisted OKC 3 way ankle strengthening 2 x10  Gait training 12'   With cane needs to get used to laterality as she is more comfortable to use it on R side  VC on sequencing and wt shifting  Recommending even up on opposite side for move level hip   After manual tx:  Ball rolling on plantar surface 1'  Big toe ext/flexion 1'x2  AAROM to AROM on all planes x20 each  Seated AROM on round board CW/CCW x 20 each  Pink bal disc heels/toes presses 1'x2  NeuroReEd 10'  On // bar weight shifting with fwd/lat/bwd direction  Fwd stepping then   Fwd/bwd stepping with min to no hold  Airex steps up/down 1'x2 Nustep L 6 x 8'  Without boot seated Exs\"  Rocker board A/P rocking 2'  Med/lat rocking 2'  Pink bal disc A/P shifting pressure 2'  Round board CW/CCW 2'  Intrinsic muscles exs  Toe curling and extension 1'x2  Red band resisted ankle PF 1'x2  Closed kinetic chain toe extension 1'  Standing TKE with red band 5\" 1'x2  OKC 3 way hip R 2x10 each direction Nustep L 6 x 8'  Without boot seated Exs\"  Rocker board A/P rocking 2'  Med/lat rocking 2'  Pink bal disc A/P shifting pressure 2'  Round board CW/CCW 2'  Ball rolling on plantar surface , on forefoot CW/CCW 1'x2  Red band resisted ankle PF 1'x2  SAQ to SLR 3x10  S/L hip abd 3x10        Manual tx :28' Manual tx :25'      STM to R dorsal and plantar surface, passive ROM/stretch to tolerance metatarsals Gr I-III mobs STM to R dorsal and plantar surface, passive ROM/stretch to tolerance metatarsals Gr I-III        HEP: passive ROM with strap on all planes, AROM on all planes   Added: yellow band resisted OKC on all planes, towel srunches, big toe extension/curling /toe separation  Charges: Thera Ex x3       Total Timed Treatment: 40 min  Total Treatment Time: 40 min      LOWER EXTREMITY EVALUATION:     Diagnosis:   Aftercare following surgery (Z48.89)     Aftercare following surgery of the musculoskeletal system   Displaced trimalleolar fracture of right lower leg, subsequent encounter for closed fracture with routine healing   ,  S/P right ankle bimalleolar ORIF   Referring Provider:   Gregory Zheng PA-C Date of Evaluation:   7/5/2024    Precautions:   WBAT with CAM walker boot with A.D except when in therapy , Fall risk, MS  Next MD visit:   Wound  7/8/24  Ortho Dr 8/14/24  Date of Surgery: 5/18/24  R ankle bimalleolar ORIF       PATIENT SUMMARY   Makenzie Reyes is a 74 year old female who presents to therapy today S/P R ankle bimalleolar ORIF on 5/18/2024.The patient has lost her balance and suffered displaced trimalleolar fracture from that fall.She is known to this clinic from previous therapy treatments from knee replacements last year on Left and earlier this year on the Right knee. The patient expresses her frustration and feeling down about her physical condition since this happened.She also has delayed healing of surgical wound and underwent debridement and under wound care treatment with follow up in this coming week.Currently ambulates WBAT with CAM boot and walker , she can only  take the boot off for therapy treatments/activities.        Pt describes pain level at worst <4/10, current 0-2/10, at best 0/10.   Current functional limitations include: not going outdoors, limited walking distances and difficulty with uneven surfaces.   Past medical history was reviewed with Makenzie.     Makenzie describes prior level of function not limited. Pt goals include To ambulate normally again without CAM boot or  ALEYDA.      ASSESSMENT  Makenzie presents to physical therapy evaluation S/P Right ankle bimalleolar ORIF on 5/18/24. The results of the objective tests and measures show decrease ankle AROM and strength, mild pitting edema, WBAT on CAM boot, only off during therapy per Ortho,gait abnormality, needing RW for stability.  These findings contribute to functional limitations reported in patient summary above. Pt and PT discussed evaluation findings, pathology, POC and HEP.  Pt voiced understanding and performs HEP correctly without reported pain. Skilled Physical Therapy is medically necessary to address the above impairments and reach functional goals.     OBJECTIVE:   Gait: pt ambulates on level ground with assistive device of RW wearing CAM boot on RLE, decreased step length R, and decreased stance phase RLE  Observation: Pitting edema on dorsum of R foot 2+    AROM: (* denotes performed with pain)  Foot/Ankle (deg)   DF: R 5 deg; L WFL  PF: R 12; L WFL  INV: R 10; L  WFL  EV: R 4 deg  L WFL  PROM Great toe ext: R WFL; L WFL       Strength/MMT: (* denotes performed with pain)  Foot/Ankle   DF: R 3-/5; L 5/5  PF: R 2-/5; L 5/5  INV: R 2-/5; L 5/5  EV: R 2-/5; L 5/5     Palpation: no significant TPP on dorsum of foot  Accessory motion: Mod-sig decrease on talocrural & subtalar joint on RLE    Flexibility:  Gastroc-soleus: Mod decreased on R    Balance: NT.    LEFS Score  LEFS Score: 41.25 % (7/4/2024  2:43 PM)      Today’s Treatment and Response:   Pt education was provided on exam findings, treatment diagnosis, treatment plan, expectations, and prognosis. Pt was also provided recommendations for activity modifications, possible soreness after evaluation, and modalities as needed [ice/heat].  Manual Therapy:  STM to gastrocsoleus and dorsum of foot, PROM on all planes, for 10 min.  Patient was instructed in and issued a HEP for: seated stretches with strap/towed, AROM on all planes    Charges: PT Eval Moderate Complexity,  Manual Tx 1      Total Timed Treatment: 10 min     Total Treatment Time: 45 min     Based on clinical rationale and outcome measures, this evaluation involved Moderate Complexity decision making due to 1-2 personal factors/comorbidities, 3 body structures involved/activity limitations, and evolving symptoms including  improved ability to ambulate without A.D .  PLAN OF CARE:    Goals: (to be met in 12 visits)  Pt will demonstrate improved DF AROM to >= 10 degrees to promote proper foot clearance during gait and greater ease descending stairs without compensation  Pt will have increased ankle strength to 5/5 throughout for improved ankle control with ADLs such as prolonged gait and stair negotiation (  Pt will have improved SLS to >5s for increased ankle stability with ambulation on uneven surfaces such as gravel and grass   Pt will transition to walking without CAM boot with least to no A.D with good stability to be able to go to stores for functional distances  Pt will report able to get back to her low impact group exercises without significant ankle pain  Pt will be independent and compliant with comprehensive HEP to maintain progress achieved in PT      Frequency / Duration: Patient will be seen for 2 x/week or a total of 12 visits over a 90 day period. Treatment will include: Gait training, Manual Therapy, Neuromuscular Re-education, Therapeutic Activities, Therapeutic Exercise, and Home Exercise Program instruction, modalities as needed for pain.    Education or treatment limitation: None  Rehab Potential:good    Patient/Family/Caregiver was advised of these findings, precautions, and treatment options and has agreed to actively participate in planning and for this course of care.    Thank you for your referral. Please co-sign or sign and return this letter via fax as soon as possible to [phone]. If you have any questions, please contact me at Dept: [phone]    Sincerely,  Electronically signed by  therapist: Desiree Pace PT  Physician's certification required: Yes  I certify the need for these services furnished under this plan of treatment and while under my care.    X___________________________________________________ Date____________________    Certification From: 7/5/2024  To:10/3/2024

## 2024-07-25 ENCOUNTER — OFFICE VISIT (OUTPATIENT)
Dept: PHYSICAL THERAPY | Age: 74
End: 2024-07-25
Attending: PHYSICIAN ASSISTANT
Payer: MEDICARE

## 2024-07-25 PROCEDURE — 97110 THERAPEUTIC EXERCISES: CPT

## 2024-07-25 NOTE — PROGRESS NOTES
Diagnosis:   Aftercare following surgery (Z48.89)     Aftercare following surgery of the musculoskeletal system   Displaced trimalleolar fracture of right lower leg, subsequent encounter for closed fracture with routine healing   ,  S/P right ankle bimalleolar ORIF      Referring Provider: Nereida Seymour (external) Date of Evaluation:    7/5/2024    Precautions:    WBAT with CAM walker boot with A.D except when in therapy, Fall risk, MS  Next MD visit:   none scheduled  Date of Surgery: n/a   [facility] / N/A     # Auth Visits: N/A            Subjective: Pt states that she hopes to get rid of the boot when she sees the MD next visit. Notes wearing a compression sleeve due to her wound where her stitches opened.   Pain: 0/10 pt denies pain just feeling the pressure from swelling       Objective:   7/25/2024  DF: R 11 deg PROM (0 deg AROM)   PF: R 55 deg AROM  INV: R 30 deg AROM  EV: R 16 deg AROM    7/19/2024  DF: R 10 deg  PF: R 28 deg  INV: R 20; L    EV: R 15 deg       7/12/2024  DF: R 8 deg;   PF: R 25 deg  INV: R 20; L    EV: R 15 deg       Assessment: Pt presents with mild improvements in ROM. She was progressed in gentle standing exercises out of boot per protocol. She was advised to continue practicing these exercises at home in gym shoe, but to wear her CamBoot at all other times while on her foot. She tolerated today's treatment without increased pain and patient was pleasantly surprised with what she could do today. She did need external HHA occasionally due to instability from MS.      Goals: (to be met in 12 visits)  Pt will demonstrate improved DF AROM to >= 10 degrees to promote proper foot clearance during gait and greater ease descending stairs without compensation  Pt will have increased ankle strength to 5/5 throughout for improved ankle control with ADLs such as prolonged gait and stair negotiation (  Pt will have improved SLS to >5s for increased ankle  stability with ambulation on uneven surfaces such as gravel and grass   Pt will transition to walking without CAM boot with least to no A.D with good stability to be able to go to stores for functional distances  Pt will report able to get back to her low impact group exercises without significant ankle pain  Pt will be independent and compliant with comprehensive HEP to maintain progress achieved in PT    Plan: PN NEXT (MD appointment Wednesday 7/31)   Date: 7/9/2024  TX#: 2/12 Date: 7/12/2024                TX#: 3/12 Date:  7/18/2024               TX#: 4/12 Date: 7/19/2024               TX#: 5/12 Date: 7/25/2024  Tx#: 6/12 **PN NEXT   Thera Ext 15' Thera Ext 15' Thera Ext 40' Thera ex x40' Ther-Ex: 40 min   After manual tx:  Big toe ext/flexion 1'x2  Towel scrunches x1'x2  yellow band resisted OKC 3 way ankle strengthening 2 x10  Gait training 12'   With cane needs to get used to laterality as she is more comfortable to use it on R side  VC on sequencing and wt shifting  Recommending even up on opposite side for move level hip   After manual tx:  Ball rolling on plantar surface 1'  Big toe ext/flexion 1'x2  AAROM to AROM on all planes x20 each  Seated AROM on round board CW/CCW x 20 each  Pink bal disc heels/toes presses 1'x2  NeuroReEd 10'  On // bar weight shifting with fwd/lat/bwd direction  Fwd stepping then   Fwd/bwd stepping with min to no hold  Airex steps up/down 1'x2 Nustep L 6 x 8'  Without boot seated Exs\"  Rocker board A/P rocking 2'  Med/lat rocking 2'  Pink bal disc A/P shifting pressure 2'  Round board CW/CCW 2'  Intrinsic muscles exs  Toe curling and extension 1'x2  Red band resisted ankle PF 1'x2  Closed kinetic chain toe extension 1'  Standing TKE with red band 5\" 1'x2  OKC 3 way hip R 2x10 each direction Nustep L 6 x 8'  Without boot seated Exs\"  Rocker board A/P rocking 2'  Med/lat rocking 2'  Pink bal disc A/P shifting pressure 2'  Round board CW/CCW 2'  Ball rolling on plantar surface , on  forefoot CW/CCW 1'x2  Red band resisted ankle PF 1'x2  SAQ to SLR 3x10  S/L hip abd 3x10     NuStep 5 min  Seated AP rocker board lvl 1, 2x20  Seated ML rocker board lvl 1, 2x20  Seated round board CW/CCW 2x20 ea  Towel scrunch/doming, 20x5\"   Ankle inversion/eversion with towel x10 ea   Standing weight shifts, AP and ML 1x10 ea   Side steps at bar, 3 laps          NMR: 5 min       Semi tandem stance, 2x30\" B  Standing march with light fingertip assist, 1'   Ax step up, 1' ea leg   Manual tx :28' Manual tx :25'      STM to R dorsal and plantar surface, passive ROM/stretch to tolerance metatarsals Gr I-III mobs STM to R dorsal and plantar surface, passive ROM/stretch to tolerance metatarsals Gr I-III       HEP: passive ROM with strap on all planes, AROM on all planes   Added: yellow band resisted OKC on all planes, towel srunches, big toe extension/curling /toe separation  Charges: Thera Ex x3         Total Timed Treatment: 45 min  Total Treatment Time: 45 min

## 2024-07-29 NOTE — PROGRESS NOTES
Progress Summary  Pt has attended 7 visits in Physical Therapy.     Diagnosis:   Aftercare following surgery (Z48.89)     Aftercare following surgery of the musculoskeletal system   Displaced trimalleolar fracture of right lower leg, subsequent encounter for closed fracture with routine healing   ,  S/P right ankle bimalleolar ORIF      Referring Provider: Nereida Seymour (external) Date of Evaluation:    7/5/2024    Precautions:    WBAT with CAM walker boot with A.D except when in therapy, Fall risk, MS  Next MD visit:   none scheduled  Date of Surgery: n/a   Insurance Primary/Secondary: [facility] / N/A     # Auth Visits: N/A            Subjective: Pt states that she went to the wound clinic yesterday and they told her part of the bone is exposed. Notes they took Xrays and is waiting for the results.   Pain: 0/10 pt denies pain just feeling the pressure from swelling       Objective:   Gait: pt ambulates on level ground with assistive device of RW wearing CAM boot on RLE, decreased step length R, and decreased stance phase RLE; able to walk in shoe, notable pronation and slowed speed, toe out on R, reduced push off on R, use of SP cane.   Observation: Pitting edema on dorsum of R foot 2+; unable to assess due to wound dressings      AROM: (* denotes performed with pain)  Foot/Ankle (deg)   DF: R 5 deg; L WFL; R 5  PF: R 12; L WFL; R 55  INV: R 10; L  WFL; R 18  EV: R 4 deg  L WFL; R 21         Strength/MMT: (* denotes performed with pain)  Foot/Ankle   DF: R 3-/5; L 5/5; R 3+/5  PF: R 2-/5; L 5/5; R 3+/5  INV: R 2-/5; L 5/5; R 3+/5  EV: R 2-/5; L 5/5; R 3+/5      Palpation: no significant TPP on dorsum of foot; unable to assess due to wound bandages  Accessory motion: Mod-sig decrease on talocrural & subtalar joint on RLE; difficult to assess due to wound bandages.      Flexibility:  Gastroc-soleus: Mod decreased on R; continued to a lesser degree     Balance: NT. Able to complete 30 seconds semi-tandem  balance in // bars with occasional HHA         Assessment: Pt has been seen in skilled PT for 7 visits since IE. Objectively, patient has shown improvements in ankle ROM, strength, gait mechanics, and flexibility. Pt continues to have limitations in normalizing all of the above measures contributing to functional limitations of AMB outside of boot without AD, walking long distances, stair climbing, and driving. Makenzie Reyes will benefit from continued skilled PT to address the remaining listed deficits for return to full, unrestricted work and extracurricular activities.         Goals: (to be met in 12 visits)  Pt will demonstrate improved DF AROM to >= 10 degrees to promote proper foot clearance during gait and greater ease descending stairs without compensation  Pt will have increased ankle strength to 5/5 throughout for improved ankle control with ADLs such as prolonged gait and stair negotiation (  Pt will have improved SLS to >5s for increased ankle stability with ambulation on uneven surfaces such as gravel and grass   Pt will transition to walking without CAM boot with least to no A.D with good stability to be able to go to stores for functional distances  Pt will report able to get back to her low impact group exercises without significant ankle pain  Pt will be independent and compliant with comprehensive HEP to maintain progress achieved in PT    Plan:   Post LEFS Score  Post LEFS Score: 58.75 % (7/30/2024 10:36 AM)    17.5 % improvement    Plan: Continue skilled Physical Therapy 2 x/week or a total of 12 visits over a 90 day period. Treatment will include:  Manual Therapy including dry needling, Neuromuscular Re-education including blood flow restriction therapy, Therapeutic Activities, Therapeutic Exercise, Home Exercise Program instruction, and Modalities to include: Electrical stimulation (unattended), ice/heat, US as needed.         Patient/Family/Caregiver was advised of these findings,  precautions, and treatment options and has agreed to actively participate in planning and for this course of care.    Thank you for your referral. If you have any questions, please contact me at Dept: [phone].    Sincerely,  Electronically signed by therapist: Delisa Norman PT     Physician's certification required:  Yes  Please co-sign or sign and return this letter via fax as soon as possible to [phone].   I certify the need for these services furnished under this plan of treatment and while under my care.    X___________________________________________________ Date____________________    Certification From: 7/29/2024  To:10/27/2024     Date:  7/18/2024               TX#: 4/12 Date: 7/19/2024               TX#: 5/12 Date: 7/25/2024  Tx#: 6/12 **PN NEXT Date: 7/30/2024  Tx#: 7/19 (pending auth)    Thera Ext 40' Thera ex x40' Ther-Ex: 40 min Ther-Ex: 35 min   Nustep L 6 x 8'  Without boot seated Exs\"  Rocker board A/P rocking 2'  Med/lat rocking 2'  Pink bal disc A/P shifting pressure 2'  Round board CW/CCW 2'  Intrinsic muscles exs  Toe curling and extension 1'x2  Red band resisted ankle PF 1'x2  Closed kinetic chain toe extension 1'  Standing TKE with red band 5\" 1'x2  OKC 3 way hip R 2x10 each direction Nustep L 6 x 8'  Without boot seated Exs\"  Rocker board A/P rocking 2'  Med/lat rocking 2'  Pink bal disc A/P shifting pressure 2'  Round board CW/CCW 2'  Ball rolling on plantar surface , on forefoot CW/CCW 1'x2  Red band resisted ankle PF 1'x2  SAQ to SLR 3x10  S/L hip abd 3x10     NuStep 5 min  Seated AP rocker board lvl 1, 2x20  Seated ML rocker board lvl 1, 2x20  Seated round board CW/CCW 2x20 ea  Towel scrunch/doming, 20x5\"   Ankle inversion/eversion with towel x10 ea   Standing weight shifts, AP and ML 1x10 ea   Side steps at bar, 3 laps    NuStep 5 min  Ankle ROM: circles, DF/PF, inv/ev x20 ea  Ankle alphabets, x1 round   Seated DKSA calf stretch, 2x30\"   Seated AP rocker board lvl 1,  2x20  Seated ML rocker board lvl 1, 2x20  Seated round board CW/CCW 2x20 ea                          Gait training in // bars: 3 laps rocker bottom mechanics.      NMR: 5 min NMR: 5 min     Semi tandem stance, 2x30\" B  Standing march with light fingertip assist, 1'   Ax step up, 1' ea leg Semi tandem stance, 2x30\" B  Standing march with light fingertip assist, 1'   Ax step up, 1' ea leg       Measurements 5 min         HEP: passive ROM with strap on all planes, AROM on all planes   Added: yellow band resisted OKC on all planes, towel srunches, big toe extension/curling /toe separation  Charges: Thera Ex x2         Total Timed Treatment: 40  min  Total Treatment Time: 45  min

## 2024-07-30 ENCOUNTER — OFFICE VISIT (OUTPATIENT)
Dept: PHYSICAL THERAPY | Age: 74
End: 2024-07-30
Payer: MEDICARE

## 2024-07-30 PROCEDURE — 97110 THERAPEUTIC EXERCISES: CPT

## 2024-08-01 ENCOUNTER — OFFICE VISIT (OUTPATIENT)
Dept: PHYSICAL THERAPY | Age: 74
End: 2024-08-01
Payer: MEDICARE

## 2024-08-01 PROCEDURE — 97110 THERAPEUTIC EXERCISES: CPT

## 2024-08-01 PROCEDURE — 97112 NEUROMUSCULAR REEDUCATION: CPT

## 2024-08-01 NOTE — PROGRESS NOTES
Diagnosis:   Aftercare following surgery (Z48.89)     Aftercare following surgery of the musculoskeletal system   Displaced trimalleolar fracture of right lower leg, subsequent encounter for closed fracture with routine healing   ,  S/P right ankle bimalleolar ORIF      Referring Provider: Nereida Seymour (external) Date of Evaluation:    7/5/2024         Precautions:    WBAT with CAM walker boot with A.D except when in therapy, Fall risk, MS  Next MD visit:   none scheduled  Date of Surgery: n/a   [facility] / N/A     # Auth Visits:     16 visits until 9/30        Subjective: Pt states that she saw the MD who took her out of the boot. He believe the wound is coming from her screws and will need surgery to remove them.   Pain: 0/10 pt denies pain just feeling the pressure from swelling        Objective:   Continued weakness through inv/ev (3+/5)     Assessment: Progression of exercises in standing this session to improve gait and mobility. HHA at barre needed due to continued instability at the ankle. Education regarding the instability she feels, noting that her muscles have been affected by time in the boot as well by infection she has developed. She displays increased fatigue by the end of the session.          Goals: (to be met in 12 visits)  Pt will demonstrate improved DF AROM to >= 10 degrees to promote proper foot clearance during gait and greater ease descending stairs without compensation  Pt will have increased ankle strength to 5/5 throughout for improved ankle control with ADLs such as prolonged gait and stair negotiation (  Pt will have improved SLS to >5s for increased ankle stability with ambulation on uneven surfaces such as gravel and grass   Pt will transition to walking without CAM boot with least to no A.D with good stability to be able to go to stores for functional distances  Pt will report able to get back to her low impact group exercises without  significant ankle pain  Pt will be independent and compliant with comprehensive HEP to maintain progress achieved in PT    Plan:  continue progression of strength in OKC and CKC  Date:  7/18/2024               TX#: 4/12 Date: 7/19/2024               TX#: 5/12 Date: 7/25/2024  Tx#: 6/12 **PN NEXT Date: 7/30/2024  Tx#: 7/16 (pending auth)  Date: 8/1/2024  Tx#: 8/16   Thera Ext 40' Thera ex x40' Ther-Ex: 40 min Ther-Ex: 35 min Ther-Ex: 35 min   Nustep L 6 x 8'  Without boot seated Exs\"  Rocker board A/P rocking 2'  Med/lat rocking 2'  Pink bal disc A/P shifting pressure 2'  Round board CW/CCW 2'  Intrinsic muscles exs  Toe curling and extension 1'x2  Red band resisted ankle PF 1'x2  Closed kinetic chain toe extension 1'  Standing TKE with red band 5\" 1'x2  OKC 3 way hip R 2x10 each direction Nustep L 6 x 8'  Without boot seated Exs\"  Rocker board A/P rocking 2'  Med/lat rocking 2'  Pink bal disc A/P shifting pressure 2'  Round board CW/CCW 2'  Ball rolling on plantar surface , on forefoot CW/CCW 1'x2  Red band resisted ankle PF 1'x2  SAQ to SLR 3x10  S/L hip abd 3x10     NuStep 5 min  Seated AP rocker board lvl 1, 2x20  Seated ML rocker board lvl 1, 2x20  Seated round board CW/CCW 2x20 ea  Towel scrunch/doming, 20x5\"   Ankle inversion/eversion with towel x10 ea   Standing weight shifts, AP and ML 1x10 ea   Side steps at bar, 3 laps    NuStep 5 min  Ankle ROM: circles, DF/PF, inv/ev x20 ea  Ankle alphabets, x1 round   Seated DKSA calf stretch, 2x30\"   Seated AP rocker board lvl 1, 2x20  Seated ML rocker board lvl 1, 2x20  Seated round board CW/CCW 2x20 ea            Gait training in // bars: 3 laps rocker bottom mechanics.  NuStep 5 min  Ankle PROM gentle   Ankle iso inv/ev against therapist resist, 10x5\" ea   Tandem walking in // bars heel toe, 5 laps  Side stepping in // bars, 5 laps  4\" step ups fwd, 1x10 B  4\" step ups lateral, 1x10 B     DLP next   Rocker board standing next     NMR: 5 min NMR: 5 min NMR: 8 min      Semi tandem stance, 2x30\" B  Standing march with light fingertip assist, 1'   Ax step up, 1' ea leg Semi tandem stance, 2x30\" B  Standing march with light fingertip assist, 1'   Ax step up, 1' ea leg  Semi tandem stance on Ax, 2x30\" B with fingertip assist  Standing march on Ax with light fingertip assist, 2x10 B      Measurements 5 min           HEP: passive ROM with strap on all planes, AROM on all planes   Added: yellow band resisted OKC on all planes, towel srunches, big toe extension/curling /toe separation  Charges: Thera Ex x2, NMR x1      Total Timed Treatment: 43  min  Total Treatment Time: 43  min

## 2024-08-04 DIAGNOSIS — I10 PRIMARY HYPERTENSION: ICD-10-CM

## 2024-08-05 ENCOUNTER — APPOINTMENT (OUTPATIENT)
Dept: PHYSICAL THERAPY | Age: 74
End: 2024-08-05
Attending: PHYSICIAN ASSISTANT
Payer: MEDICARE

## 2024-08-05 RX ORDER — AMLODIPINE BESYLATE 5 MG/1
10 TABLET ORAL DAILY
Qty: 180 TABLET | Refills: 1 | Status: SHIPPED | OUTPATIENT
Start: 2024-08-05

## 2024-08-07 ENCOUNTER — TELEPHONE (OUTPATIENT)
Dept: PHYSICAL THERAPY | Facility: HOSPITAL | Age: 74
End: 2024-08-07

## 2024-08-08 ENCOUNTER — OFFICE VISIT (OUTPATIENT)
Dept: INTERNAL MEDICINE CLINIC | Facility: CLINIC | Age: 74
End: 2024-08-08
Payer: MEDICARE

## 2024-08-08 ENCOUNTER — LAB ENCOUNTER (OUTPATIENT)
Dept: LAB | Age: 74
End: 2024-08-08
Attending: FAMILY MEDICINE
Payer: MEDICARE

## 2024-08-08 ENCOUNTER — TELEPHONE (OUTPATIENT)
Dept: INTERNAL MEDICINE CLINIC | Facility: CLINIC | Age: 74
End: 2024-08-08

## 2024-08-08 VITALS
TEMPERATURE: 97 F | WEIGHT: 184.19 LBS | RESPIRATION RATE: 16 BRPM | DIASTOLIC BLOOD PRESSURE: 74 MMHG | OXYGEN SATURATION: 97 % | SYSTOLIC BLOOD PRESSURE: 128 MMHG | HEART RATE: 76 BPM | BODY MASS INDEX: 31.45 KG/M2 | HEIGHT: 64 IN

## 2024-08-08 DIAGNOSIS — Z01.818 PREOP GENERAL PHYSICAL EXAM: Primary | ICD-10-CM

## 2024-08-08 DIAGNOSIS — T84.84XD PAIN DUE TO INTERNAL ORTHOPEDIC PROSTHETIC DEVICE, SUBSEQUENT ENCOUNTER: ICD-10-CM

## 2024-08-08 DIAGNOSIS — Z01.818 PREOP GENERAL PHYSICAL EXAM: ICD-10-CM

## 2024-08-08 LAB
ALBUMIN SERPL-MCNC: 4.8 G/DL (ref 3.2–4.8)
ALBUMIN/GLOB SERPL: 1.7 {RATIO} (ref 1–2)
ALP LIVER SERPL-CCNC: 76 U/L
ALT SERPL-CCNC: 13 U/L
ANION GAP SERPL CALC-SCNC: 6 MMOL/L (ref 0–18)
AST SERPL-CCNC: 17 U/L (ref ?–34)
ATRIAL RATE: 66 BPM
BASOPHILS # BLD AUTO: 0.06 X10(3) UL (ref 0–0.2)
BASOPHILS NFR BLD AUTO: 0.5 %
BILIRUB SERPL-MCNC: 0.4 MG/DL (ref 0.2–1.1)
BUN BLD-MCNC: 24 MG/DL (ref 9–23)
CALCIUM BLD-MCNC: 10.5 MG/DL (ref 8.7–10.4)
CHLORIDE SERPL-SCNC: 102 MMOL/L (ref 98–112)
CO2 SERPL-SCNC: 28 MMOL/L (ref 21–32)
CREAT BLD-MCNC: 0.74 MG/DL
EGFRCR SERPLBLD CKD-EPI 2021: 85 ML/MIN/1.73M2 (ref 60–?)
EOSINOPHIL # BLD AUTO: 0.26 X10(3) UL (ref 0–0.7)
EOSINOPHIL NFR BLD AUTO: 2 %
ERYTHROCYTE [DISTWIDTH] IN BLOOD BY AUTOMATED COUNT: 13.7 %
FASTING STATUS PATIENT QL REPORTED: NO
GLOBULIN PLAS-MCNC: 2.9 G/DL (ref 2–3.5)
GLUCOSE BLD-MCNC: 91 MG/DL (ref 70–99)
HCT VFR BLD AUTO: 39 %
HGB BLD-MCNC: 12.9 G/DL
IMM GRANULOCYTES # BLD AUTO: 0.05 X10(3) UL (ref 0–1)
IMM GRANULOCYTES NFR BLD: 0.4 %
LYMPHOCYTES # BLD AUTO: 1.74 X10(3) UL (ref 1–4)
LYMPHOCYTES NFR BLD AUTO: 13.1 %
MCH RBC QN AUTO: 28.9 PG (ref 26–34)
MCHC RBC AUTO-ENTMCNC: 33.1 G/DL (ref 31–37)
MCV RBC AUTO: 87.2 FL
MONOCYTES # BLD AUTO: 1.15 X10(3) UL (ref 0.1–1)
MONOCYTES NFR BLD AUTO: 8.7 %
NEUTROPHILS # BLD AUTO: 10.01 X10 (3) UL (ref 1.5–7.7)
NEUTROPHILS # BLD AUTO: 10.01 X10(3) UL (ref 1.5–7.7)
NEUTROPHILS NFR BLD AUTO: 75.3 %
OSMOLALITY SERPL CALC.SUM OF ELEC: 286 MOSM/KG (ref 275–295)
P AXIS: 60 DEGREES
P-R INTERVAL: 192 MS
PLATELET # BLD AUTO: 329 10(3)UL (ref 150–450)
POTASSIUM SERPL-SCNC: 3.4 MMOL/L (ref 3.5–5.1)
PROT SERPL-MCNC: 7.7 G/DL (ref 5.7–8.2)
Q-T INTERVAL: 428 MS
QRS DURATION: 82 MS
QTC CALCULATION (BEZET): 448 MS
R AXIS: 8 DEGREES
RBC # BLD AUTO: 4.47 X10(6)UL
SODIUM SERPL-SCNC: 136 MMOL/L (ref 136–145)
T AXIS: 52 DEGREES
VENTRICULAR RATE: 66 BPM
WBC # BLD AUTO: 13.3 X10(3) UL (ref 4–11)

## 2024-08-08 PROCEDURE — 85025 COMPLETE CBC W/AUTO DIFF WBC: CPT

## 2024-08-08 PROCEDURE — 36415 COLL VENOUS BLD VENIPUNCTURE: CPT

## 2024-08-08 PROCEDURE — 80053 COMPREHEN METABOLIC PANEL: CPT

## 2024-08-08 NOTE — TELEPHONE ENCOUNTER
CAL at Dr. Stallworth's office.  Still have not received Pre op requirements.    Pt is aware we have not received, pt gave surgeons office phone number

## 2024-08-08 NOTE — PROGRESS NOTES
CC: Preop exam.    Makenzie Reyes is a 74 year old female who presents for a pre-operative physical exam  By Dr. Michael Stallworth's  request. Patient is to have right malleolus hardware removal,secondary to pain due to internal orthopedic prosthesis to be done on 8/13/24 at [facility].   No prior anaesthesia problem.     Current Outpatient Medications   Medication Sig Dispense Refill    amLODIPine 5 MG Oral Tab TAKE 2 TABLETS EVERY  tablet 1    CELECOXIB 200 MG Oral Cap TAKE 1 CAPSULE EVERY DAY 30 capsule 11    buPROPion  MG Oral Tablet 24 Hr Take 1 tablet (150 mg total) by mouth daily. 30 tablet 1    baclofen 20 MG Oral Tab Take 1 tablet (20 mg total) by mouth daily. 90 tablet 0    escitalopram 20 MG Oral Tab Take 1 tablet (20 mg total) by mouth daily. 90 tablet 0    ATORVASTATIN 20 MG Oral Tab TAKE 1 TABLET EVERY NIGHT 90 tablet 3    Triamterene-HCTZ 37.5-25 MG Oral Tab TAKE 1 TABLET EVERY DAY 90 tablet 1    LEVOTHYROXINE 25 MCG Oral Tab TAKE 1 TABLET (25 MCG TOTAL) BY MOUTH BEFORE BREAKFAST. 90 tablet 3    senna-docusate 8.6-50 MG Oral Tab Take 1 tablet by mouth 2 (two) times daily as needed. 30 tablet 0    Omeprazole 40 MG Oral Capsule Delayed Release Take one tablet (40 mg total) by mouth once daily, 30 minutes prior to breakfast. 90 capsule 2    CALCIUM OR Take 1,000 mg by mouth daily.      Multiple Vitamin (DAILY VALUE MULTIVITAMIN) Oral Tab Take 1 tablet by mouth daily.      Cholecalciferol (VITAMIN D) 2000 UNITS Oral Cap Take 1 capsule (2,000 Units total) by mouth daily.        Allergies: No Known Allergies   Past Medical History:    Arthritis    BACK PAIN    in morning    Back problem    Alas's esophagus    Basal cell carcinoma of ear    excision of BCC lesion Left ear    Change in hair    Chronic heartburn    Constipation    Depression    Disorder of thyroid    Easy bruising    Esophageal reflux    Essential hypertension    Fatigue    Heartburn    Hemorrhoids    High blood  pressure    High cholesterol    Hx of motion sickness    Hyperglycemia    Hyperlipidemia    Increased weakness when ambulating    Multiple sclerosis (HCC)    chronic side effects include fatique and \"brain fog\" and decreased vision of left eye, left sided weakness due to MS    Muscle spasms of both lower extremities    Muscle weakness    has MS    OSTEOARTHRITIS    Osteoarthritis    Osteoarthrosis involving lower leg    Log Date: 10/26/2012     OTHER DISEASES    MS dx 1981 Dr. Ramachandran    Pain in joints    Personal history of colonic polyps    PONV (postoperative nausea and vomiting)    Unspecified essential hypertension    Visual impairment    decreased vision left eye glasses    Wears glasses      Past Surgical History:   Procedure Laterality Date    Appendectomy  1964    Back surgery      cervical surgery/ACDF x 3 with Dr. Webster          x2    Colonoscopy  2003    Colonoscopy      Excis lumbar disk,one level      Foot/toes surgery proc unlisted  2005    hemmertoes operation Rt toe    Hysterectomy      Knee replacement surgery  2022    Knee replacement surgery Right 2024    Dr.Ryan Curry    Laminectomy,lumbar  2010    L4 L5 fusion    Oophorectomy Bilateral     Removal of ovarian cyst(s)      x2    Thyroidectomy      partial thyroidectomy Lt    Tonsillectomy      Total abdom hysterectomy      w/ removal of both ovaries d/t DBT and fibroid    Upper gi endoscopy,exam        Family History   Problem Relation Age of Onset    Cancer Sister         cervical cancer    Hypertension Mother          2019    Breast Cancer Daughter 39        BRCA 1 GENE    BRCA gene + Daughter     Hypertension Brother          2014    Hypertension Sister     Hypertension Sister     Hypertension Brother       Social History:   Social History     Socioeconomic History    Marital status:    Tobacco Use    Smoking status: Never    Smokeless tobacco: Never    Tobacco comments:      Updated 4/17/24   Vaping Use    Vaping status: Never Used   Substance and Sexual Activity    Alcohol use: Yes     Alcohol/week: 2.0 standard drinks of alcohol     Types: 2 Glasses of wine per week     Comment: 2 glasses of wine on a weekend    Drug use: Never   Other Topics Concern    Caffeine Concern Yes     Comment: 2 cups of coffee daily.    Exercise Yes     Comment: 4x/week.      Social Determinants of Health     Financial Resource Strain: Low Risk  (6/28/2024)    Financial Resource Strain     Difficulty of Paying Living Expenses: Not hard at all   Food Insecurity: No Food Insecurity (4/4/2024)    Food Insecurity     Food Insecurity: Never true   Transportation Needs: No Transportation Needs (6/28/2024)    Transportation Needs     Lack of Transportation: No   Housing Stability: Low Risk  (4/4/2024)    Housing Stability     Housing Instability: No      Occ: retired  Exercise: in physical therapy currently.  Diet:  no snacks, no eating between meals, no alcohol     REVIEW OF SYSTEMS:   GENERAL: feels well otherwise  SKIN: denies any unusual skin lesions,granuloma annulare  EYES:denies blurred vision or double vision  HEENT: denies nasal congestion, sinus pain or ST  LUNGS: denies shortness of breath with exertion  CARDIOVASCULAR: denies chest pain on exertion,+HTN  GI: denies abdominal pain,+GERD  : denies dysuria,vaginal discharge or itching.  MUSCULOSKELETAL: hx of  back pain  NEURO: denies headaches,+MS,+ RLS,+OA  PSYCHE:+ depression and  anxiety  HEMATOLOGIC: denies hx of anemia  ENDOCRINE: + thyroid history,+ prediabetic  ALL/ASTHMA: denies hx of allergy or asthma    EXAM:   /74 (BP Location: Left arm, Patient Position: Sitting, Cuff Size: adult)   Pulse 76   Temp 97.3 °F (36.3 °C) (Temporal)   Resp 16   Ht 5' 4\" (1.626 m)   Wt 184 lb 3.2 oz (83.6 kg)   SpO2 97%   BMI 31.62 kg/m²   GENERAL: well developed, well nourished,in no apparent distress  SKIN: no rashes,no suspicious lesions  HEENT:  atraumatic, normocephalic,ears and throat are clear  EYES:PERRLA, EOMI, normal optic disk,conjunctiva are clear  NECK: supple,no adenopathy,no bruits  CHEST: no chest tenderness  LUNGS: clear to auscultation  CARDIO: RRR without murmur  -EKG shows Normal sinus rhythm with rate of 66, SD and QRS intervals within normal limits, QRS complexes lead III and no T-wave abnormalities.   -Unchanged from previous tracings    GI: good BS's,no masses, HSM or tenderness  : deferred  MUSCULOSKELETAL: back is not tender,FROM of the back  EXTREMITIES: no cyanosis, clubbing or edema  ANKLE/Right: swollen, minimal pain currently, gait walking with a cane  NEURO: Oriented times three,cranial nerves are intact,motor and sensory are grossly intact    ASSESSMENT AND PLAN:   Makenzie Reyes is a 74 year old female who presents for a pre-operative physical exam.  EKG Normal. Surgery pending until lab results are back.      Patient appears stable. Pre-op testing is acceptable but her potassium level is low at 3.4. Patient will be given K-Dur 20 jak one tablet today.  Patient is cleared for their procedure and may proceed at an acceptable risk for complications.    HUSSAIN Mujica, 08/12/24, 12:53 PM    Encounter Diagnoses   Name Primary?    Preop general physical exam Yes    Pain due to internal orthopedic prosthetic device, subsequent encounter        Orders Placed This Encounter   Procedures    CBC With Differential With Platelet    Comp Metabolic Panel (14)       Meds & Refills for this Visit:  Requested Prescriptions      No prescriptions requested or ordered in this encounter       Imaging & Consults:  ELECTROCARDIOGRAM, COMPLETE

## 2024-08-08 NOTE — TELEPHONE ENCOUNTER
Office called in, will re-fax pre-op requirements.     Provider fax number. If we do not receive, office advised to call.

## 2024-08-09 ENCOUNTER — TELEPHONE (OUTPATIENT)
Dept: INTERNAL MEDICINE CLINIC | Facility: CLINIC | Age: 74
End: 2024-08-09

## 2024-08-09 NOTE — TELEPHONE ENCOUNTER
Dr. Stallworth office called to get clearance fax over to them for patients Surgery scheduled 8/13.     Fax#[phone]

## 2024-08-12 ENCOUNTER — TELEPHONE (OUTPATIENT)
Dept: INTERNAL MEDICINE CLINIC | Facility: CLINIC | Age: 74
End: 2024-08-12

## 2024-08-12 ENCOUNTER — APPOINTMENT (OUTPATIENT)
Dept: PHYSICAL THERAPY | Age: 74
End: 2024-08-12
Attending: PHYSICIAN ASSISTANT
Payer: MEDICARE

## 2024-08-12 DIAGNOSIS — Z98.890 HISTORY OF OPEN REDUCTION AND INTERNAL FIXATION (ORIF) PROCEDURE: Primary | ICD-10-CM

## 2024-08-12 RX ORDER — POTASSIUM CHLORIDE 1500 MG/1
20 TABLET, EXTENDED RELEASE ORAL DAILY
Qty: 1 TABLET | Refills: 0 | Status: SHIPPED | OUTPATIENT
Start: 2024-08-12 | End: 2024-08-13

## 2024-08-12 NOTE — TELEPHONE ENCOUNTER
Pre op labs discussed with Dr. Garcia. Pt's potassium level is a little low. Pt needs K-dur 20 jak one tab today to up her potassium level before today.  Forms given to MA to fax.

## 2024-08-12 NOTE — TELEPHONE ENCOUNTER
Spoke to Julita from Dr. Stallworth, surgeon, requesting to have a referral placed for surgery right medial malleolus hardware removal at [facility].     Pended referral, please sign if appropriate.     Additionally, surgeon is requesting medical clearance from PCP to have procedure done. Please advise.     Patient has appointment for removal on 8/13/24

## 2024-08-12 NOTE — TELEPHONE ENCOUNTER
Sofia ([facility] [[email or web address]])  Ph: [phone]  Fax: [phone]    Requesting for pre-op clearance and testing to be faxed over, pt having surgery tomorrow.

## 2024-08-15 ENCOUNTER — APPOINTMENT (OUTPATIENT)
Dept: PHYSICAL THERAPY | Age: 74
End: 2024-08-15
Attending: PHYSICIAN ASSISTANT
Payer: MEDICARE

## 2024-08-16 ENCOUNTER — TELEPHONE (OUTPATIENT)
Dept: PHYSICAL THERAPY | Facility: HOSPITAL | Age: 74
End: 2024-08-16

## 2024-08-20 ENCOUNTER — APPOINTMENT (OUTPATIENT)
Dept: PHYSICAL THERAPY | Age: 74
End: 2024-08-20
Attending: PHYSICIAN ASSISTANT
Payer: MEDICARE

## 2024-08-22 ENCOUNTER — APPOINTMENT (OUTPATIENT)
Dept: PHYSICAL THERAPY | Age: 74
End: 2024-08-22
Attending: PHYSICIAN ASSISTANT
Payer: MEDICARE

## 2024-08-22 DIAGNOSIS — G35 MULTIPLE SCLEROSIS (HCC): ICD-10-CM

## 2024-08-22 DIAGNOSIS — G25.81 RLS (RESTLESS LEGS SYNDROME): ICD-10-CM

## 2024-08-22 RX ORDER — ESCITALOPRAM OXALATE 20 MG/1
20 TABLET ORAL DAILY
Qty: 90 TABLET | Refills: 0 | Status: SHIPPED | OUTPATIENT
Start: 2024-08-22

## 2024-08-22 RX ORDER — BACLOFEN 20 MG/1
20 TABLET ORAL DAILY
Qty: 90 TABLET | Refills: 0 | Status: SHIPPED | OUTPATIENT
Start: 2024-08-22

## 2024-08-22 NOTE — TELEPHONE ENCOUNTER
Baclofen 20 mg  Filled 6-13-24  Qty 90  0 refills  Future Appointments   Date Time Provider Department Center   9/3/2024  9:00 AM Desiree Pace PT [facility] [place]   9/9/2024  9:00 AM Desiree Pace, PT [facility] [place]   9/12/2024  9:45 AM Desiree Pace, PT [facility] [place]   9/18/2024 10:30 AM Albania Knox APRN [facility] [place]   LOV 1-24-24 SM    Escitalopram 20 mg  Filled 6-8-24  Qty 90  0 refills  Future Appointments   Date Time Provider Department Center   9/3/2024  9:00 AM Desiree Pace PT [facility] [place]   9/9/2024  9:00 AM Desiree Pace PT [facility] [place]   9/12/2024  9:45 AM Desiree Pace PT [facility] [place]   9/18/2024 10:30 AM Albania Knox APRN [facility] [place]   Telemed. 6-24-24 SM

## 2024-08-28 ENCOUNTER — TELEPHONE (OUTPATIENT)
Dept: INTERNAL MEDICINE CLINIC | Facility: CLINIC | Age: 74
End: 2024-08-28

## 2024-08-28 NOTE — TELEPHONE ENCOUNTER
Incoming fax from [facility]      Order # [id number]    Placed in TO in basket for review

## 2024-09-03 ENCOUNTER — OFFICE VISIT (OUTPATIENT)
Dept: PHYSICAL THERAPY | Age: 74
End: 2024-09-03
Payer: MEDICARE

## 2024-09-03 DIAGNOSIS — Z48.89 AFTERCARE FOLLOWING SURGERY: Primary | ICD-10-CM

## 2024-09-03 PROCEDURE — 97110 THERAPEUTIC EXERCISES: CPT

## 2024-09-03 PROCEDURE — 97112 NEUROMUSCULAR REEDUCATION: CPT

## 2024-09-03 NOTE — PROGRESS NOTES
Diagnosis:   Aftercare following surgery (Z48.89)     Aftercare following surgery of the musculoskeletal system   Displaced trimalleolar fracture of right lower leg, subsequent encounter for closed fracture with routine healing   ,  S/P right ankle bimalleolar ORIF      Referring Provider: Nereida Seymour (external) Date of Evaluation:    7/5/2024         Precautions:    WBAT with CAM walker boot with A.D except when in therapy, Fall risk, MS  Next MD visit:   none scheduled  Date of Surgery: n/a   Insurance Primary/Secondary: [facility] / N/A     # Auth Visits:     16 visits until 9/30       Progress Summary  Pt has attended 9 visits in Physical Therapy.     Subjective: Pt came back from doing home therapy treatments when she had the hardware removed, she is glad to be back in outpatient therapy,she still feels weak and unstable on the RLE but she has been able to drive now.  Pain: 0-2/10 pt denies pain \"just achy\"    Objective:   Grossly graded 4/5 on R ankle muscles but unable to do R heel raises on RLE    SLS: R unable L 2 sec    R Ankle ROM: DF 8 deg                          PF 28 deg                          Inv 25 deg                          Ev 8 deg    Post LEFS Score  Post LEFS Score: 60 % (9/3/2024  3:14 PM)    18.75 % improvement    6MWT = 448 ft with cane  TUG = 19  sec with cane  Gait Assessment: with single point cane with narrow BENITA ,slow marilyn but more even steps R/LLE      Assessment: Patient has started to drive herself to therapy but still has not been going out in the community for distance ambulation.She is ambulating with a cane without significant limp but she does not feel stable.She was able to do more CKC activities today without significant pain and just reported feeling stretch on her heel.We will continue working on her functional ROM and strength to be able to return to previous level of function.      Goals: (to be met in 16 visits)  Pt will demonstrate improved DF AROM to >=  10 degrees to promote proper foot clearance during gait and greater ease descending stairs without compensation  Pt will have increased ankle strength to 5/5 throughout for improved ankle control with ADLs such as prolonged gait and stair negotiation ,Not met  Pt will have improved SLS to >5s for increased ankle stability with ambulation on uneven surfaces such as gravel and grass ,Not met  Pt will transition to walking without CAM boot with least to no A.D with good stability to be able to go to stores for functional distances --- Partially met(out of boot but not very stable and limited distances)  Pt will report able to get back to her low impact group exercises without significant ankle pain,Not met  Pt will be independent and compliant with comprehensive HEP to maintain progress achieved in PT,on going    Added goal: Pt will improve her LEFS score to 70% or greater demonstrating close to her PLOF    Plan:  Continue with plan of care with upgraded strengthening in OKC/CKC and static/dynamic balance activities  Date:  7/18/2024               TX#: 4/12 Date: 7/19/2024               TX#: 5/12 Date: 7/25/2024  Tx#: 6/12 **PN NEXT Date: 7/30/2024  Tx#: 7/16 (pending auth)  Date: 8/1/2024  Tx#: 8/16 Date: 9/3/2024  Tx#: 9/16   Thera Ext 40' Thera ex x40' Ther-Ex: 40 min Ther-Ex: 35 min Ther-Ex: 35 min Ther-Ex: 33 min   Nustep L 6 x 8'  Without boot seated Exs\"  Rocker board A/P rocking 2'  Med/lat rocking 2'  Pink bal disc A/P shifting pressure 2'  Round board CW/CCW 2'  Intrinsic muscles exs  Toe curling and extension 1'x2  Red band resisted ankle PF 1'x2  Closed kinetic chain toe extension 1'  Standing TKE with red band 5\" 1'x2  OKC 3 way hip R 2x10 each direction Nustep L 6 x 8'  Without boot seated Exs\"  Rocker board A/P rocking 2'  Med/lat rocking 2'  Pink bal disc A/P shifting pressure 2'  Round board CW/CCW 2'  Ball rolling on plantar surface , on forefoot CW/CCW 1'x2  Red band resisted ankle PF 1'x2  SAQ to  SLR 3x10  S/L hip abd 3x10     NuStep 5 min  Seated AP rocker board lvl 1, 2x20  Seated ML rocker board lvl 1, 2x20  Seated round board CW/CCW 2x20 ea  Towel scrunch/doming, 20x5\"   Ankle inversion/eversion with towel x10 ea   Standing weight shifts, AP and ML 1x10 ea   Side steps at bar, 3 laps    NuStep 5 min  Ankle ROM: circles, DF/PF, inv/ev x20 ea  Ankle alphabets, x1 round   Seated DKSA calf stretch, 2x30\"   Seated AP rocker board lvl 1, 2x20  Seated ML rocker board lvl 1, 2x20  Seated round board CW/CCW 2x20 ea            Gait training in // bars: 3 laps rocker bottom mechanics.  NuStep 5 min  Ankle PROM gentle   Ankle iso inv/ev against therapist resist, 10x5\" ea   Tandem walking in // bars heel toe, 5 laps  Side stepping in // bars, 5 laps  4\" step ups fwd, 1x10 B  4\" step ups lateral, 1x10 B     DLP next   Rocker board standing next NuStep L 5 x 8min  Gastroc stretch 45\"x2  Rocker board A/P x 2'  Tandem walking in // bars heel toe, 2 laps  Side stepping in // bars, 5 laps  4\" [employer] x20 B  4\" [employer] x20 B   Doug # 5 DLP 3x10  #4 SLP RLE 2x10     NMR: 5 min NMR: 5 min NMR: 8 min NMR: 10 min     Semi tandem stance, 2x30\" B  Standing march with light fingertip assist, 1'   Ax step up, 1' ea leg Semi tandem stance, 2x30\" B  Standing march with light fingertip assist, 1'   Ax step up, 1' ea leg  Semi tandem stance on Ax, 2x30\" B with fingertip assist  Standing march on Ax with light fingertip assist, 2x10 B Semi tandem stance on Ax, 2x30\" B with fingertip assist  Standing march on Ax with light fingertip assist, 2x10 B  Rocker board static balance with light hand support      Measurements 5 min             HEP: passive ROM with strap on all planes, AROM on all planes   Added: yellow band resisted OKC on all planes, towel srunches, big toe extension/curling /toe separation  Charges: Thera Ex x2, NMR x1      Total Timed Treatment: 43  min  Total Treatment Time: 43  min

## 2024-09-09 ENCOUNTER — TELEPHONE (OUTPATIENT)
Dept: PHYSICAL THERAPY | Age: 74
End: 2024-09-09

## 2024-09-09 ENCOUNTER — APPOINTMENT (OUTPATIENT)
Dept: PHYSICAL THERAPY | Age: 74
End: 2024-09-09
Payer: MEDICARE

## 2024-09-10 ENCOUNTER — OFFICE VISIT (OUTPATIENT)
Dept: PHYSICAL THERAPY | Age: 74
End: 2024-09-10
Payer: MEDICARE

## 2024-09-10 DIAGNOSIS — S82.851D DISPLACED TRIMALLEOLAR FRACTURE OF RIGHT LOWER LEG, SUBSEQUENT ENCOUNTER FOR CLOSED FRACTURE WITH ROUTINE HEALING: Primary | ICD-10-CM

## 2024-09-10 PROCEDURE — 97112 NEUROMUSCULAR REEDUCATION: CPT

## 2024-09-10 PROCEDURE — 97140 MANUAL THERAPY 1/> REGIONS: CPT

## 2024-09-10 PROCEDURE — 97110 THERAPEUTIC EXERCISES: CPT

## 2024-09-10 NOTE — PROGRESS NOTES
Diagnosis:   Aftercare following surgery (Z48.89)     Aftercare following surgery of the musculoskeletal system   Displaced trimalleolar fracture of right lower leg, subsequent encounter for closed fracture with routine healing   ,  S/P right ankle bimalleolar ORIF      Referring Provider: Nereida Seymour (external) Date of Evaluation:    7/5/2024         Precautions:    WBAT with CAM walker boot with A.D except when in therapy, Fall risk, MS  Next MD visit:   none scheduled  Date of Surgery: n/a   Insurance Primary/Secondary: [facility] / N/A     # Auth Visits:     16 visits until 9/30         Subjective: Pt c/o \"pull\" pain/discomfort on dorsal aspect of the foot  Pt also mentions that she was discharged from wound care as it is healing well now.    Pain: Pt denies pain but \"Stretch /tight pull\"      Objective:       9/3/2024  Grossly graded 4/5 on R ankle muscles but unable to do R heel raises on RLE    SLS: R unable L 2 sec    R Ankle ROM: DF 8 deg                          PF 28 deg                          Inv 25 deg                          Ev 8 deg    Post LEFS Score  Post LEFS Score: 60 % (9/3/2024  3:14 PM)    18.75 % improvement    6MWT = 448 ft with cane  TUG = 19  sec with cane  Gait Assessment: with single point cane with narrow BENITA ,slow marilyn but more even steps R/LLE      Assessment: The patient has been having pain on anterior ankle with active or passive dorsiflexion but has felt relief from manual treatment that she was able to tolerate the rest of [facility] activities.She was given additional HEP with ball rogelio on plantar surface to work on intrinsic muscles activation and improved metatarsal mobility.    Goals: (to be met in 16 visits)  Pt will demonstrate improved DF AROM to >= 10 degrees to promote proper foot clearance during gait and greater ease descending stairs without compensation  Pt will have increased ankle strength to 5/5 throughout for improved ankle control with ADLs such as  prolonged gait and stair negotiation ,Not met  Pt will have improved SLS to >5s for increased ankle stability with ambulation on uneven surfaces such as gravel and grass ,Not met  Pt will transition to walking without CAM boot with least to no A.D with good stability to be able to go to stores for functional distances --- Partially met(out of boot but not very stable and limited distances)  Pt will report able to get back to her low impact group exercises without significant ankle pain,Not met  Pt will be independent and compliant with comprehensive HEP to maintain progress achieved in PT,on going    Added goal: Pt will improve her LEFS score to 70% or greater demonstrating close to her PLOF    Plan:  Continue with plan of care with upgraded strengthening in OKC/CKC and static/dynamic balance activities  Date:  7/18/2024               TX#: 4/12 Date: 7/19/2024               TX#: 5/12 Date: 7/25/2024  Tx#: 6/12 **PN NEXT Date: 7/30/2024  Tx#: 7/16 (pending auth)  Date: 8/1/2024  Tx#: 8/16 Date: 9/3/2024  Tx#: 9/16 Date: 9/10/2024  Tx#: 10/16   Thera Ext 40' Thera ex x40' Ther-Ex: 40 min Ther-Ex: 35 min Ther-Ex: 35 min Ther-Ex: 33 min Ther-Ex: 20 min   Nustep L 6 x 8'  Without boot seated Exs\"  Rocker board A/P rocking 2'  Med/lat rocking 2'  Pink bal disc A/P shifting pressure 2'  Round board CW/CCW 2'  Intrinsic muscles exs  Toe curling and extension 1'x2  Red band resisted ankle PF 1'x2  Closed kinetic chain toe extension 1'  Standing TKE with red band 5\" 1'x2  OKC 3 way hip R 2x10 each direction Nustep L 6 x 8'  Without boot seated Exs\"  Rocker board A/P rocking 2'  Med/lat rocking 2'  Pink bal disc A/P shifting pressure 2'  Round board CW/CCW 2'  Ball rolling on plantar surface , on forefoot CW/CCW 1'x2  Red band resisted ankle PF 1'x2  SAQ to SLR 3x10  S/L hip abd 3x10     NuStep 5 min  Seated AP rocker board lvl 1, 2x20  Seated ML rocker board lvl 1, 2x20  Seated round board CW/CCW 2x20 ea  Towel  scrunch/doming, 20x5\"   Ankle inversion/eversion with towel x10 ea   Standing weight shifts, AP and ML 1x10 ea   Side steps at bar, 3 laps    NuStep 5 min  Ankle ROM: circles, DF/PF, inv/ev x20 ea  Ankle alphabets, x1 round   Seated DKSA calf stretch, 2x30\"   Seated AP rocker board lvl 1, 2x20  Seated ML rocker board lvl 1, 2x20  Seated round board CW/CCW 2x20 ea            Gait training in // bars: 3 laps rocker bottom mechanics.  NuStep 5 min  Ankle PROM gentle   Ankle iso inv/ev against therapist resist, 10x5\" ea   Tandem walking in // bars heel toe, 5 laps  Side stepping in // bars, 5 laps  4\" step ups fwd, 1x10 B  4\" step ups lateral, 1x10 B     DLP next   Rocker board standing next NuStep L 5 x 8min  Gastroc stretch 45\"x2  Rocker board A/P x 2'  Tandem walking in // bars heel toe, 2 laps  Side stepping in // bars, 5 laps  4\" FSU x20 B  4\" LSU x20 B   Doug # 5 DLP 3x10  #4 SLP RLE 2x10 NusStep L 6 x 6min  Post Manual Tx:  Tennis ball foot rolling 2'  Gastroc stretch 1'x2  4\" FSU x20 B  4\" LSU x20 B   Shuttle #5 DLP 3x10  SLP RLE 3x10     Manual Tx 15 min  STM to R ant and post foot , gentle talocrural distraction and passive plantarflexion  ,STM/IASTM to post leg/calf, passive stretches   Midfoot mobilization     NMR: 5 min NMR: 5 min NMR: 8 min NMR: 10 min NMR: 10 min     Semi tandem stance, 2x30\" B  Standing march with light fingertip assist, 1'   Ax step up, 1' ea leg Semi tandem stance, 2x30\" B  Standing march with light fingertip assist, 1'   Ax step up, 1' ea leg  Semi tandem stance on Ax, 2x30\" B with fingertip assist  Standing march on Ax with light fingertip assist, 2x10 B Semi tandem stance on Ax, 2x30\" B with fingertip assist  Standing march on Ax with light fingertip assist, 2x10 B  Rocker board static balance with light hand support Standing march on Ax with light fingertip assist, 2x10 B  Stepping up/down fwd 1' lateral 1'  Rocker board controlled A/P tapping 2'   static balance with light hand  support 1'      Measurements 5 min               HEP: passive ROM with strap on all planes, AROM on all planes   Added: yellow band resisted OKC on all planes, towel srunches, big toe extension/curling /toe separation  Charges: Thera Ex x1, Manual x1 NMR x1      Total Timed Treatment: 45  min  Total Treatment Time: 45 min

## 2024-09-12 ENCOUNTER — OFFICE VISIT (OUTPATIENT)
Dept: PHYSICAL THERAPY | Age: 74
End: 2024-09-12
Payer: MEDICARE

## 2024-09-12 DIAGNOSIS — Z47.89 AFTERCARE FOLLOWING SURGERY OF THE MUSCULOSKELETAL SYSTEM: Primary | ICD-10-CM

## 2024-09-12 PROCEDURE — 97110 THERAPEUTIC EXERCISES: CPT

## 2024-09-12 PROCEDURE — 97112 NEUROMUSCULAR REEDUCATION: CPT

## 2024-09-12 PROCEDURE — 97140 MANUAL THERAPY 1/> REGIONS: CPT

## 2024-09-12 NOTE — PROGRESS NOTES
Diagnosis:   Aftercare following surgery (Z48.89)     Aftercare following surgery of the musculoskeletal system   Displaced trimalleolar fracture of right lower leg, subsequent encounter for closed fracture with routine healing   ,  S/P right ankle bimalleolar ORIF      Referring Provider: Nereida Seymour (external) Date of Evaluation:    7/5/2024         Precautions:    WBAT with CAM walker boot with A.D except when in therapy, Fall risk, MS  Next MD visit:   none scheduled  Date of Surgery: n/a   Insurance Primary/Secondary: [facility] / N/A     # Auth Visits:     16 visits until 9/30         Subjective: Pt report the massage from the previous session and ball rolling on the foot helped a lot with her pain.    Pain: \"4/10 pain when walking\"    Objective:       9/3/2024  Grossly graded 4/5 on R ankle muscles but unable to do R heel raises on RLE    SLS: R unable L 2 sec    R Ankle ROM: DF 8 deg                          PF 28 deg                          Inv 25 deg                          Ev 8 deg    Post LEFS Score  Post LEFS Score: 60 % (9/3/2024  3:14 PM)    18.75 % improvement    6MWT = 448 ft with cane  TUG = 19  sec with cane  Gait Assessment: with single point cane with narrow BENITA ,slow marilyn but more even steps R/LLE      Assessment: The patient demonstrates excessive subtalar pronation on R/post of side when standing and walking.She was given tactile/manual cue on keeping hip more neutral and prevent excessive IR when doing CKC activities. Pt reports having minimal pain when taking step/bearing weight on RLE when going out of the clinic.    Goals: (to be met in 16 visits)  Pt will demonstrate improved DF AROM to >= 10 degrees to promote proper foot clearance during gait and greater ease descending stairs without compensation  Pt will have increased ankle strength to 5/5 throughout for improved ankle control with ADLs such as prolonged gait and stair negotiation ,Not met  Pt will have improved SLS  to >5s for increased ankle stability with ambulation on uneven surfaces such as gravel and grass ,Not met  Pt will transition to walking without CAM boot with least to no A.D with good stability to be able to go to stores for functional distances --- Partially met(out of boot but not very stable and limited distances)  Pt will report able to get back to her low impact group exercises without significant ankle pain,Not met  Pt will be independent and compliant with comprehensive HEP to maintain progress achieved in PT,on going    Added goal: Pt will improve her LEFS score to 70% or greater demonstrating close to her PLOF    Plan:  Continue with plan of care with upgraded strengthening in OKC/CKC and static/dynamic balance activities  Date:  7/18/2024               TX#: 4/12 Date: 7/19/2024               TX#: 5/12 Date: 7/25/2024  Tx#: 6/12 **PN NEXT Date: 7/30/2024  Tx#: 7/16 (pending auth)  Date: 8/1/2024  Tx#: 8/16 Date: 9/3/2024  Tx#: 9/16 Date: 9/10/2024  Tx#: 10/16 Date:9/12/2024  Tx#: 11/16   Thera Ext 40' Thera ex x40' Ther-Ex: 40 min Ther-Ex: 35 min Ther-Ex: 35 min Ther-Ex: 33 min Ther-Ex: 20 min Manual Tx 20 min   Nustep L 6 x 8'  Without boot seated Exs\"  Rocker board A/P rocking 2'  Med/lat rocking 2'  Pink bal disc A/P shifting pressure 2'  Round board CW/CCW 2'  Intrinsic muscles exs  Toe curling and extension 1'x2  Red band resisted ankle PF 1'x2  Closed kinetic chain toe extension 1'  Standing TKE with red band 5\" 1'x2  OKC 3 way hip R 2x10 each direction Nustep L 6 x 8'  Without boot seated Exs\"  Rocker board A/P rocking 2'  Med/lat rocking 2'  Pink bal disc A/P shifting pressure 2'  Round board CW/CCW 2'  Ball rolling on plantar surface , on forefoot CW/CCW 1'x2  Red band resisted ankle PF 1'x2  SAQ to SLR 3x10  S/L hip abd 3x10     NuStep 5 min  Seated AP rocker board lvl 1, 2x20  Seated ML rocker board lvl 1, 2x20  Seated round board CW/CCW 2x20 ea  Towel scrunch/doming, 20x5\"   Ankle  inversion/eversion with towel x10 ea   Standing weight shifts, AP and ML 1x10 ea   Side steps at bar, 3 laps    NuStep 5 min  Ankle ROM: circles, DF/PF, inv/ev x20 ea  Ankle alphabets, x1 round   Seated DKSA calf stretch, 2x30\"   Seated AP rocker board lvl 1, 2x20  Seated ML rocker board lvl 1, 2x20  Seated round board CW/CCW 2x20 ea            Gait training in // bars: 3 laps rocker bottom mechanics.  NuStep 5 min  Ankle PROM gentle   Ankle iso inv/ev against therapist resist, 10x5\" ea   Tandem walking in // bars heel toe, 5 laps  Side stepping in // bars, 5 laps  4\" step ups fwd, 1x10 B  4\" step ups lateral, 1x10 B     DLP next   Rocker board standing next NuStep L 5 x 8min  Gastroc stretch 45\"x2  Rocker board A/P x 2'  Tandem walking in // bars heel toe, 2 laps  Side stepping in // bars, 5 laps  4\" FSU x20 B  4\" LSU x20 B   Doug # 5 DLP 3x10  #4 SLP RLE 2x10 NusStep L 6 x 6min  Post Manual Tx:  Tennis ball foot rolling 2'  Gastroc stretch 1'x2  4\" FSU x20 B  4\" LSU x20 B   Shuttle #5 DLP 3x10  SLP RLE 3x10     Manual Tx 15 min  STM to R ant and post foot , gentle talocrural distraction and passive plantarflexion  ,STM/IASTM to post leg/calf, passive stretches   Midfoot mobilization STM to R ant and post foot , gentle talocrural distracttion   passive stretches  on all planes   Midfoot mobilization      Ther-Ex: 12 min  Post Manual Tx:  Seated rocker board  A/P 2'  Heel/toe presses on pink balance disc  Gastroc stretch 1'x2  4\" FSU x20 B  4\" LSU x20 B   Shuttle #5  DLP RLE 3x10      NMR: 5 min NMR: 5 min NMR: 8 min NMR: 10 min NMR: 10 min NMR: 13 min     Semi tandem stance, 2x30\" B  Standing march with light fingertip assist, 1'   Ax step up, 1' ea leg Semi tandem stance, 2x30\" B  Standing march with light fingertip assist, 1'   Ax step up, 1' ea leg  Semi tandem stance on Ax, 2x30\" B with fingertip assist  Standing march on Ax with light fingertip assist, 2x10 B Semi tandem stance on Ax, 2x30\" B with fingertip  assist  Standing march on Ax with light fingertip assist, 2x10 B  Rocker board static balance with light hand support Standing march on Ax with light fingertip assist, 2x10 B  Stepping up/down fwd 1' lateral 1'  Rocker board controlled A/P tapping 2'   static balance with light hand support 1' Controlled A/P and med/lat tapping on square RB 2' each  Blue balance disc R/L stepping 2'  Heel/toe 2'  Standing march on Ax with light fingertip assist, 2x10 B  Stepping up/down fwd 1' lateral 1'     static balance with light hand support 1      Measurements 5 min                 HEP: passive ROM with strap on all planes, AROM on all planes   Added: yellow band resisted OKC on all planes, towel srunches, big toe extension/curling /toe separation  Charges: Thera Ex x1, Manual x1 NMR x1      Total Timed Treatment: 45  min  Total Treatment Time: 45 min

## 2024-09-17 ENCOUNTER — OFFICE VISIT (OUTPATIENT)
Dept: PHYSICAL THERAPY | Age: 74
End: 2024-09-17
Payer: MEDICARE

## 2024-09-17 DIAGNOSIS — Z48.89 AFTERCARE FOLLOWING SURGERY: Primary | ICD-10-CM

## 2024-09-17 PROCEDURE — 97112 NEUROMUSCULAR REEDUCATION: CPT

## 2024-09-17 PROCEDURE — 97110 THERAPEUTIC EXERCISES: CPT

## 2024-09-17 PROCEDURE — 97140 MANUAL THERAPY 1/> REGIONS: CPT

## 2024-09-19 ENCOUNTER — TELEPHONE (OUTPATIENT)
Dept: PHYSICAL THERAPY | Age: 74
End: 2024-09-19

## 2024-09-19 ENCOUNTER — APPOINTMENT (OUTPATIENT)
Dept: PHYSICAL THERAPY | Age: 74
End: 2024-09-19
Payer: MEDICARE

## 2024-09-26 ENCOUNTER — OFFICE VISIT (OUTPATIENT)
Dept: PHYSICAL THERAPY | Age: 74
End: 2024-09-26
Payer: MEDICARE

## 2024-09-26 DIAGNOSIS — Z47.89 AFTERCARE FOLLOWING SURGERY OF THE MUSCULOSKELETAL SYSTEM: Primary | ICD-10-CM

## 2024-09-26 PROCEDURE — 97140 MANUAL THERAPY 1/> REGIONS: CPT

## 2024-09-26 NOTE — PROGRESS NOTES
Diagnosis:   Aftercare following surgery (Z48.89)     Aftercare following surgery of the musculoskeletal system   Displaced trimalleolar fracture of right lower leg, subsequent encounter for closed fracture with routine healing   ,  S/P right ankle bimalleolar ORIF      Referring Provider: Nereida Seymour (external) Date of Evaluation:    7/5/2024         Precautions:    WBAT with CAM walker boot with A.D except when in therapy, Fall risk, MS  Next MD visit:   none scheduled  Date of Surgery: n/a   Insurance Primary/Secondary: [facility] / N/A     # Auth Visits:     16 visits until 9/30/2024         Subjective: Pt states she has seen the ortho and explained how the ankle deformity happened ,she  was told no more therapy was referred to see a foot doctor.She just wanted to come for her last visit today.      Pain: \"5/10 pain when walking/putting pressure\"    Objective:   9/3/2024  (Reassessment)                                                                9/26/2024 ( Discharge)   SLS: R unable L 2 sec                                                                      SLS: R unable,   L 3-4 sec        R Ankle ROM: DF 8 deg                                                                     Ankle ROM: Right  DF 10 deg                          PF 28 deg                                                                                             PF 30 deg                            Inv 25 deg                                                                                             Inv 30 deg                          Ev 8 deg                                                                                                Ev 10 deg      Post LEFS Score  Post LEFS Score: 45 % (9/26/2024  1:07 PM)    3.75 % improvement      Post LEFS Score  Post LEFS Score: 60 % (9/3/2024  3:14 PM)    18.75 % improvement    6MWT = 448 ft with cane  TUG = 19  sec with cane  Gait Assessment: with single point cane with narrow BENITA  ,slow marilyn but more even steps R/LLE      Assessment: The patient admittedly feeling down about her foot deformity , the ortho doctor is recommending consult foot doctor as she might surgery to correct it.She came in today just to make finalize her last visit, she states she is trying to keep attending to her stretch and tone class and meet up with friends to keep her self busy.We discussed about benefits of consulting with ankle/foot doctor, to ask about good  orthotics and shoe recommendation as she does not want to go through surgery again. We will be done with skilled therapy treatments at this time.    Goals: (to be met in 16 visits)  Pt will demonstrate improved DF AROM to >= 10 degrees to promote proper foot clearance during gait and greater ease descending stairs without compensation,Improved  Pt will have increased ankle strength to 5/5 throughout for improved ankle control with ADLs such as prolonged gait and stair negotiation,Improved  Pt will have improved SLS to >5s for increased ankle stability with ambulation on uneven surfaces such as gravel and grass ,Not met  Pt will transition to walking without CAM boot with least to no A.D with good stability to be able to go to stores for functional distances ---Improved, ambulating without cam boot  Pt will report able to get back to her low impact group exercises without significant ankle pain,Met  Pt will be independent and compliant with comprehensive HEP to maintain progress achieved in PT.Met    Added goal: Pt will improve her LEFS score to 70% or greater demonstrating close to her PLOF   , Not Met    Plan:  Discharge from skilled therapy services at this time with home exercises.  Date:  7/18/2024               TX#: 4/12 Date: 7/19/2024               TX#: 5/12 Date: 7/25/2024  Tx#: 6/12 **PN NEXT Date: 7/30/2024  Tx#: 7/16 (pending auth)  Date: 8/1/2024  Tx#: 8/16 Date: 9/3/2024  Tx#: 9/16 Date: 9/10/2024  Tx#: 10/16 Date:9/12/2024  Tx#: 11/16  Date:9/17/2024  Tx#: 12/16 Date:9/23/2024  Tx#: 13/16   Thera Ext 40' Thera ex x40' Ther-Ex: 40 min Ther-Ex: 35 min Ther-Ex: 35 min Ther-Ex: 33 min Ther-Ex: 20 min Manual Tx 20 min Manual Tx 12 min Manual Tx 38 min   Nustep L 6 x 8'  Without boot seated Exs\"  Rocker board A/P rocking 2'  Med/lat rocking 2'  Pink bal disc A/P shifting pressure 2'  Round board CW/CCW 2'  Intrinsic muscles exs  Toe curling and extension 1'x2  Red band resisted ankle PF 1'x2  Closed kinetic chain toe extension 1'  Standing TKE with red band 5\" 1'x2  OKC 3 way hip R 2x10 each direction Nustep L 6 x 8'  Without boot seated Exs\"  Rocker board A/P rocking 2'  Med/lat rocking 2'  Pink bal disc A/P shifting pressure 2'  Round board CW/CCW 2'  Ball rolling on plantar surface , on forefoot CW/CCW 1'x2  Red band resisted ankle PF 1'x2  SAQ to SLR 3x10  S/L hip abd 3x10     NuStep 5 min  Seated AP rocker board lvl 1, 2x20  Seated ML rocker board lvl 1, 2x20  Seated round board CW/CCW 2x20 ea  Towel scrunch/doming, 20x5\"   Ankle inversion/eversion with towel x10 ea   Standing weight shifts, AP and ML 1x10 ea   Side steps at bar, 3 laps    NuStep 5 min  Ankle ROM: circles, DF/PF, inv/ev x20 ea  Ankle alphabets, x1 round   Seated DKSA calf stretch, 2x30\"   Seated AP rocker board lvl 1, 2x20  Seated ML rocker board lvl 1, 2x20  Seated round board CW/CCW 2x20 ea            Gait training in // bars: 3 laps rocker bottom mechanics.  NuStep 5 min  Ankle PROM gentle   Ankle iso inv/ev against therapist resist, 10x5\" ea   Tandem walking in // bars heel toe, 5 laps  Side stepping in // bars, 5 laps  4\" step ups fwd, 1x10 B  4\" step ups lateral, 1x10 B     DLP next   Rocker board standing next NuStep L 5 x 8min  Gastroc stretch 45\"x2  Rocker board A/P x 2'  Tandem walking in // bars heel toe, 2 laps  Side stepping in // bars, 5 laps  4\" FSU x20 B  4\" LSU x20 B   Doug # 5 DLP 3x10  #4 SLP RLE 2x10 NusStep L 6 x 6min  Post Manual Tx:  Tennis ball foot  rolling 2'  Gastroc stretch 1'x2  4\" FSU x20 B  4\" LSU x20 B   Shuttle #5 DLP 3x10  SLP RLE 3x10     Manual Tx 15 min  STM to R ant and post foot , gentle talocrural distraction and passive plantarflexion  ,STM/IASTM to post leg/calf, passive stretches   Midfoot mobilization STM to R ant and post foot , gentle talocrural distracttion   passive stretches  on all planes   Midfoot mobilization      Ther-Ex: 12 min  Post Manual Tx:  Seated rocker board  A/P 2'  Heel/toe presses on pink balance disc  Gastroc stretch 1'x2  4\" FSU x20 B  4\" LSU x20 B   Shuttle #5  DLP RLE 3x10  STM to R ant and post foot , gentle talocrural distracttion  passive stretches  on all planes   Midfoot /metatarsal mobilization    Ther-Ex: 13 min  Gastroc stretch 1'x2  Heel/toe presses on pink balance disc  Stepping R/L on blue balance disc 2'  6\" FSU x20 B  6\" LSU x20 B   4\" eccentric lowering R  Stairs negotiation training   STM to ant and post lower leg ,  R ant and post foot , gentle talocrural distracttion  passive stretches  on all planes   Midfoot /metatarsal mobilization       NMR: 5 min NMR: 5 min NMR: 8 min NMR: 10 min NMR: 10 min NMR: 13 min NMR: 15 min      Semi tandem stance, 2x30\" B  Standing march with light fingertip assist, 1'   Ax step up, 1' ea leg Semi tandem stance, 2x30\" B  Standing march with light fingertip assist, 1'   Ax step up, 1' ea leg  Semi tandem stance on Ax, 2x30\" B with fingertip assist  Standing march on Ax with light fingertip assist, 2x10 B Semi tandem stance on Ax, 2x30\" B with fingertip assist  Standing march on Ax with light fingertip assist, 2x10 B  Rocker board static balance with light hand support Standing march on Ax with light fingertip assist, 2x10 B  Stepping up/down fwd 1' lateral 1'  Rocker board controlled A/P tapping 2'   static balance with light hand support 1' Controlled A/P and med/lat tapping on square RB 2' each  Blue balance disc R/L stepping 2'  Heel/toe 2'  Standing march on Ax with  light fingertip assist, 2x10 B  Stepping up/down fwd 1' lateral 1'     static balance with light hand support 1 Controlled A/P and med/lat tapping on square RB 2' each  Blue balance disc R/L stepping 2'  Heel/toe 2'  Standing march on Ax with light fingertip assist, 2x10 B  Stepping up/down fwd 1' lateral 1'     static balance with light hand support 1       Measurements 5 min                     HEP: passive ROM with strap on all planes, AROM on all planes   Added: yellow band resisted OKC on all planes, towel srunches, big toe extension/curling /toe separation  Charges: Manual Tx x 3   Total Timed Treatment: 38  min  Total Treatment Time: 38 min

## 2024-10-22 ENCOUNTER — ANESTHESIA EVENT (OUTPATIENT)
Dept: ENDOSCOPY | Facility: HOSPITAL | Age: 74
End: 2024-10-22
Payer: MEDICARE

## 2024-10-23 ENCOUNTER — ANESTHESIA (OUTPATIENT)
Dept: ENDOSCOPY | Facility: HOSPITAL | Age: 74
End: 2024-10-23
Payer: MEDICARE

## 2024-10-23 ENCOUNTER — HOSPITAL ENCOUNTER (OUTPATIENT)
Facility: HOSPITAL | Age: 74
Setting detail: HOSPITAL OUTPATIENT SURGERY
Discharge: HOME | End: 2024-10-23
Attending: INTERNAL MEDICINE | Admitting: INTERNAL MEDICINE
Payer: MEDICARE

## 2024-10-23 VITALS
WEIGHT: 185 LBS | SYSTOLIC BLOOD PRESSURE: 127 MMHG | BODY MASS INDEX: 31.58 KG/M2 | HEART RATE: 67 BPM | OXYGEN SATURATION: 99 % | HEIGHT: 64 IN | DIASTOLIC BLOOD PRESSURE: 80 MMHG | RESPIRATION RATE: 16 BRPM | TEMPERATURE: 99 F

## 2024-10-23 DIAGNOSIS — K22.70 BARRETT'S ESOPHAGUS WITHOUT DYSPLASIA: ICD-10-CM

## 2024-10-23 PROCEDURE — 88305 TISSUE EXAM BY PATHOLOGIST: CPT | Performed by: INTERNAL MEDICINE

## 2024-10-23 PROCEDURE — 0DB48ZX EXCISION OF ESOPHAGOGASTRIC JUNCTION, VIA NATURAL OR ARTIFICIAL OPENING ENDOSCOPIC, DIAGNOSTIC: ICD-10-PCS | Performed by: INTERNAL MEDICINE

## 2024-10-23 RX ORDER — SODIUM CHLORIDE, SODIUM LACTATE, POTASSIUM CHLORIDE, CALCIUM CHLORIDE 600; 310; 30; 20 MG/100ML; MG/100ML; MG/100ML; MG/100ML
INJECTION, SOLUTION INTRAVENOUS CONTINUOUS
Status: DISCONTINUED | OUTPATIENT
Start: 2024-10-23 | End: 2024-10-23

## 2024-10-23 RX ORDER — HYDROMORPHONE HYDROCHLORIDE 1 MG/ML
0.6 INJECTION, SOLUTION INTRAMUSCULAR; INTRAVENOUS; SUBCUTANEOUS EVERY 5 MIN PRN
Status: DISCONTINUED | OUTPATIENT
Start: 2024-10-23 | End: 2024-10-23

## 2024-10-23 RX ORDER — HYDROMORPHONE HYDROCHLORIDE 1 MG/ML
0.2 INJECTION, SOLUTION INTRAMUSCULAR; INTRAVENOUS; SUBCUTANEOUS EVERY 5 MIN PRN
Status: DISCONTINUED | OUTPATIENT
Start: 2024-10-23 | End: 2024-10-23

## 2024-10-23 RX ORDER — METOCLOPRAMIDE HYDROCHLORIDE 5 MG/ML
10 INJECTION INTRAMUSCULAR; INTRAVENOUS EVERY 8 HOURS PRN
Status: DISCONTINUED | OUTPATIENT
Start: 2024-10-23 | End: 2024-10-23

## 2024-10-23 RX ORDER — NALOXONE HYDROCHLORIDE 0.4 MG/ML
0.08 INJECTION, SOLUTION INTRAMUSCULAR; INTRAVENOUS; SUBCUTANEOUS AS NEEDED
Status: DISCONTINUED | OUTPATIENT
Start: 2024-10-23 | End: 2024-10-23

## 2024-10-23 RX ORDER — ONDANSETRON 2 MG/ML
4 INJECTION INTRAMUSCULAR; INTRAVENOUS EVERY 6 HOURS PRN
Status: DISCONTINUED | OUTPATIENT
Start: 2024-10-23 | End: 2024-10-23

## 2024-10-23 RX ORDER — HYDROMORPHONE HYDROCHLORIDE 1 MG/ML
0.4 INJECTION, SOLUTION INTRAMUSCULAR; INTRAVENOUS; SUBCUTANEOUS EVERY 5 MIN PRN
Status: DISCONTINUED | OUTPATIENT
Start: 2024-10-23 | End: 2024-10-23

## 2024-10-23 NOTE — ANESTHESIA PREPROCEDURE EVALUATION
PRE-OP EVALUATION       Patient Name: Makenzie Reyes    Admit Diagnosis: Alas's esophagus without dysplasia [K22.70]    Pre-op Diagnosis: Alas's esophagus without dysplasia [K22.70]    ESOPHAGOGASTRODUODENOSCOPY (EGD)    Anesthesia Procedure: ESOPHAGOGASTRODUODENOSCOPY (EGD)    Surgeons and Role:     * Marco Antonio Guzman MD - Primary    Pre-op vitals reviewed.        Body mass index is 31.58 kg/m².    Current medications reviewed.  Hospital Medications:  No current facility-administered medications on file as of .       Outpatient Medications:   Prescriptions Prior to Admission[1]    Allergies: Patient has no known allergies.      Anesthesia Evaluation    Patient summary reviewed.    Anesthetic Complications  (+) history of anesthetic complications  History of: PONV       GI/Hepatic/Renal      (+) GERD   (+) hiatal hernia                        Cardiovascular                  (+) hypertension                                     Endo/Other                           (+) arthritis       Pulmonary    Negative pulmonary ROS.                       Neuro/Psych      (+) depression                        +multiple sclerosis        Past Surgical History:   Procedure Laterality Date    Appendectomy      Back surgery      cervical surgery/ACDF x 3 with Dr. Webster          x2    Colonoscopy  2003    Colonoscopy      Excis lumbar disk,one level      Foot/toes surgery proc unlisted  2005    hemmertoes operation Rt toe    Hysterectomy      Knee replacement surgery  2022    Knee replacement surgery Right 2024    Dr.Ryan Curry    Laminectomy,lumbar  2010    L4 L5 fusion    Oophorectomy Bilateral     Removal of ovarian cyst(s)      x2    Thyroidectomy      partial thyroidectomy Lt    Tonsillectomy      Total abdom hysterectomy      w/ removal of both ovaries d/t DBT and fibroid    Upper gi endoscopy,exam       Social History     Socioeconomic History    Marital status:     Tobacco Use    Smoking status: Never    Smokeless tobacco: Never    Tobacco comments:     Updated 4/17/24   Vaping Use    Vaping status: Never Used   Substance and Sexual Activity    Alcohol use: Not Currently    Drug use: Never   Other Topics Concern    Caffeine Concern Yes     Comment: 2 cups of coffee daily.    Exercise Yes     Comment: 4x/week.      History   Drug Use Unknown     Available pre-op labs reviewed.  Lab Results   Component Value Date    WBC 13.3 (H) 08/08/2024    RBC 4.47 08/08/2024    HGB 12.9 08/08/2024    HCT 39.0 08/08/2024    MCV 87.2 08/08/2024    MCH 28.9 08/08/2024    MCHC 33.1 08/08/2024    RDW 13.7 08/08/2024    .0 08/08/2024     Lab Results   Component Value Date     08/08/2024    K 3.4 (L) 08/08/2024     08/08/2024    CO2 28.0 08/08/2024    BUN 24 (H) 08/08/2024    CREATSERUM 0.74 08/08/2024    GLU 91 08/08/2024    CA 10.5 (H) 08/08/2024            Airway      Mallampati: II  Mouth opening: >3 FB  TM distance: > 6 cm  Neck ROM: full Cardiovascular    Cardiovascular exam normal.         Dental    Dentition appears grossly intact         Pulmonary    Pulmonary exam normal.                 Other findings              ASA: 2   Plan: MAC  NPO status verified and         Comment:   Plan is MAC anesthesia, which likely will include deep sedation.  Implied that memory of procedure is unlikely although intraop recall, if it occurs, may be a reasonable and comfortable experience with this anesthetic.  Aware that general anesthesia is not intended though deep sedation may include brief moments of general anesthesia.   Questions answered. Accepts. The consent was signed without further questions.   Plan/risks discussed with: patient                Present on Admission:  **None**             [1]   No medications prior to admission.

## 2024-10-23 NOTE — DISCHARGE INSTRUCTIONS
Home Care Instructions for Gastroscopy with Sedation    Diet:  - Resume your regular diet as tolerated unless otherwise instructed.  - Start with light meals to minimize bloating.  - Do not drink alcohol today.    Medication:  - If you have questions about resuming your normal medications, please contact your Primary Care Physician.    Activities:  - Take it easy today. Do not return to work today.  - Do not drive today.  - Do not operate any machinery today (including kitchen equipment).      Gastroscopy:  - You may have a sore throat for 2-3 days following the exam. This is normal. Gargling with warm salt water (1/2 tsp salt to 1 glass warm water) or using throat lozenges will help.  - If you experience any sharp pain in your neck, abdomen or chest, vomiting of blood, oral temperature over 100 degrees Fahrenheit, light-headedness or dizziness, or any other problems, contact your doctor.    **If unable to reach your doctor, please go to the [facility] Emergency Room**    - Your referring physician will receive a full report of your examination.  - If you do not hear from your doctor's office within two weeks of your biopsy, please call them for your results.    You may be able to see your laboratory results in [employer]¢â€žÂ¢ between 4 and 7 business days.  In some cases, your physician may not have viewed the results before they are released to [employer]¢â€žÂ¢.  If you have questions regarding your results contact the physician who ordered the test/exam by phone or via [employer]¢â€žÂ¢ by choosing \"Ask a Medical Question.\"

## 2024-10-23 NOTE — H&P
[facility]  Pre-op H and P    Makenzie Reyes Patient Status:  Hospital Outpatient Surgery    3/21/1950 MRN [id number]   Location [facility] ENDOSCOPY PAIN CENTER Attending Marco Antonio Guzman MD   Date 10/23/2024 PCP Alyssa Garcia MD     CC: Alas's    History of Present Illness:  Makenzie Reyes is a a(n) 74 year old female. Alas's Specialized columnar mucosa with a focus indefinite for dysplasia     History:  Past Medical History:    Arthritis    BACK PAIN    in morning    Back problem    low back    Alas's esophagus    Basal cell carcinoma of ear    excision of BCC lesion Left ear    Change in hair    Chronic heartburn    Constipation    Depression    Disorder of thyroid    Easy bruising    Esophageal reflux    Essential hypertension    Fatigue    Fracture tibia/fibula    Heartburn    Hemorrhoids    High blood pressure    High cholesterol    Hx of motion sickness    Hyperglycemia    Hyperlipidemia    Increased weakness when ambulating    Multiple sclerosis (HCC)    chronic side effects include fatique and \"brain fog\" and decreased vision of left eye, left sided weakness due to MS    Muscle spasms of both lower extremities    Muscle weakness    has MS    OSTEOARTHRITIS    Osteoarthritis    Osteoarthrosis involving lower leg    Log Date: 10/26/2012     OTHER DISEASES    MS dx 1981 Dr. Ramachandran    Pain in joints    Personal history of colonic polyps    PONV (postoperative nausea and vomiting)    Unspecified essential hypertension    Visual impairment    decreased vision left eye glasses    Wears glasses     Past Surgical History:   Procedure Laterality Date    Appendectomy  1964    Back surgery      cervical surgery/ACDF x 3 with Dr. Webster          x2    Colonoscopy  2003    Colonoscopy      Excis lumbar disk,one level      Foot/toes surgery proc unlisted  2005    hemmertoes operation Rt toe    Hysterectomy      Knee replacement surgery  2022    Knee replacement surgery  Right 2024    Dr.Ryan Curry    Laminectomy,lumbar  2010    L4 L5 fusion    Oophorectomy Bilateral     Removal of ovarian cyst(s)      x2    Thyroidectomy      partial thyroidectomy Lt    Tonsillectomy      Total abdom hysterectomy      w/ removal of both ovaries d/t DBT and fibroid    Upper gi endoscopy,exam       Family History   Problem Relation Age of Onset    Cancer Sister         cervical cancer    Hypertension Mother          2019    Breast Cancer Daughter 39        BRCA 1 GENE    BRCA gene + Daughter     Hypertension Brother          2014    Hypertension Sister     Hypertension Sister     Hypertension Brother       reports that she has never smoked. She has never used smokeless tobacco. She reports that she does not currently use alcohol. She reports that she does not use drugs.    Allergies:  Allergies[1]    Medications:    Current Facility-Administered Medications:     lactated ringers infusion, , Intravenous, Continuous    Physical Exam:    Blood pressure 137/64, pulse 68, temperature 99 °F (37.2 °C), temperature source Temporal, resp. rate 18, height 5' 4\" (1.626 m), weight 185 lb (83.9 kg), SpO2 99%, not currently breastfeeding.    General: Appears alert, oriented x3 and in no acute distress.  CV: Normal rate   Lungs: Normal effort   Skin: Warm and dry.  Laboratory Data:       Imaging:      Assessment/Plan/Procedure:  Patient Active Problem List   Diagnosis    Lumbar spinal stenosis    Multiple sclerosis (HCC)    Hyperlipidemia    Hypothyroidism    Anxiety    Granuloma annulare    History of lumbar fusion    RLS (restless legs syndrome)    Muscle spasms of both lower extremities    Chronic fatigue    Facet arthritis of lumbar region    Facet arthritis of cervical region    Menopausal vaginal dryness    Mild recurrent major depression (HCC)    Prediabetes    Chronic superficial gastritis without bleeding    Hiatal hernia    Gastroesophageal reflux disease without  esophagitis    Polyp of stomach and duodenum    Alas's esophagus without dysplasia    Polyp of colon    Weakness of right leg    Seborrheic keratoses    Lentigo    Personal history of malignant neoplasm of skin    Unspecified inflammatory spondylopathy, cervical region (HCC)    Arthritis of left knee    Primary hypertension    Diaphragmatic hernia without obstruction or gangrene    Osteoarthritis of right knee       Alas's Specialized columnar mucosa with a focus indefinite for dysplasia     Plan:  EGD    Marco Antonio Guzman MD  10/23/2024  9:10 AM         [1] No Known Allergies

## 2024-10-23 NOTE — OPERATIVE REPORT
Makenzie Reyes Patient Status:  Hospital Outpatient Surgery    3/21/1950 MRN [id number]   [facility] ENDOSCOPY PAIN CENTER Attending Marco Antonio Guzman MD   Date 10/23/2024 PCP Alyssa Garcia MD     PREOPERATIVE DIAGNOSIS/INDICATION: Alas's indefinite for dysplasia post ablation 2023, 2024  POSTOPERTATIVE DIAGNOSIS: No visible residual Alas's, irregular z line, hiatal hernia, fundic polyps  PROCEDURE PERFORMED: EGD biopsy  TIME OUT WAS PERFORMED     SEDATION: MAC sedation provided by General Anesthesia     INFORMED CONSENT: Risks, benefits and alternatives to the procedure were explained to the patient including but not limited to bleeding, infection, perforation, adverse drug reactions, pancreatitis and the need for hospitalization and surgery if this occurs, the patient understands and agrees to procedure.  PROCEDURE DESCRIPTION: A regular upper endoscope was introduced into the patient’s mouth, hypo pharynx, esophagus, stomach and the first and second portion of the duodenum, retroflexion of the endoscope was performed in the stomach. Careful examination of the above described areas was performed on withdrawal of the endoscope. The patient tolerated the procedure well, there were no immediate complication immediately following the procedure, and the patient was transferred to recovery in stable condition.  FINDINGS:  ESOPHAGUS: No residual Alas's  EGJ: 2 cm hiatal hernia, Hill grade 3  STOMACH: few 2-3 mm fundic gland polyps  DUODENUM: Normal  THERAPEUTICS: Cold forceps biopsy was performed from GEJ  RECOMMENDATIONS:   Post EGD precautions, watch for bleeding, infection, perforation, adverse drug reactions   Continue PPI's  EGD 1 year    Marco Antonio Guzman MD  10/23/2024  9:50 AM

## 2024-10-23 NOTE — ANESTHESIA POSTPROCEDURE EVALUATION
[facility]    Makenzie Reyes Patient Status:  Hospital Outpatient Surgery   Age/Gender 74 year old female MRN [id number]   Location [facility] ENDOSCOPY PAIN CENTER Attending Marco Antonio Guzman MD   Hosp Day # 0 PCP Alyssa Garcia MD       Anesthesia Post-op Note    ESOPHAGOGASTRODUODENOSCOPY (EGD) with biopsies    Procedure Summary       Date: 10/23/24 Room / Location:  ENDOSCOPY 02 /  ENDOSCOPY    Anesthesia Start: 0940 Anesthesia Stop: 0951    Procedure: ESOPHAGOGASTRODUODENOSCOPY (EGD) with biopsies Diagnosis:       Alas's esophagus without dysplasia      (hiatal hernia, gastric polyps)    Surgeons: Marco Antonio Guzman MD Anesthesiologist: Len Patel DO    Anesthesia Type: MAC ASA Status: 2            Anesthesia Type: MAC    Vitals Value Taken Time   /80 10/23/24 0951   Temp 97 10/23/24 0951   Pulse 55 10/23/24 0951   Resp 16 10/23/24 0951   SpO2 100 10/23/24 0951   Vitals shown include unfiled device data.    Patient Location: Endoscopy    Anesthesia Type: MAC    Airway Patency: patent    Postop Pain Control: adequate    Mental Status: preanesthetic baseline    Nausea/Vomiting: none    Cardiopulmonary/Hydration status: stable euvolemic    Complications: no apparent anesthesia related complications    Postop vital signs: stable    Dental Exam: Unchanged from Preop

## 2024-10-28 ENCOUNTER — LAB ENCOUNTER (OUTPATIENT)
Dept: LAB | Age: 74
End: 2024-10-28
Attending: FAMILY MEDICINE
Payer: MEDICARE

## 2024-10-28 DIAGNOSIS — R73.03 PREDIABETES: ICD-10-CM

## 2024-10-28 LAB
EST. AVERAGE GLUCOSE BLD GHB EST-MCNC: 126 MG/DL (ref 68–126)
HBA1C MFR BLD: 6 % (ref ?–5.7)

## 2024-10-28 PROCEDURE — 83036 HEMOGLOBIN GLYCOSYLATED A1C: CPT

## 2024-10-28 PROCEDURE — 36415 COLL VENOUS BLD VENIPUNCTURE: CPT

## 2024-10-28 NOTE — PROGRESS NOTES
Date: 10/28/2024    To: Makenzie Reyes  : 3/21/1950    I hope this letter finds you doing well.  I am writing to inform you of the following:     The biopsies obtained at the time of your recent endoscopic procedure were benign and showed no evidence of infection or malignancy.           I am advising you to undergo repeat upper endoscopy in 1 year. We will send you a reminder card when the time of your procedure is near.      Please call the office at [phone] if there are any questions.    Regards,    Marco Antonio Guzman M.D.

## 2024-10-29 ENCOUNTER — HOSPITAL ENCOUNTER (OUTPATIENT)
Dept: GENERAL RADIOLOGY | Age: 74
Discharge: HOME | End: 2024-10-29
Attending: PODIATRIST
Payer: MEDICARE

## 2024-10-29 ENCOUNTER — OFFICE VISIT (OUTPATIENT)
Facility: LOCATION | Age: 74
End: 2024-10-29
Payer: MEDICARE

## 2024-10-29 DIAGNOSIS — M79.671 RIGHT FOOT PAIN: ICD-10-CM

## 2024-10-29 DIAGNOSIS — M21.071 VALGUS DEFORMITY, NOT ELSEWHERE CLASSIFIED, RIGHT ANKLE: ICD-10-CM

## 2024-10-29 DIAGNOSIS — S82.891P: ICD-10-CM

## 2024-10-29 DIAGNOSIS — M25.571 RIGHT ANKLE PAIN, UNSPECIFIED CHRONICITY: ICD-10-CM

## 2024-10-29 DIAGNOSIS — Z98.890 STATUS POST OPEN REDUCTION WITH INTERNAL FIXATION (ORIF) OF FRACTURE OF ANKLE: Primary | ICD-10-CM

## 2024-10-29 DIAGNOSIS — Z87.81 HISTORY OF FRACTURE OF RIGHT ANKLE: ICD-10-CM

## 2024-10-29 DIAGNOSIS — M25.471 EDEMA OF RIGHT ANKLE: ICD-10-CM

## 2024-10-29 DIAGNOSIS — Z87.81 STATUS POST OPEN REDUCTION WITH INTERNAL FIXATION (ORIF) OF FRACTURE OF ANKLE: Primary | ICD-10-CM

## 2024-10-29 PROCEDURE — 1159F MED LIST DOCD IN RCRD: CPT | Performed by: PODIATRIST

## 2024-10-29 PROCEDURE — 73600 X-RAY EXAM OF ANKLE: CPT | Performed by: PODIATRIST

## 2024-10-29 PROCEDURE — 73630 X-RAY EXAM OF FOOT: CPT | Performed by: PODIATRIST

## 2024-10-29 PROCEDURE — 99203 OFFICE O/P NEW LOW 30 MIN: CPT | Performed by: PODIATRIST

## 2024-10-29 NOTE — PROGRESS NOTES
[facility]  Progress Note  10/29/2024    Makenzie Reyes is a 74 year old female.   Chief Complaint   Patient presents with    New Patient     Patient is here for right ankle/foot. Concerned about how foot leans inwards, history of tib/fib fracture surgery was 5/18/24. Surgery was completed by an Orthopedic from [place]. She states that she has an ache when weightbearing, pain is located at the front of the ankle. She does wear an ankle brace from [employer] which helps a lot. Denies any numbness or tingling, she does get swelling at night.         HPI:     Patient is a pleasant 74-year-old female with multiple sclerosis who is presenting to clinic today for evaluation of her right ankle.  Patient has a history of a right bimalleolar ankle fracture which was surgically repaired by an outside physician 5 months ago.  Patient states that she was advised to follow-up with a foot and ankle surgeon for another opinion.  She states that she has pain when weightbearing.  She currently has an ankle brace that she is utilizing with a cane.  Patient feels like her foot is collapsing when she walks.  She also notices intermittent swelling.  Patient does share that she had a wound to the inside of the ankle that was slow to heal.  She utilized a wound VAC and went to a wound care center and it recently healed.  Denies any open wounds.  No other concerns.  Denies recent nausea, vomiting, fevers, chills.      Allergies: Patient has no known allergies.   Current Outpatient Medications   Medication Sig Dispense Refill    Omeprazole 40 MG Oral Capsule Delayed Release TAKE 1 CAPSULE ONE TIME DAILY 30 MINUTES BEFORE BREAKFAST 90 capsule 0    escitalopram 20 MG Oral Tab Take 1 tablet (20 mg total) by mouth daily. 90 tablet 0    baclofen 20 MG Oral Tab Take 1 tablet (20 mg total) by mouth daily. 90 tablet 0    amLODIPine 5 MG Oral Tab TAKE 2 TABLETS EVERY  tablet 1    CELECOXIB 200 MG Oral Cap TAKE 1 CAPSULE EVERY  DAY 30 capsule 11    buPROPion  MG Oral Tablet 24 Hr Take 1 tablet (150 mg total) by mouth daily. 30 tablet 1    ATORVASTATIN 20 MG Oral Tab TAKE 1 TABLET EVERY NIGHT 90 tablet 3    Triamterene-HCTZ 37.5-25 MG Oral Tab TAKE 1 TABLET EVERY DAY 90 tablet 1    LEVOTHYROXINE 25 MCG Oral Tab TAKE 1 TABLET (25 MCG TOTAL) BY MOUTH BEFORE BREAKFAST. 90 tablet 3    CALCIUM OR Take 1,000 mg by mouth daily.      Multiple Vitamin (DAILY VALUE MULTIVITAMIN) Oral Tab Take 1 tablet by mouth daily.      Cholecalciferol (VITAMIN D) 2000 UNITS Oral Cap Take 1 capsule (2,000 Units total) by mouth daily.        Past Medical History:    Arthritis    BACK PAIN    in morning    Back problem    low back    Alas's esophagus    Basal cell carcinoma of ear    excision of BCC lesion Left ear    Change in hair    Chronic heartburn    Constipation    Depression    Disorder of thyroid    Easy bruising    Esophageal reflux    Essential hypertension    Fatigue    Fracture tibia/fibula    Heartburn    Hemorrhoids    High blood pressure    High cholesterol    Hx of motion sickness    Hyperglycemia    Hyperlipidemia    Increased weakness when ambulating    Multiple sclerosis (HCC)    chronic side effects include fatique and \"brain fog\" and decreased vision of left eye, left sided weakness due to MS    Muscle spasms of both lower extremities    Muscle weakness    has MS    OSTEOARTHRITIS    Osteoarthritis    Osteoarthrosis involving lower leg    Log Date: 10/26/2012     OTHER DISEASES    MS dx  Dr. Ramachandran    Pain in joints    Personal history of colonic polyps    PONV (postoperative nausea and vomiting)    Unspecified essential hypertension    Visual impairment    decreased vision left eye glasses    Wears glasses      Past Surgical History:   Procedure Laterality Date    Appendectomy      Back surgery      cervical surgery/ACDF x 3 with Dr. Webster          x2    Colonoscopy  2003    Colonoscopy      Excis lumbar disk,one  level      Foot/toes surgery proc unlisted  2005    hemmertoes operation Rt toe    Hysterectomy      Knee replacement surgery  2022    Knee replacement surgery Right 2024    Dr.Ryan Curry    Laminectomy,lumbar  2010    L4 L5 fusion    Oophorectomy Bilateral     Removal of ovarian cyst(s)      x2    Thyroidectomy      partial thyroidectomy Lt    Tonsillectomy  's    Total abdom hysterectomy      w/ removal of both ovaries d/t DBT and fibroid    Upper gi endoscopy,exam        Family History   Problem Relation Age of Onset    Cancer Sister         cervical cancer    Hypertension Mother          2019    Breast Cancer Daughter 39        BRCA 1 GENE    BRCA gene + Daughter     Hypertension Brother          2014    Hypertension Sister     Hypertension Sister     Hypertension Brother       Social History     Socioeconomic History    Marital status:    Tobacco Use    Smoking status: Never    Smokeless tobacco: Never    Tobacco comments:     Updated 24   Vaping Use    Vaping status: Never Used   Substance and Sexual Activity    Alcohol use: Not Currently    Drug use: Never   Other Topics Concern    Caffeine Concern Yes     Comment: 2 cups of coffee daily.    Exercise Yes     Comment: 4x/week.            REVIEW OF SYSTEMS:     10 point ROS completed and was negative, except for pertinent positive and negatives stated in subjective.       EXAM:     GENERAL: well developed, well nourished, in no apparent distress  EXTREMITIES:  1. Integument: Skin appears moist, warm, and supple. There are no color changes. No open lesions. No macerations. No Hyperkeratotic lesions.  Healed ulceration overlying medial malleolus, right  2. Vascular: Dorsalis pedis 2/4 bilateral and posterior tibial pulses 2/4 bilateral, capillary refill normal.  3. Neurological: Gross sensation intact via light touch bilaterally.  Normal sharp/dull sensation  4. Musculoskeletal: All muscle groups are  graded 5/5 in the foot and ankle with no pain elicited.  Nonreducible valgus position to ankle joint, right.  Patient has free range of motion of right ankle with no pain elicited.  Crepitus is noted within right ankle.  Patient does have pain with palpation within the medial ankle gutter.  Pain with palpation to area of AITFL.  No pain with palpation to medial malleolus or lateral malleolus.    XR ANKLE (2 VIEW), RIGHT (CPT=73600)    Result Date: 10/29/2024  CONCLUSION:  1. Postsurgical changes are seen.  The tibia appears tilted in relation to the talus with bone-on-bone appearance laterally and widening medially. 2. Old ununited fractures of the medial malleolus and lateral malleolus. 3. Healed fracture deformity of the distal fibula diaphysis.   LOCATION:  [facility]   Dictated by (CST): Dylan Coates MD on 10/29/2024 at 2:42 PM     Finalized by (CST): Dylan Coates MD on 10/29/2024 at 2:50 PM       XR FOOT WEIGHTBEARING (3 VIEWS), RIGHT   (CPT=73630)    Result Date: 10/29/2024  CONCLUSION:  1. Osseous structures appear demineralized which may reflect disuse. 2. No acute foot fracture or dislocation.  3. Questionable wire like metallic foreign body in the plantar soft tissues.  This could lie external to the patient given the interval change in position between the AP and lateral views. 4. Postsurgical changes are seen in the ankle.    LOCATION:  [facility]   Dictated by (CST): Dylan Coates MD on 10/29/2024 at 2:29 PM     Finalized by (CST): Dylan Coates MD on 10/29/2024 at 2:36 PM           ASSESSMENT AND PLAN:   Diagnoses and all orders for this visit:    Status post open reduction with internal fixation (ORIF) of fracture of ankle  -     CT ANKLE RIGHT (CPT=73700); Future  -     DME - EXTERNAL   -     DME - EXTERNAL     History of fracture of right ankle  -     CT ANKLE RIGHT (CPT=73700); Future  -     DME - EXTERNAL   -     DME - EXTERNAL     Closed fracture of ankle with malunion, right  -      CT ANKLE RIGHT (CPT=73700); Future  -     DME - EXTERNAL   -     DME - EXTERNAL     Valgus deformity, not elsewhere classified, right ankle    Edema of right ankle  -     DME - EXTERNAL   -     DME - EXTERNAL     Right ankle pain, unspecified chronicity  -     XR ANKLE (2 VIEW), RIGHT (CPT=73600); Future  -     CT ANKLE RIGHT (CPT=73700); Future  -     DME - EXTERNAL   -     DME - EXTERNAL     Other orders  -     XR FOOT WEIGHTBEARING (3 VIEWS), RIGHT   (CPT=73630); Future        Plan:   -Patient was seen and evaluated today in clinic.  Chart history reviewed.    Radiographs were obtained of the right ankle today, revealing a valgus ankle with medial joint space widening and mild reduced lateral malleolus, as well as medial malleolus.  Questionable union to lateral and medial malleolus fractures.  See above for official impression    On clinical exam, patient does have pain with palpation to several portions of the right ankle with an overall valgus position    Recommending we obtain a CT scan to confirm overall union of previous fractures.  Order was placed today.    Because of concerns of stability of the ankle, recommending a cam boot.  Patient was properly fitted for and provided with a short cam boot today.  She can be weightbearing as tolerated.    Patient does verbalize she is wanting to avoid any further surgical treatment at this time.  I did recommend obtaining a custom [place] brace.  Patient is agreeable to this and an order was placed today.    Pending CT findings, we will discuss further options.  I did verbalize to patient that surgical treatment may be warranted.  This would most likely entail a right ankle arthrodesis    Patient should rest, ice, elevate, and avoid any activities that elicits pain    -All of the patient's questions and concerns were addressed.  They indicated their understanding of these issues and agrees to the plan.    Time spent reviewing pertinent information from patient's  chart, reviewing any pertinent imaging, obtaining history and physical exam, discussing and mutually agreeing on a treatment plan, and documenting encounter: 40 minutes    RTC after CT to discuss findings and further treatment options      Mj Carr DPM        [address] [address]   [email or web address]            [employer] speech recognition software was used to prepare this note.  Errors in word recognition may occur.  Please contact me with any questions/concerns with this note.

## 2024-10-30 ENCOUNTER — OFFICE VISIT (OUTPATIENT)
Dept: INTERNAL MEDICINE CLINIC | Facility: CLINIC | Age: 74
End: 2024-10-30
Payer: MEDICARE

## 2024-10-30 VITALS
HEIGHT: 64 IN | DIASTOLIC BLOOD PRESSURE: 78 MMHG | OXYGEN SATURATION: 98 % | BODY MASS INDEX: 31.07 KG/M2 | HEART RATE: 68 BPM | WEIGHT: 182 LBS | SYSTOLIC BLOOD PRESSURE: 126 MMHG

## 2024-10-30 DIAGNOSIS — I10 PRIMARY HYPERTENSION: Primary | ICD-10-CM

## 2024-10-30 DIAGNOSIS — Z12.31 ENCOUNTER FOR SCREENING MAMMOGRAM FOR BREAST CANCER: ICD-10-CM

## 2024-10-30 DIAGNOSIS — F41.9 ANXIETY: ICD-10-CM

## 2024-10-30 DIAGNOSIS — E78.5 HYPERLIPIDEMIA, UNSPECIFIED HYPERLIPIDEMIA TYPE: ICD-10-CM

## 2024-10-30 PROCEDURE — 1170F FXNL STATUS ASSESSED: CPT | Performed by: FAMILY MEDICINE

## 2024-10-30 PROCEDURE — G2211 COMPLEX E/M VISIT ADD ON: HCPCS | Performed by: FAMILY MEDICINE

## 2024-10-30 PROCEDURE — 99213 OFFICE O/P EST LOW 20 MIN: CPT | Performed by: FAMILY MEDICINE

## 2024-10-30 PROCEDURE — 3008F BODY MASS INDEX DOCD: CPT | Performed by: FAMILY MEDICINE

## 2024-10-30 PROCEDURE — 3074F SYST BP LT 130 MM HG: CPT | Performed by: FAMILY MEDICINE

## 2024-10-30 PROCEDURE — 1160F RVW MEDS BY RX/DR IN RCRD: CPT | Performed by: FAMILY MEDICINE

## 2024-10-30 PROCEDURE — 1159F MED LIST DOCD IN RCRD: CPT | Performed by: FAMILY MEDICINE

## 2024-10-30 PROCEDURE — 3078F DIAST BP <80 MM HG: CPT | Performed by: FAMILY MEDICINE

## 2024-10-30 RX ORDER — ESCITALOPRAM OXALATE 20 MG/1
20 TABLET ORAL DAILY
Qty: 90 TABLET | Refills: 0 | Status: SHIPPED | OUTPATIENT
Start: 2024-10-30

## 2024-10-30 NOTE — PROGRESS NOTES
CHIEF COMPLAINT:     Chief Complaint   Patient presents with    Follow - Up     Patient is here for follow up on BP        HPI:   Makenzie Reyes is a 74 year old female   Patient presents for recheck of  Hypertension and hyperlipdemia. Pt has been taking medications as instructed, no medication side effects, no home BP monitoring.  Currently asymptomatic, no chest pains, jaw pains, arm pains. No headaches, dizziness or edema.    Current lipid treatment: atorvastatin, tolerating medication well, no side effects. Encouraged low fat diet.   No SOB on exertion or rest.  Patient denies any myalgias or arthralgias on the cholesterol  medication. Pt has been following a low fat diet and has been exercising 7 days a week.    Pt is here for a recheck of anxiety. Has been tolerating the meds well. Denies any side effects from the meds. Mood has been good. Patient's appetite is good.Pt denies headaches,tics,or insomnia.No depressive symptoms or suicidal ideations. Would like to continue the same medication. Does not want to see a counselor now.    Per patient plan for corrective right ankle surgery next year. Currently wearing boot, getting fitted for ankle brace soon.      Current Outpatient Medications   Medication Sig Dispense Refill    Omeprazole 40 MG Oral Capsule Delayed Release TAKE 1 CAPSULE ONE TIME DAILY 30 MINUTES BEFORE BREAKFAST 90 capsule 0    escitalopram 20 MG Oral Tab Take 1 tablet (20 mg total) by mouth daily. 90 tablet 0    baclofen 20 MG Oral Tab Take 1 tablet (20 mg total) by mouth daily. 90 tablet 0    amLODIPine 5 MG Oral Tab TAKE 2 TABLETS EVERY  tablet 1    CELECOXIB 200 MG Oral Cap TAKE 1 CAPSULE EVERY DAY 30 capsule 11    buPROPion  MG Oral Tablet 24 Hr Take 1 tablet (150 mg total) by mouth daily. 30 tablet 1    ATORVASTATIN 20 MG Oral Tab TAKE 1 TABLET EVERY NIGHT 90 tablet 3    Triamterene-HCTZ 37.5-25 MG Oral Tab TAKE 1 TABLET EVERY DAY 90 tablet 1    LEVOTHYROXINE 25 MCG Oral Tab  TAKE 1 TABLET (25 MCG TOTAL) BY MOUTH BEFORE BREAKFAST. 90 tablet 3    CALCIUM OR Take 1,000 mg by mouth daily.      Multiple Vitamin (DAILY VALUE MULTIVITAMIN) Oral Tab Take 1 tablet by mouth daily.      Cholecalciferol (VITAMIN D) 2000 UNITS Oral Cap Take 1 capsule (2,000 Units total) by mouth daily.        Past Medical History:    Arthritis    BACK PAIN    in morning    Back problem    low back    Alas's esophagus    Basal cell carcinoma of ear    excision of BCC lesion Left ear    Change in hair    Chronic heartburn    Constipation    Depression    Disorder of thyroid    Easy bruising    Esophageal reflux    Essential hypertension    Fatigue    Fracture tibia/fibula    Heartburn    Hemorrhoids    High blood pressure    High cholesterol    Hx of motion sickness    Hyperglycemia    Hyperlipidemia    Increased weakness when ambulating    Multiple sclerosis (HCC)    chronic side effects include fatique and \"brain fog\" and decreased vision of left eye, left sided weakness due to MS    Muscle spasms of both lower extremities    Muscle weakness    has MS    OSTEOARTHRITIS    Osteoarthritis    Osteoarthrosis involving lower leg    Log Date: 10/26/2012     OTHER DISEASES    MS dx 1981 Dr. Ramachandran    Pain in joints    Personal history of colonic polyps    PONV (postoperative nausea and vomiting)    Unspecified essential hypertension    Visual impairment    decreased vision left eye glasses    Wears glasses      Social History:  Social History     Socioeconomic History    Marital status:    Tobacco Use    Smoking status: Never    Smokeless tobacco: Never    Tobacco comments:     Updated 4/17/24   Vaping Use    Vaping status: Never Used   Substance and Sexual Activity    Alcohol use: Not Currently    Drug use: Never   Other Topics Concern    Caffeine Concern Yes     Comment: 2 cups of coffee daily.    Exercise Yes     Comment: 4x/week.      Social Drivers of Health     Financial Resource Strain: Low Risk   (6/28/2024)    Financial Resource Strain     Difficulty of Paying Living Expenses: Not hard at all   Food Insecurity: No Food Insecurity (4/4/2024)    Food Insecurity     Food Insecurity: Never true   Transportation Needs: No Transportation Needs (6/28/2024)    Transportation Needs     Lack of Transportation: No   Housing Stability: Low Risk  (4/4/2024)    Housing Stability     Housing Instability: No        REVIEW OF SYSTEMS:   GENERAL: Denies fever, chills,weight change, decreased appetite  SKIN: Denies rashes, skin wounds or ulcers.  EYES: Denies blurred vision or double vision  HENT: Denies congestion, rhinorrhea, sore throat or ear pain  CHEST: Denies chest pain, or palpitations  LUNGS: Denies shortness of breath, cough, or wheezing  NEURO: Denies headaches or lightheadedness  PSYCH: see HPI    EXAM:   /78 (BP Location: Left arm, Patient Position: Sitting, Cuff Size: large)   Pulse 68   Ht 5' 4\" (1.626 m)   Wt 182 lb (82.6 kg)   SpO2 98%   BMI 31.24 kg/m²   GENERAL: well developed, well nourished,in no apparent distress  SKIN: no rashes,no suspicious lesions  HEAD: atraumatic, normocephalic  EYES: conjunctiva clear, EOM intact, PERRLA  HEENT: ears,nose and throat clear  NECK: supple, non-tender,no thyromegaly  LUNGS: clear to auscultation bilaterally, no wheezes or rhonchi. Breathing is non labored.  CARDIO: RRR without murmur  EXTREMITIES: no cyanosis, clubbing or edema +tender to right ankle  PSYCH:  Speech, mood and affect are appropriate      ASSESSMENT AND PLAN:     Encounter Diagnoses   Name Primary?    Encounter for screening mammogram for breast cancer     Hyperlipidemia, unspecified hyperlipidemia type     Primary hypertension Yes    Anxiety        No orders of the defined types were placed in this encounter.      Meds & Refills for this Visit:  Requested Prescriptions      No prescriptions requested or ordered in this encounter       Imaging & Consults:  [facility] 2D+3D SCREENING BILAT  (CPT=77067/03088)  1. Encounter for screening mammogram for breast cancer  - [facility] 2D+3D SCREENING BILAT (CPT=77067/01744); Future    2. Hyperlipidemia, unspecified hyperlipidemia type  - Stable, continue medications  - will check labs in 6 months  - encouraged low fat diet, continue exercise daily    3. Primary hypertension  - Conservative measures dicussed. Continue medication.  Diet and exercise explained and encouraged.  Fasting labs due.  Home blood pressure monitoring. Pt should measure BP’s two to three times per week. Goal blood pressure at home - < 135/85.     - labs reviewed  - encouraged low fat diet, continue exercise daily    4. Anxiety  - Stable, continue medications. Lexapro refilled    The patient indicates understanding of these issues and agrees to the plan.  The patient is asked to return in 6 mos for annual wellness visit. Pt declined it today

## 2024-11-01 ENCOUNTER — MED REC SCAN ONLY (OUTPATIENT)
Facility: LOCATION | Age: 74
End: 2024-11-01

## 2024-11-06 NOTE — PROGRESS NOTES
Diagnosis:   Aftercare following surgery (Z48.89)     Aftercare following surgery of the musculoskeletal system   Displaced trimalleolar fracture of right lower leg, subsequent encounter for closed fracture with routine healing   ,  S/P right ankle bimalleolar ORIF      Referring Provider: Nereida Seymour (external) Date of Evaluation:    7/5/2024         Precautions:    WBAT with CAM walker boot with A.D except when in therapy, Fall risk, MS  Next MD visit:   none scheduled  Date of Surgery: n/a   Insurance Primary/Secondary: [facility] / N/A     # Auth Visits:     16 visits until 9/30         Subjective: Pt states her foot is really going in when she walks and she is getting more concern about it.She is seeing the ortho next week and would ask what they can do about this.      Pain: \"4/10 pain when walking\"    Objective:   9/17/24  R ankle (post surgical side) is deviated more medially on transmalleolar level even when seated    Stairs Assessment: nonreciprocal pattern descending,reciprocal pattern ascending and with use of bilateral handrail    9/3/2024  Grossly graded 4/5 on R ankle muscles but unable to do R heel raises on RLE    SLS: R unable L 2 sec    R Ankle ROM: DF 8 deg                          PF 28 deg                          Inv 25 deg                          Ev 8 deg    Post LEFS Score  Post LEFS Score: 60 % (9/3/2024  3:14 PM)    18.75 % improvement    6MWT = 448 ft with cane  TUG = 19  sec with cane  Gait Assessment: with single point cane with narrow BENITA ,slow marilyn but more even steps R/LLE      Assessment: The patient is getting more concern of her R ankle/foot deformity,she does not really have a lot of pain, only intermittently she will have a sharp but brief pain elizabeth shifting weight on R foot when walking.She still having difficulty doing reciprocal pattern going down the stairs due to ROM and decreased eccentric control.We will continue working on improving curb or stair  negotiation in the next few sessions.    Goals: (to be met in 16 visits)  Pt will demonstrate improved DF AROM to >= 10 degrees to promote proper foot clearance during gait and greater ease descending stairs without compensation  Pt will have increased ankle strength to 5/5 throughout for improved ankle control with ADLs such as prolonged gait and stair negotiation ,Not met  Pt will have improved SLS to >5s for increased ankle stability with ambulation on uneven surfaces such as gravel and grass ,Not met  Pt will transition to walking without CAM boot with least to no A.D with good stability to be able to go to stores for functional distances --- Partially met(out of boot but not very stable and limited distances)  Pt will report able to get back to her low impact group exercises without significant ankle pain,Not met  Pt will be independent and compliant with comprehensive HEP to maintain progress achieved in PT,on going    Added goal: Pt will improve her LEFS score to 70% or greater demonstrating close to her PLOF    Plan:  Continue with plan of care with upgraded strengthening in OKC/CKC and static/dynamic balance activities  Date:  7/18/2024               TX#: 4/12 Date: 7/19/2024               TX#: 5/12 Date: 7/25/2024  Tx#: 6/12 **PN NEXT Date: 7/30/2024  Tx#: 7/16 (pending auth)  Date: 8/1/2024  Tx#: 8/16 Date: 9/3/2024  Tx#: 9/16 Date: 9/10/2024  Tx#: 10/16 Date:9/12/2024  Tx#: 11/16 Date:9/17/2024  Tx#: 12/16   Thera Ext 40' Thera ex x40' Ther-Ex: 40 min Ther-Ex: 35 min Ther-Ex: 35 min Ther-Ex: 33 min Ther-Ex: 20 min Manual Tx 20 min Manual Tx 12 min   Nustep L 6 x 8'  Without boot seated Exs\"  Rocker board A/P rocking 2'  Med/lat rocking 2'  Pink bal disc A/P shifting pressure 2'  Round board CW/CCW 2'  Intrinsic muscles exs  Toe curling and extension 1'x2  Red band resisted ankle PF 1'x2  Closed kinetic chain toe extension 1'  Standing TKE with red band 5\" 1'x2  OKC 3 way hip R 2x10 each direction Nustep  L 6 x 8'  Without boot seated Exs\"  Rocker board A/P rocking 2'  Med/lat rocking 2'  Pink bal disc A/P shifting pressure 2'  Round board CW/CCW 2'  Ball rolling on plantar surface , on forefoot CW/CCW 1'x2  Red band resisted ankle PF 1'x2  SAQ to SLR 3x10  S/L hip abd 3x10     NuStep 5 min  Seated AP rocker board lvl 1, 2x20  Seated ML rocker board lvl 1, 2x20  Seated round board CW/CCW 2x20 ea  Towel scrunch/doming, 20x5\"   Ankle inversion/eversion with towel x10 ea   Standing weight shifts, AP and ML 1x10 ea   Side steps at bar, 3 laps    NuStep 5 min  Ankle ROM: circles, DF/PF, inv/ev x20 ea  Ankle alphabets, x1 round   Seated DKSA calf stretch, 2x30\"   Seated AP rocker board lvl 1, 2x20  Seated ML rocker board lvl 1, 2x20  Seated round board CW/CCW 2x20 ea            Gait training in // bars: 3 laps rocker bottom mechanics.  NuStep 5 min  Ankle PROM gentle   Ankle iso inv/ev against therapist resist, 10x5\" ea   Tandem walking in // bars heel toe, 5 laps  Side stepping in // bars, 5 laps  4\" step ups fwd, 1x10 B  4\" step ups lateral, 1x10 B     DLP next   Rocker board standing next NuStep L 5 x 8min  Gastroc stretch 45\"x2  Rocker board A/P x 2'  Tandem walking in // bars heel toe, 2 laps  Side stepping in // bars, 5 laps  4\" FSU x20 B  4\" LSU x20 B   Doug # 5 DLP 3x10  #4 SLP RLE 2x10 NusStep L 6 x 6min  Post Manual Tx:  Tennis ball foot rolling 2'  Gastroc stretch 1'x2  4\" FSU x20 B  4\" LSU x20 B   Shuttle #5 DLP 3x10  SLP RLE 3x10     Manual Tx 15 min  STM to R ant and post foot , gentle talocrural distraction and passive plantarflexion  ,STM/IASTM to post leg/calf, passive stretches   Midfoot mobilization STM to R ant and post foot , gentle talocrural distracttion   passive stretches  on all planes   Midfoot mobilization      Ther-Ex: 12 min  Post Manual Tx:  Seated rocker board  A/P 2'  Heel/toe presses on pink balance disc  Gastroc stretch 1'x2  4\" FSU x20 B  4\" LSU x20 B   Shuttle #5  DLP RLE 3x10  STM to  R ant and post foot , gentle talocrural distracttion  passive stretches  on all planes   Midfoot /metatarsal mobilization    Ther-Ex: 13 min  Gastroc stretch 1'x2  Heel/toe presses on pink balance disc  Stepping R/L on blue balance disc 2'  6\" FSU x20 B  6\" LSU x20 B   4\" eccentric lowering R  Stairs negotiation training       NMR: 5 min NMR: 5 min NMR: 8 min NMR: 10 min NMR: 10 min NMR: 13 min NMR: 15 min     Semi tandem stance, 2x30\" B  Standing march with light fingertip assist, 1'   Ax step up, 1' ea leg Semi tandem stance, 2x30\" B  Standing march with light fingertip assist, 1'   Ax step up, 1' ea leg  Semi tandem stance on Ax, 2x30\" B with fingertip assist  Standing march on Ax with light fingertip assist, 2x10 B Semi tandem stance on Ax, 2x30\" B with fingertip assist  Standing march on Ax with light fingertip assist, 2x10 B  Rocker board static balance with light hand support Standing march on Ax with light fingertip assist, 2x10 B  Stepping up/down fwd 1' lateral 1'  Rocker board controlled A/P tapping 2'   static balance with light hand support 1' Controlled A/P and med/lat tapping on square RB 2' each  Blue balance disc R/L stepping 2'  Heel/toe 2'  Standing march on Ax with light fingertip assist, 2x10 B  Stepping up/down fwd 1' lateral 1'     static balance with light hand support 1 Controlled A/P and med/lat tapping on square RB 2' each  Blue balance disc R/L stepping 2'  Heel/toe 2'  Standing march on Ax with light fingertip assist, 2x10 B  Stepping up/down fwd 1' lateral 1'     static balance with light hand support 1      Measurements 5 min                   HEP: passive ROM with strap on all planes, AROM on all planes   Added: yellow band resisted OKC on all planes, towel srunches, big toe extension/curling /toe separation  Charges: Thera Ex x1, Manual x1 NMR x1      Total Timed Treatment: 40  min  Total Treatment Time: 40 min   [Chaperone Present] : A chaperone was present in the examining room during all aspects of the physical examination [Absent] : CVAT: absent [Abnormal] : Adnexa(ae): Abnormal [Normal] : Recto-Vaginal Exam: Normal [de-identified] : Large irregular firm pelvic mass to level of umbilicus, nontender [de-identified] : large pelvic/abdominal mass, mobile, non tender, midline [Fully active, able to carry on all pre-disease performance without restriction] : Status 0 - Fully active, able to carry on all pre-disease performance without restriction

## 2024-11-07 ENCOUNTER — HOSPITAL ENCOUNTER (OUTPATIENT)
Dept: CT IMAGING | Age: 74
Discharge: HOME | End: 2024-11-07
Attending: PODIATRIST
Payer: MEDICARE

## 2024-11-07 DIAGNOSIS — S82.891P: ICD-10-CM

## 2024-11-07 DIAGNOSIS — Z98.890 STATUS POST OPEN REDUCTION WITH INTERNAL FIXATION (ORIF) OF FRACTURE OF ANKLE: ICD-10-CM

## 2024-11-07 DIAGNOSIS — Z87.81 STATUS POST OPEN REDUCTION WITH INTERNAL FIXATION (ORIF) OF FRACTURE OF ANKLE: ICD-10-CM

## 2024-11-07 DIAGNOSIS — Z87.81 HISTORY OF FRACTURE OF RIGHT ANKLE: ICD-10-CM

## 2024-11-07 DIAGNOSIS — M25.571 RIGHT ANKLE PAIN, UNSPECIFIED CHRONICITY: ICD-10-CM

## 2024-11-07 PROCEDURE — 73700 CT LOWER EXTREMITY W/O DYE: CPT | Performed by: PODIATRIST

## 2024-11-09 DIAGNOSIS — G25.81 RLS (RESTLESS LEGS SYNDROME): ICD-10-CM

## 2024-11-09 DIAGNOSIS — I10 PRIMARY HYPERTENSION: ICD-10-CM

## 2024-11-09 DIAGNOSIS — G35 MULTIPLE SCLEROSIS (HCC): ICD-10-CM

## 2024-11-11 RX ORDER — ESCITALOPRAM OXALATE 20 MG/1
20 TABLET ORAL DAILY
Qty: 90 TABLET | Refills: 0 | Status: SHIPPED | OUTPATIENT
Start: 2024-11-11

## 2024-11-11 RX ORDER — TRIAMTERENE AND HYDROCHLOROTHIAZIDE 37.5; 25 MG/1; MG/1
1 TABLET ORAL DAILY
Qty: 90 TABLET | Refills: 0 | Status: SHIPPED | OUTPATIENT
Start: 2024-11-11

## 2024-11-11 RX ORDER — BACLOFEN 20 MG/1
20 TABLET ORAL DAILY
Qty: 90 TABLET | Refills: 0 | Status: SHIPPED | OUTPATIENT
Start: 2024-11-11

## 2024-11-11 NOTE — TELEPHONE ENCOUNTER
Baclofen 20 mg  Filled 8-22-24  Qty 90  0 refills  Future Appointments   Date Time Provider Department Center   11/18/2024  1:40 PM ANA MACKEY [facility]   12/4/2024  9:45 AM Heraclio Carr DPM [facility]   LOV  1-24-24 SM    Escitalopram 20 mg  Filled 10-30-24  Qty 90  0 refills  Future Appointments   Date Time Provider Department Center   11/18/2024  1:40 PM ANA [facility]   12/4/2024  9:45 AM Heraclio Carr DPM [facility]   LOV 10-30-24 SM    Triamterene- hydrochlorothiazide 37.5-25 mg  Filled 6-7-24  Qty 90  1 refill  Future Appointments   Date Time Provider Department Center   11/18/2024  1:40 PM ANA MACKEY RM1 [facility]   12/4/2024  9:45 AM Heraclio Carr DPM [facility]   LOV 10-30-24 SM

## 2024-11-18 ENCOUNTER — HOSPITAL ENCOUNTER (OUTPATIENT)
Dept: MAMMOGRAPHY | Age: 74
Discharge: HOME | End: 2024-11-18
Attending: FAMILY MEDICINE
Payer: MEDICARE

## 2024-11-18 DIAGNOSIS — Z12.31 ENCOUNTER FOR SCREENING MAMMOGRAM FOR BREAST CANCER: ICD-10-CM

## 2024-11-18 PROCEDURE — 77063 BREAST TOMOSYNTHESIS BI: CPT | Performed by: FAMILY MEDICINE

## 2024-11-18 PROCEDURE — 77067 SCR MAMMO BI INCL CAD: CPT | Performed by: FAMILY MEDICINE

## 2024-12-04 ENCOUNTER — OFFICE VISIT (OUTPATIENT)
Facility: LOCATION | Age: 74
End: 2024-12-04
Payer: MEDICARE

## 2024-12-04 DIAGNOSIS — Z98.890 STATUS POST OPEN REDUCTION AND INTERNAL FIXATION (ORIF) OF FRACTURE WITH NONUNION: Primary | ICD-10-CM

## 2024-12-04 DIAGNOSIS — S82.64XK CLOSED NONDISPLACED FRACTURE OF LATERAL MALLEOLUS OF RIGHT FIBULA WITH NONUNION, SUBSEQUENT ENCOUNTER: ICD-10-CM

## 2024-12-04 DIAGNOSIS — M25.571 RIGHT ANKLE PAIN, UNSPECIFIED CHRONICITY: ICD-10-CM

## 2024-12-04 DIAGNOSIS — Z87.81 HISTORY OF FRACTURE OF RIGHT ANKLE: ICD-10-CM

## 2024-12-04 DIAGNOSIS — G35 MULTIPLE SCLEROSIS (HCC): ICD-10-CM

## 2024-12-04 DIAGNOSIS — Z98.890 STATUS POST OPEN REDUCTION WITH INTERNAL FIXATION (ORIF) OF FRACTURE OF ANKLE: ICD-10-CM

## 2024-12-04 DIAGNOSIS — S82.891P: ICD-10-CM

## 2024-12-04 DIAGNOSIS — S82.51XK CLOSED DISPLACED FRACTURE OF MEDIAL MALLEOLUS OF RIGHT TIBIA WITH NONUNION, SUBSEQUENT ENCOUNTER: ICD-10-CM

## 2024-12-04 DIAGNOSIS — M21.071 VALGUS DEFORMITY, NOT ELSEWHERE CLASSIFIED, RIGHT ANKLE: ICD-10-CM

## 2024-12-04 DIAGNOSIS — Z87.81 STATUS POST OPEN REDUCTION AND INTERNAL FIXATION (ORIF) OF FRACTURE WITH NONUNION: Primary | ICD-10-CM

## 2024-12-04 DIAGNOSIS — Z87.81 STATUS POST OPEN REDUCTION WITH INTERNAL FIXATION (ORIF) OF FRACTURE OF ANKLE: ICD-10-CM

## 2024-12-04 DIAGNOSIS — M25.471 EDEMA OF RIGHT ANKLE: ICD-10-CM

## 2024-12-04 PROCEDURE — 1159F MED LIST DOCD IN RCRD: CPT | Performed by: PODIATRIST

## 2024-12-04 PROCEDURE — 99214 OFFICE O/P EST MOD 30 MIN: CPT | Performed by: PODIATRIST

## 2024-12-05 ENCOUNTER — TELEPHONE (OUTPATIENT)
Facility: LOCATION | Age: 74
End: 2024-12-05

## 2024-12-05 NOTE — PROGRESS NOTES
[facility]  Progress Note      Makenzie Reyes is a 74 year old female.   Chief Complaint   Patient presents with    Test Results     Patient is here to review CT results of the right ankle, completed on 11/7/24.  She was able to  custom AFO brace. She denies any pain in the office today.         HPI:     Patient is a pleasant 74-year-old female with multiple sclerosis who is returning to clinic today for recheck on right ankle and to discuss CT results.  Patient states that she is doing pretty well overall.  She has obtained her custom AFO and feels like she is moving around better with less pain.  She does still continue to have pain intermittently.  Patient continues to be nervous when ambulating that she will rebreak or reinjure her ankle.  She is ambulating with a cane today.  She does state that prior to her ankle injury, she did not need assistive devices to ambulate.  No other concerns and here today to discuss further options.      Allergies: Patient has no known allergies.   Current Outpatient Medications   Medication Sig Dispense Refill    OMEPRAZOLE 40 MG Oral Capsule Delayed Release TAKE 1 CAPSULE ONE TIME DAILY 30 MINUTES BEFORE BREAKFAST 90 capsule 0    BACLOFEN 20 MG Oral Tab TAKE 1 TABLET EVERY DAY 90 tablet 0    Triamterene-HCTZ 37.5-25 MG Oral Tab TAKE 1 TABLET EVERY DAY 90 tablet 0    ESCITALOPRAM 20 MG Oral Tab TAKE 1 TABLET EVERY DAY 90 tablet 0    amLODIPine 5 MG Oral Tab TAKE 2 TABLETS EVERY  tablet 1    CELECOXIB 200 MG Oral Cap TAKE 1 CAPSULE EVERY DAY 30 capsule 11    buPROPion  MG Oral Tablet 24 Hr Take 1 tablet (150 mg total) by mouth daily. 30 tablet 1    ATORVASTATIN 20 MG Oral Tab TAKE 1 TABLET EVERY NIGHT 90 tablet 3    LEVOTHYROXINE 25 MCG Oral Tab TAKE 1 TABLET (25 MCG TOTAL) BY MOUTH BEFORE BREAKFAST. 90 tablet 3    CALCIUM OR Take 1,000 mg by mouth daily.      Multiple Vitamin (DAILY VALUE MULTIVITAMIN) Oral Tab Take 1 tablet by mouth daily.       Cholecalciferol (VITAMIN D) 2000 UNITS Oral Cap Take 1 capsule (2,000 Units total) by mouth daily.        Past Medical History:    Arthritis    BACK PAIN    in morning    Back problem    low back    Alas's esophagus    Basal cell carcinoma of ear    excision of BCC lesion Left ear    Change in hair    Chronic heartburn    Constipation    Depression    Disorder of thyroid    Easy bruising    Esophageal reflux    Essential hypertension    Fatigue    Fracture tibia/fibula    Heartburn    Hemorrhoids    High blood pressure    High cholesterol    Hx of motion sickness    Hyperglycemia    Hyperlipidemia    Increased weakness when ambulating    Multiple sclerosis (HCC)    chronic side effects include fatique and \"brain fog\" and decreased vision of left eye, left sided weakness due to MS    Muscle spasms of both lower extremities    Muscle weakness    has MS    OSTEOARTHRITIS    Osteoarthritis    Osteoarthrosis involving lower leg    Log Date: 10/26/2012     OTHER DISEASES    MS dx 1981 Dr. Ramachandran    Pain in joints    Personal history of colonic polyps    PONV (postoperative nausea and vomiting)    Unspecified essential hypertension    Visual impairment    decreased vision left eye glasses    Wears glasses      Past Surgical History:   Procedure Laterality Date    Appendectomy  1964    Back surgery      cervical surgery/ACDF x 3 with Dr. Webster          x2    Colonoscopy  2003    Colonoscopy      Excis lumbar disk,one level      Foot/toes surgery proc unlisted  2005    hemmertoes operation Rt toe    Hysterectomy      Knee replacement surgery  2022    Knee replacement surgery Right 2024    Dr.Ryan Curry    Laminectomy,lumbar  2010    L4 L5 fusion    Oophorectomy Bilateral     Removal of ovarian cyst(s)      x2    Thyroidectomy      partial thyroidectomy Lt    Tonsillectomy  's    Total abdom hysterectomy      w/ removal of both ovaries d/t DBT and fibroid    Upper gi  endoscopy,exam        Family History   Problem Relation Age of Onset    Cancer Sister         cervical cancer    Hypertension Mother          2019    Breast Cancer Daughter 39        BRCA 1 GENE    BRCA gene + Daughter     Hypertension Brother          2014    Hypertension Sister     Hypertension Sister     Hypertension Brother       Social History     Socioeconomic History    Marital status:    Tobacco Use    Smoking status: Never    Smokeless tobacco: Never    Tobacco comments:     Updated 24   Vaping Use    Vaping status: Never Used   Substance and Sexual Activity    Alcohol use: Not Currently    Drug use: Never   Other Topics Concern    Caffeine Concern Yes     Comment: 2 cups of coffee daily.    Exercise Yes     Comment: 4x/week.            REVIEW OF SYSTEMS:     10 point ROS completed and was negative unless stated in HPI.      EXAM:     GENERAL: well developed, well nourished, in no apparent distress  EXTREMITIES:  1. Integument: Skin appears moist, warm, and supple. There are no color changes. No open lesions. No macerations. No Hyperkeratotic lesions.  Healed ulceration overlying medial malleolus, right  2. Vascular: Dorsalis pedis 2/4 bilateral and posterior tibial pulses 2/4 bilateral, capillary refill normal.  3. Neurological: Gross sensation intact via light touch bilaterally.  Normal sharp/dull sensation  4. Musculoskeletal: All muscle groups are graded 5/5 in the foot and ankle with no pain elicited.  Nonreducible valgus deformity to ankle joint, right.  Patient has free range of motion of right ankle with no pain elicited.  Crepitus is noted within right ankle.  Patient does have pain with palpation within the medial ankle gutter.  Pain with palpation to area of AITFL.  Mild pain with palpation to medial malleolus or lateral malleolus.     PROCEDURE:  CT ANKLE RIGHT (CPT=73700)     COMPARISON:  [facility], XR, XR ANKLE (2 VIEW), RIGHT (CPT=73600), 10/29/2024, 2:23  PM.     INDICATIONS:  M25.571 Right ankle pain, unspecified chronicity S82.891P Closed fracture of ankle with malunion, right Z87.81 History of fracture of right ankle Z87.81 Status*     TECHNIQUE:  Multi-planar CT images were created without intravenous contrast. Shaded surface renderings are generated on an independent CT scanner workstation.  Dose reduction techniques were used. Dose information is transmitted to the ACR (American  College of Radiology) NRDR (National Radiology Data Registry) which includes the Dose Index Registry     3-D RENDERING:  Three dimensional image processing was completed using a separate workstation under concurrent supervision. Images were archived.     PATIENT STATED HISTORY:(As transcribed by Technologist)  Patient states her right leg goes inward when walking causing her to have an unsteady gait and she states to have right heel pain when weight bearing for 6 months; she is 6 months status post right   ankle ORIF.         FINDINGS:    There is a lateral fixation plate and multiple fixation screws traversing a remote, chronic Baca type B fracture of the distal fibular metadiaphysis.  The surgical hardware appears intact without evidence of failure.  The Baca B fracture plane is still   clearly delineated with mild cortication on opposing aspects of the fracture consistent with a lack of osseous fusion and nonunion.     Additionally, there are chronically ununited corticated fractures along the posterior colliculus of the fibula and tip of the medial malleolus as described on recent radiographs.     There is tibiotalar joint osteoarthritis with lateral tilting of the talus in the ankle mortise with prominent subchondral sclerosis and cystic changes along the lateral talar dome and lateral aspect of the distal tibial plafond.  The talar tilting in  conjunction with the chronic ununited fractures of the medial and lateral malleolar tips are consistent with significant tibiotalar  joint instability relating to chronic ligamentous instability.     There is a remote, healed fracture of the posterior malleolus with mild posttraumatic deformity.     There is mild tibiotalar joint effusion.  There is mild calcaneal enthesopathy involving both the Achilles and plantar fascial insertions.  No CT evidence of significant tendinopathy about the ankle noted.  No acute muscular strains or edema noted.  No  significant muscle atrophy.      Impression   CONCLUSION:    1. There is evidence of chronic, remote trimalleolar fractures with healing and mild posttraumatic deformity of the posterior malleolar fracture.  Fractures of the medial malleolar tip and posterior colliculus of the fibula are un-united with an evidence   of chronic nonunion.  2. There is also a Baca type B fracture of the distal fibular diametaphysis with a lateral fixation plate and multiple fixation screws.  No hardware failure is noted, however there is evidence of nonunion across the fracture plane.  3. Tibiotalar osteoarthritis with chronic tibiotalar instability including lateral tilting of the talus.  4. Please see further details above.     XR ANKLE (2 VIEW), RIGHT (CPT=73600)     Result Date: 10/29/2024  CONCLUSION:  1. Postsurgical changes are seen.  The tibia appears tilted in relation to the talus with bone-on-bone appearance laterally and widening medially. 2. Old ununited fractures of the medial malleolus and lateral malleolus. 3. Healed fracture deformity of the distal fibula diaphysis.   LOCATION:  [facility]   Dictated by (CST): Dylan Coates MD on 10/29/2024 at 2:42 PM     Finalized by (CST): Dylan Coates MD on 10/29/2024 at 2:50 PM        XR FOOT WEIGHTBEARING (3 VIEWS), RIGHT   (CPT=73630)     Result Date: 10/29/2024  CONCLUSION:  1. Osseous structures appear demineralized which may reflect disuse. 2. No acute foot fracture or dislocation.  3. Questionable wire like metallic foreign body in the plantar soft  tissues.  This could lie external to the patient given the interval change in position between the AP and lateral views. 4. Postsurgical changes are seen in the ankle.    LOCATION:  [facility]   Dictated by (CST): Dylan Coates MD on 10/29/2024 at 2:29 PM     Finalized by (TRELL): Dylan Coates MD on 10/29/2024 at 2:36 PM      ASSESSMENT AND PLAN:   Diagnoses and all orders for this visit:    Status post open reduction and internal fixation (ORIF) of fracture with nonunion    Closed displaced fracture of medial malleolus of right tibia with nonunion, subsequent encounter    Closed nondisplaced fracture of lateral malleolus of right fibula with nonunion, subsequent encounter    Closed fracture of ankle with malunion, right    Status post open reduction with internal fixation (ORIF) of fracture of ankle    History of fracture of right ankle    Valgus deformity, not elsewhere classified, right ankle    Edema of right ankle    Right ankle pain, unspecified chronicity    Multiple sclerosis (HCC)        Plan:   -Patient was seen and evaluated today in clinic.  Chart history reviewed.    Reviewed CT results with patient.  See official impression above.  There is nonunion noted to the medial and lateral malleoli with a valgus deformity within the ankle with osteoarthritis and appearance of overall instability within the tibiotalar joint    On clinical exam, patient continues to have pain and deformity to the right ankle    Discussed further treatment options with patient, including long-term custom AFO brace usage versus revisional arthrodesis of the right ankle joint.    Had a lengthy discussion with patient and her  in regards to long-term prognosis.  I do feel that the ankle is unstable in the position that it currently is in.  With known nonunions to medial lateral malleoli as well, this can also increase patient's chances of reinjuring.    Patient does elect to move forward with surgical intervention.  I am  recommending removal of hardware, removal of fracture fragments of the ankle, and ankle arthrodesis, all of the right lower extremity.  I did discuss the procedure in detail with the patient and her  today, as well as all perioperative protocols.  All of patient's questions and concerns were addressed and we will plan to move forward with surgical intervention at a mutually agreeable date in the near future.    In the meantime, I do recommend patient utilize her custom AFO at all times to prevent reinjury.  If pain worsens, I do recommend patient begin nonweightbearing    Patient should rest, ice, and elevate, and avoid any activities that elicits any pain    -All of the patient's questions and concerns were addressed.  They indicated their understanding of these issues and agrees to the plan.    The patient has been advised of the approximate disability involved for these procedures. In addition, the patient has been advised as to the alternatives of care, including continued conservative care as well as surgical procedures. The patient has failed all conservative treatment at this point. The patient understands that if surgical procedures are performed, there are risks and complications that could occur, including but not limited to: hematoma formation, damage to the nervous system, seroma formation, development of a DVT or phlebitis, infection, painful scar tissue formation, stiffness, limited motion, delayed-union, non-union, mal-union, impaired healing, increased chance of swelling, reaction to implanted biomaterials, over-correction, under-correction with recurrence of the deformities, continued pain, loss of limb, loss of life, and the possibility that future surgery may need to be performed. The patient was given the opportunity to ask questions which were answered. The patient voiced no concerns and will consider all these options. Patient desires surgical intervention. No guarantees were given or  implied. Pt consented freely to surgical intervention.  I spent approximately 30 minutes discussing the surgical procedures at length. I reviewed in detail the procedure to be performed, postoperative course, disability to be expected, healing time, prognosis and the importance of their following the recommended postoperative care. Possible complications discussed included but not limited to recurrence of deformity, postoperative pain, swelling, stiffness, rejection of the implant if utilized, return to surgery, infection, residual pain, possible blood clot formation, bleeding, etc. Possible complications relating to over activity and limitations during the postoperative period were reviewed. The patient has responsibilities to help insure successful outcome of the surgery. Postoperative oral and written instructions were reviewed and postoperative course of treatment discussed in detail. Management of postoperative pain was discussed. All questions related to preoperative, operative and postoperative course of treatment were answered to their understanding and satisfaction. RTO and follow up after surgery is necessary and expected and agreed to by the patient. The patient acknowledged understanding of the above and agrees to follow all instructions and protocols. No guarantee or warranty was given or implied as to the results of the surgery.       Time spent reviewing pertinent information from patient's chart, reviewing any pertinent imaging, obtaining history and physical exam, discussing and mutually agreeing on a treatment plan, and documenting encounter: 45 minutes    RTC for 1 week postop follow-up    Mj Carr DPM        12/5/2024    [facility]  [address] [place] [id number]   [email or web address]            [employer] speech recognition software was used to prepare this note.  Errors in word recognition may occur.  Please contact me with any questions/concerns with  this note.

## 2024-12-05 NOTE — TELEPHONE ENCOUNTER
*patient did request a home health nurse following surgery.  I am unsure how we go about this*    Procedure:   -Removal of hardware, right ankle  -Removal of fracture fragments, right ankle  -Right ankle arthrodesis  CPT code: 46564, 14946, 43344  Length of Surgery: 4 hours  Any Instruments/Hardware: C arm, [place] ankle arthrodesis anterior plate and screws  Call patient: ASAP (wanting surgery after the 1st of the year)  Anesthesia: Gen  Location: [facility]  Assistance: Dr. Lemons  Pacemaker: No  Anticoagulants: No  Nickel Allergy: No  Latex Allergy: No  Diagnosis/ICD Code:   Status post open reduction and internal fixation (ORIF) of fracture with nonunion    Closed displaced fracture of medial malleolus of right tibia with nonunion, subsequent encounter    Closed nondisplaced fracture of lateral malleolus of right fibula with nonunion, subsequent encounter    Closed fracture of ankle with malunion, right    Status post open reduction with internal fixation (ORIF) of fracture of ankle    History of fracture of right ankle    Valgus deformity, not elsewhere classified, right ankle    Edema of right ankle    Right ankle pain, unspecified chronicity

## 2024-12-11 ENCOUNTER — TELEPHONE (OUTPATIENT)
Dept: PODIATRY CLINIC | Facility: CLINIC | Age: 74
End: 2024-12-11

## 2024-12-11 DIAGNOSIS — S82.51XK CLOSED DISPLACED FRACTURE OF MEDIAL MALLEOLUS OF RIGHT TIBIA WITH NONUNION, SUBSEQUENT ENCOUNTER: ICD-10-CM

## 2024-12-11 DIAGNOSIS — Z87.81 HISTORY OF FRACTURE OF RIGHT ANKLE: ICD-10-CM

## 2024-12-11 DIAGNOSIS — M21.071 VALGUS DEFORMITY, NOT ELSEWHERE CLASSIFIED, RIGHT ANKLE: ICD-10-CM

## 2024-12-11 DIAGNOSIS — Z98.890 STATUS POST OPEN REDUCTION WITH INTERNAL FIXATION (ORIF) OF FRACTURE OF ANKLE: ICD-10-CM

## 2024-12-11 DIAGNOSIS — Z87.81 STATUS POST OPEN REDUCTION WITH INTERNAL FIXATION (ORIF) OF FRACTURE OF ANKLE: ICD-10-CM

## 2024-12-11 DIAGNOSIS — M25.471 EDEMA OF RIGHT ANKLE: ICD-10-CM

## 2024-12-11 DIAGNOSIS — S82.64XK CLOSED NONDISPLACED FRACTURE OF LATERAL MALLEOLUS OF RIGHT FIBULA WITH NONUNION, SUBSEQUENT ENCOUNTER: ICD-10-CM

## 2024-12-11 DIAGNOSIS — M25.571 RIGHT ANKLE PAIN, UNSPECIFIED CHRONICITY: ICD-10-CM

## 2024-12-11 DIAGNOSIS — Z98.890 STATUS POST OPEN REDUCTION AND INTERNAL FIXATION (ORIF) OF FRACTURE WITH NONUNION: Primary | ICD-10-CM

## 2024-12-11 DIAGNOSIS — S82.891P: ICD-10-CM

## 2024-12-11 DIAGNOSIS — Z87.81 STATUS POST OPEN REDUCTION AND INTERNAL FIXATION (ORIF) OF FRACTURE WITH NONUNION: Primary | ICD-10-CM

## 2024-12-11 NOTE — TELEPHONE ENCOUNTER
Dr. Garcia,     The patient is scheduled for outpatient foot surgery with Dr. Carr on 1/16 for the following procedure(s): removal of hardware, right foot; removal of fracture fragments, right ankle; right ankle arthrodesis.      The patient has been advised to contact your office for pre-operative clearance.  The patient needs the following test/exams    __X__ History and Physical (H&P) may be no older than THIRTY (30) days prior to surgery. If possible, please complete within 7 days prior to surgery. If done before 7 days of surgery date, an update to the office visit note is required within 7 days prior to surgery.     __X__ EKG for patients that are age 50 or older, have hypertension, diabetes or a history of cardiac disease or are obese.  This should be no older than 6 months prior to surgery.    __X__ Complete Metabolic Panel (CMP) & CBC. This should be no older than 3 months prior to surgery.    Please include any other test you deem necessary for medical clearance.    Thank you,   Ana JON   Surgical Scheduler   [phone]

## 2024-12-29 DIAGNOSIS — I10 PRIMARY HYPERTENSION: ICD-10-CM

## 2024-12-29 RX ORDER — AMLODIPINE BESYLATE 5 MG/1
10 TABLET ORAL DAILY
Qty: 180 TABLET | Refills: 0 | Status: SHIPPED | OUTPATIENT
Start: 2024-12-29

## 2025-01-02 ENCOUNTER — TELEPHONE (OUTPATIENT)
Facility: LOCATION | Age: 75
End: 2025-01-02

## 2025-01-02 NOTE — TELEPHONE ENCOUNTER
Patient calling stating she will be having a surgery with Dr Carr on 1/16 and will need a wheelchair after surgery and in order for her insurance to cover the wheelchair it will need to be requested by Dr Carr.Please advise

## 2025-01-08 ENCOUNTER — OFFICE VISIT (OUTPATIENT)
Dept: INTERNAL MEDICINE CLINIC | Facility: CLINIC | Age: 75
End: 2025-01-08
Payer: MEDICARE

## 2025-01-08 ENCOUNTER — LAB ENCOUNTER (OUTPATIENT)
Dept: LAB | Age: 75
End: 2025-01-08
Attending: FAMILY MEDICINE
Payer: MEDICARE

## 2025-01-08 VITALS
WEIGHT: 176 LBS | DIASTOLIC BLOOD PRESSURE: 76 MMHG | OXYGEN SATURATION: 97 % | BODY MASS INDEX: 30.05 KG/M2 | HEIGHT: 64 IN | HEART RATE: 60 BPM | SYSTOLIC BLOOD PRESSURE: 134 MMHG | RESPIRATION RATE: 16 BRPM

## 2025-01-08 DIAGNOSIS — Z87.81: ICD-10-CM

## 2025-01-08 DIAGNOSIS — Z01.818 PRE-OP EXAMINATION: ICD-10-CM

## 2025-01-08 DIAGNOSIS — Z98.890 STATUS POST OPEN REDUCTION AND INTERNAL FIXATION (ORIF) OF FRACTURE WITH NONUNION: ICD-10-CM

## 2025-01-08 DIAGNOSIS — Z01.818 PRE-OP EXAMINATION: Primary | ICD-10-CM

## 2025-01-08 DIAGNOSIS — M21.071 VALGUS DEFORMITY OF FOOT, RIGHT: ICD-10-CM

## 2025-01-08 DIAGNOSIS — Z87.81 STATUS POST OPEN REDUCTION AND INTERNAL FIXATION (ORIF) OF FRACTURE WITH NONUNION: ICD-10-CM

## 2025-01-08 DIAGNOSIS — G89.29 CHRONIC PAIN OF RIGHT ANKLE: ICD-10-CM

## 2025-01-08 DIAGNOSIS — M25.571 CHRONIC PAIN OF RIGHT ANKLE: ICD-10-CM

## 2025-01-08 LAB
ALBUMIN SERPL-MCNC: 4.3 G/DL (ref 3.2–4.8)
ALBUMIN/GLOB SERPL: 1.3 {RATIO} (ref 1–2)
ALP LIVER SERPL-CCNC: 73 U/L
ALT SERPL-CCNC: 12 U/L
ANION GAP SERPL CALC-SCNC: 7 MMOL/L (ref 0–18)
AST SERPL-CCNC: 16 U/L (ref ?–34)
BASOPHILS # BLD AUTO: 0.07 X10(3) UL (ref 0–0.2)
BASOPHILS NFR BLD AUTO: 0.8 %
BILIRUB SERPL-MCNC: 0.4 MG/DL (ref 0.2–1.1)
BUN BLD-MCNC: 22 MG/DL (ref 9–23)
CALCIUM BLD-MCNC: 10.3 MG/DL (ref 8.7–10.4)
CHLORIDE SERPL-SCNC: 102 MMOL/L (ref 98–112)
CO2 SERPL-SCNC: 32 MMOL/L (ref 21–32)
CREAT BLD-MCNC: 0.75 MG/DL
EGFRCR SERPLBLD CKD-EPI 2021: 83 ML/MIN/1.73M2 (ref 60–?)
EOSINOPHIL # BLD AUTO: 0.56 X10(3) UL (ref 0–0.7)
EOSINOPHIL NFR BLD AUTO: 6.4 %
ERYTHROCYTE [DISTWIDTH] IN BLOOD BY AUTOMATED COUNT: 12.8 %
FASTING STATUS PATIENT QL REPORTED: YES
GLOBULIN PLAS-MCNC: 3.2 G/DL (ref 2–3.5)
GLUCOSE BLD-MCNC: 105 MG/DL (ref 70–99)
HCT VFR BLD AUTO: 40.7 %
HGB BLD-MCNC: 13.3 G/DL
IMM GRANULOCYTES # BLD AUTO: 0.03 X10(3) UL (ref 0–1)
IMM GRANULOCYTES NFR BLD: 0.3 %
LYMPHOCYTES # BLD AUTO: 1.62 X10(3) UL (ref 1–4)
LYMPHOCYTES NFR BLD AUTO: 18.5 %
MCH RBC QN AUTO: 29.5 PG (ref 26–34)
MCHC RBC AUTO-ENTMCNC: 32.7 G/DL (ref 31–37)
MCV RBC AUTO: 90.2 FL
MONOCYTES # BLD AUTO: 0.79 X10(3) UL (ref 0.1–1)
MONOCYTES NFR BLD AUTO: 9 %
NEUTROPHILS # BLD AUTO: 5.68 X10 (3) UL (ref 1.5–7.7)
NEUTROPHILS # BLD AUTO: 5.68 X10(3) UL (ref 1.5–7.7)
NEUTROPHILS NFR BLD AUTO: 65 %
OSMOLALITY SERPL CALC.SUM OF ELEC: 296 MOSM/KG (ref 275–295)
PLATELET # BLD AUTO: 302 10(3)UL (ref 150–450)
POTASSIUM SERPL-SCNC: 4.2 MMOL/L (ref 3.5–5.1)
PROT SERPL-MCNC: 7.5 G/DL (ref 5.7–8.2)
RBC # BLD AUTO: 4.51 X10(6)UL
SODIUM SERPL-SCNC: 141 MMOL/L (ref 136–145)
WBC # BLD AUTO: 8.8 X10(3) UL (ref 4–11)

## 2025-01-08 PROCEDURE — 36415 COLL VENOUS BLD VENIPUNCTURE: CPT

## 2025-01-08 PROCEDURE — 85025 COMPLETE CBC W/AUTO DIFF WBC: CPT

## 2025-01-08 PROCEDURE — 1159F MED LIST DOCD IN RCRD: CPT | Performed by: FAMILY MEDICINE

## 2025-01-08 PROCEDURE — 3078F DIAST BP <80 MM HG: CPT | Performed by: FAMILY MEDICINE

## 2025-01-08 PROCEDURE — 99214 OFFICE O/P EST MOD 30 MIN: CPT | Performed by: FAMILY MEDICINE

## 2025-01-08 PROCEDURE — 80053 COMPREHEN METABOLIC PANEL: CPT

## 2025-01-08 PROCEDURE — 1160F RVW MEDS BY RX/DR IN RCRD: CPT | Performed by: FAMILY MEDICINE

## 2025-01-08 PROCEDURE — 3075F SYST BP GE 130 - 139MM HG: CPT | Performed by: FAMILY MEDICINE

## 2025-01-08 PROCEDURE — 3008F BODY MASS INDEX DOCD: CPT | Performed by: FAMILY MEDICINE

## 2025-01-08 NOTE — PROGRESS NOTES
CC: Preop exam.    Makenzie Reyes is a 74 year old female who presents for a pre-operative physical exam  By 's request. Patient is to have removal of hardware right foot,removal of fracture fragments right ankle: right ankle arthrodesis,secondary to pain and deformity  to be on 1/16/25.    No prior anaesthesia problem.    Patient complains of pain in the ankle intermittently. Patient has opted.for surgery to correct the ankle issues of non union of the fracture/deformity.    Current Outpatient Medications   Medication Sig Dispense Refill    amLODIPine 5 MG Oral Tab Take 2 tablets (10 mg total) by mouth daily. 180 tablet 0    OMEPRAZOLE 40 MG Oral Capsule Delayed Release TAKE 1 CAPSULE ONE TIME DAILY 30 MINUTES BEFORE BREAKFAST 90 capsule 0    BACLOFEN 20 MG Oral Tab TAKE 1 TABLET EVERY DAY 90 tablet 0    Triamterene-HCTZ 37.5-25 MG Oral Tab TAKE 1 TABLET EVERY DAY 90 tablet 0    ESCITALOPRAM 20 MG Oral Tab TAKE 1 TABLET EVERY DAY 90 tablet 0    CELECOXIB 200 MG Oral Cap TAKE 1 CAPSULE EVERY DAY 30 capsule 11    buPROPion  MG Oral Tablet 24 Hr Take 1 tablet (150 mg total) by mouth daily. 30 tablet 1    ATORVASTATIN 20 MG Oral Tab TAKE 1 TABLET EVERY NIGHT 90 tablet 3    LEVOTHYROXINE 25 MCG Oral Tab TAKE 1 TABLET (25 MCG TOTAL) BY MOUTH BEFORE BREAKFAST. 90 tablet 3    CALCIUM OR Take 1,000 mg by mouth daily.      Multiple Vitamin (DAILY VALUE MULTIVITAMIN) Oral Tab Take 1 tablet by mouth daily.      Cholecalciferol (VITAMIN D) 2000 UNITS Oral Cap Take 1 capsule (2,000 Units total) by mouth daily.        Allergies: Allergies[1]   Past Medical History:    Arthritis    BACK PAIN    in morning    Back problem    low back    Alas's esophagus    Basal cell carcinoma of ear    excision of BCC lesion Left ear    Change in hair    Chronic heartburn    Constipation    Depression    Disorder of thyroid    Easy bruising    Esophageal reflux    Essential hypertension    Fatigue    Fracture tibia/fibula     Heartburn    Hemorrhoids    High blood pressure    High cholesterol    Hx of motion sickness    Hyperglycemia    Hyperlipidemia    Increased weakness when ambulating    Multiple sclerosis (HCC)    chronic side effects include fatique and \"brain fog\" and decreased vision of left eye, left sided weakness due to MS    Muscle spasms of both lower extremities    Muscle weakness    has MS    OSTEOARTHRITIS    Osteoarthritis    Osteoarthrosis involving lower leg    Log Date: 10/26/2012     OTHER DISEASES    MS dx 1981 Dr. Ramachandran    Pain in joints    Personal history of colonic polyps    PONV (postoperative nausea and vomiting)    Unspecified essential hypertension    Visual impairment    decreased vision left eye glasses    Wears glasses      Past Surgical History:   Procedure Laterality Date    Appendectomy  1964    Back surgery      cervical surgery/ACDF x 3 with Dr. Webster          x2    Colonoscopy  2003    Colonoscopy      Excis lumbar disk,one level      Foot/toes surgery proc unlisted  2005    hemmertoes operation Rt toe    Hysterectomy      Knee replacement surgery  2022    Knee replacement surgery Right 2024    Dr.Ryan Curry    Laminectomy,lumbar  2010    L4 L5 fusion    Oophorectomy Bilateral     Removal of ovarian cyst(s)      x2    Thyroidectomy      partial thyroidectomy Lt    Tonsillectomy      Total abdom hysterectomy      w/ removal of both ovaries d/t DBT and fibroid    Upper gi endoscopy,exam        Family History   Problem Relation Age of Onset    Cancer Sister         cervical cancer    Hypertension Mother          2019    Breast Cancer Daughter 39        BRCA 1 GENE    BRCA gene + Daughter     Hypertension Brother          2014    Hypertension Sister     Hypertension Sister     Hypertension Brother       Social History:   Social History     Socioeconomic History    Marital status:    Tobacco Use    Smoking status: Never    Smokeless  tobacco: Never    Tobacco comments:     Updated 4/17/24   Vaping Use    Vaping status: Never Used   Substance and Sexual Activity    Alcohol use: Not Currently    Drug use: Never   Other Topics Concern    Caffeine Concern Yes     Comment: 2 cups of coffee daily.    Exercise Yes     Comment: 4x/week.      Social Drivers of Health     Financial Resource Strain: Low Risk  (6/28/2024)    Financial Resource Strain     Difficulty of Paying Living Expenses: Not hard at all   Food Insecurity: No Food Insecurity (4/4/2024)    Food Insecurity     Food Insecurity: Never true   Transportation Needs: No Transportation Needs (6/28/2024)    Transportation Needs     Lack of Transportation: No   Housing Stability: Low Risk  (4/4/2024)    Housing Stability     Housing Instability: No      Occ: retired   Exercise: minimal, walking.  Diet: watches fats closely and low carb diet     REVIEW OF SYSTEMS:   GENERAL: feels well otherwise  SKIN: denies any unusual skin lesions,+ granuloma annulare  EYES:denies blurred vision or double vision  HEENT: denies nasal congestion, sinus pain or ST  LUNGS: denies shortness of breath with exertion  CARDIOVASCULAR: denies chest pain on exertion  GI: denies abdominal pain,+GERD,Alas's esophagus  : denies dysuria, vaginal discharge or itching  MUSCULOSKELETAL: arthritis lower back  NEURO: denies headaches,+MS,+RLS  PSYCHE: +depression and anxiety  HEMATOLOGIC: denies hx of anemia  ENDOCRINE: denies thyroid history  ALL/ASTHMA: denies hx of allergy or asthma    EXAM:   /76 (BP Location: Right arm, Patient Position: Sitting, Cuff Size: adult)   Pulse 60   Resp 16   Ht 5' 4\" (1.626 m)   Wt 176 lb (79.8 kg)   SpO2 97%   BMI 30.21 kg/m²   GENERAL: well developed, well nourished,in no apparent distress  SKIN: no rashes,no suspicious lesions  HEENT: atraumatic, normocephalic,ears and throat are clear  EYES:PERRLA, EOMI, normal optic disk,conjunctiva are clear  NECK: supple,no adenopathy,no  bruits  CHEST: no chest tenderness  LUNGS: clear to auscultation  CARDIO: RRR without murmur  GI: good BS's,no masses, HSM or tenderness  : deferred  MUSCULOSKELETAL: Right Ankle- non reducible valgus deformity to right ankle joint, + pain with palpation of the ankle. Gait limps on the right and uses a cane.  EXTREMITIES: no cyanosis, clubbing or edema  NEURO: Oriented times three,cranial nerves are intact,motor and sensory are grossly intact    ASSESSMENT AND PLAN:   Makenzie Reyes is a 74 year old female who presents for a pre-operative physical exam. EKG from 8/8/24 normal/stable. Patient is approved for surgery pending lab results.    Patient appears stable. Pre-op testing is acceptable. Patient is cleared for their procedure and may proceed at an acceptable risk for complications.  HUSSAIN Mujica, 01/09/25, 5:10 PM      Encounter Diagnoses   Name Primary?    Pre-op examination Yes    History of fracture, delayed union     Status post open reduction and internal fixation (ORIF) of fracture with nonunion     Valgus deformity of foot, right     Chronic pain of right ankle        Orders Placed This Encounter   Procedures    Comp Metabolic Panel (14)    CBC With Differential With Platelet       Meds & Refills for this Visit:  Requested Prescriptions      No prescriptions requested or ordered in this encounter       Imaging & Consults:  None         [1] No Known Allergies

## 2025-01-08 NOTE — TELEPHONE ENCOUNTER
Can we assist patient in receiving a wheelchair?  If an order is needed to be placed, please let me know and I can do this.  Thank you

## 2025-01-10 ENCOUNTER — TELEPHONE (OUTPATIENT)
Dept: INTERNAL MEDICINE CLINIC | Facility: CLINIC | Age: 75
End: 2025-01-10

## 2025-01-16 ENCOUNTER — PROCEDURE (OUTPATIENT)
Facility: LOCATION | Age: 75
End: 2025-01-16

## 2025-01-16 DIAGNOSIS — Z98.890 STATUS POST OPEN REDUCTION WITH INTERNAL FIXATION (ORIF) OF FRACTURE OF ANKLE: ICD-10-CM

## 2025-01-16 DIAGNOSIS — Z87.81 HISTORY OF FRACTURE OF RIGHT ANKLE: ICD-10-CM

## 2025-01-16 DIAGNOSIS — S82.64XK CLOSED NONDISPLACED FRACTURE OF LATERAL MALLEOLUS OF RIGHT FIBULA WITH NONUNION, SUBSEQUENT ENCOUNTER: ICD-10-CM

## 2025-01-16 DIAGNOSIS — M21.071 VALGUS DEFORMITY, NOT ELSEWHERE CLASSIFIED, RIGHT ANKLE: ICD-10-CM

## 2025-01-16 DIAGNOSIS — S82.51XK CLOSED DISPLACED FRACTURE OF MEDIAL MALLEOLUS OF RIGHT TIBIA WITH NONUNION, SUBSEQUENT ENCOUNTER: ICD-10-CM

## 2025-01-16 DIAGNOSIS — Z98.890 POST-OPERATIVE STATE: Primary | ICD-10-CM

## 2025-01-16 DIAGNOSIS — S82.891P: ICD-10-CM

## 2025-01-16 DIAGNOSIS — Z87.81 STATUS POST OPEN REDUCTION WITH INTERNAL FIXATION (ORIF) OF FRACTURE OF ANKLE: ICD-10-CM

## 2025-01-16 DIAGNOSIS — M25.571 RIGHT ANKLE PAIN, UNSPECIFIED CHRONICITY: ICD-10-CM

## 2025-01-16 DIAGNOSIS — Z98.890 STATUS POST OPEN REDUCTION AND INTERNAL FIXATION (ORIF) OF FRACTURE WITH NONUNION: ICD-10-CM

## 2025-01-16 DIAGNOSIS — Z87.81 STATUS POST OPEN REDUCTION AND INTERNAL FIXATION (ORIF) OF FRACTURE WITH NONUNION: ICD-10-CM

## 2025-01-16 RX ORDER — HYDROCODONE BITARTRATE AND ACETAMINOPHEN 7.5; 325 MG/1; MG/1
1 TABLET ORAL EVERY 6 HOURS PRN
Qty: 28 TABLET | Refills: 0 | Status: SHIPPED | OUTPATIENT
Start: 2025-01-16 | End: 2025-01-23

## 2025-01-16 NOTE — PROGRESS NOTES
[facility], [address], [place]     OPERATIVE NOTE     Makenzie SILVA Reyes Patient Status:  No patient class for patient encounter    3/21/1950 MRN [id number]   Location [facility], [address], [place] Attending No att. providers found   [facility] Day # 0 PCP Alyssa Garcia MD     Date of Surgery:  2025     Preoperative Diagnosis:   -History of ORIF of right ankle fracture  -Nondisplaced closed fracture of lateral malleolus with nonunion  -Nondisplaced closed fracture of medial malleolus with nonunion  -Right ankle valgus deformity  -Right ankle pain    Postoperative Diagnosis:   -Same    Primary Surgeon: Mj Carr DPM    Assistant: Tom Loredo, PGY-3    Procedures:   -Removal of deep hardware, right ankle  -Fibular osteotomy, right lower extremity  -Removal of fracture fragments, right ankle  -Right ankle arthrodesis    Surgical Findings: Upon removal of hardware, the distal screws within the plate of the lateral malleolar plate were broken.  Chronic synovitis noted within the right ankle with nonunion of lateral malleolus fracture, as well as a nonunion to the medial malleolus fracture.  Medial malleolar fragment was within the medial ankle gutter.  There was a valgus deformity to the right ankle with degenerative changes to the ankle joint itself consisting of spurring and wearing of articular cartilage to both the tibia and the talus.    Anesthesia: General with popliteal and saphenous block    Complications: none    Estimated Blood Loss: 50 cc    Implants:   -Jose Daniel 7.0mm x 50mm Fixos headless screw x 2  -Jose Daniel Axsos Anterior ankle fusion Plate  -Dequincy 4.0 mm x 24 mm cortical screw  -Dequincy 4.0 mm x 26 mm locking screw  -Dequincy 4.0 mm x 22 mm locking screws x 3  -Dequincy 4.0 mm x 30 mm cortical screw  -Dequincy 4.0 mm x 42 mm locking screw  -5cc of Dequincy Vitoss  -3cc of Dequincy Augment    Specimen: None    Drains: None    Condition: Stable    Preoperative  history/indications:  Patient presented to Mj Carr DPM due to a nonunited lateral malleolus and medial malleolus fracture following a right ankle ORIF by an outside physician.  Patient did have a valgus deformity to the ankle with significant pain with ambulation.  Because of the ongoing deformity with confirmed nonunion to both the medial and lateral malleoli fractures, recommended ankle arthrodesis.  The procedure was discussed with patient in detail.  All of their questions and concerns were answered and the patient would like to proceed with surgical intervention.    Informed Consent:  All treatment options have been discussed with the patient of both conservative and surgical attempt at correction including the potential risks and complications of surgical intervention including but not exhaustive of death, loss of limb, post op pain, numbness, swelling, infection, bleeding, reoccurrence of the deformity, rotation, elevation, malposition, extended healing, non-union or delayed union and the possibility of further and future surgery.  No guarantees have been made to the pt and the informed consent has been signed.    Procedure in detail:  The patient was brought into the operating room and placed on the operating table in the supine position.  Patient received a preoperative popliteal block, as well as a saphenous block by anesthesia prior to arriving in the OR.  Pre-operative antibiotics were given per [facility]IP protocols.  A pneumatic tourniquet was placed about the patient's right thigh.  A formal timeout was performed and the OR staff were all in agreement. Following IV sedation, the right lower extremity was then scrubbed, prepped, and draped in the usual aseptic manner.  The right lower extremity was then exsanguinated and the pneumatic tourniquet was inflated.  Attention was first directed to the lateral aspect of the right ankle where a linear incision was placed overlying patient's previously healed  incision.  The incision was bluntly dissected with care to identify and retract any vital neurovascular structures.  There was significant scar tissue noted overlying the lateral malleolus.  A sharp incision was then placed overlying the lateral aspect of the fibula, exposing the lateral malleolar plates and screws.  All screws were then removed from the plate, noting that the distal screws within the plate were all broken.  The plate was then removed from the lateral malleolus, revealing a nonunited fracture with significant fibrous tissue.  Next, a proximal lateral to distal medial osteotomy was placed roughly 6 cm above the level of the ankle joint.  The osteotomize lateral malleolus was then sharply removed and passed from the operative field.  The remaining fibula appeared healthy in nature.    Next, attention was directed to the anterior portion of the ankle joint where a curvilinear incision was placed along the course of the tibial crest just medial to the tibialis anterior tendon, and carried distally across the medial ankle gutter, ending just prior to the tibialis anterior tendon insertion.  The incision was deepened bluntly, with care to avoid any vital neurovascular structures and cauterizing any venous contributories.  Once at the level of the tendon sheath, a full-thickness incision was placed down to the level of bone just medial to the tibialis anterior tendon.  Periosteal and capsular tissues were all reflected, exposing the tibiotalar joint.  There was significant ankle synovitis noted within the ankle joint, as well as wearing of the articular cartilage of both the tibia and talus.  The tibiotalar joint itself was in a valgus position with nonunited medial malleolar fragment within the medial gutter.  A [employer] ankle joint distractor was utilized per  protocol to expose the tibiotalar joint in its entirety.  The medial malleolar fracture fragment was then removed utilizing combination  of osteotome, bone rongeur, and curette.      Following this, a Jose Daniel 4.0 egg resurfacing tool with irrigation was utilized to remove all remaining cartilage and subchondral bone of both the tibial and talar surfaces of the ankle joint.  A 2.0 mm drill was then utilized to fenestrate the tibial and talar surfaces.  Good, bleeding bone was noted within both surfaces.  At this time, the tourniquet was deflated for 20 minutes, in which it was then reinflated.  Next, Jose Daniel Vitoss and Augment were prepared per  protocol and mixed together.  The product was then placed within the ankle joint and the joint was then reduced out of its severe valgus deformity into rectus position in the sagittal plane, with minimal valgus and external rotation noted.  Proper position of the tibiotalar joint with excellent compression of the joint was confirmed on fluoroscopy.  Temporary fixation was placed from just proximal to medial malleolus and across the ankle joint into the lateral talar body.  Another temporary wire was placed from lateral distal tibia across the ankle joint into the medial talar body.  Temporary guidewire fixation was placed under fluoroscopic guidance. Jose Daniel 7.0 mm cannulated screws were then placed across the tibiotalar joint utilizing guidewires per  protocol under fluoroscopy.  Maintained correction was noted, with continued excellent compression of the joint.  Next, a [place] anterior ankle fusion plate was temporarily fixated to the anterior portion of the tibiotalar joint.  Proper position was confirmed on fluoroscopy and the plate was then fixated with to the anterior ankle per manufacture protocol utilizing a combination of locking and nonlocking screws.  Final radiographs were obtained, confirming proper position of the tibiotalar joint with excellent compression across the joint.  The surgical sites were then flushed with copious amounts of sterile saline and closed in layered  fashion utilizing 2-0 Vicryl for deep closure, 4-0 Monocryl for subdermal closure, and a combination of staples and Suturegard for skin reapproximation.     Upon completion of the procedure, the pneumatic tourniquet was deflated and a prompt hyperemic response was noted to the surgical extremity.  The incisions were dressed with Betadine soaked Adaptic and covered with sterile compressive dressings consisting of 4 x 4 gauze, ABD pad, Kerlix, Webril.  A well-padded posterior splint and ace wrap was then applied to the surgical extremity.  The pt tolerated the procedure and anesthesia well.  They were transferred to the recovery room with VSS and vascular status intact to surgical extremity.  Following a period of postoperative monitoring, the patient will be discharged home on the following written and oral postop instructions: keep dressings C/D/I, remain nonweightbearing to surgical extremity in posterior splint, ice and elevate surgical extremity when at rest.  Patient was prescribed Norco for pain and will begin ASA 81 mg 1 p.o. daily for DVT prophylaxis.  Patient will contact my office for all postoperative f/u care and should any problems arise.      Mj Carr DPM, AACFAS  1/16/2025

## 2025-01-17 ENCOUNTER — TELEPHONE (OUTPATIENT)
Dept: PODIATRY CLINIC | Facility: CLINIC | Age: 75
End: 2025-01-17

## 2025-01-17 ENCOUNTER — TELEPHONE (OUTPATIENT)
Dept: ORTHOPEDICS CLINIC | Facility: CLINIC | Age: 75
End: 2025-01-17

## 2025-01-17 NOTE — TELEPHONE ENCOUNTER
Called patient and left a message with the office phone number. Will try and reach out to her later

## 2025-01-17 NOTE — TELEPHONE ENCOUNTER
Procedure date:1- Procedures:   -Removal of deep hardware, right ankle  -Fibular osteotomy, right lower extremity  -Removal of fracture fragments, right ankle  -Right ankle arthrodesis    How are you feeling? / Doing fine  Any bleeding?/no    Is the dressing dry & intact?/yes  Level of pain? / 3  Character of pain: / aching  Onset of pain:/ 10am 1-   Aggravating factors:/pain pill  Duration of pain:/ intermittent  Alleviating factors:/ pain pill  Are you taking the prescribed medication? /  Medication Quantity Refills Start End   HYDROcodone-acetaminophen (NORCO) 7.5-325 MG Oral Tab 28 tablet 0 1/16/2025 1/23/2025   Sig:   Take 1 tablet by         Aleve suggested by Dr. Carr  Are you following all of the PO instructions?/ appetite good     Other Comments:We went over icing protocol under the knee and timing    Follow-up appt  date: 1-    Pt was advised if they have any concerns after hours to call our office and they would be directed to on call physician. / DONE     My Chart message sent with clinic number

## 2025-01-21 NOTE — LETTER
12/26/18          [address]  [id number]      Dear Landon Marshall,     We are contacting you from Dr. Katz [facility] office. Your health is important to us.  We have not received test results for additional tests that your provider gagandeep Would like to call and schedule closer to July

## 2025-01-22 NOTE — TELEPHONE ENCOUNTER
Patient had procedure completed. Per 1/17/25 post op call completed. Patient doing well. No requests at that time. Patient scheduled for f/u appointment on 1/24/25

## 2025-01-24 ENCOUNTER — OFFICE VISIT (OUTPATIENT)
Facility: LOCATION | Age: 75
End: 2025-01-24
Payer: MEDICARE

## 2025-01-24 ENCOUNTER — HOSPITAL ENCOUNTER (OUTPATIENT)
Dept: GENERAL RADIOLOGY | Age: 75
Discharge: HOME | End: 2025-01-24
Attending: PODIATRIST
Payer: MEDICARE

## 2025-01-24 DIAGNOSIS — M25.471 EDEMA OF RIGHT ANKLE: ICD-10-CM

## 2025-01-24 DIAGNOSIS — M25.571 RIGHT ANKLE PAIN, UNSPECIFIED CHRONICITY: ICD-10-CM

## 2025-01-24 DIAGNOSIS — G35 MULTIPLE SCLEROSIS (HCC): ICD-10-CM

## 2025-01-24 DIAGNOSIS — Z98.890 POST-OPERATIVE STATE: Primary | ICD-10-CM

## 2025-01-24 DIAGNOSIS — Z98.890 POST-OPERATIVE STATE: ICD-10-CM

## 2025-01-24 PROCEDURE — 1159F MED LIST DOCD IN RCRD: CPT | Performed by: PODIATRIST

## 2025-01-24 PROCEDURE — 99024 POSTOP FOLLOW-UP VISIT: CPT | Performed by: PODIATRIST

## 2025-01-24 PROCEDURE — 73610 X-RAY EXAM OF ANKLE: CPT | Performed by: PODIATRIST

## 2025-01-24 NOTE — PROGRESS NOTES
[facility]  Progress Note      Makenzie Reyes is a 74 year old female.   Chief Complaint   Patient presents with    Post-Op     Surgery was on 1/16/2025, she denies any pain in the office, swelling is coming down. She is non weightbearing.         HPI:     Patient is a pleasant 74-year-old female with multiple sclerosis who is status post 1 week from the fibular osteotomy, hardware removal with ankle arthrodesis, all of the right lower extremity (DOS: 1/16/2025).  Patient states that over the last few days her pain has diminished.  She is denying any current pain.  She has remained nonweightbearing in her posterior splint and arrives in a wheelchair today.  She states that the swelling feels like it has been improving.  She has been icing and elevating as recommended.  She is denying any recent nausea, vomiting, fevers, chills, shortness of breath, chest pain, calf pain.  No further concerns.      Allergies: Patient has no known allergies.   Current Outpatient Medications   Medication Sig Dispense Refill    amLODIPine 5 MG Oral Tab Take 2 tablets (10 mg total) by mouth daily. 180 tablet 0    OMEPRAZOLE 40 MG Oral Capsule Delayed Release TAKE 1 CAPSULE ONE TIME DAILY 30 MINUTES BEFORE BREAKFAST 90 capsule 0    BACLOFEN 20 MG Oral Tab TAKE 1 TABLET EVERY DAY 90 tablet 0    Triamterene-HCTZ 37.5-25 MG Oral Tab TAKE 1 TABLET EVERY DAY 90 tablet 0    ESCITALOPRAM 20 MG Oral Tab TAKE 1 TABLET EVERY DAY 90 tablet 0    CELECOXIB 200 MG Oral Cap TAKE 1 CAPSULE EVERY DAY 30 capsule 11    buPROPion  MG Oral Tablet 24 Hr Take 1 tablet (150 mg total) by mouth daily. 30 tablet 1    ATORVASTATIN 20 MG Oral Tab TAKE 1 TABLET EVERY NIGHT 90 tablet 3    LEVOTHYROXINE 25 MCG Oral Tab TAKE 1 TABLET (25 MCG TOTAL) BY MOUTH BEFORE BREAKFAST. 90 tablet 3    CALCIUM OR Take 1,000 mg by mouth daily.      Multiple Vitamin (DAILY VALUE MULTIVITAMIN) Oral Tab Take 1 tablet by mouth daily.      Cholecalciferol (VITAMIN D)  2000 UNITS Oral Cap Take 1 capsule (2,000 Units total) by mouth daily.        Past Medical History:    Arthritis    BACK PAIN    in morning    Back problem    low back    Alas's esophagus    Basal cell carcinoma of ear    excision of BCC lesion Left ear    Change in hair    Chronic heartburn    Constipation    Depression    Disorder of thyroid    Easy bruising    Esophageal reflux    Essential hypertension    Fatigue    Fracture tibia/fibula    Heartburn    Hemorrhoids    High blood pressure    High cholesterol    Hx of motion sickness    Hyperglycemia    Hyperlipidemia    Increased weakness when ambulating    Multiple sclerosis (HCC)    chronic side effects include fatique and \"brain fog\" and decreased vision of left eye, left sided weakness due to MS    Muscle spasms of both lower extremities    Muscle weakness    has MS    OSTEOARTHRITIS    Osteoarthritis    Osteoarthrosis involving lower leg    Log Date: 10/26/2012     OTHER DISEASES    MS dx 1981 Dr. Ramachandran    Pain in joints    Personal history of colonic polyps    PONV (postoperative nausea and vomiting)    Unspecified essential hypertension    Visual impairment    decreased vision left eye glasses    Wears glasses      Past Surgical History:   Procedure Laterality Date    Appendectomy  1964    Back surgery      cervical surgery/ACDF x 3 with Dr. Webster          x2    Colonoscopy  2003    Colonoscopy      Excis lumbar disk,one level      Foot/toes surgery proc unlisted  2005    hemmertoes operation Rt toe    Hysterectomy      Knee replacement surgery  2022    Knee replacement surgery Right 2024    Dr.Ryan Curry    Laminectomy,lumbar  2010    L4 L5 fusion    Oophorectomy Bilateral     Removal of ovarian cyst(s)      x2    Thyroidectomy      partial thyroidectomy Lt    Tonsillectomy      Total abdom hysterectomy      w/ removal of both ovaries d/t DBT and fibroid    Upper gi endoscopy,exam        Family History    Problem Relation Age of Onset    Cancer Sister         cervical cancer    Hypertension Mother          2019    Breast Cancer Daughter 39        BRCA 1 GENE    BRCA gene + Daughter     Hypertension Brother          2014    Hypertension Sister     Hypertension Sister     Hypertension Brother       Social History     Socioeconomic History    Marital status:    Tobacco Use    Smoking status: Never    Smokeless tobacco: Never    Tobacco comments:     Updated 24   Vaping Use    Vaping status: Never Used   Substance and Sexual Activity    Alcohol use: Not Currently    Drug use: Never   Other Topics Concern    Caffeine Concern Yes     Comment: 2 cups of coffee daily.    Exercise Yes     Comment: 4x/week.            REVIEW OF SYSTEMS:     10 point ROS completed and was negative unless stated in HPI.      EXAM:     General: NAD, appropriate and cooperative.  Vascular: Distal pulses intact b/l.  Mild edema noted to right lower extremity  Integument: Incisions well approximated with staples intact. No dehiscence or drainage, no SOI.  Neurological: Sensation and motor function intact and symmetric compatible with pre-operative state.  Musculoskeletal: Mild discomfort to surgical sites.  Maintain surgical correction.  Patient able to actively move her digits of the right foot.  Compartments of the foot and ankle are soft and compressible.              PROCEDURE:  XR ANKLE (MIN 3 VIEWS), RIGHT (CPT=73610)      TECHNIQUE:  Three views were obtained.      COMPARISON:  [facility], XR, XR ANKLE (2 VIEW), RIGHT (CPT=73600), 10/29/2024, 2:23 PM.      INDICATIONS:  Z98.890 Post-operative state      PATIENT STATED HISTORY: (As transcribed by Technologist)  Ortho follow up post operative. Patient fell and had right ankle surgery  in May 2024.      FINDINGS:  The fibula is absent.  Interval postsurgical changes of the distal tibia are noted.  Small bone fragments are noted.  No definite evidence of  hardware failure present.  Continued follow-up is recommended.      ASSESSMENT AND PLAN:   Diagnoses and all orders for this visit:    Post-operative state  -     Cancel: XR ANKLE WEIGHTBEARING (3 VIEWS), RIGHT (CPT=73610); Future    Edema of right ankle    Right ankle pain, unspecified chronicity    Multiple sclerosis (HCC)        Plan:   -Patient was seen and evaluated today in clinic.  Chart history reviewed.    Patient is status post hardware removal, fibular osteotomy, and ankle arthrodesis, all of the right lower extremity (DOS: 1/16/2025)    Updated radiographs were obtained today and independently reviewed, showing maintained surgical correction with no concerns of hardware complications.  See above for official impression    Patient is doing well overall.  Sutures/staples intact with no current signs of infection or dehiscence    Surgical site painted with Betadine, Xeroform, and covered with a dry sterile dressing.  A well-padded posterior splint was then reapplied to the right lower extremity    We will have patient keep dressings clean, dry, and intact until next visit    Patient will remain nonweightbearing to surgical extremity in posterior splint at this time.    Patient will continue with medications as prescribed    Recommend continue resting, icing, elevating surgical extremity    Educated patient on signs of infection and encouraged them to contact my office and seek immediate medical attention if noticing any of the signs.        -All of the patient's questions and concerns were addressed.  They indicated their understanding of these issues and agrees to the plan.      RTC 1 week for possible skin staple removal    Mj Carr DPM, AACFAS        1/24/2025    [address] [address]   [email or web address]            [employer] speech recognition software was used to prepare this note.  Errors in word recognition may occur.  Please contact me  with any questions/concerns with this note.

## 2025-01-30 DIAGNOSIS — G35 MULTIPLE SCLEROSIS (HCC): ICD-10-CM

## 2025-01-30 DIAGNOSIS — I10 PRIMARY HYPERTENSION: ICD-10-CM

## 2025-01-30 DIAGNOSIS — G25.81 RLS (RESTLESS LEGS SYNDROME): ICD-10-CM

## 2025-01-30 PROBLEM — T14.90XA INJURY, UNSPECIFIED, INITIAL ENCOUNTER: Status: ACTIVE | Noted: 2024-09-04

## 2025-01-30 PROBLEM — E03.9 HYPOTHYROIDISM, UNSPECIFIED: Status: ACTIVE | Noted: 2024-01-01

## 2025-01-30 PROBLEM — F32.A DEPRESSION, UNSPECIFIED: Status: ACTIVE | Noted: 2024-01-01

## 2025-01-30 PROBLEM — M47.812 ARTHRITIS OF FACET JOINT OF CERVICAL SPINE: Status: ACTIVE | Noted: 2019-01-23

## 2025-01-30 PROBLEM — Z91.81 HISTORY OF FALLING: Status: ACTIVE | Noted: 2024-01-01

## 2025-01-30 PROBLEM — Z79.899 OTHER LONG TERM (CURRENT) DRUG THERAPY: Status: ACTIVE | Noted: 2024-01-01

## 2025-01-30 PROBLEM — Z79.890 HORMONE REPLACEMENT THERAPY (HRT): Status: ACTIVE | Noted: 2024-01-01

## 2025-01-30 PROBLEM — E78.00 HIGH BLOOD CHOLESTEROL: Status: ACTIVE | Noted: 2025-01-30

## 2025-01-30 PROBLEM — Z79.1 LONG TERM (CURRENT) USE OF NON-STEROIDAL ANTI-INFLAMMATORIES (NSAID): Status: ACTIVE | Noted: 2024-01-01

## 2025-01-30 PROBLEM — E78.00 PURE HYPERCHOLESTEROLEMIA, UNSPECIFIED: Status: ACTIVE | Noted: 2024-01-01

## 2025-01-30 PROBLEM — M47.816 ARTHRITIS OF FACET JOINT OF LUMBAR SPINE: Status: ACTIVE | Noted: 2019-01-23

## 2025-01-30 PROBLEM — Z96.651 PRESENCE OF RIGHT ARTIFICIAL KNEE JOINT: Status: ACTIVE | Noted: 2024-03-21

## 2025-01-30 PROBLEM — Z79.891 LONG TERM (CURRENT) USE OF OPIATE ANALGESIC: Status: ACTIVE | Noted: 2024-01-01

## 2025-01-30 PROBLEM — K22.70 BARRETTS ESOPHAGUS: Status: ACTIVE | Noted: 2025-01-30

## 2025-01-30 PROBLEM — Z47.1 AFTERCARE FOLLOWING JOINT REPLACEMENT SURGERY: Status: ACTIVE | Noted: 2024-03-21

## 2025-01-30 PROBLEM — K59.00 CONSTIPATION, UNSPECIFIED: Status: ACTIVE | Noted: 2024-01-01

## 2025-01-30 PROBLEM — E03.9 HYPOTHYROID: Status: ACTIVE | Noted: 2025-01-30

## 2025-01-30 PROBLEM — F41.9 ANXIETY DISORDER, UNSPECIFIED: Status: ACTIVE | Noted: 2024-01-01

## 2025-01-30 PROBLEM — Z85.828 PERSONAL HISTORY OF OTHER MALIGNANT NEOPLASM OF SKIN: Status: ACTIVE | Noted: 2024-01-01

## 2025-01-30 RX ORDER — TRIAMTERENE AND HYDROCHLOROTHIAZIDE 37.5; 25 MG/1; MG/1
1 TABLET ORAL DAILY
Qty: 90 TABLET | Refills: 0 | Status: SHIPPED | OUTPATIENT
Start: 2025-01-30

## 2025-01-30 RX ORDER — BACLOFEN 20 MG/1
20 TABLET ORAL DAILY
Qty: 90 TABLET | Refills: 0 | Status: SHIPPED | OUTPATIENT
Start: 2025-01-30

## 2025-01-30 NOTE — TELEPHONE ENCOUNTER
Baclofen 20 mg  Filled 11-11-24  Qty 90  0 refills  Future Appointments   Date Time Provider Department Center   1/30/2025  3:00 PM Tammy Moncada APRN [facility]   1/31/2025 10:15 AM Heraclio Carr DPM [id number]   LOV 1-8-25 [facility]    Triamterene-hydrochlorothiazide 37.5-25 mg  Filled 11-11-24  Qty 90  0 refills  Future Appointments   Date Time Provider Department Center   1/30/2025  3:00 PM Tammy Moncada APRN [facility] GI   1/31/2025 10:15 AM Heraclio Carr DPM [id number]   LOV 10-30-24 [facility]

## 2025-01-31 ENCOUNTER — OFFICE VISIT (OUTPATIENT)
Facility: LOCATION | Age: 75
End: 2025-01-31
Payer: MEDICARE

## 2025-01-31 DIAGNOSIS — M25.571 RIGHT ANKLE PAIN, UNSPECIFIED CHRONICITY: ICD-10-CM

## 2025-01-31 DIAGNOSIS — M25.471 EDEMA OF RIGHT ANKLE: ICD-10-CM

## 2025-01-31 DIAGNOSIS — Z98.890 POST-OPERATIVE STATE: Primary | ICD-10-CM

## 2025-01-31 PROCEDURE — 1159F MED LIST DOCD IN RCRD: CPT | Performed by: PODIATRIST

## 2025-01-31 PROCEDURE — 99024 POSTOP FOLLOW-UP VISIT: CPT | Performed by: PODIATRIST

## 2025-02-07 ENCOUNTER — OFFICE VISIT (OUTPATIENT)
Facility: LOCATION | Age: 75
End: 2025-02-07
Payer: MEDICARE

## 2025-02-07 DIAGNOSIS — Z98.890 POST-OPERATIVE STATE: Primary | ICD-10-CM

## 2025-02-07 PROCEDURE — 99024 POSTOP FOLLOW-UP VISIT: CPT | Performed by: PODIATRIST

## 2025-02-07 PROCEDURE — 1159F MED LIST DOCD IN RCRD: CPT | Performed by: PODIATRIST

## 2025-02-07 NOTE — PROGRESS NOTES
[facility]  Progress Note      Makenzie Reyes is a 74 year old female.   Chief Complaint   Patient presents with    Post-Op     Patient is here for staple removal. Surgery was on 1/16/25.         HPI:     Patient is a pleasant 74-year-old female with multiple sclerosis who is status post 3 weeks from the fibular osteotomy, hardware removal with ankle arthrodesis, all of the right lower extremity (DOS: 1/16/2025).  Patient is here today for staple/suture removal.  She is doing well overall and states that pain has been under control.  She is denying any pain in clinic today.  She has remained nonweightbearing in a posterior splint and kept her dressings clean, dry, intact.  She arrives in a wheelchair today.  She is denying any recent nausea, vomiting, fevers, chills.  No other concerns at this time.      Allergies: Patient has no known allergies.   Current Outpatient Medications   Medication Sig Dispense Refill    baclofen 20 MG Oral Tab Take 1 tablet (20 mg total) by mouth daily. 90 tablet 0    Triamterene-HCTZ 37.5-25 MG Oral Tab TAKE 1 TABLET EVERY DAY 90 tablet 0    Omeprazole 40 MG Oral Capsule Delayed Release TAKE 1 CAPSULE ONE TIME DAILY 30 MINUTES BEFORE BREAKFAST 90 capsule 3    PEG 3350-KCl-Na Bicarb-NaCl 420 g Oral Recon Soln Take 4,000 mL by mouth As Directed. (Patient not taking: Reported on 1/31/2025) 2 each 0    amLODIPine 5 MG Oral Tab Take 2 tablets (10 mg total) by mouth daily. 180 tablet 0    ESCITALOPRAM 20 MG Oral Tab TAKE 1 TABLET EVERY DAY 90 tablet 0    CELECOXIB 200 MG Oral Cap TAKE 1 CAPSULE EVERY DAY 30 capsule 11    buPROPion  MG Oral Tablet 24 Hr Take 1 tablet (150 mg total) by mouth daily. 30 tablet 1    ATORVASTATIN 20 MG Oral Tab TAKE 1 TABLET EVERY NIGHT 90 tablet 3    LEVOTHYROXINE 25 MCG Oral Tab TAKE 1 TABLET (25 MCG TOTAL) BY MOUTH BEFORE BREAKFAST. 90 tablet 3    CALCIUM OR Take 1,000 mg by mouth daily.      Multiple Vitamin (DAILY VALUE MULTIVITAMIN) Oral  Tab Take 1 tablet by mouth daily.      Cholecalciferol (VITAMIN D) 2000 UNITS Oral Cap Take 1 capsule (2,000 Units total) by mouth daily.        Past Medical History:    Arthritis    BACK PAIN    in morning    Back problem    low back    Alas's esophagus    Basal cell carcinoma of ear    excision of BCC lesion Left ear    Change in hair    Chronic heartburn    Constipation    Depression    Disorder of thyroid    Easy bruising    Esophageal reflux    Essential hypertension    Fatigue    Fracture tibia/fibula    Heartburn    Hemorrhoids    High blood pressure    High cholesterol    Hx of motion sickness    Hyperglycemia    Hyperlipidemia    Increased weakness when ambulating    Multiple sclerosis (HCC)    chronic side effects include fatique and \"brain fog\" and decreased vision of left eye, left sided weakness due to MS    Muscle spasms of both lower extremities    Muscle weakness    has MS    OSTEOARTHRITIS    Osteoarthritis    Osteoarthrosis involving lower leg    Log Date: 10/26/2012     OTHER DISEASES    MS dx 1981 Dr. Ramachandran    Pain in joints    Personal history of colonic polyps    PONV (postoperative nausea and vomiting)    Problems with swallowing    Unspecified essential hypertension    Visual impairment    decreased vision left eye glasses    Wears glasses      Past Surgical History:   Procedure Laterality Date    Appendectomy  1964    Back surgery      cervical surgery/ACDF x 3 with Dr. Webster          x2    Colonoscopy  2003    Colonoscopy      Excis lumbar disk,one level      Foot/toes surgery proc unlisted  2005    hemmertoes operation Rt toe    Hysterectomy      Knee replacement surgery  2022    Knee replacement surgery Right 2024    Dr.Ryan Curry    Laminectomy,lumbar  2010    L4 L5 fusion    Oophorectomy Bilateral     Removal of ovarian cyst(s)      x2    Thyroidectomy      partial thyroidectomy Lt    Tonsillectomy  's    Total abdom hysterectomy      w/  removal of both ovaries d/t DBT and fibroid    Upper gi endoscopy,exam        Family History   Problem Relation Age of Onset    Cancer Sister         cervical cancer    Hypertension Mother          2019    Breast Cancer Daughter 39        BRCA 1 GENE    BRCA gene + Daughter     Hypertension Brother          2014    Hypertension Sister     Hypertension Sister     Hypertension Brother       Social History     Socioeconomic History    Marital status:    Tobacco Use    Smoking status: Never    Smokeless tobacco: Never    Tobacco comments:     Updated 24   Vaping Use    Vaping status: Never Used   Substance and Sexual Activity    Alcohol use: Yes    Drug use: Never   Other Topics Concern    Caffeine Concern Yes     Comment: 2 cups of coffee daily.    Exercise Yes     Comment: 4x/week.            REVIEW OF SYSTEMS:     10 point ROS completed and was negative unless stated in HPI.      EXAM:     General: NAD, appropriate and cooperative.  Vascular: Distal pulses intact b/l.  Continued edema noted to right ankle   Integument: Incisions well approximated with staples/sutures intact prior to removal today. No dehiscence or drainage, no SOI.  No further serous drainage coming from central portion of anterior ankle incision  Neurological: Sensation and motor function intact and symmetric compatible with pre-operative state.  Musculoskeletal: No discomfort to surgical sites.  Maintained surgical correction.  Patient able to actively move her digits of the right foot.  Compartments of the foot and ankle are soft and compressible.               ASSESSMENT AND PLAN:   Diagnoses and all orders for this visit:    Post-operative state        Plan:   -Patient was seen and evaluated today in clinic.  Chart history reviewed.    Patient is status post hardware removal, fibular osteotomy, and ankle arthrodesis, all of the right lower extremity (DOS: 2025)     Patient doing well overall.   Sutures/staples intact with no current signs of infection or dehiscence.  All sutures and staples removed today utilizing sterile instrumentation.  The Nguyễn guards were left in and will be taken out at around 6 weeks postop.    Steri-Strips applied across incision sites.  The sites were painted with Betadine cover with 4 x 4 gauze, Kerlix, and Ace bandage.  Will have patient change dressings 1 time per week in a similar fashion.    dean out, nguyễn guards left in, steri strips, betadine, 4x4 gauze, kerlix, and ace.  Will change dressings in similar fashion 1x/week.    Patient was properly fitted for and provided with a tall cam boot today.  She will remain nonweightbearing at this time.    Recommend continue resting, icing, elevating surgical extremity     Educated patient on signs of infection and encouraged them to contact my office and seek immediate medical attention if noticing any of the signs.     -All of the patient's questions and concerns were addressed.  They indicated their understanding of these issues and agrees to the plan.    RTC 3 weeks for updated radiographs    Mj Carr DPM, AACFAS        2/7/2025    [facility]  [address] [place] [id number]   [email or web address]            [employer] speech recognition software was used to prepare this note.  Errors in word recognition may occur.  Please contact me with any questions/concerns with this note.

## 2025-02-08 NOTE — PROGRESS NOTES
[facility]  Progress Note      Makenzie Reyes is a 74 year old female.   Chief Complaint   Patient presents with    Post-Op     2nd POV, Surgery was 1/16/25 right ankle. She denies any pain in the office today.         HPI:     Patient is a pleasant 74-year-old female with multiple sclerosis who is status post 2 weeks from the fibular osteotomy, hardware removal with ankle arthrodesis, all of the right lower extremity (DOS: 1/16/2025).  She states that she is doing well overall and pain has been well-controlled.  She has been nonweightbearing and arrives in a wheelchair today.  She has kept her dressings and posterior splint clean, dry, and intact.  Denies recent nausea, vomiting, fevers, chills.  No other concerns.      Allergies: Patient has no known allergies.   Current Outpatient Medications   Medication Sig Dispense Refill    baclofen 20 MG Oral Tab Take 1 tablet (20 mg total) by mouth daily. 90 tablet 0    Triamterene-HCTZ 37.5-25 MG Oral Tab TAKE 1 TABLET EVERY DAY 90 tablet 0    Omeprazole 40 MG Oral Capsule Delayed Release TAKE 1 CAPSULE ONE TIME DAILY 30 MINUTES BEFORE BREAKFAST 90 capsule 3    amLODIPine 5 MG Oral Tab Take 2 tablets (10 mg total) by mouth daily. 180 tablet 0    ESCITALOPRAM 20 MG Oral Tab TAKE 1 TABLET EVERY DAY 90 tablet 0    CELECOXIB 200 MG Oral Cap TAKE 1 CAPSULE EVERY DAY 30 capsule 11    buPROPion  MG Oral Tablet 24 Hr Take 1 tablet (150 mg total) by mouth daily. 30 tablet 1    ATORVASTATIN 20 MG Oral Tab TAKE 1 TABLET EVERY NIGHT 90 tablet 3    LEVOTHYROXINE 25 MCG Oral Tab TAKE 1 TABLET (25 MCG TOTAL) BY MOUTH BEFORE BREAKFAST. 90 tablet 3    CALCIUM OR Take 1,000 mg by mouth daily.      Multiple Vitamin (DAILY VALUE MULTIVITAMIN) Oral Tab Take 1 tablet by mouth daily.      Cholecalciferol (VITAMIN D) 2000 UNITS Oral Cap Take 1 capsule (2,000 Units total) by mouth daily.      PEG 3350-KCl-Na Bicarb-NaCl 420 g Oral Recon Soln Take 4,000 mL by mouth As  Directed. (Patient not taking: Reported on 2025) 2 each 0      Past Medical History:    Arthritis    BACK PAIN    in morning    Back problem    low back    Alas's esophagus    Basal cell carcinoma of ear    excision of BCC lesion Left ear    Change in hair    Chronic heartburn    Constipation    Depression    Disorder of thyroid    Easy bruising    Esophageal reflux    Essential hypertension    Fatigue    Fracture tibia/fibula    Heartburn    Hemorrhoids    High blood pressure    High cholesterol    Hx of motion sickness    Hyperglycemia    Hyperlipidemia    Increased weakness when ambulating    Multiple sclerosis (HCC)    chronic side effects include fatique and \"brain fog\" and decreased vision of left eye, left sided weakness due to MS    Muscle spasms of both lower extremities    Muscle weakness    has MS    OSTEOARTHRITIS    Osteoarthritis    Osteoarthrosis involving lower leg    Log Date: 10/26/2012     OTHER DISEASES    MS dx 1981 Dr. Ramachandran    Pain in joints    Personal history of colonic polyps    PONV (postoperative nausea and vomiting)    Problems with swallowing    Unspecified essential hypertension    Visual impairment    decreased vision left eye glasses    Wears glasses      Past Surgical History:   Procedure Laterality Date    Appendectomy  1964    Back surgery      cervical surgery/ACDF x 3 with Dr. Webster          x2    Colonoscopy  2003    Colonoscopy      Excis lumbar disk,one level      Foot/toes surgery proc unlisted  2005    hemmertoes operation Rt toe    Hysterectomy      Knee replacement surgery  2022    Knee replacement surgery Right 2024    Dr.Ryan Curry    Laminectomy,lumbar  2010    L4 L5 fusion    Oophorectomy Bilateral     Removal of ovarian cyst(s)      x2    Thyroidectomy      partial thyroidectomy Lt    Tonsillectomy  's    Total abdom hysterectomy      w/ removal of both ovaries d/t DBT and fibroid    Upper gi endoscopy,exam         Family History   Problem Relation Age of Onset    Cancer Sister         cervical cancer    Hypertension Mother          2019    Breast Cancer Daughter 39        BRCA 1 GENE    BRCA gene + Daughter     Hypertension Brother          2014    Hypertension Sister     Hypertension Sister     Hypertension Brother       Social History     Socioeconomic History    Marital status:    Tobacco Use    Smoking status: Never    Smokeless tobacco: Never    Tobacco comments:     Updated 24   Vaping Use    Vaping status: Never Used   Substance and Sexual Activity    Alcohol use: Yes    Drug use: Never   Other Topics Concern    Caffeine Concern Yes     Comment: 2 cups of coffee daily.    Exercise Yes     Comment: 4x/week.            REVIEW OF SYSTEMS:     10 point ROS completed and was negative unless stated in HPI.      EXAM:     General: NAD, appropriate and cooperative.  Vascular: Distal pulses intact b/l.  Continued edema noted to right ankle   Integument: Incisions well approximated with staples intact. No dehiscence or drainage, no SOI.  Very mild serous drainage coming from central portion of anterior ankle incision  Neurological: Sensation and motor function intact and symmetric compatible with pre-operative state.  Musculoskeletal: Mild discomfort to surgical sites.  Maintain surgical correction.  Patient able to actively move her digits of the right foot.  Compartments of the foot and ankle are soft and compressible.               ASSESSMENT AND PLAN:   Diagnoses and all orders for this visit:    Post-operative state    Edema of right ankle    Right ankle pain, unspecified chronicity        Plan:   -Patient was seen and evaluated today in clinic.  Chart history reviewed.    Patient is status post hardware removal, fibular osteotomy, and ankle arthrodesis, all of the right lower extremity (DOS: 2025)     Patient is doing well overall.  Sutures/staples intact with no current signs of  infection or dehiscence.  Some mild serous drainage to the anterior ankle incision.  Will plan to take out sutures and staples next week if appropriate.     Surgical site painted with Betadine, Xeroform, and covered with a dry sterile dressing.  A well-padded posterior splint was then reapplied to the right lower extremity     We will have patient keep dressings clean, dry, and intact until next visit     Patient will remain nonweightbearing to surgical extremity in posterior splint at this time.     Patient will continue with medications as prescribed     Recommend continue resting, icing, elevating surgical extremity     Educated patient on signs of infection and encouraged them to contact my office and seek immediate medical attention if noticing any of the signs.      -All of the patient's questions and concerns were addressed.  They indicated their understanding of these issues and agrees to the plan.    RTC 1 week    Mj Carr DPM, AACFAS        2/8/2025    [facility]  [address] [place] [id number]   [email or web address]            [employer] speech recognition software was used to prepare this note.  Errors in word recognition may occur.  Please contact me with any questions/concerns with this note.

## 2025-02-24 ENCOUNTER — TELEPHONE (OUTPATIENT)
Dept: INTERNAL MEDICINE CLINIC | Facility: CLINIC | Age: 75
End: 2025-02-24

## 2025-02-24 NOTE — TELEPHONE ENCOUNTER
Incoming fax from [facility]      Order # [id number]    Placed in TO in basket for review

## 2025-02-25 ENCOUNTER — TELEPHONE (OUTPATIENT)
Dept: INTERNAL MEDICINE CLINIC | Facility: CLINIC | Age: 75
End: 2025-02-25

## 2025-02-28 ENCOUNTER — HOSPITAL ENCOUNTER (OUTPATIENT)
Dept: GENERAL RADIOLOGY | Age: 75
Discharge: HOME | End: 2025-02-28
Attending: PODIATRIST
Payer: MEDICARE

## 2025-02-28 ENCOUNTER — OFFICE VISIT (OUTPATIENT)
Facility: LOCATION | Age: 75
End: 2025-02-28
Payer: MEDICARE

## 2025-02-28 DIAGNOSIS — Z98.890 POST-OPERATIVE STATE: ICD-10-CM

## 2025-02-28 DIAGNOSIS — Z98.890 POST-OPERATIVE STATE: Primary | ICD-10-CM

## 2025-02-28 DIAGNOSIS — Z98.1 STATUS POST ANKLE ARTHRODESIS: ICD-10-CM

## 2025-02-28 PROCEDURE — 73610 X-RAY EXAM OF ANKLE: CPT | Performed by: PODIATRIST

## 2025-02-28 PROCEDURE — 1159F MED LIST DOCD IN RCRD: CPT | Performed by: PODIATRIST

## 2025-02-28 PROCEDURE — 99024 POSTOP FOLLOW-UP VISIT: CPT | Performed by: PODIATRIST

## 2025-02-28 NOTE — PROGRESS NOTES
[facility]  Progress Note      Makenzie Reyes is a 74 year old female.   Chief Complaint   Patient presents with    Post-Op     Surgery was 1/16/25, right ankle ORIF. She denies any pain in the office today, in a cam boot, non weightbearing, and does notice some swelling on top of right foot.         HPI:     Patient is a pleasant 74-year-old female with multiple sclerosis who is status post 6 weeks from the fibular osteotomy, hardware removal with ankle arthrodesis, all of the right lower extremity (DOS: 1/16/2025).  Patient arrives today in a cam boot nonweightbearing utilizing a wheelchair.  She states that pain has been well-controlled and is denying any pain.  She does notice some swelling to the top of the foot.  Denies recent injury other concerns.  Denies recent nausea, vomiting, fevers, chills, shortness of breath, chest pain, calf pain.      Allergies: Patient has no known allergies.   Current Outpatient Medications   Medication Sig Dispense Refill    baclofen 20 MG Oral Tab Take 1 tablet (20 mg total) by mouth daily. 90 tablet 0    Triamterene-HCTZ 37.5-25 MG Oral Tab TAKE 1 TABLET EVERY DAY 90 tablet 0    Omeprazole 40 MG Oral Capsule Delayed Release TAKE 1 CAPSULE ONE TIME DAILY 30 MINUTES BEFORE BREAKFAST 90 capsule 3    PEG 3350-KCl-Na Bicarb-NaCl 420 g Oral Recon Soln Take 4,000 mL by mouth As Directed. 2 each 0    amLODIPine 5 MG Oral Tab Take 2 tablets (10 mg total) by mouth daily. 180 tablet 0    ESCITALOPRAM 20 MG Oral Tab TAKE 1 TABLET EVERY DAY 90 tablet 0    CELECOXIB 200 MG Oral Cap TAKE 1 CAPSULE EVERY DAY 30 capsule 11    buPROPion  MG Oral Tablet 24 Hr Take 1 tablet (150 mg total) by mouth daily. 30 tablet 1    ATORVASTATIN 20 MG Oral Tab TAKE 1 TABLET EVERY NIGHT 90 tablet 3    LEVOTHYROXINE 25 MCG Oral Tab TAKE 1 TABLET (25 MCG TOTAL) BY MOUTH BEFORE BREAKFAST. 90 tablet 3    CALCIUM OR Take 1,000 mg by mouth daily.      Multiple Vitamin (DAILY VALUE MULTIVITAMIN)  Oral Tab Take 1 tablet by mouth daily.      Cholecalciferol (VITAMIN D) 2000 UNITS Oral Cap Take 1 capsule (2,000 Units total) by mouth daily.        Past Medical History:    Arthritis    BACK PAIN    in morning    Back problem    low back    Alas's esophagus    Basal cell carcinoma of ear    excision of BCC lesion Left ear    Change in hair    Chronic heartburn    Constipation    Depression    Disorder of thyroid    Easy bruising    Esophageal reflux    Essential hypertension    Fatigue    Fracture tibia/fibula    Heartburn    Hemorrhoids    High blood pressure    High cholesterol    Hx of motion sickness    Hyperglycemia    Hyperlipidemia    Increased weakness when ambulating    Multiple sclerosis (HCC)    chronic side effects include fatique and \"brain fog\" and decreased vision of left eye, left sided weakness due to MS    Muscle spasms of both lower extremities    Muscle weakness    has MS    OSTEOARTHRITIS    Osteoarthritis    Osteoarthrosis involving lower leg    Log Date: 10/26/2012     OTHER DISEASES    MS dx 1981 Dr. Ramachandran    Pain in joints    Personal history of colonic polyps    PONV (postoperative nausea and vomiting)    Problems with swallowing    Unspecified essential hypertension    Visual impairment    decreased vision left eye glasses    Wears glasses      Past Surgical History:   Procedure Laterality Date    Appendectomy  1964    Back surgery      cervical surgery/ACDF x 3 with Dr. Webster          x2    Colonoscopy  2003    Colonoscopy      Excis lumbar disk,one level      Foot/toes surgery proc unlisted  2005    hemmertoes operation Rt toe    Hysterectomy      Knee replacement surgery  2022    Knee replacement surgery Right 2024    Dr.Ryan Curry    Laminectomy,lumbar  2010    L4 L5 fusion    Oophorectomy Bilateral     Removal of ovarian cyst(s)      x2    Thyroidectomy      partial thyroidectomy Lt    Tonsillectomy  's    Total abdom hysterectomy      w/  removal of both ovaries d/t DBT and fibroid    Upper gi endoscopy,exam        Family History   Problem Relation Age of Onset    Cancer Sister         cervical cancer    Hypertension Mother          2019    Breast Cancer Daughter 39        BRCA 1 GENE    BRCA gene + Daughter     Hypertension Brother          2014    Hypertension Sister     Hypertension Sister     Hypertension Brother       Social History     Socioeconomic History    Marital status:    Tobacco Use    Smoking status: Never    Smokeless tobacco: Never    Tobacco comments:     Updated 24   Vaping Use    Vaping status: Never Used   Substance and Sexual Activity    Alcohol use: Yes    Drug use: Never   Other Topics Concern    Caffeine Concern Yes     Comment: 2 cups of coffee daily.    Exercise Yes     Comment: 4x/week.            REVIEW OF SYSTEMS:     10 point ROS completed and was negative unless stated in HPI.      EXAM:     General: NAD, appropriate and cooperative.  Vascular: Distal pulses intact b/l.  Continued edema noted to right ankle   Integument: Incisions well approximated with staples/sutures intact prior to removal today. No dehiscence or drainage, no SOI.    Neurological: Sensation and motor function intact and symmetric compatible with pre-operative state.  Musculoskeletal: No discomfort to surgical sites.  Maintained surgical correction.  Patient able to actively move her digits of the right foot.  Compartments of the foot and ankle are soft and compressible.  4/5 muscle strength             PROCEDURE:  XR ANKLE (MIN 3 VIEWS), RIGHT (CPT=73610)      TECHNIQUE:  Three views were obtained.      COMPARISON:  [facility], XR, XR ANKLE (MIN 3 VIEWS), RIGHT (CPT=73610), 2025, 10:35 AM.      INDICATIONS:  Z98.890 Post-operative state      PATIENT STATED HISTORY: (As transcribed by Technologist)  Ortho consult. Patient states she fell in May 2024 and has had 2 surgeries since, the latest on 2025,  follow up.          FINDINGS:  Moderate to severe osteopenia is likely present which limits assessment.  The appearance is very similar to prior studies with absent distal fibula and numerous postsurgical changes present.  No definite evidence of hardware failure.    Continued follow-up is recommended.     ASSESSMENT AND PLAN:   Diagnoses and all orders for this visit:    Post-operative state  -     XR ANKLE (MIN 3 VIEWS), RIGHT (CPT=73610); Future  -     OP REFERRAL TO [facility]    Status post ankle arthrodesis  -     OP REFERRAL TO [facility]        Plan:   -Patient was seen and evaluated today in clinic.  Chart history reviewed.    Patient is status post hardware removal, fibular osteotomy, and ankle arthrodesis, all of the right lower extremity (DOS: 1/16/2025)     Updated radiographs were obtained today of the right ankle revealing maintained surgical correction with hardware intact no current signs of hardware failure.  After independently reviewed, increased consolidation across the tibiotalar joint noted.    Clinically, patient continues to improve with no pain on exam today    Patient will begin gently weightbearing at home in cam boot with the use of walker at this time.  Discussed importance of safety to prevent falls.    An order for formal physical therapy was also placed today for patient to begin gait training and gentle range of motion exercises while she transitions to weightbearing in cam boot    Patient to continue resting, icing, elevating at this time.    -All of the patient's questions and concerns were addressed.  They indicated their understanding of these issues and agrees to the plan.    RTC 3 weeks    Mj Carr DPM, AACFAS        2/28/2025    [address] [address]   [email or web address]            Spor speech recognition software was used to prepare this note.  Errors in word  recognition may occur.  Please contact me with any questions/concerns with this note.

## 2025-03-04 ENCOUNTER — TELEPHONE (OUTPATIENT)
Dept: PHYSICAL THERAPY | Facility: HOSPITAL | Age: 75
End: 2025-03-04

## 2025-03-05 ENCOUNTER — OFFICE VISIT (OUTPATIENT)
Dept: PHYSICAL THERAPY | Age: 75
End: 2025-03-05
Attending: PODIATRIST
Payer: MEDICARE

## 2025-03-05 DIAGNOSIS — Z98.1 STATUS POST ANKLE ARTHRODESIS: Primary | ICD-10-CM

## 2025-03-05 PROCEDURE — 97161 PT EVAL LOW COMPLEX 20 MIN: CPT

## 2025-03-05 PROCEDURE — 97110 THERAPEUTIC EXERCISES: CPT

## 2025-03-05 NOTE — PROGRESS NOTES
LOWER EXTREMITY EVALUATION:     Diagnosis:   Status post ankle arthrodesis (Z98.1)   Referring Provider: Heraclio Carr  Date of Evaluation:    3/5/2025    Precautions:   WBAT c CAM boot at eval Next MD visit:   3-21-25  Date of Surgery: 1-16-25 fibular osteotomy, hardware removal with ankle arthrodesis/fusion ;  5/18/24 R ankle bimalleolar ORIF     PATIENT SUMMARY   Makenzie Reyes is a 74 year old female who presents to therapy today with complaints of anterior R ankle. She states she started putting weight on RLE after 2-28-25 MD visit, as long as CAM boot is donned. She stopped pain meds 3rd day after surgery. Denies any N/T RLE.    Home: step-over tub, only 1 step into house  Pt describes pain level at best 0/10, at worst 3/10.   Current functional limitations include not yet returned to driving, UA to participate in stretch and tone exercise class.     Makenzie describes prior level of function ambulating s RW, driving. Pt goals include incr walking tolerance, return to exercise class and driving.  Past medical history was reviewed with Makenzie. Significant findings include Multiple sclerosis, depression, anxiety, inflammatory spondylopathy cervical, Htn, hyperlipidemia, prediabetes, hypothyroidism, hormone replacement therapy, hx of lumbar fusion, lumbar spinal stenosis, restless legs syndrome, facet arthritis cervical and lumbar, diaphragmatic hernia,s/p R TKR, L knee arthritis, chronic fatigue, history of falling    ASSESSMENT  Makenzie presents to physical therapy evaluation with primary c/o L ankle pain/stiffness s/p fusion and hardware removal. The results of the objective tests and measures show decr RLE strength, decr R ankle ROM, impaired gait and balance.  Functional deficits include but are not limited to UA to participate in her regular exercise classes, UA to drive, decr walking tolerance.  Signs and symptoms are consistent with rehab diagnosis. Pt and PT discussed evaluation findings, pathology,  POC and HEP.  Pt voiced understanding and performs HEP correctly without reported pain. Skilled Physical Therapy is medically necessary to address the above impairments and reach functional goals.     OBJECTIVE:   Palpation: TTP R lateral ankle  Sensation: intact to light touch throughout L foot/ankle    AROM: (* denotes performed with pain)  @eval 3-5-25   Ankle DF: R -4; L 4  Ankle PF: R 43; L 63  Ankle INV: R 10; L 30  Ankle EV: R 18*; L 18     Flexibility:  Hip Flexor: R 60, L 60    Strength/MMT: (* denotes performed with pain)  @eval 3-5-25   Ankle DF: R 4/5; L 5/5  Ankle PF: R 4-/5; L 5/5  Ankle INV: R 4/5; L 5/5  Ankle EV: R 4-/5; L 4+/5  Quad: R 4-/5; L 4/5  Hamstring: R 4/5; L 5/5  Hip abd: R <3/5; L 3+/5   Note: pt reports LLE is chronically her weaker side 2o MS    Special tests:   Xmalleolar girth: R 29.5cm / L 24.7cm    Gait: pt ambulates on level ground with  RW c CAM boot on R  Balance: SLS: R 0 sec, L 7 sec    Today’s Treatment and Response:   Pt education was provided on exam findings, treatment diagnosis, treatment plan, expectations, and prognosis. Pt was also provided recommendations for  elevation above heart level > or = 2x/day for 15-20min c ankle pumps .  Patient was instructed in and issued a HEP for:   Access Code: [email or web address] ; URL: [email or web address] ; Prepared by: Raquel Shearer  Date: 03/05/2025  Exercises  - Seated Long Arc Quad  - 2 x daily - 20 reps - 5 hold  - Seated Ankle Alphabet  - 2 x daily - 2 sets  - Seated Heel Raise  - 2 x daily - 2 sets - 10 reps  - Seated Toe Raise  - 2 x daily - 2 sets - 10 reps  - Seated Toe Towel Scrunches  - 2 x daily - 2 minutes  - Seated Hamstring Stretch  - 2 x daily - 3 reps - 20 hold  - Side Stepping with Counter Support  - 2 x daily - 2 reps - 1 minutes    Charges: PT Eval Low Complexity, therex-1      Total Timed Treatment: 20 min     Total Treatment Time: 40 min     Based on clinical rationale and outcome measures, this  evaluation involved Low Complexity decision making.  PLAN OF CARE:    Goals: (to be met in 8 visits)  Consistently decr pain < or = 1/10 intermittent for incr QOL and activity tolerance  Overall incr in function as indicated by incr LEFS at least 10 pts  Incr walking tolerance s boot (when cleared) c or s cane for at least 30 min bouts c little to no pain for incr community mobility  Incr functional strength RLE to allow ability to ascend/descend 1 flight of stairs c good form and control c 1 railing c minimal to no pain  Incr RLE at least 1/2 grade painfree throughout for incr mm support for WB activities and promote return to exercise class  Incr R at least 5 sec for incr proprioception to decr fall risk  Indep HEP to promote cont progress toward functional goals     incr walking tolerance, return to exercise class and driving.    Frequency / Duration: Patient will be seen for 1-2 x/week or a total of 8 visits over a 90 day period. Treatment will include: Manual Therapy, Neuromuscular Re-education, Therapeutic Exercise, and Home Exercise Program instruction    Education or treatment limitation: None  Rehab Potential:good    LEFS Score  LEFS Score: 41.25 % (7/4/2024  2:43 PM)      Patient/Family/Caregiver was advised of these findings, precautions, and treatment options and has agreed to actively participate in planning and for this course of care.    Thank you for your referral. Please co-sign or sign and return this letter via fax as soon as possible to [phone]. If you have any questions, please contact me at Dept: [phone]    Sincerely,  Electronically signed by therapist: Raquel Shearer, PT  Physician's certification required: Yes  I certify the need for these services furnished under this plan of treatment and while under my care.    X___________________________________________________ Date____________________    Certification From: 3/5/2025  To:6/3/2025

## 2025-03-11 ENCOUNTER — OFFICE VISIT (OUTPATIENT)
Dept: PHYSICAL THERAPY | Age: 75
End: 2025-03-11
Attending: PODIATRIST
Payer: MEDICARE

## 2025-03-11 PROCEDURE — 97140 MANUAL THERAPY 1/> REGIONS: CPT

## 2025-03-11 PROCEDURE — 97110 THERAPEUTIC EXERCISES: CPT

## 2025-03-11 NOTE — PROGRESS NOTES
Patient: Makenzie Reyes (74 year old, female) Referring Provider:  Insurance:   Diagnosis:   Heraclio WICK MCR   Date of Surgery: No data recorded Next MD visit:  N/A   Precautions:    No data recorded Referral Information:    Date of Evaluation: Req: 8, Auth: 8, Exp: 5/5/2025    No data recorded POC Auth Visits:  8       Today's Date   3/11/2025    Subjective  Pt reports she sees doctor Friday 3/21. She has been keeping up with home exercises. She has not been experiencing pain but has been feeling moreso an ache and dull feeling.       Pain: 0/10 (ache, dull)     Objective            Assessment  Patient demonstrating increased tenderness to anterior tib and gastroc, adressed with STM as shown above. Will continue to progress exercises per patient tolerance and with lifted weight bearing retrictions.      Goals: (to be met in 8 visits)  Consistently decr pain < or = 1/10 intermittent for incr QOL and activity tolerance  Overall incr in function as indicated by incr LEFS at least 10 pts  Incr walking tolerance s boot (when cleared) c or s cane for at least 30 min bouts c little to no pain for incr community mobility  Incr functional strength RLE to allow ability to ascend/descend 1 flight of stairs c good form and control c 1 railing c minimal to no pain  Incr RLE at least 1/2 grade painfree throughout for incr mm support for WB activities and promote return to exercise class  Incr R at least 5 sec for incr proprioception to decr fall risk  Indep HEP to promote cont progress toward functional goals     Plan  Continue with core and hip strengthening- consider adding supine deadbugs, supine marching.    Treatment Last 4 Visits       3/11/2025   PT Treatment   Treatment Day 2          3/11/2025   LE Treatment   Treatment Day 2   Therapeutic Exercise Ankle alphabet 2'  Ankle circles x20  Seated toe/ heel raises x20    Seated hip abd/ add pilates ring x20 5 iso hold   Seated TrA press pilates ring with alt  marching 5 iso hold x20     Seated clamshells 2x10 red TB     Seated diagonal chops 2x10 red weighted ball    Manual Therapy STM anterior tib, lat calf   Gentle PROM ankle DF/ PF/ inv/ ev        Therapeutic Exercise Minutes 31   Manual Therapy Minutes 14   Total Time Of Timed Procedures 45   Total Time Of Service-Based Procedures 0   Total Treatment Time 45   HEP 3/11    Added seated clamshells red TB         HEP  3/11    Added seated clamshells red TB     Charges

## 2025-03-14 DIAGNOSIS — I10 PRIMARY HYPERTENSION: ICD-10-CM

## 2025-03-14 RX ORDER — AMLODIPINE BESYLATE 5 MG/1
10 TABLET ORAL DAILY
Qty: 180 TABLET | Refills: 0 | Status: SHIPPED | OUTPATIENT
Start: 2025-03-14

## 2025-03-14 NOTE — TELEPHONE ENCOUNTER
Amlodipine 5 mg  Filled 12-29-24  Qty 180  0 refills  Future Appointments   Date Time Provider Department Center   3/17/2025  3:00 PM Kody Nicolas, PT [facility] [place]   3/21/2025 10:15 AM Heraclio Carr DPM [id number]   3/27/2025  2:45 PM Kody Nicolas, PT [facility] [place]   4/1/2025 12:30 PM Kody Nicolas, PT [facility] [place]   4/4/2025 10:00 AM Kody Nicolas, PT [facility] [place]   4/7/2025 12:30 PM Raquel Shearer, PT [facility] [place]   4/9/2025 12:30 PM Raquel Shearer, PT [facility] [place]   4/14/2025 11:00 AM Kody Nicolas, PT [facility] [place]   4/16/2025 11:00 AM Kody Nicolas, PT [facility] [place]   4/21/2025 11:00 AM Kody Nicolas, PT [facility] [place]   4/23/2025 11:00 AM Kody Nicolas, PT [facility] [place]   4/28/2025 11:00 AM Kody Nicolas, PT [facility] [place]   5/21/2025 12:30 PM Marco Antonio Guzman MD [facility]   LOV 10-30-24    Detail Level: Detailed

## 2025-03-17 ENCOUNTER — OFFICE VISIT (OUTPATIENT)
Dept: PHYSICAL THERAPY | Age: 75
End: 2025-03-17
Attending: PODIATRIST
Payer: MEDICARE

## 2025-03-17 PROCEDURE — 97110 THERAPEUTIC EXERCISES: CPT

## 2025-03-17 PROCEDURE — 97140 MANUAL THERAPY 1/> REGIONS: CPT

## 2025-03-21 ENCOUNTER — OFFICE VISIT (OUTPATIENT)
Facility: LOCATION | Age: 75
End: 2025-03-21
Payer: MEDICARE

## 2025-03-21 ENCOUNTER — HOSPITAL ENCOUNTER (OUTPATIENT)
Dept: GENERAL RADIOLOGY | Age: 75
Discharge: HOME | End: 2025-03-21
Attending: PODIATRIST
Payer: MEDICARE

## 2025-03-21 ENCOUNTER — APPOINTMENT (OUTPATIENT)
Dept: PHYSICAL THERAPY | Age: 75
End: 2025-03-21
Attending: PODIATRIST
Payer: MEDICARE

## 2025-03-21 DIAGNOSIS — Z98.890 POST-OPERATIVE STATE: Primary | ICD-10-CM

## 2025-03-21 DIAGNOSIS — Z98.1 STATUS POST ANKLE ARTHRODESIS: ICD-10-CM

## 2025-03-21 DIAGNOSIS — Z47.89 ORTHOPEDIC AFTERCARE: ICD-10-CM

## 2025-03-21 PROCEDURE — 99024 POSTOP FOLLOW-UP VISIT: CPT | Performed by: PODIATRIST

## 2025-03-21 PROCEDURE — 1126F AMNT PAIN NOTED NONE PRSNT: CPT | Performed by: PODIATRIST

## 2025-03-21 PROCEDURE — 1159F MED LIST DOCD IN RCRD: CPT | Performed by: PODIATRIST

## 2025-03-21 NOTE — PROGRESS NOTES
[facility]  Progress Note      Makenzie Reyes is a 75 year old female.   Chief Complaint   Patient presents with    Post-Op     S/p R ankle f/u - had sx on 1/16/25 - walking with CAM boot and a walker - has pain walking rated as 1/10 on and off          HPI:     Patient is a pleasant 74-year-old female with multiple sclerosis who is status post 10 weeks from the fibular osteotomy, hardware removal with ankle arthrodesis, all of the right lower extremity (DOS: 1/16/2025).  Patient states that she is doing well overall.  She is ambulating in a cam boot with use of walker currently.  She has an intermittent 1/10 pain.  She is currently in physical therapy and feels that this has been very beneficial.  She states that swelling has improved as well.  She does admit to trying on shoes in the house yesterday and did not have any concerns with this.  Patient is very happy with her outcome.  No other concerns at this time.      Allergies: Patient has no known allergies.   Current Outpatient Medications   Medication Sig Dispense Refill    amLODIPine 5 MG Oral Tab TAKE 2 TABLETS EVERY  tablet 0    baclofen 20 MG Oral Tab Take 1 tablet (20 mg total) by mouth daily. 90 tablet 0    Triamterene-HCTZ 37.5-25 MG Oral Tab TAKE 1 TABLET EVERY DAY 90 tablet 0    Omeprazole 40 MG Oral Capsule Delayed Release TAKE 1 CAPSULE ONE TIME DAILY 30 MINUTES BEFORE BREAKFAST 90 capsule 3    PEG 3350-KCl-Na Bicarb-NaCl 420 g Oral Recon Soln Take 4,000 mL by mouth As Directed. 2 each 0    ESCITALOPRAM 20 MG Oral Tab TAKE 1 TABLET EVERY DAY 90 tablet 0    CELECOXIB 200 MG Oral Cap TAKE 1 CAPSULE EVERY DAY 30 capsule 11    buPROPion  MG Oral Tablet 24 Hr Take 1 tablet (150 mg total) by mouth daily. 30 tablet 1    ATORVASTATIN 20 MG Oral Tab TAKE 1 TABLET EVERY NIGHT 90 tablet 3    LEVOTHYROXINE 25 MCG Oral Tab TAKE 1 TABLET (25 MCG TOTAL) BY MOUTH BEFORE BREAKFAST. 90 tablet 3    CALCIUM OR Take 1,000 mg by mouth daily.       Multiple Vitamin (DAILY VALUE MULTIVITAMIN) Oral Tab Take 1 tablet by mouth daily.      Cholecalciferol (VITAMIN D) 2000 UNITS Oral Cap Take 1 capsule (2,000 Units total) by mouth daily.        Past Medical History:    Arthritis    BACK PAIN    in morning    Back problem    low back    Alas's esophagus    Basal cell carcinoma of ear    excision of BCC lesion Left ear    Change in hair    Chronic heartburn    Constipation    Depression    Disorder of thyroid    Easy bruising    Esophageal reflux    Essential hypertension    Fatigue    Fracture tibia/fibula    Heartburn    Hemorrhoids    High blood pressure    High cholesterol    Hx of motion sickness    Hyperglycemia    Hyperlipidemia    Increased weakness when ambulating    Multiple sclerosis (HCC)    chronic side effects include fatique and \"brain fog\" and decreased vision of left eye, left sided weakness due to MS    Muscle spasms of both lower extremities    Muscle weakness    has MS    OSTEOARTHRITIS    Osteoarthritis    Osteoarthrosis involving lower leg    Log Date: 10/26/2012     OTHER DISEASES    MS dx 1981 Dr. Ramachandran    Pain in joints    Personal history of colonic polyps    PONV (postoperative nausea and vomiting)    Problems with swallowing    Unspecified essential hypertension    Visual impairment    decreased vision left eye glasses    Wears glasses      Past Surgical History:   Procedure Laterality Date    Appendectomy  1964    Back surgery      cervical surgery/ACDF x 3 with Dr. Webster          x2    Colonoscopy  2003    Colonoscopy      Excis lumbar disk,one level      Foot/toes surgery proc unlisted  2005    hemmertoes operation Rt toe    Hysterectomy      Knee replacement surgery  2022    Knee replacement surgery Right 2024    Dr.Ryan Curry    Laminectomy,lumbar  2010    L4 L5 fusion    Oophorectomy Bilateral     Removal of ovarian cyst(s)      x2    Thyroidectomy      partial thyroidectomy Lt     Tonsillectomy  's    Total abdom hysterectomy      w/ removal of both ovaries d/t DBT and fibroid    Upper gi endoscopy,exam        Family History   Problem Relation Age of Onset    Cancer Sister         cervical cancer    Hypertension Mother          2019    Breast Cancer Daughter 39        BRCA 1 GENE    BRCA gene + Daughter     Hypertension Brother          2014    Hypertension Sister     Hypertension Sister     Hypertension Brother       Social History     Socioeconomic History    Marital status:    Tobacco Use    Smoking status: Never    Smokeless tobacco: Never    Tobacco comments:     Updated 24   Vaping Use    Vaping status: Never Used   Substance and Sexual Activity    Alcohol use: Yes    Drug use: Never   Other Topics Concern    Caffeine Concern Yes     Comment: 2 cups of coffee daily.    Exercise Yes     Comment: 4x/week.            REVIEW OF SYSTEMS:     10 point ROS completed and was negative unless stated in HPI.      EXAM:     General: NAD, appropriate and cooperative.  Vascular: Distal pulses intact b/l.  Edema improved to right ankle   Integument: Incisions well healed. No dehiscence or drainage, no SOI.    Neurological: Sensation and motor function intact and symmetric compatible with pre-operative state.  Musculoskeletal: No discomfort to surgical sites.  Maintained surgical correction.  Patient able to actively move her digits of the right foot.  Compartments of the foot and ankle are soft and compressible.  4/5 muscle strength to surgical extremity.  No acute deformities noted.  No pain with palpation of fifth metatarsal base.            XR ANKLE (MIN 3 VIEWS), RIGHT (CPT=73610)    Result Date: 3/21/2025  CONCLUSION:  1. Findings appear similar to prior right ankle radiographs performed 2025.  There is cortical disruption involving the base of the 5th metatarsal bone, an area which was not well seen on prior study, correlate clinically.  Dedicated right  foot radiographs can be performed for further evaluation as clinically indicated.   LOCATION:  [facility]   Dictated by (CST): Callie Low MD on 3/21/2025 at 1:23 PM     Finalized by (CST): Callie Low MD on 3/21/2025 at 1:28 PM             ASSESSMENT AND PLAN:   Diagnoses and all orders for this visit:    Post-operative state    Status post ankle arthrodesis    Other orders  -     XR ANKLE (MIN 3 VIEWS), RIGHT (CPT=73610); Future        Plan:   -Patient was seen and evaluated today in clinic.  Chart history reviewed.    Patient is status post hardware removal, fibular osteotomy, and ankle arthrodesis, all of the right lower extremity (DOS: 1/16/2025)     Updated radiographs were obtained today and independently reviewed, showing improved consolidation across the tibiotalar joint with maintained surgical alignment and correction.  Hardware appears intact with no signs of failure.    Clinically, patient continues to show improvements and is ambulating with no concerns utilizing her cam boot.    Patient will finish out physical therapy as recommended.  At this time, patient can transition out of cam boot and into supportive shoe gear with the assistance from physical therapy.  Recommend utilizing a walker for gait assistance as needed.    Recommend patient continue to rest, ice, and elevate, and avoid any activities that elicits increasing discomfort    -All of the patient's questions and concerns were addressed.  They indicated their understanding of these issues and agrees to the plan.    RTC 4 weeks    Mj Carr DPM, AACFAS        3/21/2025    [facility]  [id number] [address] [address]   [email or web address]            Dragon speech recognition software was used to prepare this note.  Errors in word recognition may occur.  Please contact me with any questions/concerns with this note.

## 2025-03-24 ENCOUNTER — OFFICE VISIT (OUTPATIENT)
Dept: PHYSICAL THERAPY | Age: 75
End: 2025-03-24
Attending: PODIATRIST
Payer: MEDICARE

## 2025-03-24 PROCEDURE — 97112 NEUROMUSCULAR REEDUCATION: CPT

## 2025-03-24 PROCEDURE — 97110 THERAPEUTIC EXERCISES: CPT

## 2025-03-24 PROCEDURE — 97140 MANUAL THERAPY 1/> REGIONS: CPT

## 2025-03-24 NOTE — PROGRESS NOTES
Patient: Makenzie Reyes (75 year old, female) Referring Provider:  Insurance:   Diagnosis:   Heraclio Carr  SHAMIKA CONTRERAS   Date of Surgery: No data recorded Next MD visit:  SHAMIKA CONTRERAS   Precautions:    No data recorded Referral Information:    Date of Evaluation: Req: 8, Auth: 8, Exp: 5/5/2025    No data recorded POC Auth Visits:  8       Today's Date   3/24/2025    Subjective  Patient reporting she went to doctor on Friday, doctor gave clearance for full weight bearing. Patient reporting she was able to put weight on the ankle but feels pain.       Pain: 0/10     Objective               Assessment  Added standing interventions per pt tolerance. HEP was updated adding standing weight shifting. Pt tolerating today's interventions with no increase in pain. Will continue to progress per pt tolerance.    Goals (to be met in  )      Plan  Continue with standing interventions. Add Nustep    Treatment Last 4 Visits       3/11/2025 3/17/2025 3/24/2025   PT Treatment   Treatment Day 2 3 4          3/11/2025 3/17/2025 3/24/2025   LE Treatment   Treatment Day 2 3 4   Therapeutic Exercise Ankle alphabet 2'  Ankle circles x20  Seated toe/ heel raises x20    Seated hip abd/ add pilates ring x20 5 iso hold   Seated TrA press pilates ring with alt marching 5 iso hold x20     Seated clamshells 2x10 red TB     Seated diagonal chops 2x10 red weighted ball  Ankle alphabet 2'   Ankle circles x20   Seated toe/ heel raises x20     Seated hip abd/ add pilates ring x20 5 iso hold   Seated TrA press pilates ring with alt marching 5 iso hold x20     Seated unilat clamshells 2x10 red TB     Seated LAQ R 2x10 2#   Seated hamstring curls R 2x10 red     Seated diagonal chops 2x10 yellow weighted ball   Seated overhead lifts with alt marching 2x10 yellow wt ball     Walking in clinic 3'  Ankle alphabet 2'   Ankle circles x20, pumps x20   Seated toe/ heel raises x20     Seated calf stretch 2x1'        Neuro Re-Education   Weight shifting forward  2x2', side 2x2', backward 2x2'    Seated LAQ 2x10  Seated hip add, abd 2x10, 5 iso hold    Manual Therapy STM anterior tib, lat calf   Gentle PROM ankle DF/ PF/ inv/ ev      STM R anterior tib, calf  Gentle PROM ankle DF/ PF/ inv/ ev     STM R anterior tib, calf  Gentle PROM ankle DF/ PF/ inv/ ev             Therapeutic Exercise Minutes 31 31 10   Neuro Re-Educ Minutes   25   Manual Therapy Minutes 14 14 10   Total Time Of Timed Procedures 45 45 45   Total Time Of Service-Based Procedures 0 0 0   Total Treatment Time 45 45 45   HEP 3/11    Added seated ron red TB   Access Code: [email or web address]  URL: [email or web address]  Date: 03/24/2025  Prepared by: Kody Nicolas    Exercises  - Seated Long Arc Quad  - 2 x daily - 20 reps - 5 hold  - Seated Ankle Alphabet  - 2 x daily - 2 sets  - Seated Heel Raise  - 2 x daily - 2 sets - 10 reps  - Seated Toe Raise  - 2 x daily - 2 sets - 10 reps  - Seated Toe Towel Scrunches  - 2 x daily - 2 minutes  - Seated Hamstring Stretch  - 2 x daily - 3 reps - 20 hold  - Side Stepping with Counter Support  - 2 x daily - 2 reps - 1 minutes  - Side to Side Weight Shift with Counter Support  - 1 x daily - 7 x weekly - 3 sets - 10 reps  - Staggered Stance Forward Backward Weight Shift with Counter Support  - 1 x daily - 7 x weekly - 3 sets - 10 reps        HEP  Access Code: [email or web address]  URL: [email or web address]  Date: 03/24/2025  Prepared by: Migelra Nicolas    Exercises  - Seated Long Arc Quad  - 2 x daily - 20 reps - 5 hold  - Seated Ankle Alphabet  - 2 x daily - 2 sets  - Seated Heel Raise  - 2 x daily - 2 sets - 10 reps  - Seated Toe Raise  - 2 x daily - 2 sets - 10 reps  - Seated Toe Towel Scrunches  - 2 x daily - 2 minutes  - Seated Hamstring Stretch  - 2 x daily - 3 reps - 20 hold  - Side Stepping with Counter Support  - 2 x daily - 2 reps - 1 minutes  - Side to Side Weight Shift with Counter Support  - 1 x daily - 7 x weekly - 3 sets - 10 reps  -  Staggered Stance Forward Backward Weight Shift with Counter Support  - 1 x daily - 7 x weekly - 3 sets - 10 reps    Charges     1 manual, 1 therex, 1 neuro re-ed

## 2025-03-26 RX ORDER — ESCITALOPRAM OXALATE 20 MG/1
20 TABLET ORAL DAILY
Qty: 90 TABLET | Refills: 0 | Status: SHIPPED | OUTPATIENT
Start: 2025-03-26

## 2025-03-26 NOTE — TELEPHONE ENCOUNTER
Escitalopram 20 mg  Filled 11-11-24  Qty 90  0 refills  Future Appointments   Date Time Provider Department Center   3/27/2025  2:45 PM Kody Nicolas, PT [facility] [place]   4/1/2025 12:30 PM Kody Nicolas, PT [facility] [place]   4/4/2025 10:00 AM Kody Nicolas, PT [facility] [place]   4/7/2025 12:30 PM Raquel Shearer, PT [facility] [place]   4/9/2025 12:30 PM Raquel Shearer, PT [facility] [place]   4/14/2025 11:00 AM Kody Nicolas, PT [facility] [place]   4/16/2025 11:00 AM Kody Nicolas, PT [facility] [place]   4/21/2025 11:00 AM Kody Nicolas, PT [facility] [place]   4/23/2025 11:00 AM Kody Nicolas, PT [facility] [place]   4/25/2025 10:15 AM Heraclio Carr DPM [id number]   4/28/2025 11:00 AM Kody Nicolas, PT [facility] [place]   5/21/2025 12:30 PM Marco Antonio Guzman MD [facility]   LOV 10-30-24

## 2025-03-27 ENCOUNTER — OFFICE VISIT (OUTPATIENT)
Dept: PHYSICAL THERAPY | Age: 75
End: 2025-03-27
Attending: PODIATRIST
Payer: MEDICARE

## 2025-03-27 PROCEDURE — 97110 THERAPEUTIC EXERCISES: CPT

## 2025-03-27 PROCEDURE — 97112 NEUROMUSCULAR REEDUCATION: CPT

## 2025-03-27 NOTE — PROGRESS NOTES
Patient: Makenzie Reyes (75 year old, female) Referring Provider:  Insurance:   Diagnosis:   Heraclio Bruno CONTRERAS   Date of Surgery: No data recorded Next MD visit:  SHAMIKA CONTRERAS   Precautions:    No data recorded Referral Information:    Date of Evaluation: Req: 8, Auth: 8, Exp: 5/5/2025    No data recorded POC Auth Visits:  8       Today's Date   3/27/2025    Subjective  Patient reporting she was very sore from last session. She reports the ankle is achy today.       Pain: 0/10 (achy)     Objective               Assessment  Continued focusing on standing interventions and inlcuded walking with small quad cane as shown above. Pt tolerating today's interventions with no increase in pain. Will continue to progress per pt tolerance.    Goals (to be met in  )      Plan  Continue with standing interventions. Add walking    Treatment Last 4 Visits  Treatment Day: 5       3/11/2025 3/17/2025 3/24/2025 3/27/2025   LE Treatment   Therapeutic Exercise Ankle alphabet 2'  Ankle circles x20  Seated toe/ heel raises x20    Seated hip abd/ add pilates ring x20 5 iso hold   Seated TrA press pilates ring with alt marching 5 iso hold x20     Seated clamshells 2x10 red TB     Seated diagonal chops 2x10 red weighted ball  Ankle alphabet 2'   Ankle circles x20   Seated toe/ heel raises x20     Seated hip abd/ add pilates ring x20 5 iso hold   Seated TrA press pilates ring with alt marching 5 iso hold x20     Seated unilat clamshells 2x10 red TB     Seated LAQ R 2x10 2#   Seated hamstring curls R 2x10 red     Seated diagonal chops 2x10 yellow weighted ball   Seated overhead lifts with alt marching 2x10 yellow wt ball     Walking in clinic 3'  Ankle alphabet 2'   Ankle circles x20, pumps x20   Seated toe/ heel raises x20     Seated calf stretch 2x1'      NuStep 5 min, level 1     Seated calf stretch 3x1'  Seated hamstring stretch 3x30\"       Neuro Re-Education   Weight shifting forward 2x2', side 2x2', backward 2x2'    Seated LAQ  2x10  Seated hip add, abd 2x10, 5 iso hold  Weight shifting forward 2x2', side 2x2', backward 2x2'    Seated LAQ 2x10   Seated hip add, abd 2x10, 5 iso hold     Ambulating in clinic with small quad cane 20ftx4, 10 minutes, step to progressing to step through gait pattern, verbal cues for upright posture and keep cane closer to pt      Manual Therapy STM anterior tib, lat calf   Gentle PROM ankle DF/ PF/ inv/ ev      STM R anterior tib, calf  Gentle PROM ankle DF/ PF/ inv/ ev     STM R anterior tib, calf  Gentle PROM ankle DF/ PF/ inv/ ev           STM R anterior tib, calf        Therapeutic Exercise Minutes 31 31 10 10   Neuro Re-Educ Minutes   25 29   Manual Therapy Minutes 14 14 10 6   Total Time Of Timed Procedures 45 45 45 45   Total Time Of Service-Based Procedures 0 0 0 0   Total Treatment Time 45 45 45 45   HEP 3/11    Added seated ron red TB   Access Code: [email or web address]  URL: [email or web address]  Date: 03/24/2025  Prepared by: Kody Nicolas    Exercises  - Seated Long Arc Quad  - 2 x daily - 20 reps - 5 hold  - Seated Ankle Alphabet  - 2 x daily - 2 sets  - Seated Heel Raise  - 2 x daily - 2 sets - 10 reps  - Seated Toe Raise  - 2 x daily - 2 sets - 10 reps  - Seated Toe Towel Scrunches  - 2 x daily - 2 minutes  - Seated Hamstring Stretch  - 2 x daily - 3 reps - 20 hold  - Side Stepping with Counter Support  - 2 x daily - 2 reps - 1 minutes  - Side to Side Weight Shift with Counter Support  - 1 x daily - 7 x weekly - 3 sets - 10 reps  - Staggered Stance Forward Backward Weight Shift with Counter Support  - 1 x daily - 7 x weekly - 3 sets - 10 reps         HEP  Access Code: [email or web address]  URL: [email or web address]  Date: 03/24/2025  Prepared by: Kody Nicolas    Exercises  - Seated Long Arc Quad  - 2 x daily - 20 reps - 5 hold  - Seated Ankle Alphabet  - 2 x daily - 2 sets  - Seated Heel Raise  - 2 x daily - 2 sets - 10 reps  - Seated Toe Raise  - 2 x daily - 2 sets - 10  reps  - Seated Toe Towel Scrunches  - 2 x daily - 2 minutes  - Seated Hamstring Stretch  - 2 x daily - 3 reps - 20 hold  - Side Stepping with Counter Support  - 2 x daily - 2 reps - 1 minutes  - Side to Side Weight Shift with Counter Support  - 1 x daily - 7 x weekly - 3 sets - 10 reps  - Staggered Stance Forward Backward Weight Shift with Counter Support  - 1 x daily - 7 x weekly - 3 sets - 10 reps    Charges     1 therex, 2 neuro re-ed                       <<-----Click on this checkbox to enter Pre-Op Dx

## 2025-04-01 ENCOUNTER — OFFICE VISIT (OUTPATIENT)
Dept: PHYSICAL THERAPY | Age: 75
End: 2025-04-01
Attending: PODIATRIST
Payer: MEDICARE

## 2025-04-01 PROCEDURE — 97110 THERAPEUTIC EXERCISES: CPT

## 2025-04-01 PROCEDURE — 97112 NEUROMUSCULAR REEDUCATION: CPT

## 2025-04-01 PROCEDURE — 97140 MANUAL THERAPY 1/> REGIONS: CPT

## 2025-04-01 NOTE — PROGRESS NOTES
Patient: Makenzie Reyes (75 year old, female) Referring Provider:  Insurance:   Diagnosis:   Heraclio Carr  SHAMIKA CONTRERAS   Date of Surgery: No data recorded Next MD visit:  SHAMIKA CONTRERAS   Precautions:    No data recorded Referral Information:    Date of Evaluation: Req: 8, Auth: 8, Exp: 5/5/2025    No data recorded POC Auth Visits:  8       Today's Date   4/1/2025    Subjective  Patient reporting soreness to the top of the ankle, but no pain. She has been standing more especially to cook.       Pain: 0/10     Objective               Assessment  Continued focusing on standing interventions and inlcuded walking with small quad cane as shown above. Pt tolerating today's interventions with no increase in pain. Will continue to progress per pt tolerance.    Goals (to be met in  )      Plan  Continue with standing interventions. Add walking    Treatment Last 4 Visits  Treatment Day: 6       3/17/2025 3/24/2025 3/27/2025 4/1/2025   LE Treatment   Therapeutic Exercise Ankle alphabet 2'   Ankle circles x20   Seated toe/ heel raises x20     Seated hip abd/ add pilates ring x20 5 iso hold   Seated TrA press pilates ring with alt marching 5 iso hold x20     Seated unilat clamshells 2x10 red TB     Seated LAQ R 2x10 2#   Seated hamstring curls R 2x10 red     Seated diagonal chops 2x10 yellow weighted ball   Seated overhead lifts with alt marching 2x10 yellow wt ball     Walking in clinic 3'  Ankle alphabet 2'   Ankle circles x20, pumps x20   Seated toe/ heel raises x20     Seated calf stretch 2x1'      NuStep 5 min, level 1     Seated calf stretch 3x1'  Seated hamstring stretch 3x30\"     NuStep 5 min, level 4    Seated LAQ 2x10   Seated hip add, abd 2x10, 5 iso hold   Seated unilat clamshells green 2x10    Mini squats 2x10        Neuro Re-Education  Weight shifting forward 2x2', side 2x2', backward 2x2'    Seated LAQ 2x10  Seated hip add, abd 2x10, 5 iso hold  Weight shifting forward 2x2', side 2x2', backward 2x2'    Seated LAQ  2x10   Seated hip add, abd 2x10, 5 iso hold     Ambulating in clinic with small quad cane 20ftx4, 10 minutes, step to progressing to step through gait pattern, verbal cues for upright posture and keep cane closer to pt    Weight shifting forward x2', side x2', backward x2'    Ambulating in clinic with small quad cane 20ftx4, 10 minutes, step to progressing to step through gait pattern, verbal cues for upright posture and keep cane closer to pt   Manual Therapy STM R anterior tib, calf  Gentle PROM ankle DF/ PF/ inv/ ev     STM R anterior tib, calf  Gentle PROM ankle DF/ PF/ inv/ ev           STM R anterior tib, calf      STM effleurage lower leg distal to proximal to promote blood return to heart     STM gastroc, ant tib   Pt education effleurage message at home    Therapeutic Exercise Minutes 31 10 10 19   Neuro Re-Educ Minutes  25 29 14   Manual Therapy Minutes 14 10 6 12   Total Time Of Timed Procedures 45 45 45 45   Total Time Of Service-Based Procedures 0 0 0 0   Total Treatment Time 45 45 45 45   HEP  Access Code: [email or web address]  URL: [email or web address]  Date: 03/24/2025  Prepared by: Kody Nicolas    Exercises  - Seated Long Arc Quad  - 2 x daily - 20 reps - 5 hold  - Seated Ankle Alphabet  - 2 x daily - 2 sets  - Seated Heel Raise  - 2 x daily - 2 sets - 10 reps  - Seated Toe Raise  - 2 x daily - 2 sets - 10 reps  - Seated Toe Towel Scrunches  - 2 x daily - 2 minutes  - Seated Hamstring Stretch  - 2 x daily - 3 reps - 20 hold  - Side Stepping with Counter Support  - 2 x daily - 2 reps - 1 minutes  - Side to Side Weight Shift with Counter Support  - 1 x daily - 7 x weekly - 3 sets - 10 reps  - Staggered Stance Forward Backward Weight Shift with Counter Support  - 1 x daily - 7 x weekly - 3 sets - 10 reps  Add   Seated clamshells 2x10 green         HEP  Add   Seated clamshells 2x10 green     Charges     1 manual, 1 therex, 1 neuro re-ed

## 2025-04-04 ENCOUNTER — OFFICE VISIT (OUTPATIENT)
Dept: PHYSICAL THERAPY | Age: 75
End: 2025-04-04
Attending: PODIATRIST
Payer: MEDICARE

## 2025-04-04 PROCEDURE — 97112 NEUROMUSCULAR REEDUCATION: CPT

## 2025-04-04 PROCEDURE — 97110 THERAPEUTIC EXERCISES: CPT

## 2025-04-04 PROCEDURE — 97140 MANUAL THERAPY 1/> REGIONS: CPT

## 2025-04-04 NOTE — PROGRESS NOTES
Patient: Makenzie Reyes (75 year old, female) Referring Provider:  Insurance:   Diagnosis:   Heraclio Bruno CONTRERAS   Date of Surgery: No data recorded Next MD visit:  SHAMIKA CONTRERAS   Precautions:    No data recorded Referral Information:    Date of Evaluation: Req: 8, Auth: 8, Exp: 2025    No data recorded POC Auth Visits:  8       Today's Date   2025    Subjective  Patient reporting she was at a  yesterday, she wore her boot and did more standing/ walking. She felt soreness and tiredness to the ankle/ leg this morning, no pain.       Pain: 0/10 (tired)     Objective               Assessment  Continued focusing on standing interventions and walking with small quad cane as shown above. Pt demonstarting step through gait pattern whereas previously she was demonstrating a step to pattern. Continued with STM, pt reporting decreased tenderness and soreness to the leg/ ankle post manual therapy. Pt tolerating today's interventions with no increase in pain. Will continue to progress per pt tolerance. Pt requiring increased rest breaks due to soreness/ tiredness to the leg coming in this morning.     Goals (to be met in  )      Plan  Continue with standing interventions. Add side steps, monster walks    Treatment Last 4 Visits  Treatment Day: 7       3/24/2025 3/27/2025 2025 2025   LE Treatment   Therapeutic Exercise Ankle alphabet 2'   Ankle circles x20, pumps x20   Seated toe/ heel raises x20     Seated calf stretch 2x1'      NuStep 5 min, level 1     Seated calf stretch 3x1'  Seated hamstring stretch 3x30\"     NuStep 5 min, level 4    Seated LAQ 2x10   Seated hip add, abd 2x10, 5 iso hold   Seated unilat clamshells green 2x10    Mini squats 2x10      NuStep 5 min, level 6     Mini squats 2x10      Neuro Re-Education Weight shifting forward 2x2', side 2x2', backward 2x2'    Seated LAQ 2x10  Seated hip add, abd 2x10, 5 iso hold  Weight shifting forward 2x2', side 2x2', backward 2x2'    Seated LAQ  2x10   Seated hip add, abd 2x10, 5 iso hold     Ambulating in clinic with small quad cane 20ftx4, 10 minutes, step to progressing to step through gait pattern, verbal cues for upright posture and keep cane closer to pt    Weight shifting forward x2', side x2', backward x2'    Ambulating in clinic with small quad cane 20ftx4, 10 minutes, step to progressing to step through gait pattern, verbal cues for upright posture and keep cane closer to pt Weight shifting forward x2', side x2', backward x2'     Standing marching 3'      Heel raises x20, toe raises x20     Ambulating in clinic with small quad cane 20ftx6, step through gait pattern (progressed from step to gait pattern)     Manual Therapy STM R anterior tib, calf  Gentle PROM ankle DF/ PF/ inv/ ev           STM R anterior tib, calf      STM effleurage lower leg distal to proximal to promote blood return to heart     STM gastroc, ant tib   Pt education effleurage message at home  STM effleurage lower leg distal to proximal to promote blood return to heart     STM R gastroc, ant tib        Therapeutic Exercise Minutes 10 10 19 10   Neuro Re-Educ Minutes 25 29 14 21   Manual Therapy Minutes 10 6 12 14   Total Time Of Timed Procedures 45 45 45 45   Total Time Of Service-Based Procedures 0 0 0 0   Total Treatment Time 45 45 45 45   HEP Access Code: [email or web address]  URL: [email or web address]  Date: 03/24/2025  Prepared by: Kody Nicolas    Exercises  - Seated Long Arc Quad  - 2 x daily - 20 reps - 5 hold  - Seated Ankle Alphabet  - 2 x daily - 2 sets  - Seated Heel Raise  - 2 x daily - 2 sets - 10 reps  - Seated Toe Raise  - 2 x daily - 2 sets - 10 reps  - Seated Toe Towel Scrunches  - 2 x daily - 2 minutes  - Seated Hamstring Stretch  - 2 x daily - 3 reps - 20 hold  - Side Stepping with Counter Support  - 2 x daily - 2 reps - 1 minutes  - Side to Side Weight Shift with Counter Support  - 1 x daily - 7 x weekly - 3 sets - 10 reps  - Staggered Stance Forward  Backward Weight Shift with Counter Support  - 1 x daily - 7 x weekly - 3 sets - 10 reps  Add   Seated clamshells 2x10 green          HEP  Add   Seated clamshells 2x10 green     Charges     1 manual, 1 therex, 1 neuro re-ed

## 2025-04-07 ENCOUNTER — OFFICE VISIT (OUTPATIENT)
Dept: PHYSICAL THERAPY | Age: 75
End: 2025-04-07
Attending: PODIATRIST
Payer: MEDICARE

## 2025-04-07 DIAGNOSIS — Z98.1 STATUS POST ANKLE ARTHRODESIS: Primary | ICD-10-CM

## 2025-04-07 PROCEDURE — 97112 NEUROMUSCULAR REEDUCATION: CPT

## 2025-04-07 PROCEDURE — 97110 THERAPEUTIC EXERCISES: CPT

## 2025-04-07 NOTE — PROGRESS NOTES
Progress Summary  Pt has attended 8 visits in Physical Therapy.     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 She can stand for the duration of a shower, standing to cook and do dishes. She started using her SBQC in house yesterday, but used the walker to come to therapy. She completely weaned from the boot a couple weeks ago. She has noticed much less swelling in the foot, but still some in ankle; continues c compression socks.       Pain: 3/10 (0-3/10, \"ache\" when she steps on it)     Objective       AROM: (* denotes performed with pain)  @eval 3-5-25 4-7-25   Ankle DF: R -4; L 4  Ankle PF: R 43; L 63  Ankle INV: R 10; L 30  Ankle EV: R 18*; L 18  R 5   R 43   R 20   R 20      Strength/MMT: (* denotes performed with pain)  @eval 3-5-25 4-7-25   Ankle DF: R 4/5; L 5/5  Ankle PF: R 4-/5; L 5/5  Ankle INV: R 4/5; L 5/5  Ankle EV: R 4-/5; L 4+/5  Quad: R 4-/5; L 4/5  Hamstring: R 4/5; L 5/5  Hip abd: R <3/5; L 3+/5  R 4   R 4+   R 4   R 5   R 4-   R 4+   R 3   Note: pt reports LLE is chronically her weaker side 2o MS  functional strength-B heel raise; incr excursion RLE vs LLE  SL heel raise: R  L x10    Special tests:   Xmalleolar girth: R 28.0cm (vs eval: R 29.5cm / L 24.7cm)     Gait: pt ambulates on level ground, step-through pattern c SBQC, slow speed, incr R foot pronation (vs eval: c  RW c CAM boot on R)  Stairs: ascends reciprocally c min-mod A BUEs  on 2 railings and incr time ; descends 1:1 R lead c min A 2 railings, can demonstrate L lead but poor eccentric quad control  Balance: SLS: R 1 sec / L 3 sec (Vs eval: R 0 sec, L 7 sec)   Tandem balance: R-retro 6sec ; L-retro 7 sec     Assessment  Pt has shown significant improvement since starting PT. She has weaned from boot and is starting to transition from RW to SBQC for gait. She is showing progress in ankle ROM and strength. She would continue to benefit from guided PT to continue to progress her functional strength for stairs, walking endurance, and progress to balance activities to decr fall risk.    Goals (to be met in 8 visits)  Consistently decr pain < or = 1/10 intermittent for incr QOL and activity tolerance--progressing 4-7-25  Overall incr in function as indicated by incr LEFS at least 10 pts -- met 4-7-25  Incr walking tolerance s boot (when cleared) c or s cane for at least 30 min bouts c little to no pain for incr community mobility--progressing, pt has recently weaned from boot, using RW primarily but cane for short distances 4-7-25  Incr functional strength RLE to allow ability to ascend/descend 1 flight of stairs c good form and control c 1 railing c minimal to no pain--progressing 4-7-25  Incr RLE at least 1/2 grade painfree throughout for incr mm support for WB activities and promote return to exercise class--progressing, met 4 of 7 measures 4-7-25  Incr R at least 5 sec for incr proprioception to decr fall risk--progressing 4-7-25  Indep HEP to promote cont progress toward functional goals    Treatment Last 4 Visits  Treatment Day: 8       3/27/2025 4/1/2025 4/4/2025 4/7/2025   LE Treatment   Therapeutic Exercise NuStep 5 min, level 1     Seated calf stretch 3x1'  Seated hamstring stretch 3x30\"     NuStep 5 min, level 4    Seated LAQ 2x10   Seated hip add, abd 2x10, 5 iso hold   Seated unilat clamshells green 2x10    Mini squats 2x10      NuStep 5 min, level 6     Mini squats 2x10    NuStep  5 min, level 6   Mini squats 2x10  Ascend/descend stairs 1 flight  SL heel raise x10 ea R,L  Reassessment   Neuro Re-Education Weight shifting forward 2x2', side 2x2', backward 2x2'    Seated LAQ 2x10   Seated hip add, abd 2x10, 5 iso hold     Ambulating in clinic with small quad cane 20ftx4, 10 minutes, step to progressing to step through gait pattern, verbal cues for upright posture and keep cane closer to pt    Weight shifting forward x2', side x2', backward x2'    Ambulating in clinic with small quad cane 20ftx4, 10 minutes, step to progressing to step through gait pattern, verbal cues for upright posture and keep cane closer to pt Weight shifting forward x2', side x2', backward x2'     Standing marching 3'      Heel raises x20, toe raises x20     Ambulating in clinic with small quad cane 20ftx6, step through gait pattern (progressed from step to gait pattern)   Heel raises x20, toe raises x20   Ambulating in clinic with small quad cane 7'     Manual Therapy STM R anterior tib, calf      STM effleurage lower leg distal to proximal to promote blood return to heart     STM gastroc, ant tib   Pt education effleurage message at home  STM effleurage lower leg distal to proximal to promote blood return to heart     STM R gastroc, ant tib         Therapeutic Exercise Minutes 10 19 10 35   Neuro Re-Educ Minutes 29 14 21 10   Manual Therapy Minutes 6 12 14    Total Time Of Timed Procedures 45 45 45 45   Total Time Of Service-Based Procedures 0 0 0 0   Total Treatment Time 45 45 45 45   HEP  Add   Seated clamshells 2x10 green           HEP  Add   Seated clamshells 2x10 green     Charges     2therex,1neuro  Post LEFS Score  Post LEFS Score: (Patient-Rptd) 55 % (4/7/2025  1:17 PM)    13.75 % improvement    Plan: Continue skilled Physical Therapy 2 x/week for a total of 16 visits over a 90 day period. Treatment will include: progress gait speed and endurance, strength and confidence for stairs, balance.        Patient/Family/Caregiver was advised of these findings, precautions, and treatment options and has agreed to actively participate in planning and for this course of care.    Thank you for your referral. If you have any questions, please contact me at Dept: [phone].    Sincerely,  Electronically signed by therapist: Raquel Shearer PT     Physician's certification required:  Yes  Please co-sign or sign and return this letter via fax as soon as possible to [phone].   I certify the need for these services furnished under this plan of treatment and while under my care.    X___________________________________________________ Date____________________    Certification From: 4/7/2025  To:7/6/2025

## 2025-04-09 ENCOUNTER — APPOINTMENT (OUTPATIENT)
Dept: PHYSICAL THERAPY | Age: 75
End: 2025-04-09
Attending: PODIATRIST
Payer: MEDICARE

## 2025-04-14 ENCOUNTER — OFFICE VISIT (OUTPATIENT)
Dept: PHYSICAL THERAPY | Age: 75
End: 2025-04-14
Attending: PODIATRIST
Payer: MEDICARE

## 2025-04-14 DIAGNOSIS — G35 MULTIPLE SCLEROSIS (HCC): ICD-10-CM

## 2025-04-14 DIAGNOSIS — I10 PRIMARY HYPERTENSION: ICD-10-CM

## 2025-04-14 DIAGNOSIS — G25.81 RLS (RESTLESS LEGS SYNDROME): ICD-10-CM

## 2025-04-14 PROCEDURE — 97110 THERAPEUTIC EXERCISES: CPT

## 2025-04-14 RX ORDER — TRIAMTERENE AND HYDROCHLOROTHIAZIDE 37.5; 25 MG/1; MG/1
1 TABLET ORAL DAILY
Qty: 90 TABLET | Refills: 0 | Status: SHIPPED | OUTPATIENT
Start: 2025-04-14

## 2025-04-14 RX ORDER — ATORVASTATIN CALCIUM 20 MG/1
20 TABLET, FILM COATED ORAL NIGHTLY
Qty: 90 TABLET | Refills: 0 | Status: SHIPPED | OUTPATIENT
Start: 2025-04-14

## 2025-04-14 RX ORDER — BACLOFEN 20 MG/1
20 TABLET ORAL DAILY
Qty: 90 TABLET | Refills: 0 | Status: SHIPPED | OUTPATIENT
Start: 2025-04-14

## 2025-04-14 RX ORDER — LEVOTHYROXINE SODIUM 25 UG/1
25 TABLET ORAL
Qty: 90 TABLET | Refills: 0 | Status: SHIPPED | OUTPATIENT
Start: 2025-04-14

## 2025-04-14 NOTE — PROGRESS NOTES
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       Pain: 0/10 (soreness)     Objective               Assessment  Continued with standing interventions, progressing to adding more standing interventions. Pt tolerating all interventions well, will continue to progress per pt tolerance.    Goals (to be met in  )      Plan  cont standing interventions    Treatment Last 4 Visits  Treatment Day: 9 4/1/2025 4/4/2025 4/7/2025 4/14/2025   LE Treatment   Therapeutic Exercise NuStep 5 min, level 4    Seated LAQ 2x10   Seated hip add, abd 2x10, 5 iso hold   Seated unilat clamshells green 2x10    Mini squats 2x10      NuStep 5 min, level 6     Mini squats 2x10    NuStep 5 min, level 6   Mini squats 2x10  Ascend/descend stairs 1 flight  SL heel raise x10 ea R,L  Reassessment NuStep 5 min, level 6     Mini squats B UE support 2x10     Standing hip flexion 2x10, abd 2x10, ext 2x10 B UE support     Seated hip add 2x10, abd 2x10 pilates ring     Heel raises x20, toe raises x20     Forward step ups 6 in 2x10 B UE support  Lat step ups 6 in x10 B UE support     Seated diagonal chops yellow 2x10    Ambulating in clinic with small quad cane 5'   Neuro Re-Education Weight shifting forward x2', side x2', backward x2'    Ambulating in clinic with small quad cane 20ftx4, 10 minutes, step to progressing to step through gait pattern, verbal cues for upright posture and keep cane closer to pt  Weight shifting forward x2', side x2', backward x2'     Standing marching 3'      Heel raises x20, toe raises x20     Ambulating in clinic with small quad cane 20ftx6, step through gait pattern (progressed from step to gait pattern)   Heel raises x20, toe raises x20   Ambulating in clinic with small quad cane 7'   --   Manual Therapy STM effleurage lower leg distal to proximal to promote blood return to heart     STM gastroc, ant tib   Pt education effleurage message at home  STM effleurage lower leg distal to proximal to promote blood return to heart     STM R gastroc, ant tib          Therapeutic Exercise Minutes 19 10 35 45   Neuro Re-Educ Minutes 14 21 10    Manual Therapy Minutes 12 14     Total Time Of Timed Procedures 45 45 45 45   Total Time Of Service-Based Procedures 0 0 0 0   Total Treatment Time 45 45 45 45   HEP Add   Seated clamshells 2x10 green            HEP  Add   Seated clamshells 2x10 green     Charges     3 therex

## 2025-04-16 ENCOUNTER — OFFICE VISIT (OUTPATIENT)
Dept: PHYSICAL THERAPY | Age: 75
End: 2025-04-16
Attending: PODIATRIST
Payer: MEDICARE

## 2025-04-16 PROCEDURE — 97140 MANUAL THERAPY 1/> REGIONS: CPT

## 2025-04-16 PROCEDURE — 97110 THERAPEUTIC EXERCISES: CPT

## 2025-04-16 NOTE — PROGRESS NOTES
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 She also did laundry and was up and down the stairs, she did breaks as needed but overall felt good.       Pain: 0/10 (soreness)     Objective               Assessment  Continued with standing interventions. Adding forward and backward target stepping increasing weight bearing, pt toelrating well. Pt tolerating all interventions well, will continue to progress per pt tolerance.    Goals (to be met in  )      Plan  cont standing interventions- add yellow to hip 3 way    Treatment Last 4 Visits  Treatment Day: 10       4/4/2025 4/7/2025 4/14/2025 4/16/2025   LE Treatment   Therapeutic Exercise NuStep 5 min, level 6     Mini squats 2x10    NuStep 5 min, level 6   Mini squats 2x10  Ascend/descend stairs 1 flight  SL heel raise x10 ea R,L  Reassessment NuStep 5 min, level 6     Mini squats B UE support 2x10     Standing hip flexion 2x10, abd 2x10, ext 2x10 B UE support     Seated hip add 2x10, abd 2x10 pilates ring     Heel raises x20, toe raises x20     Forward step ups 6 in 2x10 B UE support  Lat step ups 6 in x10 B UE support     Seated diagonal chops yellow 2x10    Ambulating in clinic with small quad cane 5' NuStep 5 min, level 6     Mini squats B UE support 2x10     Standing hip flexion 2x10, abd 2x10, ext 2x10 B UE support     Seated hip add 2x10, abd 2x10 pilates ring   Seated LAQ 2# x20    Heel raises x20,  toe raises x20     Forward step ups 6 in 2x10 B UE support   Lat step ups 6 in x10 B UE support     R Forward target stepping x10 each, R backward x10 each     Ambulating in clinic with small quad cane 5'      Neuro Re-Education Weight shifting forward x2', side x2', backward x2'     Standing marching 3'      Heel raises x20, toe raises x20     Ambulating in clinic with small quad cane 20ftx6, step through gait pattern (progressed from step to gait pattern)   Heel raises x20, toe raises x20   Ambulating in clinic with small quad cane 7'   --    Manual Therapy STM effleurage lower leg distal to proximal to promote blood return to heart     STM R gastroc, ant tib        STM L quad, TFL  Pt edu heating pad and  at home for tenderness in L thigh from chronic compensation from the R boot    Therapeutic Exercise Minutes 10 35 45 37   Neuro Re-Educ Minutes 21 10     Manual Therapy Minutes 14   8   Total Time Of Timed Procedures 45 45 45 45   Total Time Of Service-Based Procedures 0 0 0 0   Total Treatment Time 45 45 45 45        HEP  Add   Seated clamshells 2x10 green     Charges     2 therex, 1 manual

## 2025-04-21 ENCOUNTER — APPOINTMENT (OUTPATIENT)
Dept: PHYSICAL THERAPY | Age: 75
End: 2025-04-21
Attending: PODIATRIST
Payer: MEDICARE

## 2025-04-21 ENCOUNTER — TELEPHONE (OUTPATIENT)
Dept: PHYSICAL THERAPY | Facility: HOSPITAL | Age: 75
End: 2025-04-21

## 2025-04-23 ENCOUNTER — OFFICE VISIT (OUTPATIENT)
Dept: PHYSICAL THERAPY | Age: 75
End: 2025-04-23
Attending: PODIATRIST
Payer: MEDICARE

## 2025-04-23 PROCEDURE — 97140 MANUAL THERAPY 1/> REGIONS: CPT

## 2025-04-23 PROCEDURE — 97110 THERAPEUTIC EXERCISES: CPT

## 2025-04-23 NOTE — PROGRESS NOTES
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 She did have an MS flare up starting Sunday night into Monday morning, she reports the flare ups tend to last about a week. She reports fatigue and weakness. Normally the MS attacks the L side of her body, this flare up is attacking her R side of the body. Pt reporting she has follow up with doctor this Friday. She would like to take it easy today due to the MS flare up.       Pain: pain not reported (fatigue and weak)     Objective               Assessment  Due to MS flare up, focused on manual therapy for tenderness and soreness on the R lower leg. Pt reporting feeling better after manual therapy. Focused on low activity exercise to accomadate MS flare up. [place] session was performed today due to MS flare up.  Pt tolerating interventions well, will continue to progress per pt tolerance.    Goals (to be met in  )      Plan  cont standing interventions- add yellow to hip 3 way    Treatment Last 4 Visits  Treatment Day: 11 4/7/2025 4/14/2025 4/16/2025 4/23/2025   LE Treatment   Therapeutic Exercise NuStep 5 min, level 6   Mini squats 2x10  Ascend/descend stairs 1 flight  SL heel raise x10 ea R,L  Reassessment NuStep 5 min, level 6     Mini squats B UE support 2x10     Standing hip flexion 2x10, abd 2x10, ext 2x10 B UE support     Seated hip add 2x10, abd 2x10 pilates ring     Heel raises x20, toe raises x20     Forward step ups 6 in 2x10 B UE support  Lat step ups 6 in  x10 B UE support     Seated diagonal chops yellow 2x10    Ambulating in clinic with small quad cane 5' NuStep 5 min, level 6     Mini squats B UE support 2x10     Standing hip flexion 2x10, abd 2x10, ext 2x10 B UE support     Seated hip add 2x10, abd 2x10 pilates ring   Seated LAQ 2# x20    Heel raises x20, toe raises x20     Forward step ups 6 in 2x10 B UE support   Lat step ups 6 in x10 B UE support     R Forward target stepping x10 each, R backward x10 each     Ambulating in clinic with small quad cane 5'    Seated hip add 2x10, abd 2x10 pilates ring   Seated LAQ 2# x20     Seated Heel raises x20, toe raises x20    Mini squats B UE support 2x10    Wt shifting lateral 2', backward 2'    Neuro Re-Education Heel raises x20, toe raises x20   Ambulating in clinic with small quad cane 7'   --     Manual Therapy   STM L quad, TFL  Pt edu heating pad and  at home for tenderness in L thigh from chronic compensation from the R boot  STM R lower leg- calf, ant tib, peroneals    Therapeutic Exercise Minutes 35 45 37 16   Neuro Re-Educ Minutes 10      Manual Therapy Minutes   8 14   Total Time Of Timed Procedures 45 45 45 30   Total Time Of Service-Based Procedures 0 0 0 0   Total Treatment Time 45 45 45 30        HEP  Add   Seated clamshells 2x10 green     Charges     1 manual, 1 therex

## 2025-04-25 ENCOUNTER — OFFICE VISIT (OUTPATIENT)
Facility: LOCATION | Age: 75
End: 2025-04-25
Payer: MEDICARE

## 2025-04-25 ENCOUNTER — HOSPITAL ENCOUNTER (OUTPATIENT)
Dept: GENERAL RADIOLOGY | Age: 75
Discharge: HOME | End: 2025-04-25
Attending: PODIATRIST
Payer: MEDICARE

## 2025-04-25 DIAGNOSIS — Z98.890 POST-OPERATIVE STATE: ICD-10-CM

## 2025-04-25 DIAGNOSIS — Z98.1 STATUS POST ANKLE ARTHRODESIS: ICD-10-CM

## 2025-04-25 DIAGNOSIS — M25.471 EDEMA OF RIGHT ANKLE: ICD-10-CM

## 2025-04-25 DIAGNOSIS — Z98.890 POST-OPERATIVE STATE: Primary | ICD-10-CM

## 2025-04-25 PROCEDURE — 99213 OFFICE O/P EST LOW 20 MIN: CPT | Performed by: PODIATRIST

## 2025-04-25 PROCEDURE — 1159F MED LIST DOCD IN RCRD: CPT | Performed by: PODIATRIST

## 2025-04-25 PROCEDURE — 73610 X-RAY EXAM OF ANKLE: CPT | Performed by: PODIATRIST

## 2025-04-25 NOTE — PROGRESS NOTES
[facility]  Progress Note      Makenzie Reyes is a 75 year old female.   Chief Complaint   Patient presents with    Post-Op     POV right ankle hard ware removal DOS 1/16/25. Has pain 4/10, she is doing PT, and states  has a flare up of osteoporosis.         HPI:     Patient is a pleasant 74-year-old female with multiple sclerosis who is status post ~14 weeks from the fibular osteotomy, hardware removal with ankle arthrodesis, all of the right lower extremity (DOS: 1/16/2025).  Patient states she is doing very well overall.  She states that she is having a current flareup of her MS, which is left her weak and fatigued over the last week.  She is ambulating in normal shoe gear with a walker.  She states that this usually occurs when she has a few days of excitement.  She states that she had family over the last weekend for Easter and prepared every meal during the weekend.  She states that she has had her MS flareup since Monday.  Denies any overall pain to the ankle location, but does have some fatigue and soreness to her right lower extremity in general.  She is continuing with physical therapy and states that she is booked out through the month of May.  She is denying any other concerns at this time.      Allergies: Patient has no known allergies.   Current Medications[1]   Past Medical History[2]   Past Surgical History[3]   Family History[4]   Social Hx on file[5]        REVIEW OF SYSTEMS:     10 point ROS completed and was negative unless stated in HPI.      EXAM:     General: NAD, appropriate and cooperative.  Vascular: Distal pulses intact b/l.  Edema improved to right ankle   Integument: Incisions well healed. No dehiscence or drainage, no SOI.    Neurological: Sensation and motor function intact and symmetric compatible with pre-operative state.  Musculoskeletal: No discomfort to surgical sites.  Maintained surgical correction.  Patient able to actively move her digits of the right foot.   Compartments of the foot and ankle are soft and compressible.  4/5 muscle strength to surgical extremity..  Mild valgus deformity noted to the right foot and ankle.  No pain with palpation of fifth metatarsal base or elsewhere to the right foot.       XR ANKLE WEIGHTBEARING (3 VIEWS), RIGHT (CPT=73610)  Result Date: 4/25/2025  CONCLUSION:  The exam is similar to the prior study.  There remains some lucency noted within the plantar aspect of the midfoot which is not well assessed on this exam.  This may be slightly progressed.  This could be best assessed with follow-up radiographs or MRI if clinically indicated.   LOCATION:  [facility]   Dictated by (CST): Miles Arnold MD on 4/25/2025 at 2:11 PM     Finalized by (CST): Miles Arnold MD on 4/25/2025 at 2:13 PM           ASSESSMENT AND PLAN:   Diagnoses and all orders for this visit:    Post-operative state  -     XR ANKLE WEIGHTBEARING (3 VIEWS), RIGHT (CPT=73610); Future    Status post ankle arthrodesis    Edema of right ankle        Plan:   -Patient was seen and evaluated today in clinic.  Chart history reviewed.    Patient is status post hardware removal, fibular osteotomy, and ankle arthrodesis, all of the right lower extremity (DOS: 1/16/2025)     Updated radiographs were obtained today and independently reviewed, showing maintained surgical alignment and correction with no concerns of hardware failure.  See above for official impression.  There is noted to be a lucency within the plantar aspect of the midfoot.  Patient does not have any objective symptoms to this location.    Clinically, there is no pain elicited on exam today with a very mild valgus deformity noted to the right lower extremity.  Patient does have continued objective weakness with subjective fatigue secondary to an MS flareup currently    Recommend patient continue ambulating in supportive shoe gear, utilizing a walker or cane as needed for gait assistance    Recommend patient avoid any activities that  elicits increasing pain    Patient can also utilize a compression ankle sleeve or ankle brace as needed for support secondary to her overall weakness.  Recommend patient continue with physical therapy as recommended.  She can also perform exercises at home.    Recommend rest, ice, and elevation with any increasing pain or swelling.    -All of the patient's questions and concerns were addressed.  They indicated their understanding of these issues and agrees to the plan.    Time spent reviewing pertinent information from patient's chart, reviewing any pertinent imaging, obtaining history and physical exam, discussing and mutually agreeing on a treatment plan, and documenting encounter: 25 minutes    RTC 4-6 weeks when PT is finished    Mj Carr DPM, AACFAS        4/25/2025    [address] [address]   [email or web address]            Dragon speech recognition software was used to prepare this note.  Errors in word recognition may occur.  Please contact me with any questions/concerns with this note.          [1]   Current Outpatient Medications   Medication Sig Dispense Refill    baclofen 20 MG Oral Tab TAKE 1 TABLET EVERY DAY 90 tablet 0    atorvastatin 20 MG Oral Tab TAKE 1 TABLET EVERY NIGHT 90 tablet 0    levothyroxine 25 MCG Oral Tab TAKE 1 TABLET BEFORE BREAKFAST 90 tablet 0    Triamterene-HCTZ 37.5-25 MG Oral Tab TAKE 1 TABLET EVERY DAY 90 tablet 0    escitalopram 20 MG Oral Tab Take 1 tablet (20 mg total) by mouth daily. 90 tablet 0    amLODIPine 5 MG Oral Tab TAKE 2 TABLETS EVERY  tablet 0    Omeprazole 40 MG Oral Capsule Delayed Release TAKE 1 CAPSULE ONE TIME DAILY 30 MINUTES BEFORE BREAKFAST 90 capsule 3    PEG 3350-KCl-Na Bicarb-NaCl 420 g Oral Recon Soln Take 4,000 mL by mouth As Directed. 2 each 0    CELECOXIB 200 MG Oral Cap TAKE 1 CAPSULE EVERY DAY 30 capsule 11    buPROPion  MG Oral Tablet 24 Hr Take 1 tablet (150 mg total)  by mouth daily. 30 tablet 1    CALCIUM OR Take 1,000 mg by mouth daily.      Multiple Vitamin (DAILY VALUE MULTIVITAMIN) Oral Tab Take 1 tablet by mouth daily.      Cholecalciferol (VITAMIN D) 2000 UNITS Oral Cap Take 1 capsule (2,000 Units total) by mouth daily.     [2]   Past Medical History:   Arthritis    BACK PAIN    in morning    Back problem    low back    Alas's esophagus    Basal cell carcinoma of ear    excision of BCC lesion Left ear    Change in hair    Chronic heartburn    Constipation    Depression    Disorder of thyroid    Easy bruising    Esophageal reflux    Essential hypertension    Fatigue    Fracture tibia/fibula    Heartburn    Hemorrhoids    High blood pressure    High cholesterol    Hx of motion sickness    Hyperglycemia    Hyperlipidemia    Increased weakness when ambulating    Multiple sclerosis (HCC)    chronic side effects include fatique and \"brain fog\" and decreased vision of left eye, left sided weakness due to MS    Muscle spasms of both lower extremities    Muscle weakness    has MS    OSTEOARTHRITIS    Osteoarthritis    Osteoarthrosis involving lower leg    Log Date: 10/26/2012     OTHER DISEASES    MS dx 1981 Dr. Ramachandran    Pain in joints    Personal history of colonic polyps    PONV (postoperative nausea and vomiting)    Problems with swallowing    Unspecified essential hypertension    Visual impairment    decreased vision left eye glasses    Wears glasses   [3]   Past Surgical History:  Procedure Laterality Date    Appendectomy  1964    Back surgery      cervical surgery/ACDF x 3 with Dr. Webster          x2    Colonoscopy  2003    Colonoscopy      Excis lumbar disk,one level      Foot/toes surgery proc unlisted  2005    hemmertoes operation Rt toe    Hysterectomy      Knee replacement surgery  2022    Knee replacement surgery Right 2024    Dr.Ryan Curry    Laminectomy,lumbar  2010    L4 L5 fusion    Oophorectomy Bilateral     Removal of ovarian  cyst(s)      x2    Thyroidectomy      partial thyroidectomy Lt    Tonsillectomy  's    Total abdom hysterectomy      w/ removal of both ovaries d/t DBT and fibroid    Upper gi endoscopy,exam     [4]   Family History  Problem Relation Age of Onset    Cancer Sister         cervical cancer    Hypertension Mother          2019    Breast Cancer Daughter 39        BRCA 1 GENE    BRCA gene + Daughter     Hypertension Brother          2014    Hypertension Sister     Hypertension Sister     Hypertension Brother    [5]   Social History  Socioeconomic History    Marital status:    Tobacco Use    Smoking status: Never    Smokeless tobacco: Never    Tobacco comments:     Updated 24   Vaping Use    Vaping status: Never Used   Substance and Sexual Activity    Alcohol use: Yes    Drug use: Never   Other Topics Concern    Caffeine Concern Yes     Comment: 2 cups of coffee daily.    Exercise Yes     Comment: 4x/week.

## 2025-04-28 ENCOUNTER — OFFICE VISIT (OUTPATIENT)
Dept: PHYSICAL THERAPY | Age: 75
End: 2025-04-28
Attending: PODIATRIST
Payer: MEDICARE

## 2025-04-28 PROCEDURE — 97110 THERAPEUTIC EXERCISES: CPT

## 2025-04-28 PROCEDURE — 97140 MANUAL THERAPY 1/> REGIONS: CPT

## 2025-04-28 NOTE — PROGRESS NOTES
Patient: Makenzie Reyes (75 year old, female) Referring Provider:  Insurance:   Diagnosis: Status post ankle arthrodesis (Z98.1) [facility]   Date of Surgery: 1-16-25 fibular osteotomy, hardware removal with ankle arthrodesis/fusion ;  5/18/24 R ankle bimalleolar ORIF Next MD visit:  N/A   Precautions:  None 4-25-25 Referral Information:    Date of Evaluation: Req: 16, Auth: 16, Exp: 6/30/2025 03/05/25 POC Auth Visits:  8       Today's Date   4/28/2025    Subjective  Pt reporting she is still in her MS flare up. She had her doctor appt last week, doctor cleared her to drive as soon as her MS flare up clears up. Pt reporting MS flare up is getting better.       Pain: pain not reported (fatigue)     Objective               Assessment  Due to MS flare up, continued to focused on manual therapy for tenderness and soreness on the R lower leg. Pt reporting feeling better after manual therapy. Focused on low level activity exercise to accomadate MS flare up.    Goals (to be met in  )      Plan       Treatment Last 4 Visits  Treatment Day: 12       4/14/2025 4/16/2025 4/23/2025 4/28/2025   LE Treatment   Therapeutic Exercise NuStep 5 min, level 6     Mini squats B UE support 2x10     Standing hip flexion 2x10, abd 2x10, ext 2x10 B UE support     Seated hip add 2x10, abd 2x10 pilates ring     Heel raises x20, toe raises x20     Forward step ups 6 in 2x10 B UE support  Lat step ups 6 in x10 B UE support     Seated diagonal chops yellow 2x10    Ambulating in clinic with small quad cane 5' NuStep 5 min, level 6     Mini squats B UE support 2x10     Standing hip flexion 2x10, abd 2x10, ext 2x10 B UE support     Seated hip add 2x10, abd 2x10 pilates ring   Seated LAQ 2# x20    Heel raises x20, toe raises x20     Forward step ups 6 in 2x10 B UE support   Lat step ups 6 in x10 B UE support     R Forward target stepping x10 each, R backward x10 each     Ambulating in clinic with small quad cane 5'    Seated hip  add 2x10, abd 2x10 pilates ring   Seated LAQ 2# x20     Seated Heel raises x20, toe raises x20    Mini squats B UE support 2x10    Wt shifting lateral 2', backward 2'  Seated hip add 2x10, abd 2x10 pilates ring     Seated Heel raises x20, toe raises x20     Mini squats B UE support 2x10     Wt shifting lateral 2', backward 2'   Standing marching alt 2x20     Increased time to complete interventions due to MS flare up    Neuro Re-Education --      Manual Therapy  STM L quad, TFL  Pt edu heating pad and  at home for tenderness in L thigh from chronic compensation from the R boot  STM R lower leg- calf, ant tib, peroneals  STM R lower leg- calf, ant tib, peroneals     Therapeutic Exercise Minutes 45 37 16 23   Manual Therapy Minutes  8 14 18   Total Time Of Timed Procedures 45 45 30 41   Total Time Of Service-Based Procedures 0 0 0 0   Total Treatment Time 45 45 30 41        HEP  Add   Seated clamshells 2x10 green     Charges     2 therex, 1 manual

## 2025-05-05 RX ORDER — CELECOXIB 200 MG/1
200 CAPSULE ORAL DAILY
Qty: 90 CAPSULE | Refills: 3 | OUTPATIENT
Start: 2025-05-05

## 2025-05-05 NOTE — TELEPHONE ENCOUNTER
Celecoxib 200mg    DOS: 4/4/24  Right TKA  Last OV: 5/15/24  Last refill date: 2/20/25 #/refills: 90/0  Upcoming appt: none       Ref Range & Units (hover) 1/8/25 10:03 AM   BUN 22   Creatinine 0.75   eGFR-Cr 83       Refill not appropriate

## 2025-05-06 ENCOUNTER — OFFICE VISIT (OUTPATIENT)
Dept: PHYSICAL THERAPY | Age: 75
End: 2025-05-06
Attending: PODIATRIST
Payer: MEDICARE

## 2025-05-06 PROCEDURE — 97140 MANUAL THERAPY 1/> REGIONS: CPT

## 2025-05-06 PROCEDURE — 97110 THERAPEUTIC EXERCISES: CPT

## 2025-05-06 NOTE — PROGRESS NOTES
Patient: Makenzie Reyes (75 year old, female) Referring Provider:  Insurance:   Diagnosis: Status post ankle arthrodesis (Z98.1) [facility]   Date of Surgery: 1-16-25 fibular osteotomy, hardware removal with ankle arthrodesis/fusion ;  5/18/24 R ankle bimalleolar ORIF Next MD visit:  N/A   Precautions:  None 4-25-25 Referral Information:    Date of Evaluation: Req: 16, Auth: 16, Exp: 6/30/2025 03/05/25 POC Auth Visits:  20       Today's Date   5/6/2025    Subjective  Pt reporting the MS flare up started to calm down last week. She was able to walk down the driveway yesterday with the cane. She is feeling better, is using the cane around the house.       Pain: pain not reported (tiredness in the legs)     Objective             Assessment  Due to MS flare up, continued to focused on manual therapy for tenderness and soreness on the R lower leg. Pt reporting feeling better after manual therapy. Focused on low level activity exercise to accomadate MS flare up. Pt tolerating more standing interventions with less fatigue. Will continue to progress per pt tolerance.    Goals (to be met in 20 visits )   Consistently decr pain < or = 1/10 intermittent for incr QOL and activity tolerance  Overall incr in function as indicated by incr LEFS at least 10 pts  Incr walking tolerance s boot (when cleared) c or s cane for at least 30 min bouts c little to no pain for incr community mobility  Incr functional strength RLE to allow ability to ascend/descend 1 flight of stairs c good form and control c 1 railing c minimal to no pain  Incr RLE at least 1/2 grade painfree throughout for incr mm support for WB activities and promote return to exercise class  Incr R at least 5 sec for incr proprioception to decr fall risk  Indep HEP to promote cont progress toward functional goals     Plan  cont standing interventions- add yellow to hip 3 way    Treatment Last 4 Visits  Treatment Day: 13 4/16/2025 4/23/2025  4/28/2025 5/6/2025   LE Treatment   Therapeutic Exercise NuStep 5 min, level 6     Mini squats B UE support 2x10     Standing hip flexion 2x10, abd 2x10, ext 2x10 B UE support     Seated hip add 2x10, abd 2x10 pilates ring   Seated LAQ 2# x20    Heel raises x20, toe raises x20     Forward step ups 6 in 2x10 B UE support   Lat step ups 6 in x10 B UE support     R Forward target stepping x10 each, R backward x10 each     Ambulating in clinic with small quad cane 5'    Seated hip add 2x10, abd 2x10 pilates ring   Seated LAQ 2# x20     Seated Heel raises x20, toe raises x20    Mini squats B UE support 2x10    Wt shifting lateral 2', backward 2'  Seated hip add 2x10, abd 2x10 pilates ring     Seated Heel raises x20, toe raises x20     Mini squats B UE support 2x10     Wt shifting lateral 2', backward 2'   Standing marching alt 2x20     Increased time to complete interventions due to MS flare up  Nustep level 1, B UE, 7 minutes     Seated hip add 2x10, abd 2x10 pilates ring     Seated Heel raises x20, toe raises x20     Mini squats B UE support 2x10     Standing marching alt 2x20     Standing hip flex 2x10, abd 2x10, ext 2x10 B UE support     Increased time to complete interventions due to MS flare up      Manual Therapy STM L quad, TFL  Pt edu heating pad and  at home for tenderness in L thigh from chronic compensation from the R boot  STM R lower leg- calf, ant tib, peroneals  STM R lower leg- calf, ant tib, peroneals   STM R lower leg- calf, ant tib, peroneals   Therapeutic Exercise Minutes 37 16 23 29   Manual Therapy Minutes 8 14 18 16   Total Time Of Timed Procedures 45 30 41 45   Total Time Of Service-Based Procedures 0 0 0 0   Total Treatment Time 45 30 41 45        HEP  Add   Seated clamshells 2x10 green     Charges     2 therex, 1 manual

## 2025-05-14 ENCOUNTER — OFFICE VISIT (OUTPATIENT)
Dept: PHYSICAL THERAPY | Age: 75
End: 2025-05-14
Attending: PODIATRIST
Payer: MEDICARE

## 2025-05-14 PROCEDURE — 97110 THERAPEUTIC EXERCISES: CPT

## 2025-05-14 NOTE — PROGRESS NOTES
Patient: Makenzie Reyes (75 year old, female) Referring Provider:  Insurance:   Diagnosis: Status post ankle arthrodesis (Z98.1) [facility]   Date of Surgery: 1-16-25 fibular osteotomy, hardware removal with ankle arthrodesis/fusion ;  5/18/24 R ankle bimalleolar ORIF Next MD visit:  N/A   Precautions:  None 4-25-25 Referral Information:    Date of Evaluation: Req: 16, Auth: 16, Exp: 6/30/2025 03/05/25 POC Auth Visits:  20       Today's Date   5/14/2025    Subjective  Pt reporting she has been driving short distances, drove to therapy today. Has been feeling a lot better, has continued with walking at home. Pt reporting she attended her exercise class yesterday, felt soreness today, but felt good.       Pain: pain not reported     Objective               Assessment  Continued with leg strengthening. Pt tolerating more standing interventions with less fatigue. MS has subsided and were able to perform more standing interventions in today's session. Will continue to progress per pt tolerance.    Goals (to be met in 20 visits)   Consistently decr pain < or = 1/10 intermittent for incr QOL and activity tolerance  Overall incr in function as indicated by incr LEFS at least 10 pts  Incr walking tolerance s boot (when cleared) c or s cane for at least 30 min bouts c little to no pain for incr community mobility  Incr functional strength RLE to allow ability to ascend/descend 1 flight of stairs c good form and control c 1 railing c minimal to no pain  Incr RLE at least 1/2 grade painfree throughout for incr mm support for WB activities and promote return to exercise class  Incr R at least 5 sec for incr proprioception to decr fall risk  Indep HEP to promote cont progress toward functional goals       Plan  cont w strengthening    Treatment Last 4 Visits  Treatment Day: 14 4/23/2025 4/28/2025 5/6/2025 5/14/2025   LE Treatment   Therapeutic Exercise Seated hip add 2x10, abd 2x10 pilates ring   Seated  LAQ 2# x20     Seated Heel raises x20, toe raises x20    Mini squats B UE support 2x10    Wt shifting lateral 2', backward 2'  Seated hip add 2x10, abd 2x10 pilates ring     Seated Heel raises x20, toe raises x20     Mini squats B UE support 2x10     Wt shifting lateral 2', backward 2'   Standing marching alt 2x20     Increased time to complete interventions due to MS flare up  Nustep level 1, B UE, 7 minutes     Seated hip add 2x10, abd 2x10 pilates ring     Seated Heel raises x20, toe raises x20     Mini squats B UE support 2x10     Standing marching alt 2x20     Standing hip flex 2x10, abd 2x10, ext 2x10 B UE support     Increased time to complete interventions due to MS flare up    Nustep level 1, B UE, 5 minutes     Seated hip add 2x10, abd 2x10 pilates ring     Parallel bars B UE support  -4in step ups forward 2x10  -4in lat step ups 2x10    Standing Heel raises x20, toe raises x20     Mini squats B UE support 2x10     Standing hip flex 2x10, abd 2x10, ext 2x10 B UE support      Manual Therapy STM R lower leg- calf, ant tib, peroneals  STM R lower leg- calf, ant tib, peroneals   STM R lower leg- calf, ant tib, peroneals    Therapeutic Exercise Minutes 16 23 29 45   Manual Therapy Minutes 14 18 16    Total Time Of Timed Procedures 30 41 45 45   Total Time Of Service-Based Procedures 0 0 0 0   Total Treatment Time 30 41 45 45        HEP  Add   Seated clamshells 2x10 green     Charges     3 therex

## 2025-05-19 ENCOUNTER — TELEPHONE (OUTPATIENT)
Dept: PHYSICAL THERAPY | Facility: HOSPITAL | Age: 75
End: 2025-05-19

## 2025-05-19 ENCOUNTER — APPOINTMENT (OUTPATIENT)
Dept: PHYSICAL THERAPY | Age: 75
End: 2025-05-19
Attending: PODIATRIST
Payer: MEDICARE

## 2025-05-21 ENCOUNTER — APPOINTMENT (OUTPATIENT)
Dept: PHYSICAL THERAPY | Age: 75
End: 2025-05-21
Attending: PODIATRIST
Payer: MEDICARE

## 2025-05-21 PROBLEM — Z86.0101 HISTORY OF ADENOMATOUS POLYP OF COLON: Status: ACTIVE | Noted: 2025-05-21

## 2025-05-21 PROCEDURE — 88305 TISSUE EXAM BY PATHOLOGIST: CPT | Performed by: INTERNAL MEDICINE

## 2025-05-26 DIAGNOSIS — I10 PRIMARY HYPERTENSION: ICD-10-CM

## 2025-05-26 RX ORDER — AMLODIPINE BESYLATE 5 MG/1
10 TABLET ORAL DAILY
Qty: 180 TABLET | Refills: 0 | Status: SHIPPED | OUTPATIENT
Start: 2025-05-26

## 2025-05-27 ENCOUNTER — TELEPHONE (OUTPATIENT)
Dept: INTERNAL MEDICINE CLINIC | Facility: CLINIC | Age: 75
End: 2025-05-27

## 2025-05-27 NOTE — TELEPHONE ENCOUNTER
Copies made, one placed to scan, another in accordion on my desk.   MCM sent to pt to notify form is being mailed out.

## 2025-05-28 ENCOUNTER — OFFICE VISIT (OUTPATIENT)
Dept: PHYSICAL THERAPY | Age: 75
End: 2025-05-28
Attending: PODIATRIST
Payer: MEDICARE

## 2025-05-28 PROCEDURE — 97110 THERAPEUTIC EXERCISES: CPT

## 2025-05-28 NOTE — PROGRESS NOTES
Discharge Summary  Pt has attended 15 visits in Physical Therapy.         Patient: Makenzie Reyes (75 year old, female) Referring Provider:  Insurance:   Diagnosis: Status post ankle arthrodesis (Z98.1) [facility] CONTRERAS   Date of Surgery: 1-16-25 fibular osteotomy, hardware removal with ankle arthrodesis/fusion ;  5/18/24 R ankle bimalleolar ORIF Next MD visit:  N/A   Precautions:  None 4-25-25 Referral Information:    Date of Evaluation: Req: 16, Auth: 16, Exp: 6/30/2025 03/05/25 POC Auth Visits:  20       Today's Date   5/28/2025    Subjective  Pt reporting she feels comfortable being discharge from therapy at this time. She has been walking more, returned to her exercises classes, and participating in her recreational acitvities. She reports she follows up with doctor in about 2 weeks.       Pain: pain not reported     Objective          Hip       5/28/2025   Hip ROM/MMT   Rt Hip Flexion MMT (L2) 4-   Lt Hip Flexion MMT (L2) 4-   , Knee       5/28/2025   Knee ROM/MMT   Rt Knee Flexion MMT 4+   Lt Knee Flexion MMT 4+   Rt Knee Extension MMT (L3) 4+   Lt Knee Extension MMT (L3) 4+   , Ankle/Foot       5/28/2025   Ankle/Foot ROM/MMT   Rt Foot/Ank Pf 60   Rt Foot/Ank Pf MMT (S1) 4+   Rt Foot/Ank Df 10   Rt Foot/Ank Df MMT (L4) 4+   Rt Foot/Ank Inversion 10   Rt Foot/Ank Inversion MMT 4-   Rt Foot/Ank Eversion 25   Rt Foot/Ank Eversion MMT 4-          Assessment  Pt completing 15 physical therapy session for R post ankle fusion. Pt demonstrating improvements in ankle ROM, strength, and balance as shown above. Pt reporting improvements in ankle ROM, LE strength, being able to participate in exercise classes and recreational activities, stairs/ steps/curbs and overall improvement in walking and standing tolerance. Pt met all goals established at initial evaluation. HEP was updated and reviewed with pt. Discussing the importance of keeping up with HEP to maintain gains made from therapy thus far. Pt  verbalized understanding. Also discussing utilizing a lace ankle brace for increase ankle support and reducing ankle eversion in stance when standing, discussing with pt to further explore this option when she follows up with doctor in upcoming weeks. Pt was discharged from therapy at this time with continuation of HEP.       Goals (to be met in 20 visits)   Consistently decr pain < or = 1/10 intermittent for incr QOL and activity tolerance MET   Overall incr in function as indicated by incr LEFS at least 10 pts MET  Incr walking tolerance s boot (when cleared) c or s cane for at least 30 min bouts c little to no pain for incr community mobility MET  Incr functional strength RLE to allow ability to ascend/descend 1 flight of stairs c good form and control c 1 railing c minimal to no pain MET  Incr RLE at least 1/2 grade painfree throughout for incr mm support for WB activities and promote return to exercise class MET  Incr R at least 5 sec for incr proprioception to decr fall risk MET  Indep HEP to promote cont progress toward functional goals MET         Plan  discharged    Treatment Last 4 Visits  Treatment Day: 15       4/28/2025 5/6/2025 5/14/2025 5/28/2025   LE Treatment   Therapeutic Exercise Seated hip add 2x10, abd 2x10 pilates ring     Seated Heel raises x20, toe raises x20     Mini squats B UE support 2x10     Wt shifting lateral 2', backward 2'   Standing marching alt 2x20     Increased time to complete interventions due to MS flare up  Nustep level 1, B UE, 7 minutes     Seated hip add 2x10, abd 2x10 pilates ring     Seated Heel raises x20, toe raises x20     Mini squats B UE support 2x10     Standing marching alt 2x20     Standing hip flex 2x10, abd 2x10, ext 2x10 B UE support     Increased time to complete interventions due to MS flare up    Nustep level 1, B UE, 5 minutes     Seated hip add 2x10, abd 2x10 pilates ring     Parallel bars B UE support  -4in step ups forward 2x10  -4in lat step ups  2x10    Standing Heel raises x20, toe raises x20     Mini squats B UE support 2x10     Standing hip flex 2x10, abd 2x10, ext 2x10 B UE support    NuStep level 5, 5 minutes     DC assessment  HEP updated and reviewed     Standing hip yellow  -flex x20 each   -ext x20 each   -abd x20 each     Parallel bars  -forward lunges x20 each   -lat lunges x20 each     Pt education  -recommending lace ankle brace for increase ankle support, reducing ankle ev   Manual Therapy STM R lower leg- calf, ant tib, peroneals   STM R lower leg- calf, ant tib, peroneals     Therapeutic Exercise Minutes 23 29 45 45   Manual Therapy Minutes 18 16     Total Time Of Timed Procedures 41 45 45 45   Total Time Of Service-Based Procedures 0 0 0 0   Total Treatment Time 41 45 45 45   HEP    Access Code: [email or web address]  URL: [email or web address]  Date: 05/28/2025  Prepared by: Kody Nicolas    Exercises  - Seated Ankle Alphabet  - 2 x daily - 2 sets  - Seated Ankle Pumps  - 1 x daily - 7 x weekly - 3 sets - 10 reps  - Side Stepping with Counter Support  - 2 x daily - 2 reps - 1 minutes  - Side to Side Weight Shift with Counter Support  - 1 x daily - 7 x weekly - 3 sets - 10 reps  - Hip Abduction with Resistance Loop  - 1 x daily - 7 x weekly - 3 sets - 10 reps  - Hip Extension with Resistance Loop  - 1 x daily - 7 x weekly - 3 sets - 10 reps  - Standing Hip Flexion with Resistance Loop  - 1 x daily - 7 x weekly - 3 sets - 10 reps  - Lunge with Counter Support  - 1 x daily - 7 x weekly - 3 sets - 10 reps  - Side Lunge with Counter Support  - 1 x daily - 7 x weekly - 3 sets - 10 reps  - Supine Bridge  - 1 x daily - 7 x weekly - 3 sets - 10 reps  - Hooklying Clamshell with Resistance  - 1 x daily - 7 x weekly - 3 sets - 10 reps  - Active Straight Leg Raise with Quad Set  - 1 x daily - 7 x weekly - 3 sets - 10 reps  - Sidelying Hip Abduction  - 1 x daily - 7 x weekly - 3 sets - 10 reps  - Mini Squat with Counter Support  - 1 x daily - 7 x  weekly - 3 sets - 10 reps  - Sit to Stand with Arms Crossed  - 1 x daily - 7 x weekly - 3 sets - 10 reps        HEP  Access Code: [email or web address]  URL: [email or web address]  Date: 05/28/2025  Prepared by: Kody Nicolas    Exercises  - Seated Ankle Alphabet  - 2 x daily - 2 sets  - Seated Ankle Pumps  - 1 x daily - 7 x weekly - 3 sets - 10 reps  - Side Stepping with Counter Support  - 2 x daily - 2 reps - 1 minutes  - Side to Side Weight Shift with Counter Support  - 1 x daily - 7 x weekly - 3 sets - 10 reps  - Hip Abduction with Resistance Loop  - 1 x daily - 7 x weekly - 3 sets - 10 reps  - Hip Extension with Resistance Loop  - 1 x daily - 7 x weekly - 3 sets - 10 reps  - Standing Hip Flexion with Resistance Loop  - 1 x daily - 7 x weekly - 3 sets - 10 reps  - Lunge with Counter Support  - 1 x daily - 7 x weekly - 3 sets - 10 reps  - Side Lunge with Counter Support  - 1 x daily - 7 x weekly - 3 sets - 10 reps  - Supine Bridge  - 1 x daily - 7 x weekly - 3 sets - 10 reps  - Hooklying Clamshell with Resistance  - 1 x daily - 7 x weekly - 3 sets - 10 reps  - Active Straight Leg Raise with Quad Set  - 1 x daily - 7 x weekly - 3 sets - 10 reps  - Sidelying Hip Abduction  - 1 x daily - 7 x weekly - 3 sets - 10 reps  - Mini Squat with Counter Support  - 1 x daily - 7 x weekly - 3 sets - 10 reps  - Sit to Stand with Arms Crossed  - 1 x daily - 7 x weekly - 3 sets - 10 reps    Charges     3 therex                  LEFS Score: 72%      Plan: Discharged     Patient/Family/Caregiver was advised of these findings, precautions, and treatment options and has agreed to actively participate in planning and for this course of care.    Thank you for your referral. If you have any questions, please contact me at Dept: [phone].    Sincerely,  Electronically signed by therapist: Kody Nicolas PT     Physician's certification required:  Yes  Please co-sign or sign and return this letter via fax as soon as possible to  [phone].   I certify the need for these services furnished under this plan of treatment and while under my care.    X___________________________________________________ Date____________________    Certification From: 5/28/2025  To:8/26/2025

## 2025-06-09 RX ORDER — ESCITALOPRAM OXALATE 20 MG/1
20 TABLET ORAL DAILY
Qty: 90 TABLET | Refills: 0 | Status: SHIPPED | OUTPATIENT
Start: 2025-06-09

## 2025-06-09 NOTE — TELEPHONE ENCOUNTER
Escitalopram 20 mg  Filled 3-26-25  Qty 90  0 refills  Future Appointments   Date Time Provider Department Center   6/11/2025 10:15 AM Heraclio Carr DPM [id number]   6/18/2025 10:30 AM [place] Knox APRN [facility]

## 2025-06-11 ENCOUNTER — HOSPITAL ENCOUNTER (OUTPATIENT)
Dept: GENERAL RADIOLOGY | Age: 75
Discharge: HOME | End: 2025-06-11
Attending: PODIATRIST
Payer: MEDICARE

## 2025-06-11 ENCOUNTER — OFFICE VISIT (OUTPATIENT)
Facility: LOCATION | Age: 75
End: 2025-06-11
Payer: MEDICARE

## 2025-06-11 DIAGNOSIS — G35 MULTIPLE SCLEROSIS (HCC): ICD-10-CM

## 2025-06-11 DIAGNOSIS — M25.571 RIGHT ANKLE PAIN, UNSPECIFIED CHRONICITY: ICD-10-CM

## 2025-06-11 DIAGNOSIS — Z98.1 STATUS POST ANKLE ARTHRODESIS: Primary | ICD-10-CM

## 2025-06-11 DIAGNOSIS — M25.471 EDEMA OF RIGHT ANKLE: ICD-10-CM

## 2025-06-11 PROCEDURE — 73610 X-RAY EXAM OF ANKLE: CPT | Performed by: PODIATRIST

## 2025-06-11 PROCEDURE — 1159F MED LIST DOCD IN RCRD: CPT | Performed by: PODIATRIST

## 2025-06-11 PROCEDURE — 99213 OFFICE O/P EST LOW 20 MIN: CPT | Performed by: PODIATRIST

## 2025-06-11 NOTE — PROGRESS NOTES
[facility]  Progress Note      Makenzie Reyes is a 75 year old female.   Chief Complaint   Patient presents with    Post-Op     POV right ankle hard ware removal DOS 1/16/25  PT finished           HPI:     The following individual(s) verbally consented to be recorded using ambient AI listening technology and understand that they can each withdraw their consent to this listening technology at any point by asking the clinician to turn off or pause the recording:      History of Present Illness  Makenzie Reyes is a 75 year old female who presents for follow-up status post fibular osteotomy, hardware removal with ankle arthrodesis, all of the right lower extremity (DOS: 1/16/2025).     She experiences intermittent pain in the ankle area, particularly when walking. The pain is described as 'twinges' and is not constant, coming and going with movement. No pain is present when pressure is applied to the area.    She has undergone two ankle surgeries in the past year, with swelling persistent. She can feel pressure but not pain in the swollen area. The swelling has improved since the first surgery, which was more pronounced and extended higher up the leg.    She has completed physical therapy and is currently using a cane as needed. She has resumed activities such as exercise classes and Mahjong twice a week. She uses acetaminophen for pain management. She is able to drive without issues.    She expresses satisfaction with her progress post-surgery, noting that she has returned to activities she was unable to do prior to the surgeries, such as exercising and driving.      Allergies: Patient has no known allergies.   Current Medications[1]   Past Medical History[2]   Past Surgical History[3]   Family History[4]   Social Hx on file[5]        REVIEW OF SYSTEMS:     10 point ROS completed and was negative unless stated in HPI.      EXAM:     General: NAD, appropriate and cooperative.  Vascular: Distal pulses intact  b/l.  Edema improved to right ankle   Integument: Incisions well healed. No dehiscence or drainage, no SOI.    Neurological: Sensation and motor function intact and symmetric compatible with pre-operative state.  Musculoskeletal: No discomfort to surgical sites.  Maintained surgical correction.  Patient able to actively move her digits of the right foot.  Compartments of the foot and ankle are soft and compressible.  Minimal discomfort with palpation anterior ankle/dorsal rear foot.  No significant acute deformities noted.  Mild valgus deformity noted to the right foot and ankle.  Patient is ambulating with a cane for gait assistance.     XR CHEST PA + LAT CHEST (UHF=50067)  Result Date: 6/14/2025  CONCLUSION:  Air trapping is noted.  Minimal interstitial abnormalities in the lungs are noted.  No consolidation.   LOCATION:  [facility]   Dictated by ([facility]): Gary Valenzuela MD on 6/14/2025 at 6:48 PM     Finalized by ([facility]): Gary Valenzuela MD on 6/14/2025 at 6:49 PM       XR ANKLE WEIGHTBEARING (3 VIEWS), RIGHT (CPT=73610)  Result Date: 6/11/2025  CONCLUSION:  There are stable postsurgical changes involving resection of the distal right fibula. There are cortical screws and a plate screw device fusing the distal tibia to the talus.  Hardware is intact.  There are multiple bony fragments noted medial and lateral to the ankle joint space unchanged.  There is slight decrease in the amount of soft tissue swelling.  There is some apparent fusion of the subtalar joint space on these images. Diffuse osteopenia is noted involving the hindfoot and midfoot.  Stable deformity of the posterior talus on the lateral view.   LOCATION:  [id number]   Dictated by ([facility]): Andrey Pedroza MD on 6/11/2025 at 8:19 PM     Finalized by ([facility]): Andrey Pedroza MD on 6/11/2025 at 8:21 PM           ASSESSMENT AND PLAN:   Diagnoses and all orders for this visit:    Status post ankle arthrodesis  -     DME - EXTERNAL     Edema of right ankle  -      DME - EXTERNAL     Multiple sclerosis (HCC)  -     DME - EXTERNAL     Right ankle pain, unspecified chronicity  -     XR ANKLE WEIGHTBEARING (3 VIEWS), RIGHT (CPT=73610); Future  -     DME - EXTERNAL         Plan:   -Patient was seen and evaluated today in clinic.  Chart history reviewed.    Assessment & Plan  Status post right ankle arthrodesis  Status post fibular osteotomy, hardware removal with ankle arthrodesis, all of the right lower extremity (DOS: 1/16/2025).   Post-operative right ankle with intermittent midfoot/anterior ankle pain. X-rays show well-managed status. Completed physical therapy, engaging in exercise and driving.   - Recommend custom ankle brace for support and prevention of adjacent joint issues.  Patient provided with an order today.  - Provide options for lace-up ankle brace or compression sleeve for immediate support.  Patient prefers lace up ankle brace today and was properly fitted and given 1.  - Order new custom ankle brace through  Clinic.  - Advise acetaminophen as needed for pain.  - Encourage continued cane use as needed.    Edema of right ankle  Chronic right ankle edema likely from post-surgical changes and scar tissue. No pain, but pressure and hemocytin deposition present.  - Continue compression socks for swelling management.  - Rest, ice, and elevate with any increasing edema      -All of the patient's questions and concerns were addressed.  They indicated their understanding of these issues and agrees to the plan.    Time spent reviewing pertinent information from patient's chart, reviewing any pertinent imaging, obtaining history and physical exam, discussing and mutually agreeing on a treatment plan, and documenting encounter: 30 minutes    RTC 2 months    Mj Carr DPM, AACFAS        6/11/2025    [address] [address]   [email or web address]            Dragon speech recognition software was used  to prepare this note.  Errors in word recognition may occur.  Please contact me with any questions/concerns with this note.          [1]   Current Outpatient Medications   Medication Sig Dispense Refill    escitalopram 20 MG Oral Tab Take 1 tablet (20 mg total) by mouth daily. 90 tablet 0    amLODIPine 5 MG Oral Tab Take 2 tablets (10 mg total) by mouth daily. 180 tablet 0    baclofen 20 MG Oral Tab TAKE 1 TABLET EVERY DAY 90 tablet 0    atorvastatin 20 MG Oral Tab TAKE 1 TABLET EVERY NIGHT 90 tablet 0    levothyroxine 25 MCG Oral Tab TAKE 1 TABLET BEFORE BREAKFAST 90 tablet 0    Triamterene-HCTZ 37.5-25 MG Oral Tab TAKE 1 TABLET EVERY DAY 90 tablet 0    Omeprazole 40 MG Oral Capsule Delayed Release TAKE 1 CAPSULE ONE TIME DAILY 30 MINUTES BEFORE BREAKFAST 90 capsule 3    PEG 3350-KCl-Na Bicarb-NaCl 420 g Oral Recon Soln Take 4,000 mL by mouth As Directed. (Patient not taking: Reported on 5/16/2025) 2 each 0    CELECOXIB 200 MG Oral Cap TAKE 1 CAPSULE EVERY DAY 30 capsule 11    buPROPion  MG Oral Tablet 24 Hr Take 1 tablet (150 mg total) by mouth daily. 30 tablet 1    CALCIUM OR Take 1,000 mg by mouth daily.      Multiple Vitamin (DAILY VALUE MULTIVITAMIN) Oral Tab Take 1 tablet by mouth daily.      Cholecalciferol (VITAMIN D) 2000 UNITS Oral Cap Take 1 capsule (2,000 Units total) by mouth daily.     [2]   Past Medical History:   Arthritis    BACK PAIN    in morning    Back problem    low back    Balance disorder    Alas's esophagus    Basal cell carcinoma of ear    excision of BCC lesion Left ear    Cancer (HCC)    Change in hair    Chronic heartburn    Constipation    Depression    Disorder of thyroid    Easy bruising    Esophageal reflux    Essential hypertension    Fatigue    Fracture tibia/fibula    Heartburn    Hemorrhoids    High blood pressure    High cholesterol    Hx of motion sickness    Hyperglycemia    Hyperlipidemia    Hypothyroidism    Increased weakness when ambulating    Multiple sclerosis  (HCC)    chronic side effects include fatique and \"brain fog\" and decreased vision of left eye, left sided weakness due to MS    Muscle spasms of both lower extremities    Muscle weakness    has MS    Obesity    OSTEOARTHRITIS    Osteoarthritis    Osteoarthrosis involving lower leg    Log Date: 10/26/2012     OTHER DISEASES    MS dx 1981 Dr. Ramachandran    Pain in joints    Personal history of colonic polyps    PONV (postoperative nausea and vomiting)    Problems with swallowing    Reflux    Unspecified essential hypertension    Visual impairment    decreased vision left eye glasses    Walking troubles    Wears glasses   [3]   Past Surgical History:  Procedure Laterality Date    Appendectomy      Appendectomy      Back surgery      cervical surgery/ACDF x 3 with Dr. Webster          x2     delivery only      Colonoscopy  2003    Colonoscopy      D & c      Excis lumbar disk,one level      Foot/toes surgery proc unlisted  2005    hemmertoes operation Rt toe    Hysterectomy      Knee replacement surgery  2022    Knee replacement surgery Right 2024    Dr.Ryan Curry    Laminectomy      Laminectomy,lumbar  2010    L4 L5 fusion    Oophorectomy Bilateral     Removal of ovarian cyst(s)      x2    Skin surgery      Spine surgery procedure unlisted      Thyroidectomy      partial thyroidectomy Lt    Tonsillectomy      Total abdom hysterectomy      w/ removal of both ovaries d/t DBT and fibroid    Upper gi endoscopy,exam     [4]   Family History  Problem Relation Age of Onset    Cancer Sister         cervical cancer    Hypertension Mother          2019    Dementia Mother             Breast Cancer Daughter 39        BRCA 1 GENE    BRCA gene + Daughter     Hypertension Brother          2014    Hypertension Sister     Cancer Sister     Hypertension Sister     Hypertension Brother     Hypertension Sister     Cancer Sister     Hypertension Brother    [5]    Social History  Socioeconomic History    Marital status:    Tobacco Use    Smoking status: Never    Smokeless tobacco: Never    Tobacco comments:     Updated 4/17/24   Vaping Use    Vaping status: Never Used   Substance and Sexual Activity    Alcohol use: Not Currently    Drug use: Never   Other Topics Concern    Caffeine Concern Yes     Comment: 2 cups of coffee daily.    Exercise Yes     Comment: 4x/week.

## 2025-06-11 NOTE — PROGRESS NOTES
The following individual(s) verbally consented to be recorded using ambient AI listening technology and understand that they can each withdraw their consent to this listening technology at any point by asking the clinician to turn off or pause the recording:    Patient name: Makenzie Reyes  Additional names:

## 2025-06-14 ENCOUNTER — APPOINTMENT (OUTPATIENT)
Dept: GENERAL RADIOLOGY | Age: 75
End: 2025-06-14
Attending: EMERGENCY MEDICINE
Payer: MEDICARE

## 2025-06-14 ENCOUNTER — HOSPITAL ENCOUNTER (EMERGENCY)
Age: 75
Discharge: HOME | End: 2025-06-14
Attending: EMERGENCY MEDICINE
Payer: MEDICARE

## 2025-06-14 VITALS
SYSTOLIC BLOOD PRESSURE: 133 MMHG | HEART RATE: 89 BPM | DIASTOLIC BLOOD PRESSURE: 56 MMHG | TEMPERATURE: 100 F | WEIGHT: 182 LBS | RESPIRATION RATE: 20 BRPM | OXYGEN SATURATION: 95 % | BODY MASS INDEX: 31.07 KG/M2 | HEIGHT: 64 IN

## 2025-06-14 DIAGNOSIS — J40 BRONCHITIS: Primary | ICD-10-CM

## 2025-06-14 DIAGNOSIS — J04.0 ACUTE LARYNGITIS: ICD-10-CM

## 2025-06-14 LAB
POCT INFLUENZA A: NEGATIVE
POCT INFLUENZA B: NEGATIVE
SARS-COV-2 RNA RESP QL NAA+PROBE: NOT DETECTED

## 2025-06-14 PROCEDURE — 99284 EMERGENCY DEPT VISIT MOD MDM: CPT

## 2025-06-14 PROCEDURE — 87502 INFLUENZA DNA AMP PROBE: CPT | Performed by: EMERGENCY MEDICINE

## 2025-06-14 PROCEDURE — 71046 X-RAY EXAM CHEST 2 VIEWS: CPT | Performed by: EMERGENCY MEDICINE

## 2025-06-14 RX ORDER — ALBUTEROL SULFATE 90 UG/1
2 INHALANT RESPIRATORY (INHALATION) ONCE
Status: COMPLETED | OUTPATIENT
Start: 2025-06-14 | End: 2025-06-14

## 2025-06-14 RX ORDER — DEXAMETHASONE SODIUM PHOSPHATE 4 MG/ML
10 VIAL (ML) INJECTION ONCE
Status: COMPLETED | OUTPATIENT
Start: 2025-06-14 | End: 2025-06-14

## 2025-06-14 RX ORDER — BENZONATATE 100 MG/1
100 CAPSULE ORAL 3 TIMES DAILY PRN
Qty: 30 CAPSULE | Refills: 0 | Status: SHIPPED | OUTPATIENT
Start: 2025-06-14 | End: 2025-07-14

## 2025-06-15 NOTE — ED PROVIDER NOTES
Patient Seen in: [facility] In [place]        History  Chief Complaint   Patient presents with    Cough/URI     Stated Complaint: Cough x 2 weeks    Subjective:   HPI          75-year-old woman history of MS here for evaluation of dry cough over the past 2 weeks.  No fevers no mucus production patient, just dry cough which is not better with over-the-counter cough medicines at home.  Also reports hoarse voice over the past few days, frequent coughing no significant chest pain difficulty breathing or wheezing no other complaints or concerns  Objective:     Past Medical History:    Arthritis    BACK PAIN    in morning    Back problem    low back    Balance disorder    Alas's esophagus    Basal cell carcinoma of ear    excision of BCC lesion Left ear    Cancer (HCC)    Change in hair    Chronic heartburn    Constipation    Depression    Disorder of thyroid    Easy bruising    Esophageal reflux    Essential hypertension    Fatigue    Fracture tibia/fibula    Heartburn    Hemorrhoids    High blood pressure    High cholesterol    Hx of motion sickness    Hyperglycemia    Hyperlipidemia    Hypothyroidism    Increased weakness when ambulating    Multiple sclerosis (HCC)    chronic side effects include fatique and \"brain fog\" and decreased vision of left eye, left sided weakness due to MS    Muscle spasms of both lower extremities    Muscle weakness    has MS    Obesity    OSTEOARTHRITIS    Osteoarthritis    Osteoarthrosis involving lower leg    Log Date: 10/26/2012     OTHER DISEASES    MS dx 1981 Dr. Ramachandran    Pain in joints    Personal history of colonic polyps    PONV (postoperative nausea and vomiting)    Problems with swallowing    Reflux    Unspecified essential hypertension    Visual impairment    decreased vision left eye glasses    Walking troubles    Wears glasses              Past Surgical History:   Procedure Laterality Date    Appendectomy  1964    Appendectomy      Back surgery       cervical surgery/ACDF x 3 with Dr. Webster          x2     delivery only      Colonoscopy  2003    Colonoscopy      D & c      Excis lumbar disk,one level      Foot/toes surgery proc unlisted  2005    hemmertoes operation Rt toe    Hysterectomy      Knee replacement surgery  2022    Knee replacement surgery Right 2024    Dr.Ryan Curry    Laminectomy      Laminectomy,lumbar  2010    L4 L5 fusion    Oophorectomy Bilateral     Removal of ovarian cyst(s)      x2    Skin surgery      Spine surgery procedure unlisted      Thyroidectomy      partial thyroidectomy Lt    Tonsillectomy      Total abdom hysterectomy      w/ removal of both ovaries d/t DBT and fibroid    Upper gi endoscopy,exam                  Social History     Socioeconomic History    Marital status:    Tobacco Use    Smoking status: Never    Smokeless tobacco: Never    Tobacco comments:     Updated 24   Vaping Use    Vaping status: Never Used   Substance and Sexual Activity    Alcohol use: Not Currently    Drug use: Never   Other Topics Concern    Caffeine Concern Yes     Comment: 2 cups of coffee daily.    Exercise Yes     Comment: 4x/week.      Social Drivers of Health     Food Insecurity: No Food Insecurity (2024)    Food Insecurity     Food Insecurity: Never true   Transportation Needs: No Transportation Needs (2024)    Transportation Needs     Lack of Transportation: No   Housing Stability: Low Risk  (2024)    Housing Stability     Housing Instability: No                                Physical Exam    ED Triage Vitals [25 1736]   /56   Pulse 89   Resp 20   Temp 99.8 °F (37.7 °C)   Temp src Oral   SpO2 95 %   O2 Device None (Room air)       Current Vitals:   Vital Signs  BP: 133/56  Pulse: 89  Resp: 20  Temp: 99.8 °F (37.7 °C)  Temp src: Oral    Oxygen Therapy  SpO2: 95 %  O2 Device: None (Room air)            Physical Exam      Physical Exam  Vitals signs and nursing  note reviewed.   General:  Patient laying supine in the bed in no acute distress  Head: Normocephalic and atraumatic.   HEENT:  Mucous membranes are moist.   Cardiovascular:  Normal rate and regular rhythm.  No Edema  Pulmonary:  Pulmonary effort is normal.  Normal breath sounds. No wheezing, rhonchi or rales.   Skin: Warm and dry  Neurological: Awake alert, speech is normal            ED Course  Labs Reviewed   RAPID SARS-COV-2 BY PCR - Normal   POCT FLU TEST - Normal    Narrative:     This assay is a rapid molecular in vitro test utilizing nucleic acid amplification of influenza A and B viral RNA.       XR CHEST PA + LAT CHEST (LEO=71075)  Result Date: 6/14/2025  PROCEDURE:  XR CHEST PA + LAT CHEST (CPT=71046)  INDICATIONS:  Cough x 2 weeks  COMPARISON:  [facility], XR, XR RIBS WITH CHEST (3 VIEWS), RIGHT  (CPT=71101), 4/01/2019, 12:19 PM.  TECHNIQUE:  PA and lateral chest radiographs were obtained.  PATIENT STATED HISTORY: (As transcribed by Technologist)  Pt c/o lingering dry cough and lossing her voice since 6/7/2025.    FINDINGS:  LUNGS:  Air trapping is noted.  No consolidation. CARDIAC:  Normal size cardiac silhouette. MEDIASTINUM:  Normal. PLEURA:  Normal.  No pleural effusions. BONES:  Hardware in the cervical spine.            CONCLUSION:  Air trapping is noted.  Minimal interstitial abnormalities in the lungs are noted.  No consolidation.   LOCATION:  [facility]   Dictated by (CST): Gary Valenzuela MD on 6/14/2025 at 6:48 PM     Finalized by (CST): Gary Valenzuela MD on 6/14/2025 at 6:49 PM       XR ANKLE WEIGHTBEARING (3 VIEWS), RIGHT (CPT=73610)  Result Date: 6/11/2025  PROCEDURE:  XR ANKLE WEIGHTBEARING (3 VIEWS), RIGHT (CPT=73610)  TECHNIQUE:  Three views were obtained.  COMPARISON:  [facility], XR, XR ANKLE WEIGHTBEARING (3 VIEWS), RIGHT (CPT=73610), 4/25/2025, 10:30 AM.  [facility], XR, XR ANKLE (MIN 3 VIEWS), RIGHT (CPT=73610), 3/21/2025, 10:41 AM.  INDICATIONS:  M25.571 Right ankle pain,  unspecified chronicity  PATIENT STATED HISTORY: (As transcribed by Technologist)  Podiatry evaluation. Status post hardware removal 01/16/25               CONCLUSION:  There are stable postsurgical changes involving resection of the distal right fibula. There are cortical screws and a plate screw device fusing the distal tibia to the talus.  Hardware is intact.  There are multiple bony fragments noted medial and lateral to the ankle joint space unchanged.  There is slight decrease in the amount of soft tissue swelling.  There is some apparent fusion of the subtalar joint space on these images. Diffuse osteopenia is noted involving the hindfoot and midfoot.  Stable deformity of the posterior talus on the lateral view.   LOCATION:  [place] [id number]   Dictated by (CST): Andrey Pedroza MD on 6/11/2025 at 8:19 PM     Finalized by (CST): Andrey Pedroza MD on 6/11/2025 at 8:21 PM                         MDM  75-year-old woman here for evaluation of dry cough differential includes bronchitis pneumonia.  Also reports associated laryngitis.  Chest x-ray is free of any acute infiltrates does show some nonspecific interstitial abnormalities.  Patient has no history of any lung disease in the past.  Believe she would benefit from albuterol MDI as well as Tessalon Perles to use as needed, follow-up with PMD for further evaluation also consider pulmonary evaluation given the interstitial abnormality seen on the lungs here today.  Also given a dose of Decadron for laryngitis, return precautions discussed patient agrees with plan.    I independently viewed and interpreted the following imaging: Chest x-ray without acute infiltrate        Medical Decision Making      Disposition and Plan     Clinical Impression:  1. Bronchitis    2. Acute laryngitis         Disposition:  Discharge  6/14/2025  7:07 pm    Follow-up:  Alyssa Garcia MD  [address] [address]  [phone]    Follow up  Follow-up with your PMD for  reevaluation in 24 to 48 hours.  Return to ER if symptoms worsen or change or if any other new concerns.    Beau Shaw MD  [address] , [place] [address]  [phone]    Schedule an appointment as soon as possible for a visit in 1 day(s)  Pulmonary for further evaluation of interstitial lung changes seen on chest x-ray          Medications Prescribed:  Current Discharge Medication List        START taking these medications    Details   benzonatate 100 MG Oral Cap Take 1 capsule (100 mg total) by mouth 3 (three) times daily as needed for cough.  Qty: 30 capsule, Refills: 0                   Supplementary Documentation:

## 2025-06-25 ENCOUNTER — OFFICE VISIT (OUTPATIENT)
Dept: INTERNAL MEDICINE CLINIC | Facility: CLINIC | Age: 75
End: 2025-06-25
Payer: MEDICARE

## 2025-06-25 VITALS
DIASTOLIC BLOOD PRESSURE: 62 MMHG | OXYGEN SATURATION: 97 % | BODY MASS INDEX: 32.33 KG/M2 | SYSTOLIC BLOOD PRESSURE: 124 MMHG | WEIGHT: 189.38 LBS | HEART RATE: 80 BPM | RESPIRATION RATE: 16 BRPM | HEIGHT: 64 IN | TEMPERATURE: 98 F

## 2025-06-25 DIAGNOSIS — Z96.651 PRESENCE OF RIGHT ARTIFICIAL KNEE JOINT: ICD-10-CM

## 2025-06-25 DIAGNOSIS — G35 MULTIPLE SCLEROSIS (HCC): ICD-10-CM

## 2025-06-25 DIAGNOSIS — Z85.828 PERSONAL HISTORY OF OTHER MALIGNANT NEOPLASM OF SKIN: ICD-10-CM

## 2025-06-25 DIAGNOSIS — E78.00 PURE HYPERCHOLESTEROLEMIA, UNSPECIFIED: ICD-10-CM

## 2025-06-25 DIAGNOSIS — R23.4 SKIN TEXTURE CHANGES: ICD-10-CM

## 2025-06-25 DIAGNOSIS — R73.03 PREDIABETES: ICD-10-CM

## 2025-06-25 DIAGNOSIS — Z91.81 HISTORY OF FALLING: ICD-10-CM

## 2025-06-25 DIAGNOSIS — R93.89 ABNORMAL CHEST X-RAY: ICD-10-CM

## 2025-06-25 DIAGNOSIS — M47.816 FACET ARTHRITIS OF LUMBAR REGION: ICD-10-CM

## 2025-06-25 DIAGNOSIS — F33.0 MILD RECURRENT MAJOR DEPRESSION: Chronic | ICD-10-CM

## 2025-06-25 DIAGNOSIS — Z00.00 ENCOUNTER FOR ANNUAL HEALTH EXAMINATION: Primary | ICD-10-CM

## 2025-06-25 DIAGNOSIS — M47.812 ARTHRITIS OF FACET JOINT OF CERVICAL SPINE: ICD-10-CM

## 2025-06-25 DIAGNOSIS — L92.0 GRANULOMA ANNULARE: ICD-10-CM

## 2025-06-25 DIAGNOSIS — R05.1 ACUTE COUGH: ICD-10-CM

## 2025-06-25 DIAGNOSIS — Z79.1 LONG TERM (CURRENT) USE OF NON-STEROIDAL ANTI-INFLAMMATORIES (NSAID): ICD-10-CM

## 2025-06-25 DIAGNOSIS — Z98.1 HISTORY OF LUMBAR FUSION: ICD-10-CM

## 2025-06-25 DIAGNOSIS — M48.061 SPINAL STENOSIS OF LUMBAR REGION, UNSPECIFIED WHETHER NEUROGENIC CLAUDICATION PRESENT: ICD-10-CM

## 2025-06-25 DIAGNOSIS — M17.12 ARTHRITIS OF LEFT KNEE: ICD-10-CM

## 2025-06-25 DIAGNOSIS — Z78.0 POSTMENOPAUSAL ESTROGEN DEFICIENCY: ICD-10-CM

## 2025-06-25 DIAGNOSIS — M17.11 OSTEOARTHRITIS OF RIGHT KNEE, UNSPECIFIED OSTEOARTHRITIS TYPE: ICD-10-CM

## 2025-06-25 DIAGNOSIS — M47.812 FACET ARTHRITIS OF CERVICAL REGION: ICD-10-CM

## 2025-06-25 DIAGNOSIS — K21.9 GASTRO-ESOPHAGEAL REFLUX DISEASE WITHOUT ESOPHAGITIS: ICD-10-CM

## 2025-06-25 DIAGNOSIS — K22.70 BARRETT'S ESOPHAGUS WITHOUT DYSPLASIA: ICD-10-CM

## 2025-06-25 DIAGNOSIS — L82.1 SEBORRHEIC KERATOSES: ICD-10-CM

## 2025-06-25 DIAGNOSIS — K44.9 HIATAL HERNIA: ICD-10-CM

## 2025-06-25 DIAGNOSIS — K44.9 DIAPHRAGMATIC HERNIA WITHOUT OBSTRUCTION OR GANGRENE: ICD-10-CM

## 2025-06-25 DIAGNOSIS — K63.5 POLYP OF COLON, UNSPECIFIED PART OF COLON, UNSPECIFIED TYPE: ICD-10-CM

## 2025-06-25 DIAGNOSIS — K59.00 CONSTIPATION, UNSPECIFIED CONSTIPATION TYPE: ICD-10-CM

## 2025-06-25 DIAGNOSIS — Z86.0101 HISTORY OF ADENOMATOUS POLYP OF COLON: ICD-10-CM

## 2025-06-25 DIAGNOSIS — N95.1 MENOPAUSAL VAGINAL DRYNESS: ICD-10-CM

## 2025-06-25 DIAGNOSIS — G25.81 RLS (RESTLESS LEGS SYNDROME): ICD-10-CM

## 2025-06-25 DIAGNOSIS — I10 PRIMARY HYPERTENSION: ICD-10-CM

## 2025-06-25 DIAGNOSIS — Z85.828 PERSONAL HISTORY OF MALIGNANT NEOPLASM OF SKIN: ICD-10-CM

## 2025-06-25 DIAGNOSIS — F41.1 GENERALIZED ANXIETY DISORDER: ICD-10-CM

## 2025-06-25 DIAGNOSIS — L81.4 LENTIGO: ICD-10-CM

## 2025-06-25 DIAGNOSIS — M62.838 MUSCLE SPASMS OF BOTH LOWER EXTREMITIES: ICD-10-CM

## 2025-06-25 DIAGNOSIS — M46.92 UNSPECIFIED INFLAMMATORY SPONDYLOPATHY, CERVICAL REGION: ICD-10-CM

## 2025-06-25 DIAGNOSIS — M85.80 OSTEOPENIA, UNSPECIFIED LOCATION: ICD-10-CM

## 2025-06-25 DIAGNOSIS — E03.9 HYPOTHYROIDISM, UNSPECIFIED TYPE: ICD-10-CM

## 2025-06-25 PROBLEM — F41.9 ANXIETY DISORDER, UNSPECIFIED: Status: RESOLVED | Noted: 2024-01-01 | Resolved: 2025-06-25

## 2025-06-25 PROBLEM — Z47.1 AFTERCARE FOLLOWING JOINT REPLACEMENT SURGERY: Status: RESOLVED | Noted: 2024-03-21 | Resolved: 2025-06-25

## 2025-06-25 PROBLEM — T14.90XA INJURY, UNSPECIFIED, INITIAL ENCOUNTER: Status: RESOLVED | Noted: 2024-09-04 | Resolved: 2025-06-25

## 2025-06-25 PROBLEM — Z79.891 LONG TERM (CURRENT) USE OF OPIATE ANALGESIC: Status: RESOLVED | Noted: 2024-01-01 | Resolved: 2025-06-25

## 2025-06-25 PROBLEM — R29.898 WEAKNESS OF RIGHT LEG: Status: RESOLVED | Noted: 2020-09-17 | Resolved: 2025-06-25

## 2025-06-25 PROBLEM — K31.7 POLYP OF STOMACH AND DUODENUM: Status: RESOLVED | Noted: 2019-11-05 | Resolved: 2025-06-25

## 2025-06-25 PROBLEM — K29.30 CHRONIC SUPERFICIAL GASTRITIS WITHOUT BLEEDING: Status: RESOLVED | Noted: 2019-11-05 | Resolved: 2025-06-25

## 2025-06-25 PROBLEM — Z79.899 OTHER LONG TERM (CURRENT) DRUG THERAPY: Status: RESOLVED | Noted: 2024-01-01 | Resolved: 2025-06-25

## 2025-06-25 PROBLEM — R53.82 CHRONIC FATIGUE: Status: RESOLVED | Noted: 2018-08-23 | Resolved: 2025-06-25

## 2025-06-25 PROCEDURE — G0439 PPPS, SUBSEQ VISIT: HCPCS | Performed by: FAMILY MEDICINE

## 2025-06-25 PROCEDURE — G2211 COMPLEX E/M VISIT ADD ON: HCPCS | Performed by: FAMILY MEDICINE

## 2025-06-25 PROCEDURE — 96160 PT-FOCUSED HLTH RISK ASSMT: CPT | Performed by: FAMILY MEDICINE

## 2025-06-25 PROCEDURE — 99213 OFFICE O/P EST LOW 20 MIN: CPT | Performed by: FAMILY MEDICINE

## 2025-06-25 PROCEDURE — 99499 UNLISTED E&M SERVICE: CPT | Performed by: FAMILY MEDICINE

## 2025-06-25 RX ORDER — CODEINE PHOSPHATE AND GUAIFENESIN 10; 100 MG/5ML; MG/5ML
5 SOLUTION ORAL EVERY 6 HOURS PRN
Qty: 120 ML | Refills: 0 | Status: SHIPPED | OUTPATIENT
Start: 2025-06-25

## 2025-06-25 RX ORDER — PREDNISONE 10 MG/1
TABLET ORAL
Qty: 20 TABLET | Refills: 0 | Status: SHIPPED | OUTPATIENT
Start: 2025-06-25

## 2025-06-25 NOTE — PROGRESS NOTES
Subjective:   Makenzie Reyes is a 75 year old female who presents for a MA AHA (Medicare Advantage Annual Health Assessment) and Subsequent Annual Wellness visit (Pt already had Initial Annual Wellness) and scheduled follow up of multiple significant but stable problems.             Patient presents for recheck of their hypertension. Pt has been taking medications as instructed, no medication side effects, home BP monitoring has not been done  Currently asymptomatic, no chest pains, jaw pains, arm pains. No headaches, dizziness or edema.  No SOB on exertion or rest.  Pt has been following a low fat diet and has been exercising -weights and marching in place.     Patient is still coughing and her voice is raspy. Pt was seen in the ER on 6/14/25 for bronchitis. Pt was only given Tessalon which did not help much. Pt got a script for Cheratussin AC from us which helps the cough at night. Pt's cough is dry, no mucus, has PND. Possibly allergies- it started when the smoke was coming down here from [place]. Pt also needs a referral to pulmonary to follow up the interstitial abnormality on her chest xray.    Pt needs a referral to Derm for her skin check. Pt has a hx of skin cancer in 2008.    BP Readings from Last 3 Encounters:   06/25/25 124/62   06/14/25 133/56   01/08/25 134/76       History/Other:   Fall Risk Assessment:   She has been screened for Falls and is High Risk. Fall Prevention information provided to patient in After Visit Summary.    Do you feel unsteady when standing or walking?: Yes  Do you worry about falling?: No  Have you fallen in the past year?: No  How many times have you fallen?: (Patient-Rptd) (P) 1  Were you injured?: No     Cognitive Assessment:   She had a completely normal cognitive assessment - see flowsheet entries     Functional Ability/Status:   Makenzie Reyes has some abnormal functions as listed below:  She has Vision problems based on screening of functional status. She has Walking  problems based on screening of functional status.       Depression Screening (PHQ):  PHQ-2 SCORE: 0  , done 6/25/2025   Last [place] Suicide Screening on 6/25/2025 was No Risk.     5 minutes spent screening and counseling for depression    Advanced Directives:   She has a [facility]; reviewed and discussed documents with patient (and family/surrogate if present).  She does have a POA but we do NOT have it on file [facility].    Patient has Advance Care Planning documents present in EMR. Reviewed documents with patient (and family/surrogate if present).      Problem List[1]  Allergies:  She has no known allergies.    Current Medications:  Active Meds, Sig Only[2]    Medical History:  She  has a past medical history of Aftercare following joint replacement surgery (03/21/2024), Anxiety disorder, unspecified (01/01/2024), Arthritis, Arthritis of facet joint of cervical spine (01/23/2019), Arthritis of facet joint of lumbar spine (01/23/2019), BACK PAIN, Back problem, Balance disorder (I have MS), Alas's esophagus, Alas's esophagus without dysplasia (11/05/2019), Basal cell carcinoma of ear (04/2008), Cancer ([facility]) (Had skin cancer), Change in hair (Hair is thinning/age related), Chronic heartburn (05/01/2023), Chronic superficial gastritis without bleeding (11/05/2019), Constipation, Depression, Disorder of thyroid, Easy bruising, Esophageal reflux, Essential hypertension (06/29/2012), Fatigue, Fracture tibia/fibula (05/2024), Gastro-esophageal reflux disease without esophagitis (06/29/2012), Heartburn (11/05/2019), Hemorrhoids (After having a baby), High blood cholesterol (01/30/2025), High blood pressure, High cholesterol, motion sickness, Hyperglycemia, Hyperlipidemia, Hypertensive disorder (09/04/2024), Hypothyroid (01/30/2025), Hypothyroidism, Hypothyroidism (05/05/2016), Increased weakness when ambulating (04/05/2016), Long term (current) use of opiate analgesic (01/01/2024), Multiple  sclerosis (HCC) (), Muscle spasms of both lower extremities (2018), Muscle weakness, Obesity (Over weight), OSTEOARTHRITIS, Osteoarthritis, Osteoarthrosis involving lower leg (2013), OTHER DISEASES, Pain in joints (At times/left knee), Personal history of colonic polyps (2019), Personal history of other malignant neoplasm of skin (2024), Polyp of colon (2020), Polyp of stomach and duodenum (2019), PONV (postoperative nausea and vomiting), Primary hypertension, Problems with swallowing, Reflux, Unspecified essential hypertension, Visual impairment, Walking troubles, Weakness of right leg (2020), and Wears glasses (Glasses).  Surgical History:  She  has a past surgical history that includes back surgery; ; total abdom hysterectomy; thyroidectomy; laminectomy,lumbar (2010); colonoscopy (2003); removal of ovarian cyst(s); foot/toes surgery proc unlisted (2005); hysterectomy; appendectomy (); colonoscopy; tonsillectomy (s); excis lumbar disk,one level; oophorectomy (Bilateral); knee replacement surgery (2022); upper gi endoscopy,exam; knee replacement surgery (Right, 2024); d & c; skin surgery; spine surgery procedure unlisted; laminectomy; appendectomy; and  delivery only.   Family History:  Her family history includes BRCA gene + in her daughter; Breast Cancer (age of onset: 39) in her daughter; Cancer in her sister, sister, and sister; Dementia in her mother; Hypertension in her brother, brother, brother, mother, sister, sister, and sister.  Social History:  She  reports that she has never smoked. She has never used smokeless tobacco. She reports that she does not currently use alcohol. She reports that she does not use drugs.    Tobacco:  She has never smoked tobacco.    CAGE Alcohol Screen:   CAGE screening score of 0 on 2025, showing low risk of alcohol abuse.      Patient Care Team:  Alyssa Garcia MD as PCP -  General  Dania Montaño MD as Consulting Physician (NEUROLOGY)  Alyssa Garcia MD (Family Practice)  Dalton Zuleta MD (SURGERY, ORTHOPEDIC)  David Quiros, PT as Physical Therapist (Physical Therapy)  Wilson Craven, PT as Physical Therapist  Zeferino Barney, PT as Physical Therapist (Physical Therapy)  Mathieu Ayala, PT as Physical Therapist (Physical Therapy)  Pratik Curry MD (Surgery, Orthopedic Adult Reconstructive)  Desiree Pace, PT as Physical Therapist  Gabriella Trammell, PT as Physical Therapist (Physical Therapy)  Chloé Ruiz, PTA as Physical Therapy Assistant (Physical Therapy)  Nayat, Generic Provider  Marco Antonio Guzman MD (GASTROENTEROLOGY)  Delisa Bernal, PT as Physical Therapist (Physical Therapy)  Tammy Moncada APRN (Nurse Practitioner)  Raquel Shearer, PT as Physical Therapist  Kody Nicolas, PT    Review of Systems  GENERAL: feels well otherwise  SKIN: denies any unusual skin lesions,+ granuloma annulare  EYES: denies blurred vision or double vision  HEENT: denies nasal congestion, sinus pain or ST  LUNGS: denies shortness of breath with exertion  CARDIOVASCULAR: denies chest pain on exertion,+HTN  GI: denies abdominal pain,+GERD, hiatal hernia  : denies dysuria, vaginal discharge or itching, no complaint of urinary incontinence   MUSCULOSKELETAL:hx of back pain,+OA knees  NEURO: denies headaches,+MS  PSYCHE:+ depression and anxiety  HEMATOLOGIC: denies hx of anemia  ENDOCRINE: + thyroid history  ALL/ASTHMA: denies hx of allergy or asthma    Objective:   Physical Exam  General Appearance:  Alert, cooperative, no distress, appears stated age   Head:  Normocephalic, without obvious abnormality, atraumatic   Eyes:  PERRL, conjunctiva/corneas clear, EOM's intact both eyes   Ears:  Normal TM's and external ear canals, both ears   Nose: Nares normal, septum midline,mucosa normal, no drainage or sinus tenderness   Throat: Lips, mucosa, and tongue normal; teeth and gums normal. +PND    Neck: Supple, symmetrical, trachea midline, no adenopathy;  thyroid: not enlarged, symmetric, no tenderness/mass/nodules; no carotid bruit or JVD   Back:   Symmetric, no curvature, ROM normal, no CVA tenderness   Lungs:   Clear to auscultation bilaterally, respirations unlabored. Dry cough noted   Heart:  Regular rate and rhythm, S1 and S2 normal, no murmur, rub, or gallop   Abdomen:   Soft, non-tender, bowel sounds active all four quadrants,  no masses, no organomegaly   Pelvic: Deferred   Extremities: Extremities normal, atraumatic, no cyanosis or edema   Pulses: 2+ and symmetric   Skin: Skin color, texture, turgor normal, no rashes or lesions   Lymph nodes: Cervical, supraclavicular, and axillary nodes normal   Neurologic: Normal       /62 (BP Location: Left arm, Patient Position: Sitting, Cuff Size: adult)   Pulse 80   Temp 97.9 °F (36.6 °C) (Temporal)   Resp 16   Ht 5' 4\" (1.626 m)   Wt 189 lb 6.4 oz (85.9 kg)   SpO2 97%   BMI 32.51 kg/m²  Estimated body mass index is 32.51 kg/m² as calculated from the following:    Height as of this encounter: 5' 4\" (1.626 m).    Weight as of this encounter: 189 lb 6.4 oz (85.9 kg).    Medicare Hearing Assessment:   Hearing Screening    Time taken: 6/25/2025 10:35 AM  Entry User: Allie Haq CMA  Screening Method: Finger Rub  Finger Rub Result: Pass         Visual Acuity:   Right Eye Visual Acuity: Corrected Right Eye Chart Acuity: 20/50   Left Eye Visual Acuity: Corrected Left Eye Chart Acuity: 20/50   Both Eyes Visual Acuity: Corrected Both Eyes Chart Acuity: 20/50   Able To Tolerate Visual Acuity: Yes        Assessment & Plan:   Makenzie Reyes is a 75 year old female who presents for a Medicare Assessment.     1. Encounter for annual health examination (Primary)  2. Abnormal chest x-ray  -     Pulmonary Referral - In Network  3. Skin texture changes  -     Derm Referral - In Network  4. Primary hypertension  -     Comp Metabolic Panel (14); Future;  Expected date: 06/25/2025  -     Expanded, Low Complexity (01524)  5. Pure hypercholesterolemia, unspecified  -     Comp Metabolic Panel (14); Future; Expected date: 06/25/2025  -     Lipid Panel; Future; Expected date: 06/25/2025  6. Hypothyroidism, unspecified type  -     TSH W Reflex To Free T4; Future; Expected date: 06/25/2025  7. Postmenopausal estrogen deficiency  -     XR DEXA BONE DENSITOMETRY (CPT=77080); Future; Expected date: 06/25/2025  8. Acute cough  -     predniSONE; Day 1-2 four tabs, day 3-4 three tabs, day 5-6 two tabs,day 7-8 one tab  Dispense: 20 tablet; Refill: 0  -     guaiFENesin-Codeine; Take 5 mL by mouth every 6 (six) hours as needed.  Dispense: 120 mL; Refill: 0  -     Expanded, Low Complexity (42536)  9. Multiple sclerosis (HCC)  -     CBC With Differential With Platelet; Future; Expected date: 06/25/2025  10. Prediabetes  -     Hemoglobin A1C; Future; Expected date: 06/25/2025  11. Osteopenia, unspecified location  -     Vitamin D; Future; Expected date: 06/25/2025  12. Mild recurrent major depression  13. RLS (restless legs syndrome)  14. Arthritis of facet joint of cervical spine  15. Generalized anxiety disorder  16. Polyp of colon, unspecified part of colon, unspecified type  Overview:  Added automatically from request for surgery [id number]    Added automatically from request for surgery [id number]  17. Gastro-esophageal reflux disease without esophagitis  18. Muscle spasms of both lower extremities  19. Presence of right artificial knee joint  20. Personal history of other malignant neoplasm of skin  21. Unspecified inflammatory spondylopathy, cervical region  22. Spinal stenosis of lumbar region, unspecified whether neurogenic claudication present  23. Facet arthritis of cervical region  24. Diaphragmatic hernia without obstruction or gangrene  25. History of adenomatous polyp of colon  26. Seborrheic keratoses  27. History of lumbar fusion  28. Menopausal vaginal dryness  29.  Lentigo  30. Constipation, unspecified constipation type  31. Arthritis of left knee  32. Personal history of malignant neoplasm of skin  33. Facet arthritis of lumbar region  34. Hiatal hernia  35. Alas's esophagus without dysplasia  36. Osteoarthritis of right knee, unspecified osteoarthritis type  37. Granuloma annulare  38. Long term (current) use of non-steroidal anti-inflammatories (nsaid)  39. History of falling          PLAN:  1. Abnormal chest x-ray  - Pulmonary Referral - In Network    2. Skin texture changes  - Derm Referral - In Network    3. Primary hypertension  Conservative measures dicussed. Continue medication.  Diet and exercise explained and encouraged.  Home blood pressure monitoring. Pt should measure BP’s two to three times per week. Goal blood pressure at home - < 135/85.   - Comp Metabolic Panel (14); Future  - Expanded, Low Complexity (86438)    4. Pure hypercholesterolemia, unspecified  Pt to continue their medication, increase exercise,  choose better fats,  increase fiber and avoid processed foods.  - Comp Metabolic Panel (14); Future  - Lipid Panel; Future    5. Hypothyroidism, unspecified type  Patient at goal, no symptoms of over or under-replaced.  Recheck TSH.  Continue current dose of medication.  - TSH W Reflex To Free T4; Future    6. Postmenopausal estrogen deficiency  - XR DEXA BONE DENSITOMETRY (CPT=77080); Future    7. Acute cough  - increase fluids, start an antihistamine like allegra  - start the steroid. Cheratussin AC refilled.  - f/u with pullmonary  - predniSONE 10 MG Oral Tab; Day 1-2 four tabs, day 3-4 three tabs, day 5-6 two tabs,day 7-8 one tab  Dispense: 20 tablet; Refill: 0  - guaiFENesin-codeine (GUAIATUSSIN AC) 100-10 MG/5ML Oral Solution; Take 5 mL by mouth every 6 (six) hours as needed.  Dispense: 120 mL; Refill: 0  - Expanded, Low Complexity (92908)    8. Multiple sclerosis (HCC)  Sees DR. Montaño as needed  - CBC With Differential With Platelet; Future    9.  Prediabetes  Check lab  - Hemoglobin A1C; Future    10. Osteopenia, unspecified location  Dexa scan due in August.  - Vitamin D; Future    11. Mild recurrent major depression  Stable on Lexparo    12. RLS (restless legs syndrome)  -stable, sees Dr Montaño as needed    13. Arthritis of facet joint of cervical spine  Stable uses Celebrex as needed    14. Generalized anxiety disorder  Stable on lexapro    15. Polyp of colon, unspecified part of colon, unspecified type  Managed by Dr. Guzman    16. Gastro-esophageal reflux disease without esophagitis  Managed by DR. Guzman    17. Muscle spasms of both lower extremities  Managed by DR. Montaño as needed    18. Presence of right artificial knee joint  Managed by DR. Curry    19. Personal history of other malignant neoplasm of skin  Sees Derm/Dr. Parekh yearly    20. Unspecified inflammatory spondylopathy, cervical region  Stable, uses Celebrex as needed    21. Spinal stenosis of lumbar region, unspecified whether neurogenic claudication present  Stable, uses celebrex as needed    22. Facet arthritis of cervical region  Stable, uses celebrex as needed    23. Diaphragmatic hernia without obstruction or gangrene  Managed by Dr. Kent    24. History of adenomatous polyp of colon  Managed by Dr. Guzman    25. Seborrheic keratoses  Managed by Dr. Parekh    26. History of lumbar fusion  Currently no issues    27. Menopausal vaginal dryness  Uses estrace cream as needed    28. Lentigo  Managed by Dr. Parekh    29. Constipation, unspecified constipation type  Managed by Dr. Guzman    30. Arthritis of left knee  Stable, managed by Dr. Curry    31. Personal history of malignant neoplasm of skin  Managed by Dr Gregg    32. Facet arthritis of lumbar region  Stable, uses celebrex as needed    33. Hiatal hernia  Managed by Dr. Guzman    34. Alas's esophagus without dysplasia  Managed by Dr. Guzman    35. Osteoarthritis of right knee, unspecified osteoarthritis  type  Stable, managed by Dr. Curry    36. Granuloma annulare  Managed by Dr. Parekh    37. Long term (current) use of non-steroidal anti-inflammatories (nsaid)  Labs monitored every 6 months    38. History of falling  Stable, no recent falls    39. Encounter for annual health examination  Well adult. Return in one year for annual exam    The patient indicates understanding of these issues and agrees to the plan.  Continue with current treatment plan.  Follow up in  6  month(s).  Imaging studies ordered.  Lab work ordered.  Prescription medication ordered.  Reinforced healthy diet, lifestyle, and exercise.      Return in 6 months (on 12/25/2025).     HUSSAIN Mujica, 6/25/2025     Supplementary Documentation:   General Health:  In the past six months, have you lost more than 10 pounds without trying?: 2 - No  Has your appetite been poor?: No  Type of Diet: Low Carb  How does the patient maintain a good energy level?: Appropriate Exercise  How would you describe your daily physical activity?: Light  How would you describe your current health state?: Fair  How do you maintain positive mental well-being?: Social Interaction, Puzzles  On a scale of 0 to 10, with 0 being no pain and 10 being severe pain, what is your pain level?: 0 - (None)  In the past six months, have you experienced urine leakage?: 1-Yes  At any time do you feel concerned for the safety/well-being of yourself and/or your children, in your home or elsewhere?: No  Have you had any immunizations at another office such as Influenza, Hepatitis B, Tetanus, or Pneumococcal?: Yes    Health Maintenance   Topic Date Due    Zoster Vaccines (1 of 2) Never done    Annual Well Visit  01/01/2025    Annual Depression Screening  01/01/2025    COVID-19 Vaccine (7 - 2024-25 season) 04/15/2025    Colorectal Cancer Screening  05/21/2030    Influenza Vaccine  Completed    DEXA Scan  Completed    Fall Risk Screening (Annual)  Completed    Pneumococcal Vaccine: 50+  Years  Completed    Meningococcal B Vaccine  Aged Out    Mammogram  Discontinued            [1]   Patient Active Problem List  Diagnosis    Lumbar spinal stenosis    Multiple sclerosis (HCC)    Anxiety    Granuloma annulare    History of lumbar fusion    RLS (restless legs syndrome)    Muscle spasms of both lower extremities    Facet arthritis of lumbar region    Facet arthritis of cervical region    Menopausal vaginal dryness    Mild recurrent major depression    Prediabetes    Hiatal hernia    Gastroesophageal reflux disease without esophagitis    Seborrheic keratoses    Lentigo    Personal history of malignant neoplasm of skin    Unspecified inflammatory spondylopathy, cervical region    Arthritis of left knee    Diaphragmatic hernia without obstruction or gangrene    Osteoarthritis of right knee    Barretts esophagus    Pure hypercholesterolemia, unspecified    Presence of right artificial knee joint    Depression, unspecified    History of falling    Hormone replacement therapy (HRT)    Hypothyroidism, unspecified    Essential hypertension    Long term (current) use of non-steroidal anti-inflammatories (nsaid)    Constipation, unspecified    History of adenomatous polyp of colon   [2]   Outpatient Medications Marked as Taking for the 6/25/25 encounter (Office Visit) with Albania Knox APRN   Medication Sig    predniSONE 10 MG Oral Tab Day 1-2 four tabs, day 3-4 three tabs, day 5-6 two tabs,day 7-8 one tab    guaiFENesin-codeine (GUAIATUSSIN AC) 100-10 MG/5ML Oral Solution Take 5 mL by mouth every 6 (six) hours as needed.    guaiFENesin-codeine (GUAIATUSSIN AC) 100-10 MG/5ML Oral Solution Take 5 mL by mouth every 6 (six) hours as needed.    benzonatate 100 MG Oral Cap Take 1 capsule (100 mg total) by mouth 3 (three) times daily as needed for cough.    escitalopram 20 MG Oral Tab Take 1 tablet (20 mg total) by mouth daily.    amLODIPine 5 MG Oral Tab Take 2 tablets (10 mg total) by mouth daily.    baclofen 20 MG  Oral Tab TAKE 1 TABLET EVERY DAY    atorvastatin 20 MG Oral Tab TAKE 1 TABLET EVERY NIGHT    levothyroxine 25 MCG Oral Tab TAKE 1 TABLET BEFORE BREAKFAST    Triamterene-HCTZ 37.5-25 MG Oral Tab TAKE 1 TABLET EVERY DAY    Omeprazole 40 MG Oral Capsule Delayed Release TAKE 1 CAPSULE ONE TIME DAILY 30 MINUTES BEFORE BREAKFAST    CELECOXIB 200 MG Oral Cap TAKE 1 CAPSULE EVERY DAY    buPROPion  MG Oral Tablet 24 Hr Take 1 tablet (150 mg total) by mouth daily.    CALCIUM OR Take 1,000 mg by mouth in the morning.    Multiple Vitamin (DAILY VALUE MULTIVITAMIN) Oral Tab Take 1 tablet by mouth in the morning.    Cholecalciferol (VITAMIN D) 2000 UNITS Oral Cap Take 1 capsule (2,000 Units total) by mouth in the morning.

## 2025-06-27 DIAGNOSIS — G25.81 RLS (RESTLESS LEGS SYNDROME): ICD-10-CM

## 2025-06-27 DIAGNOSIS — I10 PRIMARY HYPERTENSION: ICD-10-CM

## 2025-06-27 DIAGNOSIS — G35 MULTIPLE SCLEROSIS (HCC): ICD-10-CM

## 2025-06-27 NOTE — TELEPHONE ENCOUNTER
Atorvastatin 20 mg    Cholesterol Medication Protocol Osrjwb1006/27/2025 09:44 AM   Protocol Details Lipid panel within past 12 months      Filled 4-14-25  Qty 90  0 refills  Future Appointments   Date Time Provider Department Center   8/11/2025  9:00 AM Bijan Downs MD EE [id number] [facility] [place]   8/13/2025 10:15 AM Heraclio Carr DPM [id number] [facility]   LOV  6-25-25 SM    Baclofen 20 mg  Filled 4-14-25  Qty 90  0 refills  Future Appointments   Date Time Provider Department Center   8/11/2025  9:00 AM Bijan Downs MD EE [id number] [facility] [place]   8/13/2025 10:15 AM Heraclio Carr DPM [facility]PLPOD2 [facility]   LOV  6-25-25 SM    Levothyroxine 25 mcg    Thyroid Medication Protocol Kvwheg0006/27/2025 09:44 AM   Protocol Details TSH in past 12 months      Filled 4-14-25  Qty 90  0 refills  Future Appointments   Date Time Provider Department Center   8/11/2025  9:00 AM Bijan Downs MD EE [id number] [facility]   8/13/2025 10:15 AM Heraclio Carr DPM ECPLPOD2 [facility]   LOV  6-25-25 SM    Triamterene-hydrochlorothiazide 37.5-25 mg  Filled 4-14-25  Qty 90  0 refills  Future Appointments   Date Time Provider Department Center   8/11/2025  9:00 AM Bijan Downs MD EE [id number] [facility]   8/13/2025 10:15 AM Heraclio Carr DPM ECPLPOD2 [facility]   LOV 6-25-25 SM

## 2025-06-29 RX ORDER — BACLOFEN 20 MG/1
20 TABLET ORAL DAILY
Qty: 90 TABLET | Refills: 0 | Status: SHIPPED | OUTPATIENT
Start: 2025-06-29

## 2025-06-29 RX ORDER — LEVOTHYROXINE SODIUM 25 UG/1
25 TABLET ORAL
Qty: 90 TABLET | Refills: 0 | Status: SHIPPED | OUTPATIENT
Start: 2025-06-29

## 2025-06-29 RX ORDER — ATORVASTATIN CALCIUM 20 MG/1
20 TABLET, FILM COATED ORAL NIGHTLY
Qty: 90 TABLET | Refills: 0 | Status: SHIPPED | OUTPATIENT
Start: 2025-06-29

## 2025-06-29 RX ORDER — TRIAMTERENE AND HYDROCHLOROTHIAZIDE 37.5; 25 MG/1; MG/1
1 TABLET ORAL DAILY
Qty: 90 TABLET | Refills: 0 | Status: SHIPPED | OUTPATIENT
Start: 2025-06-29

## 2025-07-01 ENCOUNTER — MED REC SCAN ONLY (OUTPATIENT)
Facility: LOCATION | Age: 75
End: 2025-07-01

## 2025-07-24 ENCOUNTER — PATIENT MESSAGE (OUTPATIENT)
Dept: INTERNAL MEDICINE CLINIC | Facility: CLINIC | Age: 75
End: 2025-07-24

## 2025-07-24 DIAGNOSIS — H53.9 VISION CHANGES: Primary | ICD-10-CM

## 2025-07-28 ENCOUNTER — LAB ENCOUNTER (OUTPATIENT)
Dept: LAB | Age: 75
End: 2025-07-28
Attending: FAMILY MEDICINE
Payer: MEDICARE

## 2025-07-28 DIAGNOSIS — I10 PRIMARY HYPERTENSION: ICD-10-CM

## 2025-07-28 DIAGNOSIS — M85.80 OSTEOPENIA, UNSPECIFIED LOCATION: ICD-10-CM

## 2025-07-28 DIAGNOSIS — R73.03 PREDIABETES: ICD-10-CM

## 2025-07-28 DIAGNOSIS — E03.9 HYPOTHYROIDISM, UNSPECIFIED TYPE: ICD-10-CM

## 2025-07-28 DIAGNOSIS — E78.00 PURE HYPERCHOLESTEROLEMIA, UNSPECIFIED: ICD-10-CM

## 2025-07-28 DIAGNOSIS — G35 MULTIPLE SCLEROSIS (HCC): ICD-10-CM

## 2025-07-28 LAB
ALBUMIN SERPL-MCNC: 4.4 G/DL (ref 3.2–4.8)
ALBUMIN/GLOB SERPL: 1.6 (ref 1–2)
ALP LIVER SERPL-CCNC: 63 U/L (ref 55–142)
ALT SERPL-CCNC: 17 U/L (ref 10–49)
ANION GAP SERPL CALC-SCNC: 9 MMOL/L (ref 0–18)
AST SERPL-CCNC: 18 U/L (ref ?–34)
BASOPHILS # BLD AUTO: 0.06 X10(3) UL (ref 0–0.2)
BASOPHILS NFR BLD AUTO: 0.8 %
BILIRUB SERPL-MCNC: 0.5 MG/DL (ref 0.2–1.1)
BUN BLD-MCNC: 18 MG/DL (ref 9–23)
CALCIUM BLD-MCNC: 9.6 MG/DL (ref 8.7–10.6)
CHLORIDE SERPL-SCNC: 105 MMOL/L (ref 98–112)
CHOLEST SERPL-MCNC: 168 MG/DL (ref ?–200)
CO2 SERPL-SCNC: 29 MMOL/L (ref 21–32)
CREAT BLD-MCNC: 0.88 MG/DL (ref 0.55–1.02)
EGFRCR SERPLBLD CKD-EPI 2021: 68 ML/MIN/1.73M2 (ref 60–?)
EOSINOPHIL # BLD AUTO: 0.41 X10(3) UL (ref 0–0.7)
EOSINOPHIL NFR BLD AUTO: 5.6 %
ERYTHROCYTE [DISTWIDTH] IN BLOOD BY AUTOMATED COUNT: 14.1 %
EST. AVERAGE GLUCOSE BLD GHB EST-MCNC: 126 MG/DL (ref 68–126)
FASTING PATIENT LIPID ANSWER: YES
FASTING STATUS PATIENT QL REPORTED: YES
GLOBULIN PLAS-MCNC: 2.8 G/DL (ref 2–3.5)
GLUCOSE BLD-MCNC: 99 MG/DL (ref 70–99)
HBA1C MFR BLD: 6 % (ref ?–5.7)
HCT VFR BLD AUTO: 40.8 % (ref 35–48)
HDLC SERPL-MCNC: 68 MG/DL (ref 40–59)
HGB BLD-MCNC: 13.1 G/DL (ref 12–16)
IMM GRANULOCYTES # BLD AUTO: 0.02 X10(3) UL (ref 0–1)
IMM GRANULOCYTES NFR BLD: 0.3 %
LDLC SERPL CALC-MCNC: 85 MG/DL (ref ?–100)
LYMPHOCYTES # BLD AUTO: 2.1 X10(3) UL (ref 1–4)
LYMPHOCYTES NFR BLD AUTO: 28.6 %
MCH RBC QN AUTO: 29.2 PG (ref 26–34)
MCHC RBC AUTO-ENTMCNC: 32.1 G/DL (ref 31–37)
MCV RBC AUTO: 91.1 FL (ref 80–100)
MONOCYTES # BLD AUTO: 0.69 X10(3) UL (ref 0.1–1)
MONOCYTES NFR BLD AUTO: 9.4 %
NEUTROPHILS # BLD AUTO: 4.05 X10 (3) UL (ref 1.5–7.7)
NEUTROPHILS # BLD AUTO: 4.05 X10(3) UL (ref 1.5–7.7)
NEUTROPHILS NFR BLD AUTO: 55.3 %
NONHDLC SERPL-MCNC: 100 MG/DL (ref ?–130)
OSMOLALITY SERPL CALC.SUM OF ELEC: 298 MOSM/KG (ref 275–295)
PLATELET # BLD AUTO: 346 10(3)UL (ref 150–450)
POTASSIUM SERPL-SCNC: 3.9 MMOL/L (ref 3.5–5.1)
PROT SERPL-MCNC: 7.2 G/DL (ref 5.7–8.2)
RBC # BLD AUTO: 4.48 X10(6)UL (ref 3.8–5.3)
SODIUM SERPL-SCNC: 143 MMOL/L (ref 136–145)
TRIGL SERPL-MCNC: 83 MG/DL (ref 30–149)
TSI SER-ACNC: 3.63 UIU/ML (ref 0.55–4.78)
VIT D+METAB SERPL-MCNC: 88.7 NG/ML (ref 30–100)
VLDLC SERPL CALC-MCNC: 13 MG/DL (ref 0–30)
WBC # BLD AUTO: 7.3 X10(3) UL (ref 4–11)

## 2025-07-28 PROCEDURE — 82306 VITAMIN D 25 HYDROXY: CPT

## 2025-07-28 PROCEDURE — 80061 LIPID PANEL: CPT

## 2025-07-28 PROCEDURE — 80053 COMPREHEN METABOLIC PANEL: CPT

## 2025-07-28 PROCEDURE — 84443 ASSAY THYROID STIM HORMONE: CPT

## 2025-07-28 PROCEDURE — 85025 COMPLETE CBC W/AUTO DIFF WBC: CPT

## 2025-07-28 PROCEDURE — 36415 COLL VENOUS BLD VENIPUNCTURE: CPT

## 2025-07-28 PROCEDURE — 83036 HEMOGLOBIN GLYCOSYLATED A1C: CPT

## 2025-07-28 NOTE — TELEPHONE ENCOUNTER
SM: Please see MCM. Pended referral for Dr. Wayne per patient request. Please sign if appropriate. TY

## 2025-08-09 DIAGNOSIS — I10 PRIMARY HYPERTENSION: ICD-10-CM

## 2025-08-10 RX ORDER — AMLODIPINE BESYLATE 5 MG/1
10 TABLET ORAL DAILY
Qty: 180 TABLET | Refills: 0 | Status: SHIPPED | OUTPATIENT
Start: 2025-08-10

## 2025-08-11 ENCOUNTER — OFFICE VISIT (OUTPATIENT)
Facility: CLINIC | Age: 75
End: 2025-08-11

## 2025-08-11 VITALS
HEART RATE: 69 BPM | OXYGEN SATURATION: 94 % | WEIGHT: 190 LBS | HEIGHT: 64 IN | RESPIRATION RATE: 16 BRPM | BODY MASS INDEX: 32.44 KG/M2 | SYSTOLIC BLOOD PRESSURE: 118 MMHG | DIASTOLIC BLOOD PRESSURE: 62 MMHG

## 2025-08-11 DIAGNOSIS — R05.3 CHRONIC COUGH: Primary | ICD-10-CM

## 2025-08-11 PROCEDURE — 1159F MED LIST DOCD IN RCRD: CPT | Performed by: INTERNAL MEDICINE

## 2025-08-11 PROCEDURE — 1160F RVW MEDS BY RX/DR IN RCRD: CPT | Performed by: INTERNAL MEDICINE

## 2025-08-11 PROCEDURE — 99204 OFFICE O/P NEW MOD 45 MIN: CPT | Performed by: INTERNAL MEDICINE

## 2025-08-11 PROCEDURE — 3078F DIAST BP <80 MM HG: CPT | Performed by: INTERNAL MEDICINE

## 2025-08-11 PROCEDURE — 3074F SYST BP LT 130 MM HG: CPT | Performed by: INTERNAL MEDICINE

## 2025-08-11 PROCEDURE — 3008F BODY MASS INDEX DOCD: CPT | Performed by: INTERNAL MEDICINE

## 2025-08-11 RX ORDER — BUDESONIDE AND FORMOTEROL FUMARATE DIHYDRATE 160; 4.5 UG/1; UG/1
2 AEROSOL RESPIRATORY (INHALATION) 2 TIMES DAILY
Qty: 10.2 G | Refills: 5 | Status: SHIPPED | OUTPATIENT
Start: 2025-08-11

## 2025-08-13 ENCOUNTER — OFFICE VISIT (OUTPATIENT)
Facility: LOCATION | Age: 75
End: 2025-08-13

## 2025-08-13 DIAGNOSIS — Z98.1 STATUS POST ANKLE ARTHRODESIS: Primary | ICD-10-CM

## 2025-08-13 DIAGNOSIS — G35 MULTIPLE SCLEROSIS (HCC): ICD-10-CM

## 2025-08-13 DIAGNOSIS — M25.471 EDEMA OF RIGHT ANKLE: ICD-10-CM

## 2025-08-22 RX ORDER — ESCITALOPRAM OXALATE 20 MG/1
20 TABLET ORAL DAILY
Qty: 90 TABLET | Refills: 0 | Status: SHIPPED | OUTPATIENT
Start: 2025-08-22

## (undated) DIAGNOSIS — E78.5 HYPERLIPIDEMIA, UNSPECIFIED HYPERLIPIDEMIA TYPE: ICD-10-CM

## (undated) DIAGNOSIS — F33.0 MILD RECURRENT MAJOR DEPRESSION (HCC): ICD-10-CM

## (undated) DIAGNOSIS — I10 ESSENTIAL HYPERTENSION: ICD-10-CM

## (undated) DIAGNOSIS — F41.9 ANXIETY: ICD-10-CM

## (undated) DEVICE — CONT SPEC 4OZ POLYPR GRAD LEAK

## (undated) DEVICE — GLOVE SURG TRIUMPH SZ 7

## (undated) DEVICE — KIT CUSTOM ENDOPROCEDURE STERIS

## (undated) DEVICE — SWORD PIN PACK: Brand: KNEE INSTRUMENTS

## (undated) DEVICE — KIT VLV 5 PC AIR H2O SUCT BX ENDOGATOR CONN

## (undated) DEVICE — GLOVE ORTHO ALOETOUCH SZ 7

## (undated) DEVICE — Device

## (undated) DEVICE — GOWN AERO CHROME XXL

## (undated) DEVICE — 3M™ IOBAN™ 2 ANTIMICROBIAL INCISE DRAPE 6651EZ: Brand: IOBAN™ 2

## (undated) DEVICE — SOLUTION SCRB 4OZ 4% CHG ANTISEPSIS HIBICLN

## (undated) DEVICE — ANTIBACTERIAL UNDYED BRAIDED (POLYGLACTIN 910), SYNTHETIC ABSORBABLE SUTURE: Brand: COATED VICRYL

## (undated) DEVICE — BLOCK BITE MAXI 60FR

## (undated) DEVICE — NVM5 NEEDLE MODULE S

## (undated) DEVICE — Device: Brand: STABLECUT®

## (undated) DEVICE — SHEET,DRAPE,70X100,STERILE: Brand: MEDLINE

## (undated) DEVICE — SOLUTION RUBBING 4OZ 70% ISO ALC CLR

## (undated) DEVICE — SLEEVE COMPR MD KNEE LEN SGL USE KENDALL SCD

## (undated) DEVICE — 1200CC GUARDIAN II: Brand: GUARDIAN

## (undated) DEVICE — SHORT THREADED PINS PACK: Brand: KNEE INSTRUMENTS

## (undated) DEVICE — BITEBLOCK ENDOSCP 60FR MAXI STRP

## (undated) DEVICE — FLOSEAL SEALENT STERILE 10ML

## (undated) DEVICE — CHANNEL RFA ENDOSCOPIC CATHETER,ESOPHAGEAL TTS, 120 APPLICATIONS: Brand: BARRX

## (undated) DEVICE — DRAPE,U/SHT,SPLIT,FILM,60X84,STERILE: Brand: MEDLINE

## (undated) DEVICE — 10FT COMBINED O2 DELIVERY/CO2 MONITORING. FILTER WITH MICROSTREAM TYPE LUER: Brand: DUAL ADULT NASAL CANNULA

## (undated) DEVICE — DRESS WOUND AQUACEL 3.5INX12IN

## (undated) DEVICE — SCALPEL .063IN KT PRFCT SRG

## (undated) DEVICE — LAPAROTOMY SPONGE - RF AND X-RAY DETECTABLE PRE-WASHED: Brand: SITUATE

## (undated) DEVICE — TRANSPOSAL ULTRAFLEX DUO/QUAD ULTRA CART MANIFOLD

## (undated) DEVICE — NEEDLE SPINAL 18X3-1/2 PINK.

## (undated) DEVICE — EVEREST ADD ON KIT 1.4MM

## (undated) DEVICE — THREADED PINS PACK: Brand: KNEE INSTRUMENTS

## (undated) DEVICE — VALVE KIT SGL USE DEFENDO

## (undated) DEVICE — DRESSING ANTIMIC 3.5 X 12 IN AQCEL AG ADVNTG

## (undated) DEVICE — SOLUTION  .9 3000ML

## (undated) DEVICE — SMOOTH PINS PACK: Brand: KNEE INSTRUMENTS

## (undated) DEVICE — CONTAINER,SPECIMEN,PNEU TUBE,4OZ,OR STRL: Brand: MEDLINE

## (undated) DEVICE — WRAP COOLING BACK W/NO PILLOW

## (undated) DEVICE — SYRINGE MED 30ML STD CLR PLAS LL TIP N CTRL

## (undated) DEVICE — TUBING MEGADYNE SPECULUM

## (undated) DEVICE — ATTAHJA VAC WITH 22MM CONNECTR

## (undated) DEVICE — SURGICAL SCRB HIBICLENS 4OZ

## (undated) DEVICE — NVM5 NEEDLE MODULE M/E

## (undated) DEVICE — DRAPE,TOWEL,LARGE,INVISISHIELD: Brand: MEDLINE

## (undated) DEVICE — GLOVE SUR 8 SENSICARE PI PIP CRM PWD F

## (undated) DEVICE — TOWEL SURG OR 17X30IN BLUE

## (undated) DEVICE — 3M(TM) TEGADERM(TM) TRANSPARENT FILM DRESSING FRAME STYLE 1628: Brand: 3M™ TEGADERM™

## (undated) DEVICE — E-Z BUTTON SWITCH PENCIL

## (undated) DEVICE — NEEDLE SPNL 8GA BVL TIP

## (undated) DEVICE — DISPOSABLE TOURNIQUET CUFF SINGLE BLADDER, DUAL PORT AND QUICK CONNECT CONNECTOR: Brand: COLOR CUFF

## (undated) DEVICE — SUT VICRYL 2-0 CP-1 J266H

## (undated) DEVICE — BOWL CEMENT MIX QUICK-VAC

## (undated) DEVICE — GLOVE SUR 8.5 SENSICARE PI PIP GRN PWD F

## (undated) DEVICE — ALCOHOL 70% 4 OZ

## (undated) DEVICE — BOWL BNE CEM MIX DISP QUIK-VAC

## (undated) DEVICE — GMK SPHERE KNEE: Type: IMPLANTABLE DEVICE | Site: KNEE

## (undated) DEVICE — COVER,TABLE,HVY DUTY,EXT,77"X96",STRL: Brand: MEDLINE

## (undated) DEVICE — TIP CLEANER: Brand: VALLEYLAB

## (undated) DEVICE — STERILE POLYISOPRENE POWDER-FREE SURGICAL GLOVES: Brand: PROTEXIS

## (undated) DEVICE — SOLUTION IRRIG 3000ML 0.9% NACL FLX CONT

## (undated) DEVICE — HOOD, PEEL-AWAY: Brand: FLYTE

## (undated) DEVICE — 3M™ RED DOT™ MONITORING ELECTRODE WITH FOAM TAPE AND STICKY GEL, 50/BAG, 20/CASE, 72/PLT 2570: Brand: RED DOT™

## (undated) DEVICE — SUT ETHBND XL 5 30IN V-37 NABSRB GRN 40MM 1/2

## (undated) DEVICE — 3.0MM PRECISION NEURO (MATCH HEAD)

## (undated) DEVICE — NVM5 PROBE SNGL USE STER PKG

## (undated) DEVICE — BIOGUARD CLEANING ADAPTER

## (undated) DEVICE — PENCIL ES BTTN SWCH W/ TIP HOLSTER E-Z CLN

## (undated) DEVICE — VIOLET BRAIDED (POLYGLACTIN 910), SYNTHETIC ABSORBABLE SUTURE: Brand: COATED VICRYL

## (undated) DEVICE — BANDAGE COHESIVE W4INXL5YD TAN E POR SLF ADH

## (undated) DEVICE — 3M™ STERI-DRAPE™ U-DRAPE 1015: Brand: STERI-DRAPE™

## (undated) DEVICE — LAMINECTOMY CDS: Brand: MEDLINE INDUSTRIES, INC.

## (undated) DEVICE — PREMIUM WET SKIN PREP TRAY: Brand: MEDLINE INDUSTRIES, INC.

## (undated) DEVICE — SUTURE VICRYL 2-0 CP-2

## (undated) DEVICE — SUTURE MONOCRYL 3-0 PS-2

## (undated) DEVICE — LIGHT HANDLE

## (undated) DEVICE — HOOD: Brand: FLYTE

## (undated) DEVICE — STERILE POLYISOPRENE POWDER-FREE SURGICAL GLOVES WITH EMOLLIENT COATING: Brand: PROTEXIS

## (undated) DEVICE — DRAPE TABLE COVER 44X90 TC-10

## (undated) DEVICE — SOL  .9 1000ML BTL

## (undated) DEVICE — GLOVE SURG TRIUMPH SZ 9

## (undated) DEVICE — TRAP SPECIMEN MUCOUS 70 CUBIC CENTIMETER POLYURETHANE STERILE NOT MADE WITH NATURAL RUBBER LATEX MEDICHOICE: Brand: MEDICHOICE

## (undated) DEVICE — CATHETER 60 RFA FOCAL

## (undated) DEVICE — NEEDLE SPNL 18GA L3.5IN PNK QNCKE STYL DISP

## (undated) DEVICE — TOTAL HIP CDS: Brand: MEDLINE INDUSTRIES, INC.

## (undated) DEVICE — 1010 S-DRAPE TOWEL DRAPE 10/BX: Brand: STERI-DRAPE™

## (undated) DEVICE — C-ARMOR C-ARM EQUIPMENT COVERS CLEAR STERILE UNIVERSAL FIT 12 PER CASE: Brand: C-ARMOR

## (undated) DEVICE — COVER LT HNDL RIG FOR SUR CAM DISP

## (undated) DEVICE — PADDING CAST W4INXL4YD 100% COT SFT SELF BOND

## (undated) DEVICE — BNDG COHESIVE W4INXL5YD TAN E

## (undated) DEVICE — GLOVE BIOGEL M SURG SZ 9

## (undated) DEVICE — SUT ETHIBOND 5 V-37 MB66G

## (undated) DEVICE — WRAP COOLING KNEE W/ICE PILLOW

## (undated) DEVICE — SOLUTION PREP 4OZ 10% POVIDONE IOD SCR TOP

## (undated) DEVICE — KENDALL SCD EXPRESS SLEEVES, THIGH LENGTH, MEDIUM: Brand: KENDALL SCD

## (undated) DEVICE — DRAPE C-ARM UNIVERSAL

## (undated) DEVICE — WRAP COMPR UNIV KNEE HOT CLD GEL MICWV AND

## (undated) DEVICE — STERIS KITS

## (undated) DEVICE — SCREWS PACK: Brand: KNEE INSTRUMENTS

## (undated) DEVICE — TOWEL,OR,DSP,ST,BLUE,DLX,2/PK,40PK/CS: Brand: MEDLINE

## (undated) DEVICE — GIJAW SINGLE-USE BIOPSY FORCEPS WITH NEEDLE: Brand: GIJAW

## (undated) DEVICE — DRILL SRG OIL CRTDG MAESTRO

## (undated) DEVICE — GLOVE SUR 7.5 SENSICARE PI PIP CRM PWD F

## (undated) DEVICE — GOWN,SIRUS,FABRIC-REINFORCED,X-LARGE: Brand: MEDLINE

## (undated) DEVICE — V2 SPECIMEN COLLECTION MANIFOLD KIT: Brand: NEPTUNE

## (undated) DEVICE — SCRUB PVP -1 PREP SOLUTION 4OZ

## (undated) DEVICE — PADDING CAST COTTON  4

## (undated) DEVICE — SYRINGE 30ML LL TIP

## (undated) DEVICE — EVEREST 1 LEVEL KIT 1.4MM STRL

## (undated) DEVICE — DRAPE MICROSCOPE NEURO PENTERO

## (undated) DEVICE — GLOVE SUR 7.5 SENSICARE PI PIP GRN PWD F

## (undated) DEVICE — SLEEVE KENDALL SCD EXPRESS MED

## (undated) DEVICE — SOLUTION ANSEP 70% ISOPRPNL

## (undated) NOTE — MR AVS SNAPSHOT
[facility]  [address] [address]  [phone]               Thank you for choosing us for your health care visit with Alia Jaramillo PT. We are glad to serve you and happy to provide you with this summary of your visit.   Please * baclofen 20 MG Tabs   TAKE 1 TABLET(20 MG) BY MOUTH THREE TIMES DAILY   Commonly known as:  LIORESAL           * baclofen 20 MG Tabs   TAKE 1 TABLET(20 MG) BY MOUTH THREE TIMES DAILY   Commonly known as:  LIORESAL           celecoxib 200 MG Caps   TA office, you can view your past visit information in [employer] by going to Visits < Visit Summaries. [employer] questions? Call [phone] for help. [employer] is NOT to be used for urgent needs. For medical emergencies, dial 911.            Visit [facility]

## (undated) NOTE — MR AVS SNAPSHOT
[place]  [address] [facility] [address]  [address] [facility] [address] [phone]               Thank you for choosing us for your health care visit with Mick Guzman MD.  We are glad to serve you and happy to provide you with this sum AmLODIPine Besylate 5 MG Tabs   Take 2 tablets (10 mg total) by mouth daily. Commonly known as:  NORVASC           Atorvastatin Calcium 20 MG Tabs   Take 1 tablet (20 mg total) by mouth nightly.  Pt due for labs   Commonly known as:  LIPITOR           ba [email or web address]. If you've recently had a stay at the Hospital you can access your discharge instructions in [employer] by going to Visits < Admission Summaries.  If you've been to the Emergency Department or your doctor's office, you can view yo

## (undated) NOTE — MR AVS SNAPSHOT
FELIPE Leyva  [address] [address]  [phone]               Thank you for choosing us for your health care visit with Wilson Craven PT. We are glad to serve you and happy to provide you with this summary of your visit. atorvastatin 20 MG Tabs   Take 1 tablet (20 mg total) by mouth nightly.  Pt due for labs   Commonly known as:  LIPITOR           * baclofen 20 MG Tabs   TAKE 1 TABLET(20 MG) BY MOUTH THREE TIMES DAILY   Commonly known as:  LIORESAL           * baclofen 20 You can access your MyChart to more actively manage your health care and view more details from this visit by going to [email or web address].   If you've recently had a stay at the Hospital you can access your discharge instructions in [address] by luci

## (undated) NOTE — Clinical Note
I had the pleasure of seeing Lamin Hayes on 8/17/2022. Please see my attached note.     Alejo Mcintosh MD FACS  [facility]--Surgery

## (undated) NOTE — MR AVS SNAPSHOT
FEILPE Leyva  [address] [address]  [phone]               Thank you for choosing us for your health care visit with Jocelyne Chau PT. We are glad to serve you and happy to provide you with this summary of your visit.   Please * baclofen 20 MG Tabs   TAKE 1 TABLET(20 MG) BY MOUTH THREE TIMES DAILY   Commonly known as:  LIORESAL           * baclofen 20 MG Tabs   TAKE 1 TABLET(20 MG) BY MOUTH THREE TIMES DAILY   Commonly known as:  LIORESAL           celecoxib 200 MG Caps   TA Visits < Visit Summaries. MyChart questions? Call [phone] for help. Swift[employer]MD(TM) by [employer] is NOT to be used for urgent needs. For medical emergencies, dial 911.            Visit [employer] online at  [email or web address]

## (undated) NOTE — MR AVS SNAPSHOT
FELIPE Leyva  [address] [address] [phone]               Thank you for choosing us for your health care visit with Wilson Craven PT. We are glad to serve you and happy to provide you with this summary of your visit. testing room. Parents will remain in the MRI waiting area and be reunited with the child upon completion of the MRI.             Jun 05, 2017  9:15 AM   PT VISIT BY THERAPIST with Wilson Craven, PT   [facility] [place] ([facility] vaginally. Levothyroxine Sodium 25 MCG Tabs   Take 25 mcg by mouth before breakfast.   Commonly known as:  SYNTHROID, LEVOTHROID           omeprazole 20 MG Cpdr   Take 1 capsule (20 mg total) by mouth every morning.    Commonly known as:  RI

## (undated) NOTE — MR AVS SNAPSHOT
FELIPE Leyva  [address] [address] [phone]               Thank you for choosing us for your health care visit with Wilson Craven PT. We are glad to serve you and happy to provide you with this summary of your visit. atorvastatin 20 MG Tabs   Take 1 tablet (20 mg total) by mouth nightly.  Pt due for labs   Commonly known as:  LIPITOR           * baclofen 20 MG Tabs   TAKE 1 TABLET(20 MG) BY MOUTH THREE TIMES DAILY   Commonly known as:  LIORESAL           * baclofen 20 discharge instructions in [employer] by going to Visits < Admission Summaries. If you've been to the Emergency Department or your doctor's office, you can view your past visit information in [employer] by going to Visits < Visit Summaries. Mobile Complete questions?

## (undated) NOTE — LETTER
08/07/20        [address]  [address]      Dear Stephenie Hernadez,    [facility] records indicate that you have outstanding lab work and or testing that was ordered for you and has not yet been completed: Fasting lab work - (at least 10 ho

## (undated) NOTE — IP AVS SNAPSHOT
Patient Demographics     Address Phone E-mail Address    [place] [address] [phone] [facility]  [phone] (Mobile) *Preferred* [email or web address]      Emergency Contact(s)     Name Relation Home Work [employer] Spouse [phone] ? Anticipate wearing for 6-12 weeks after surgery; exact time to be determined by surgeon. Discuss at office visit.   ? Put brace on:  o  when sitting/standing at side of bed            Incision site care and dressing  Changes as directed by your surgeon taking narcotics to prevent constipation; use laxatives such as Miralax or Milk of Magnesia as needed. ? An enema or suppository may be needed if above measures do not work. No smoking  ? Smoking will inhibit the spine from healing with a solid fusion. ? Increased pain, swelling, redness, or any drainage to incision. ? Separation of incision. .  ? Sudden reappearance of pain that won’t go away with pain medication. ? New numbness or weakness to arms or legs. ?  Difficulty urinating or having bowel movem Take 1 tablet (10 mg total) by mouth 3 (three) times daily. Lindsay Oliva                        DAILY VALUE MULTIVITAMIN Tabs        Take 1 tablet by mouth daily.                             escitalopram 20 MG Tabs   Last time this was given:  20 mg Bring a paper prescription for each of these medications    - Cyclobenzaprine HCl 10 MG Tabs  - oxyCODONE-acetaminophen 5-325 MG Tabs            [id number] - MAR ACTION REPORT  (last 24 hrs)    ** SITE UNKNOWN **     Order ID Medication Name Action Time Ac Temp 98 °F (36.7 °C) Filed at 04/24/2017 1211    SpO2 95 % Filed at 04/24/2017 1211      Patient's Most Recent Weight       Most Recent Value    Patient Weight 88 kg (194 lb 0.1 oz)      Lab Results Last 24 Hours     No matching results found      Microbi extension, lateral rotation   and lateral flexion of cervical spine. No JVD. Supple.    Back : symmetric, no prominence, non tender, no step off   Spine is balance in the sagittal and coronal plane  Gait : antalgic    Lower Extremities Motor Exam:    Deep Lumbar spinal stenosis     Multiple sclerosis (HCC)     Essential hypertension     Esophageal reflux     Depressive disorder, not elsewhere classified     Osteoarthrosis, unspecified whether generalized or localized, lower leg     Hyperlipidemia     Imp 2011. She is on Tecfidera. She has a prior history of cervical spinal fusion.         History:  Past Medical History   Diagnosis Date   • BACK PAIN      in morning   • OSTEOARTHRITIS    • OTHER DISEASES      MS dx 0 Dr. Dustin Bob   • Basal cell carcinoma •  MethylPREDNISolone Sodium Succ (Solu-MEDROL) 500 mg in sodium chloride 0.9 % 100 mL IVPB, 500 mg, Intravenous, Q12H  •  sodium chloride 0.9% IV bolus 500 mL, 500 mL, Intravenous, Once  •  oxyCODONE-acetaminophen (PERCOCET) 5-325 MG per tab 1 tablet, 1 t •  escitalopram (LEXAPRO) tab 20 mg, 20 mg, Oral, Nightly    Review of Systems:  A 10-point system was reviewed. Pertinent positives and negatives are noted in HPI.       Physical Exam:  Blood pressure 140/70, pulse 75, temperature 98.7 °F (37.1 °C), tempe Hypothyroidism     Anterior leg pain     Urine retention     Urinary tract infection without hematuria        Impression:  Urinary retention and gait unsteadiness  Possible MS flare vs. pseudoexacerbation vs. R/o less likely cervical spinal stenosis/mye Presenting Problem: s/p lumbar fusion    Problem List  Principal Problem:    Lumbar spinal stenosis  Active Problems:    Multiple sclerosis (HCC)    Essential hypertension    Esophageal reflux    Depression    Hyperlipidemia    Hypothyroidism    Anterior l Precautions: Spine; Other (Comment) (pt has co-morbidity of MS)    WEIGHT BEARING RESTRICTION  Weight Bearing Restriction: None                PAIN ASSESSMENT   Ratin  Location: low back and L LE  Management Techniques: Activity promotion; Body mechanics Patient transferred sit to stand with supervision. Patient ambulated 150 ft to rehab gym with rolling walker and CGA. Patient ascended and descended 8 stairs with CGA to min assist for safety and instance of left knee buckling and loss of balance.  Patient assistance level: moderate assistance     MET 04/23/2017; New Goal: SBA-Goal met 4/24/17   Goal #3   Patient is able to ambulate 50 feet with assist device: walker - rolling at assistance level: minimum assistance     MET 04/23/2017; New Goal 200ft with SB chronic side effects include fatique and \"brain fog\" and decreased vision of left eye, left sided weakness due to MS   • PONV (postoperative nausea and vomiting)      severe - projectile vomiting for the entire night    • Back problem    • Osteoarthrit wheelchair, bedside commode, etc.): A Little   -   Moving from lying on back to sitting on the side of the bed?: None   How much help from another person does the patient currently need. ..   -   Moving to and from a bed to a chair (including a wheelchair)? Patient End of Session: Up in chair;Needs met;Call light within reach; All patient questions and concerns addressed (Awaiting transport)    ASSESSMENT   Patient is a 79year old female admitted s/p lumbar fusion on 4/18/17. PMH includes MS.  Patient continue Number of Visits to Meet Established Goals: 4    Presenting Problem: s/p lumbar fusion    Problem List  Principal Problem:    Lumbar spinal stenosis  Active Problems:    Multiple sclerosis (HCC)    Essential hypertension    Esophageal reflux    Depression Precautions: Spine; Other (Comment) (pt has co-morbidity of MS)    WEIGHT BEARING RESTRICTION  Weight Bearing Restriction: None                PAIN ASSESSMENT   Rating: 3  Location: low back and L LE  Management Techniques: Activity promotion; Body mechanics manage clothing independently. Pt sit->stand from toilet to RW with SBA. Ambulated 3ft to sink using RW and washed hands with SBA. Pt then ambulated 200' in hallway using RW with CGA.   Pt demonstrated proper UE placement with sit<->stand transfers today Goal #3  Patient is able to ambulate 50 feet with assist device: walker - rolling at assistance level: minimum assistance    MET 04/23/2017; New Goal 200ft with SBA   Goal #4  Patient to be able to independently verbalize 3/3 spinal precautions MET 04/23/2 • High blood pressure    • High cholesterol    • Disorder of thyroid    • Multiple sclerosis ([facility] [address] 2000     chronic side effects include fatique and \"brain fog\" and decreased vision of left eye, left sided weakness due to MS   • PONV (postoperative nause wheelchair, bedside commode, etc.): A Lot   -   Moving from lying on back to sitting on the side of the bed?: A Lot   How much help from another person does the patient currently need. ..   -   Moving to and from a bed to a chair (including a wheelchair)?: Patient is a 79year old female admitted 4/18/2017 for lumbar fusion. Significant PMH includes MS. Pt demonstrated improved sequencing and safe use of RW during gait today.   The patient presents with the following impairments: pain, decreased strength, d Version 1 of 1    Author:  Mini Barbosa OT Service:  (none) Author Type:  Occupational Therapist    Filed:  4/23/2017  2:30 PM Note Time:  4/23/2017  2:16 PM Status:  Signed    :  Mini Barbosa OT (Occupational Therapist)           OCCUPATIONAL THERAP • Muscle weakness      has MS   • Visual impairment      decreased vision left eye glasses       Past Surgical History      Past Surgical History    BACK SURGERY      Comment cervical surgery/ACDF x 3 with Dr. Ortiz Lowers          Comment x2    TOTAL ABD spinal precautions at beginning of the session.  Education and practice of sit-stand (min A w/ min cueing for hand placement), functional mobility for 100ft w/ rw (CGA ),  Toilet transfer (CGA), toileting and hygiene (SUP w/ over toilet commode ), LB dress equipment PRN and while maintaining back safety precautions -->Met  Patient will perform toileting: with min assist --> Met    Functional Transfer Goals  Patient will transfer to toilet:  with min assist --> Met    Additional Goals:   Pt will recall 3 out • [facility]     chronic side effects include fatique and \"brain fog\" and decreased vision of left eye, left sided weakness due to MS   • PONV (postoperative nausea and vomiting)      severe - projectile vomiting for the entire night Approx Degree of Impairment: 46.65%  Standardized Score (AM-PAC Scale): 38.66  CMS Modifier (G-Code): CK    FUNCTIONAL TRANSFER ASSESSMENT  Supine to Sit : Moderate assistance (x2 person)  Sit to Stand:  Moderate assistance    Skilled Therapy Provided: Pt r PLAN  OT Treatment Plan: Balance activities; Energy conservation/work simplification techniques;ADL training;Functional transfer training;Patient/Family education;Patient/Family training;Equipment eval/education; Compensatory technique education  [facility] [place]

## (undated) NOTE — MR AVS SNAPSHOT
FELIPE Leyva  [address] [address] [phone]               Thank you for choosing us for your health care visit with Wilson Craven PT. We are glad to serve you and happy to provide you with this summary of your visit. instructions when taking oral sedation. If you will be taking oral sedation, you must bring a  who will drive you home (the  does not necessarily have to stay throughout the procedure).   If you suspect you may be pregnant, please consult with TAKE 1 TABLET(20 MG) BY MOUTH THREE TIMES DAILY   Commonly known as:  LIORESAL           BLINK TEARS OP   1 drop by Each eye route as needed. celecoxib 200 MG Caps   Take 200 mg by mouth daily.    Commonly known as:  CeleBREX           DAILY VALUE Call [phone] for help. [employer] is NOT to be used for urgent needs. For medical emergencies, dial 911.         Educational Information     Healthy Diet and Regular Exercise  [employer] for making healthy food choices  -

## (undated) NOTE — LETTER
24    Orthopedic Surgery   Pre-Operative Clearance Request    Patient Name:   Makenzie Reyes             :   3/21/1950    Surgeon: Dr. Curry             Date of Surgery: 24    Surgical Procedure: RIGHT TOTAL KNEE ARTHROPLASTY.       Please complete all of the following 2-3 weeks PRIOR TO your scheduled surgery to avoid potential cancellation.     [x]  History and Physical        [x]  Medical  Clearance                     Required pre-op testing to be ordered:                        [x]  CBC W/Diff                                                                   [x]  CMP                                                                                       [x]  PT/INR    [x]  PTT     [x]  Type and Screen                    **Please fax test results, H&P, and clearance to [phone] and to P.A.T at [phone]**

## (undated) NOTE — LETTER
Patient Name: Neelam Scott  YOB: 1950          MRN number:  [id number]  Date:  7/25/2017  Referring Physician:  Katie Thompson    Discharge Summary  Initial Functional Outcome Score 32/100  Final Functional Outcome Score 32/100  Number Electronically signed by: Ravi Craven, PT, DPT, VAIBHAV, OCS      Physician's certification required? No    I certify the need for these services furnished under this plan of treatment and while under my care.     X____________________________________

## (undated) NOTE — LETTER
10/03/22  [facility]  Pre-Operative Clearance Request    Patient Name:   Ana Blount             :   3/21/1950    Surgeon: Dr. Radha Martinez             Date of Surgery: 22    Surgical Procedure: LEFT TOTAL KNEE ARTHROPLASTY       Please Complete: 2-3 WEEKS PRIOR TO SURGERY TO AVOID CANCELLATION OF SURGERY. [x]  History and Physical        [x]  Medical  Clearance                     Testing is ordered by Melisa DAHL per anesthesia guidelines                       [x]  CBC W/Diff                                                                   [x]  CMP                                                                                                                                                                                                  [x]  MRSA/MSSA Culture at [facility]/PAT                                       [x]  PT/INR    [x]  PTT     [x]  Type and Screen                     **Please fax test results, H&P, and clearance to [phone] and to P. A. T at [phone]**

## (undated) NOTE — MR AVS SNAPSHOT
FELIPE Leyva  [address] [address] [phone]               Thank you for choosing us for your health care visit with Dominick Braxton PT. We are glad to serve you and happy to provide you with this summary of your visit.   Please Take 1 tablet (20 mg total) by mouth nightly. Pt due for labs   Commonly known as:  LIPITOR           * baclofen 20 MG Tabs   Take 1 tablet (20 mg total) by mouth 3 (three) times daily.    Commonly known as:  LIORESAL           * baclofen 20 MG Tabs   TAKE If you've recently had a stay at the Hospital you can access your discharge instructions in [employer] by going to Visits < Admission Summaries.  If you've been to the Emergency Department or your doctor's office, you can view your past visit information in My

## (undated) NOTE — LETTER
[facility] Testing Department  Phone: [phone]  Right Fax: [phone]  [address] Timbo Pérez RN Date: 4/12/17    Patient Name: Melissa Tiffin  Surgery Date: 4/18/2017    CSN: [id number]  Medical Record: [id number]   D

## (undated) NOTE — LETTER
OUTSIDE TESTING RESULT REQUEST     IMPORTANT: FOR YOUR IMMEDIATE ATTENTION  Please FAX all test results listed below to: [phone]     Testing already done on or about: 3/11/24     * * * * If testing is NOT complete, arrange with patient A.S.A.P. * * * *      Patient Name: Makenzie Reyes  Surgery Date: 2024  Medical Record: [id number]  CSN: [id number]  : 3/21/1950 - A: 74 y     Sex: female  Surgeon(s):  Pratik Curry MD  Procedure: RIGHT TOTAL KNEE ARTHROPLASTY.  Anesthesia Type: Choice     Surgeon: Pratik Curry MD     The following Testing and Time Line are REQUIRED PER ANESTHESIA     EKG READ AND SIGNED WITHIN   90 days  CBC [with Differential & Platelets] within  90 days  CMP (requires 4 hour fast) within  90 days  PT/INR within  30 days  PTT within  30 days  Type and Screen for Pre-Admission Testing (must be within 28 days of surgery)  MSSA/MRSA Nasal screening within 30 days      Thank You,   Sent by:Camille Ojeda RN

## (undated) NOTE — LETTER
10/04/22  [facility]  Pre-Operative Clearance Request    Patient Name:   Elian Bruno             :   3/21/1950    Surgeon: Dr. Fernando Greene             Date of Surgery:22    Surgical Procedure: LEFT TOTAL KNEE ARTHROPLASTY       Please Complete: 2-3 WEEKS PRIOR TO SURGERY TO AVOID CANCELLATION OF SURGERY. [x]  History and Physical        [x]  Medical  Clearance                     Testing is ordered by Gabby DAHL per anesthesia guidelines                       [x]  CBC W/Diff                                                                   [x]  CMP                                                                                                                                                                                                   [x]  MRSA/MSSA Culture at [facility]/PAT                                       [x]  PT/INR    [x]  PTT     [x]  Type and Screen                       **Please fax test results, H&P, and clearance to [phone] and to P. A. T at [phone]**

## (undated) NOTE — MR AVS SNAPSHOT
FELIPE Leyva  [address] [address]  [phone]               Thank you for choosing us for your health care visit with Wilson Craven PT. We are glad to serve you and happy to provide you with this summary of your visit. atorvastatin 20 MG Tabs   Take 1 tablet (20 mg total) by mouth nightly.  Pt due for labs   Commonly known as:  LIPITOR           * baclofen 20 MG Tabs   TAKE 1 TABLET(20 MG) BY MOUTH THREE TIMES DAILY   Commonly known as:  LIORESAL           * baclofen 20 discharge instructions in [employer] by going to Visits < Admission Summaries. If you've been to the Emergency Department or your doctor's office, you can view your past visit information in [employer] by going to Visits < Visit Summaries. IQMS questions?

## (undated) NOTE — MR AVS SNAPSHOT
FELIPE Leyva  [address] [address]  [phone]               Thank you for choosing us for your health care visit with Wilson Craven PT. We are glad to serve you and happy to provide you with this summary of your visit. atorvastatin 20 MG Tabs   Take 1 tablet (20 mg total) by mouth nightly.  Pt due for labs   Commonly known as:  LIPITOR           * baclofen 20 MG Tabs   TAKE 1 TABLET(20 MG) BY MOUTH THREE TIMES DAILY   Commonly known as:  LIORESAL           * baclofen 20 discharge instructions in [employer] by going to Visits < Admission Summaries. If you've been to the Emergency Department or your doctor's office, you can view your past visit information in [employer] by going to Visits < Visit Summaries. Medio questions?

## (undated) NOTE — IP AVS SNAPSHOT
[facility] [place] [address] [place], [address] ~ [phone]                Discharge Summary   4/18/2017    Roland Miller           Admission Information        Provider Department    4/18/2017 Zulma Mckay MD  3sw-A Atorvastatin Calcium 20 MG Tabs   Last time this was given:  20 mg on 4/23/2017  9:08 PM   Commonly known as:  LIPITOR        Take 1 tablet (20 mg total) by mouth nightly.  Pt due for labs    [place], [address] [facility] [facility] [address], [phone], [address] [facility] [address]     Phone:  [phone]    - Triamterene-HCTZ 37.5-25 MG Tabs      Please  your prescriptions at the location directed by your doc determined by surgeon. Discuss at office visit. ? Put brace on:  o  when sitting/standing at side of bed            Incision site care and dressing  Changes as directed by your surgeon    ? Always wash hands before and after dressing changes.      KAYLYN Hawkins taking narcotics to prevent constipation; use laxatives such as Miralax or Milk of Magnesia as needed. ? An enema or suppository may be needed if above measures do not work. No smoking  ? Smoking will inhibit the spine from healing with a solid fusion. ? Increased pain, swelling, redness, or any drainage to incision. ? Separation of incision. .  ? Sudden reappearance of pain that won’t go away with pain medication. ? New numbness or weakness to arms or legs. ?  Difficulty urinating or having bowel movem (04/19/17)  17.0 (H) (04/19/17)  3.74 (L) (04/19/17)  11.4 (L) (04/19/17)  34.2 (04/19/17)  91.4    (04/19/17)  245.0     (04/18/17)  16.3 (H) (04/18/17)  4.34 (04/18/17)  13.6 (04/18/17)  40.1 (04/18/17)  92.4    (04/18/17)  263.0       Recent Hematology - If you don’t have insurance, Bruce Amador has partnered with [facility] [address] to help you get signed up for insurance coverage.   Patient [facility] is a Federal Navigator program that can help with your Affordable Care Act cover Most common side effects: Dizziness or feeling lightheaded (especially with standing), heart rate changes, headaches, nausea/vomiting   What to report to your healthcare team:  Dizziness, nausea, chest pain, weakness, numbness           Water Pills     Tri Use: Regulate metabolism and inflammation   Most common side effects:  Dizziness, swelling, appetite changes, weight changes, changes in sleep and alertness, palpitations   What to report to your healthcare team:  Changes in thinking, confusion, skin swell

## (undated) NOTE — MR AVS SNAPSHOT
[place]  [address] [facility] [address]  [address] [facility] [address] [phone]               Thank you for choosing us for your health care visit with Alvarez Roblero NP.   We are glad to serve you and happy to provide you with this sum Take 1 tablet (20 mg total) by mouth nightly. Pt due for labs   Commonly known as:  LIPITOR           * baclofen 20 MG Tabs   Take 1 tablet (20 mg total) by mouth 3 (three) times daily.    Commonly known as:  LIORESAL           * baclofen 20 MG Tabs   TAKE Assoc Dx:  Pre-op evaluation [Q94.366]           TSH [E]    Complete by: Feb 09, 2017 (Approximate)    Assoc Dx:  Hair loss [L65.9]           T4 FREE [E] (Free Thyroxine)    Complete by:   Feb 09, 2017 (Approximate)    Assoc Dx:  Hair loss [L65.9]

## (undated) NOTE — LETTER
AUTHORIZATION FOR SURGICAL OPERATION OR OTHER PROCEDURE    1. I hereby authorize Dr. PARRY PHYSICIAN:[id number]}, and [facility] staff assigned to my case to perform the following operation and/or procedure at the [facility] site:    _______________________________________________________________________________________________      _______________________________________________________________________________________________    2.  My physician has explained the nature and purpose of the operation or other procedure, possible alternative methods of treatment, the risks involved, and the possibility of complication to me.  I acknowledge that no guarantee has been made as to the result that may be obtained.  3.  I recognize that, during the course of this operation, or other procedure, unforseen conditions may necessitate additional or different procedure than those listed above.  I, therefore, further authorize and request that the above named physician, his/her physician assistants or designees perform such procedures as are, in his/her professional opinion, necessary and desirable.  4.  Any tissue or organs removed in the operation or other procedure may be disposed of by and at the discretion of the [facility] and [facility].  5.  I understand that in the event of a medical emergency, I will be transported by local paramedics to [facility] or other hospital emergency department.  6.  I certify that I have read and fully understand the above consent to operation and/or other procedure.    7.  I acknowledge that my physician has explained sedation/analgesia administration to me including the risks and benefits.  I consent to the administration of sedation/analgesia as may be necessary or desirable in the judgement of my physician.    Witness signature: ___________________________________________________ Date:  ______/______/_____                    Time:   ________ A.M.  P.M.       Patient Name:  ______________________________________________________  (please print)      Patient signature:  ___________________________________________________             Relationship to Patient:           []  Parent    Responsible person                          []  Spouse  In case of minor or                    [] Other  _____________   Incompetent name:  __________________________________________________                               (please print)      _____________      Responsible person  In case of minor or  Incompetent signature:  _______________________________________________    Statement of Physician  My signature below affirms that prior to the time of the procedure, I have explained to the patient and/or his/her guardian, the risks and benefits involved in the proposed treatment and any reasonable alternative to the proposed treatment.  I have also explained the risks and benefits involved in the refusal of the proposed treatment and have answered the patient's questions.                        Date:  ______/______/_______  Provider                      Signature:  __________________________________________________________       Time:  ___________ A.M    P.M.

## (undated) NOTE — MR AVS SNAPSHOT
FEL[place] Leyva  [address] [address]  [phone]               Thank you for choosing us for your health care visit with Jocelyne Chau PT. We are glad to serve you and happy to provide you with this summary of your visit.   Please Take 2 tablets (10 mg total) by mouth daily. Commonly known as:  NORVASC           Atorvastatin Calcium 20 MG Tabs   Take 1 tablet (20 mg total) by mouth nightly.  Pt due for labs   Commonly known as:  LIPITOR           * baclofen 20 MG Tabs   Take 1 tabl view more details from this visit by going to [email or web address]. If you've recently had a stay at the Hospital you can access your discharge instructions in [employer] by going to Visits < Admission Summaries.  If you've been to the Emergency Depar

## (undated) NOTE — MR AVS SNAPSHOT
FEL[place] Leyva  [address] [address]  [phone]               Thank you for choosing us for your health care visit with Wilson Craven PT. We are glad to serve you and happy to provide you with this summary of your visit. atorvastatin 20 MG Tabs   Take 1 tablet (20 mg total) by mouth nightly.  Pt due for labs   Commonly known as:  LIPITOR           * baclofen 20 MG Tabs   TAKE 1 TABLET(20 MG) BY MOUTH THREE TIMES DAILY   Commonly known as:  LIORESAL           * baclofen 20 discharge instructions in [employer] by going to Visits < Admission Summaries. If you've been to the Emergency Department or your doctor's office, you can view your past visit information in [employer] by going to Visits < Visit Summaries. [employer] questions?

## (undated) NOTE — LETTER
08/26/19        [address]  [address]      Dear Lul Cabral,    [address] [facility] records indicate that you have outstanding lab work and or testing that was ordered for you and has not yet been completed: Fasting lab work   *fast for at [employer]